# Patient Record
Sex: FEMALE | Race: BLACK OR AFRICAN AMERICAN | NOT HISPANIC OR LATINO | Employment: FULL TIME | URBAN - METROPOLITAN AREA
[De-identification: names, ages, dates, MRNs, and addresses within clinical notes are randomized per-mention and may not be internally consistent; named-entity substitution may affect disease eponyms.]

---

## 2017-03-29 ENCOUNTER — OFFICE VISIT (OUTPATIENT)
Dept: SLEEP MEDICINE | Facility: CLINIC | Age: 57
End: 2017-03-29
Payer: COMMERCIAL

## 2017-03-29 VITALS
SYSTOLIC BLOOD PRESSURE: 159 MMHG | WEIGHT: 293 LBS | DIASTOLIC BLOOD PRESSURE: 89 MMHG | BODY MASS INDEX: 50.02 KG/M2 | HEART RATE: 82 BPM | HEIGHT: 64 IN

## 2017-03-29 DIAGNOSIS — G47.00 INSOMNIA, UNSPECIFIED TYPE: Primary | ICD-10-CM

## 2017-03-29 PROCEDURE — 1160F RVW MEDS BY RX/DR IN RCRD: CPT | Mod: S$GLB,,, | Performed by: PSYCHIATRY & NEUROLOGY

## 2017-03-29 PROCEDURE — 99999 PR PBB SHADOW E&M-EST. PATIENT-LVL III: CPT | Mod: PBBFAC,,, | Performed by: PSYCHIATRY & NEUROLOGY

## 2017-03-29 PROCEDURE — 3079F DIAST BP 80-89 MM HG: CPT | Mod: S$GLB,,, | Performed by: PSYCHIATRY & NEUROLOGY

## 2017-03-29 PROCEDURE — 99214 OFFICE O/P EST MOD 30 MIN: CPT | Mod: S$GLB,,, | Performed by: PSYCHIATRY & NEUROLOGY

## 2017-03-29 PROCEDURE — 3077F SYST BP >= 140 MM HG: CPT | Mod: S$GLB,,, | Performed by: PSYCHIATRY & NEUROLOGY

## 2017-03-29 RX ORDER — TEMAZEPAM 15 MG/1
CAPSULE ORAL
Qty: 30 CAPSULE | Refills: 1 | Status: SHIPPED | OUTPATIENT
Start: 2017-03-29 | End: 2017-07-25

## 2017-03-29 NOTE — PATIENT INSTRUCTIONS
CBTI-      [email or web address] -> look up restful sleep hypnosis.    Blue Light Filters for electronics at night    1 Melatonin and 1 Altoril - OK to combine with Trazodone    Omega/Mg oil    I will add Restoril (Temazepam) 15 mg 1 or 2 pills

## 2017-03-29 NOTE — PROGRESS NOTES
"   Yulissa Hatfield 56 y.o. female returns for management of obstructive sleep apnea and CPAP equipment check. Last seen in clinic by Dr. Fisher 09/07/2016. This is her initial visit with me.     10/23/2015 INITIAL HISTORY OF PRESENT ILLNESS:  Yulissa Hatfield is a 56 y.o. female is here to be evaluated for a sleep disorder.       CHIEF COMPLAINT:        The patient has mentioned difficulty falling and staying asleep.    The patient states that she has already made some changes in regard to sleep hygiene, making sure that the bedroom is dark, features comfortable temperature and humidity level. The patient does watch TV and read in bed before turning light off. she avoids going to bed before she is sleepy and stays in bed if not asleep within 30 minutes. she avoids clock watching and worries about her ability to fall asleep.     The patient has tried the following sleeping aids: Lunesta (did not work), Melatonin and Benadryl helps to fall, but not stay asleep. Belsomra - mouth trembling at 10 mg. Restoril - "crazy dreams".     The patient's complaints include excessive daytime sleepiness, excessive daytime fatigue and snoring.  Denied choking, gasping for air, witnessed paneas.      Denies  dry mouth and sore throat  Denies nasal congestion   Reports  morning headaches  Reports  interrupted sleep  Reports frequent leg movements  Denies symptoms concerning for parasomnia    The ESS (Harbinger Sleepiness Score) taken on initial visit is 3 /24    The patient never had tonsillectomy, adenoidectomy or UPPP     She had a previous negative experience with PSG and an attempt of CPAP titration (combination of factors).      INTERVAL HISTORY:    03/24/2016 Dr. Fisher:  The patient has not presented any new complaints since the previous visit. Did not tolerate Trazodone - 50 mg - reports "crazy dreams", at least it gave her 4 hours of sleep. Not taking Xanax for years.  Still having has a lot of sleep related anxiety. " Using different mediations. Does sleepy time tea, meditation, bedroom dark and cold, no caffeine after lunch. Tried sleep meditation 3-4 times over the last week with progressive muscle relaxation.   Not watching the clock. A lot of her work is on her mind when going to bed.  We just recently got her sleep study. APAP was ordered on 3/18 - has not received it yet.   ESS 3/24.    05/18/2016 Dr. Fisher: She has been getting used to CPAP - still taking mask off on the early morning. Modest improvement in continuity. Still feeling sleepy and tired though.  Does not like Milagro View mask - eye leaks.    CPAP pressure: 9-18 cm H2O  Mask comfort / fit:  Milagro View    Pressure tolerance: OK   Humidification: OK yes  CPAP Interrogation:    Ave daily usage:5./6 hours /7days  Ave daily usage:5 hours /30days  Days >4 hours usage: 5 /7 days  Days >4 hours usage: 25/30 days  (86% compliance)  Machine condition: good     90-%tile pressure: 13-14 cm H2O  Large leak 20-28 L/min  AHI 0.8      09/07/2016 Dr. Fisher: States that her sleep schedule and diet have been off track. We are discussing her PSG - some trends suggest complex apnea, though she is doing much better on her side. Taking 1 hr to fall asleep. Sleep continuity improved. No problem going back to sleep. Trazodone  mg did not work.     Waking up with a headache. Reports left side of the face droopy, but headache is not unilateral. No weakness/numbeness in the body, no trouble walking. Reports dizziness.     Tried meditation.    Trying to turn TV off and keep electronics away.     Consistency is the key.    In the past - PM, valerian root;      Not going to the kitchen anymore.    90% pressure - 12-15        Compliance Summary  Apnea Indices  Ventilator Statistics    Days with Device Usage:  30 days  Average AHI:  0.9  Average Breath Rate:  N/A    Percentage of Days >=4 Hours:  100.0%  Average OA Index:  0.3  Average % Patient Triggered Breaths:  N/A    Average  Usage (Days Used):  7 hrs. 50 mins. 21 secs.  Average CA Index:  0.0  Average Tidal Volume:  N/A    Average Usage (All Days):  7 hrs. 50 mins. 21 secs.    Average Minute Vent:  N/A        Large Leak  Periodic Breathing     Average Time in Large Leak:  6 mins. 28 secs.  Average % of Night in PB:  0.1%     Average % of Night in Large Leak:  1.4%               12/06/2016 LETICIA Grimm NP: Pt returns today in follow-up. Reports that sleep schedule is much better and she's following better sleep hygiene. She rarely uses sleep meds now. She has increased her exercise and is now doing it more consistently. She has also been meditating more frequently. Denies breakthrough symptoms on CPAP. Only complaint since last clinic visit is that ramp time of 20 minutes does not seem to be long enough, as she usually falls asleep 30+ minutes. ESS 1.     CPAP pressure: 12 - 18 cm H2O  Mask comfort / fit:  Milagro View    Pressure tolerance: OK   Humidification: OK yes  CPAP Interrogation:    Blower hours: 1791.7   Therapy hours: 1644.9  Ave daily usage: 7 hours /7days  Ave daily usage: 6.3 hours /30days  Days >4 hours usage: 26/30 days    Machine condition: good   90-% pressure: 13.4 cm H2O  AHI 1.2      03/29/2017:  Still interrupted sleep - Taking 2 Alteril and 2 5 mg Melatonin IR. Still compliant with CPAP.   Started line dancing classed and gym + meditation class.  Showed me her Fitbit download. ESS 6/24.    CPAP 12-18  Dreamwear mask  90% pressure - 12    Therapy Event Summary (Last 14 Days)     Hide      Compliance Summary  Apnea Indices  Ventilator Statistics    Days with Device Usage:  14 days  Average AHI:  0.5  Average Breath Rate:  18.9 bpm    Percentage of Days >=4 Hours:  100.0%  Average OA Index:  0.2  Average % Patient Triggered Breaths:  N/A    Average Usage (Days Used):  7 hrs. 18 mins. 48 secs.  Average CA Index:  0.0  Average Tidal Volume:  394.4 ml    Average Usage (All Days):  7 hrs. 18 mins. 48 secs.    Average Minute  Vent:  N/A        Large Leak  Periodic Breathing     Average Time in Large Leak:  3 mins. 17 secs.  Average % of Night in PB:  0.0%     Average % of Night in Large Leak:  0.7%                    SLEEP ROUTINE AND LIFESTYLE :    Occupation:3 degrees - doing social work now.  She lost all her kids in an accident 13 years ago.    Gained 150 lbs since that time.   Bed partner: 10-11 pm  Time to bed: 2-3 hrs -> now 1 hr  Sleep onset latency: 45 min  Disruptions or awakenings: 3-4 ->2  Time to fall back into sleep: 45 min-60 min->30 min  Wakeup time: 6:30-7AM   Perceived sleep quality: 0/5  Perceived total sleep time:  3-4  hours.  Daytime naps: 0  Weekend sleep routine: till 7:30  Exercise routine: yes  Caffeine: no     PREVIOUS SLEEP STUDIES:     PSG 9/17/15: Significant Obstructive sleep apnea (DANIELLE) with AHI (apnea hypopnea Index) of 52 and SaO2 of 81 (weight  336 lbs).    PAST MEDICAL HISTORY:    Active Ambulatory Problems     Diagnosis Date Noted    LUQ abdominal pain 2013    Primary localized osteoarthrosis, lower leg 2014    DANIELLE (obstructive sleep apnea)     Essential hypertension 2016    Varicose veins of both lower extremities with pain 2016     Resolved Ambulatory Problems     Diagnosis Date Noted    No Resolved Ambulatory Problems     Past Medical History:   Diagnosis Date    HTN (hypertension)     Hyperlipidemia     Hypothyroid                 PAST SURGICAL HISTORY:    Past Surgical History:   Procedure Laterality Date    CARPAL TUNNEL RELEASE       SECTION, CLASSIC      x 3    ENDOMETRIAL ABLATION      FOOT SURGERY      KNEE SURGERY      THYROID SURGERY           FAMILY HISTORY:                Family History   Problem Relation Age of Onset    Heart disease Mother     Heart disease Father        SOCIAL HISTORY:          Tobacco:   History   Smoking Status    Never Smoker   Smokeless Tobacco    Never Used       alcohol use:    History   Alcohol Use No            "        ALLERGIES:    Review of patient's allergies indicates:   Allergen Reactions    Cortisone Rash       CURRENT MEDICATIONS:    Current Outpatient Prescriptions   Medication Sig Dispense Refill    fish oil-omega-3 fatty acids 300-1,000 mg capsule Take 2 g by mouth once daily.      levothyroxine (SYNTHROID) 125 MCG tablet Take 125 mcg by mouth once daily.      multivitamin capsule Take 1 capsule by mouth once daily.      valsartan-hydrochlorothiazide (DIOVAN-HCT) 80-12.5 mg per tablet       diclofenac sodium (VOLTAREN) 1 % Gel Apply 2 g topically 4 (four) times daily. 100 g 5     No current facility-administered medications for this visit.                       REVIEW OF SYSTEMS:   Sleep related symptoms as per HPI    reports weight gain 150 lbs   Denies dyspnea  Denies palpitations  Denies acid reflux   Reports polyuria  Reports  mood diturbance  Denies  anemia  Denies  muscle pain  Denies  Gait imbalance    Otherwise, a balance of 10 systems reviewed is negative.    PHYSICAL EXAM:  BP (!) 159/89 (BP Location: Right arm, Patient Position: Sitting, BP Method: Automatic)  Pulse 82  Ht 5' 4" (1.626 m)  Wt (!) 149 kg (328 lb 7.8 oz)  BMI 56.38 kg/m2  GENERAL: Overweight body habitus, well groomed.    ASSESSMENT:    Obstructive sleep apnea, severe by AHI with prior symptoms of snoring, excessive daytime sleepiness, and fatigue, now resolved with CPAP use. The patient is adherent on CPAP and experiencing symptomatic benefit. Medical co-mobidities: insomnia.     Insomnia NEC. Multi-factorial -  excess time in bed, poor sleep hygiene, and likely paradoxical insomnia play a role. Improved on CPAP and better sleep hygiene.       PLAN:    Continue CPAP 12-18 cm H2O    CBTI-      [email or web address] -> look up restful sleep hypnosis.    Blue Light Filters for electronics at night    1 Melatonin and 1 Altoril - OK to combine with Trazodone    Omega/Mg oil    I will add Restoril (Temazepam) 15 mg 1 or 2 " pills    -Education: During our discussion today, we talked about the etiology of obstructive sleep apnea as well as the potential ramifications of untreated sleep apnea, which could include daytime sleepiness, hypertension, heart disease and/or stroke.  We discussed potential treatment options, which could include weight loss, body positioning, continuous positive airway pressure (CPAP), or referral for surgical consideration. The patient preferred CPAP option.    She should avoid ETOH and sedatives at night, as it tends to aggravate DANIELLE. Regular replacement of CPAP mask, tubing and filter was recommended.    Precautions: The patient was advised to abstain from driving should he feel sleepy or drowsy.

## 2017-03-29 NOTE — MR AVS SNAPSHOT
[facility]   [place] [address]  Phone: [phone]  Fax: [phone]                  Yulissa Hatfield   3/29/2017 1:00 PM   Office Visit    Description:  Female : 1960   Provider:  Constance Fisher MD   Department:  [facility]           Reason for Visit     Sleep Apnea           Diagnoses this Visit        Comments    Insomnia, unspecified type    -  Primary            To Do List           Goals (5 Years of Data)     None       These Medications        Disp Refills Start End    temazepam (RESTORIL) 15 mg Cap 30 capsule 1 3/29/2017     1 pill PO PRN bedtime    Pharmacy: [facility]/pharmacy #[id number] - [place] - [address] [facility] #: [phone]         [facility] On Call     [facility] On Call Nurse Care Line -  Assistance  Registered nurses in the [facility] Center provide clinical advisement, health education, appointment booking, and other advisory services.  Call for this free service at [phone].             Medications           Message regarding Medications     Verify the changes and/or additions to your medication regime listed below are the same as discussed with your clinician today.  If any of these changes or additions are incorrect, please notify your healthcare provider.        START taking these NEW medications        Refills    temazepam (RESTORIL) 15 mg Cap 1    Si pill PO PRN bedtime    Class: Normal           Verify that the below list of medications is an accurate representation of the medications you are currently taking.  If none reported, the list may be blank. If incorrect, please contact your healthcare provider. Carry this list with you in case of emergency.           Current Medications     fish oil-omega-3 fatty acids 300-1,000 mg capsule Take 2 g by mouth once daily.    levothyroxine (SYNTHROID) 125 MCG tablet Take 125 mcg by mouth once daily.    multivitamin capsule Take 1 capsule by mouth once daily.     "valsartan-hydrochlorothiazide (DIOVAN-HCT) 80-12.5 mg per tablet     diclofenac sodium (VOLTAREN) 1 % Gel Apply 2 g topically 4 (four) times daily.    temazepam (RESTORIL) 15 mg Cap 1 pill PO PRN bedtime           Clinical Reference Information           Your Vitals Were     BP Pulse Height Weight BMI    159/89 (BP Location: Right arm, Patient Position: Sitting, BP Method: Automatic) 82 5' 4" (1.626 m) 149 kg (328 lb 7.8 oz) 56.38 kg/m2      Blood Pressure          Most Recent Value    BP  (!)  159/89      Allergies as of 3/29/2017     Cortisone      Immunizations Administered on Date of Encounter - 3/29/2017     None      Instructions    CBTI-      [email or web address] -> look up restful sleep hypnosis.    Blue Light Filters for electronics at night    1 Melatonin and 1 Altoril - OK to combine with Trazodone    Omega/Mg oil    I will add Restoril (Temazepam) 15 mg 1 or 2 pills         Language Assistance Services     ATTENTION: Language assistance services are available, free of charge. Please call [phone].      ATENCIÓN: Si habla español, tiene a mckeon disposición servicios gratuitos de asistencia lingüística. Llame al [phone].     DELPHINE Ý: N?u b?n nói Ti?ng Vi?t, có các d?ch v? h? tr? ngôn ng? mi?n phí dành cho b?n. G?i s? [phone].         [facility] complies with applicable Federal civil rights laws and does not discriminate on the basis of race, color, national origin, age, disability, or sex.        "

## 2017-05-11 ENCOUNTER — TELEPHONE (OUTPATIENT)
Dept: SLEEP MEDICINE | Facility: CLINIC | Age: 57
End: 2017-05-11

## 2017-05-11 DIAGNOSIS — G47.33 OSA (OBSTRUCTIVE SLEEP APNEA): Primary | ICD-10-CM

## 2017-07-25 ENCOUNTER — OFFICE VISIT (OUTPATIENT)
Dept: ALLERGY | Facility: CLINIC | Age: 57
End: 2017-07-25
Payer: COMMERCIAL

## 2017-07-25 ENCOUNTER — LAB VISIT (OUTPATIENT)
Dept: LAB | Facility: HOSPITAL | Age: 57
End: 2017-07-25
Payer: COMMERCIAL

## 2017-07-25 VITALS
BODY MASS INDEX: 48.82 KG/M2 | HEIGHT: 65 IN | WEIGHT: 293 LBS | SYSTOLIC BLOOD PRESSURE: 138 MMHG | DIASTOLIC BLOOD PRESSURE: 102 MMHG | HEART RATE: 72 BPM

## 2017-07-25 DIAGNOSIS — I10 ESSENTIAL HYPERTENSION: ICD-10-CM

## 2017-07-25 DIAGNOSIS — T78.3XXA ANGIOEDEMA, INITIAL ENCOUNTER: Primary | ICD-10-CM

## 2017-07-25 DIAGNOSIS — T78.3XXA ANGIOEDEMA, INITIAL ENCOUNTER: ICD-10-CM

## 2017-07-25 DIAGNOSIS — G47.33 OSA (OBSTRUCTIVE SLEEP APNEA): ICD-10-CM

## 2017-07-25 DIAGNOSIS — J31.0 OTHER CHRONIC RHINITIS: ICD-10-CM

## 2017-07-25 DIAGNOSIS — E03.9 HYPOTHYROIDISM, UNSPECIFIED TYPE: ICD-10-CM

## 2017-07-25 LAB
25(OH)D3+25(OH)D2 SERPL-MCNC: 17 NG/ML
ALBUMIN SERPL BCP-MCNC: 3.9 G/DL
ALP SERPL-CCNC: 80 U/L
ALT SERPL W/O P-5'-P-CCNC: 38 U/L
ANION GAP SERPL CALC-SCNC: 9 MMOL/L
AST SERPL-CCNC: 24 U/L
BILIRUB SERPL-MCNC: 0.4 MG/DL
BUN SERPL-MCNC: 19 MG/DL
CALCIUM SERPL-MCNC: 9.5 MG/DL
CHLORIDE SERPL-SCNC: 104 MMOL/L
CO2 SERPL-SCNC: 27 MMOL/L
CREAT SERPL-MCNC: 0.7 MG/DL
ERYTHROCYTE [DISTWIDTH] IN BLOOD BY AUTOMATED COUNT: 15.1 %
EST. GFR  (AFRICAN AMERICAN): >60 ML/MIN/1.73 M^2
EST. GFR  (NON AFRICAN AMERICAN): >60 ML/MIN/1.73 M^2
GLUCOSE SERPL-MCNC: 104 MG/DL
HCT VFR BLD AUTO: 43.6 %
HGB BLD-MCNC: 14.1 G/DL
IGE SERPL-ACNC: 97 IU/ML
MCH RBC QN AUTO: 27 PG
MCHC RBC AUTO-ENTMCNC: 32.3 G/DL
MCV RBC AUTO: 84 FL
NEUTROPHILS # BLD AUTO: 3.1 K/UL
PLATELET # BLD AUTO: 245 K/UL
PMV BLD AUTO: 11.2 FL
POTASSIUM SERPL-SCNC: 4.2 MMOL/L
PROT SERPL-MCNC: 8 G/DL
RBC # BLD AUTO: 5.22 M/UL
SODIUM SERPL-SCNC: 140 MMOL/L
THYROGLOB AB SERPL IA-ACNC: <4 IU/ML
THYROPEROXIDASE IGG SERPL-ACNC: <6 IU/ML
TSH SERPL DL<=0.005 MIU/L-ACNC: 1.86 UIU/ML
WBC # BLD AUTO: 5.78 K/UL

## 2017-07-25 PROCEDURE — 80053 COMPREHEN METABOLIC PANEL: CPT

## 2017-07-25 PROCEDURE — 83520 IMMUNOASSAY QUANT NOS NONAB: CPT

## 2017-07-25 PROCEDURE — 86800 THYROGLOBULIN ANTIBODY: CPT

## 2017-07-25 PROCEDURE — 84165 PROTEIN E-PHORESIS SERUM: CPT | Mod: 26,,, | Performed by: PATHOLOGY

## 2017-07-25 PROCEDURE — 82785 ASSAY OF IGE: CPT

## 2017-07-25 PROCEDURE — 86003 ALLG SPEC IGE CRUDE XTRC EA: CPT | Mod: 59

## 2017-07-25 PROCEDURE — 85027 COMPLETE CBC AUTOMATED: CPT

## 2017-07-25 PROCEDURE — 88184 FLOWCYTOMETRY/ TC 1 MARKER: CPT

## 2017-07-25 PROCEDURE — 99999 PR PBB SHADOW E&M-EST. PATIENT-LVL III: CPT | Mod: PBBFAC,,, | Performed by: ALLERGY & IMMUNOLOGY

## 2017-07-25 PROCEDURE — 82306 VITAMIN D 25 HYDROXY: CPT

## 2017-07-25 PROCEDURE — 84443 ASSAY THYROID STIM HORMONE: CPT

## 2017-07-25 PROCEDURE — 99204 OFFICE O/P NEW MOD 45 MIN: CPT | Mod: S$GLB,,, | Performed by: ALLERGY & IMMUNOLOGY

## 2017-07-25 PROCEDURE — 86376 MICROSOMAL ANTIBODY EACH: CPT

## 2017-07-25 PROCEDURE — 86003 ALLG SPEC IGE CRUDE XTRC EA: CPT

## 2017-07-25 PROCEDURE — 36415 COLL VENOUS BLD VENIPUNCTURE: CPT

## 2017-07-25 PROCEDURE — 84165 PROTEIN E-PHORESIS SERUM: CPT

## 2017-07-25 RX ORDER — MULTIVIT WITH IRON,MINERALS
TABLET ORAL DAILY
COMMUNITY
End: 2018-08-22

## 2017-07-25 RX ORDER — PHENYLEPHRINE HCL 10 MG
500 TABLET ORAL DAILY
COMMUNITY
End: 2019-02-04

## 2017-07-25 NOTE — PROGRESS NOTES
"Yulissa Hatfield is a 52-year-old female who presents to clinic today for evaluation of tongue swelling. She is here alone.    She has had itching of her mouth after eating pineapple for many years. She avoids.    Around June 21 she had swelling of her tongue and lip that she associated with consumption of sea salt. She took Benadryl and the swelling resolved. Since then however she has continued to have a sensitive tongue without swelling. She has taken Benadryl off and on since the initial episode. She has not taken any in the past week.    She was out in a restaurant several weeks ago and had sea salt. She had a "strange feeling" on her tongue. She did not have any swelling.    She denies any urticaria.    When this episode occurred they have been changing floors in her house. She did see evidence of black mold when they were repaired. Her  developed bronchitis and eventually pneumonia. Her niece that lives with them developed an upper respiratory infection.    She also been under a lot of stress. Her nieces 4 children are living with them.    She has had a "knot on the lip" after eating crab once. She now takes Benadryl before sometimes eating.    She does have chronic "sinus" with pressure across her face, eye tearing, and nasal congestion.    She denies any cough, wheezing, or shortness of breath.    She has had some scalp itching on several occasions after using hair dye particularly if she lets this set for a prolonged period of time.    She does have sleep apnea and uses CPAP.    She has hypertension and takes Diovan.    She does have hypothyroidism and takes Synthroid. She thinks she had a total thyroidectomy for a nodule on her parathyroid.    She has pain in both knees and takes Tylenol as needed.    She works as a  in [place].    [facility] PEQ ALLERGY QUESTIONNAIRE LONG 7/24/2017   Do you have symptoms in your head, eyes, ears, nose, or sinuses? Yes   Head or facial pain: Sinus pressure "   Please describe your head and/or facial pain, if applicable.  sinus pressure   Eyes: Tearing   Do you have difficulty wearing contacts, if applicable?  No   Ears: No symptoms   Nose: Snoring   Throat: No symptoms   Sinuses: No symptoms   Do you have symptoms in your lungs?  Yes   Lungs: Apnea or sleep apnea   Was your last chest x-ray normal or abnormal, if applicable? Normal   Have you ever has a tuberculosis skin test?  Yes   Was your tuberculosis skin test positive or negative, if applicable? Negative   Have you ever had a lung-function test? No   Have you had a flu shot this year? No   Have you had the pneumonia vaccine?  No   Do you have any known problems with your immune system? No   Do you suspect you may have problems with your immune system? No   Do you have frequent infections? No   Do you have skin symptoms? Yes   Skin: Itching   When did your hives and/or swelling first begin? 3 weeks ago   Please note the frequency of hives and/or swelling in days, weeks OR months. 2-3   Body location most commonly affected by hives: mouth, tongue   Body location most commonly affected by swelling: Tongue   What are the substances, contacts, activity, foods, or drugs, which you think are related to hives or swelling? foods, environment   Which medications have been helpful in controlling hives or swelling? benedrayl, atarax   Are you taking this medication regularly? No   Have you associated the hives or swelling with any of the following? Not applicable   Have you had any other associated symptoms with the hives or swelling such as: Not applicable   When did these symptoms first occur? 3-4 weeks ago   Are they getting worse or better? Worse   How often do these symptoms occur? depends   When do these symptoms occur? infrequently, sea salt products eaten   Do they occur year round? No   If there is any seasonal variation in your symptoms, when are they worse? no   Is there a particular time of the day or night when  the symptoms are worse? unsure   Is there anything you have identified, which can cause symptoms or make them worse? (such as dust, grass, plant or animal products, mold, heat, cold, strong odors, exercise) some eye make-up, lipsticks, hair dye   Is there anything you have identified, which can make symptoms better?  no   What medications have you tried in the past to help control these symptoms?  Benadryl   Please list all the vitamins or herbal medications you are taking. flax seed, cod liver oil, garlic, cinnamon, multi vitamin,   Please list all the other medications you are taking, including over-the-counter medications. Tylenol, diovan 80/12.5, levothyroxine 125 mcg   Have you ever seen an allergist for these symptoms? No   Have you ever had skin tests? Yes   When did you have skin tests, if applicable? 13 years ago   Have you ever had any other type of allergy testing? No   Have you ever had allergy shots? No   Do you have food allergies? Yes   Please list the food(s), type of reaction(s) and last date of reaction(s) sea salt 7/19/17 , pineapple, some seafood,   Do you have drug allergies? Yes   Please list the drug(s), type of reaction(s), last date of reaction(s), and if you have used the medication since discovering you are allergic.  topical cortisone, break out redness itching, certain creams prescribed by dermatologist   Do you have insect allergies? No   Please list the insect](s), type of reaction(s), last date of reaction(s), and whether or not you went to the emergency as a result.  nka   Do you have latex allergies? No   Constitution: No symptoms   Cardiovascular: Leg swelling   Gastrointestinal: No symptoms   Genital/ urinary No symptoms   Musculoskeletal: Muscle pain, Joint pain, Joint swelling   Endocrine: No symptoms   Hematologic: No symptoms   Please note which family members have allergies or asthma and specify which they have. none   How long have you lived at your current address? 3 1/2  years   Describe the damage caused by the water damage and the repairs to fix it, if applicable.  Not flood damage but had to change wooden jennifer due to moisture.   Is there any evidence of mold in the house? Yes   Does your house have: Central air conditioning   Does your bedroom have: Ceiling fan, Potted plants   What type of pillow do you have, for example feather, foam and fiberfill?  foam, feather, fiberfil   Do you have pets? No   Does anyone in the house smoke? No   What is your occupation?    Do any of the symptoms increase at school or work? Please specify which symptoms, if applicable.  yes   Do you suspect anything at work or school, which may be causing your symptoms? If so, please elaborate.  yes, old building that has know asbestos, humidity issues, ventilation concerns   Did you find this questionnaire helpful in addressing your symptoms?  Yes     Physical Examination:  General: Well-developed, well-nourished, no acute distress. 339 pounds.  Head: No sinus tenderness.  Eyes: Conjunctivae:  No bulbar or palpebral conjunctival injection.  Ears: EAC's clear.  TM's clear.  No pre-auricular nodes.  Nose: Nasal Mucosa:  Pink.  Septum: No apparent deviation.  Turbinates:  No significant edema.  Polyps/Mass:  None visible.  Teeth/Gums:  No bleeding noted.  Oropharynx: No exudates.  Neck: Supple without thyromegaly. No cervical lymphadenopathy.    Respiratory/Chest: Effort: Good.  Auscultation:  Clear bilaterally.  Cardiovascular:  No murmur, rubs, or gallop heard.   GI:  Non-tender.  No masses.  No organomegaly.  Extremities:  No swelling.  No cyanosis, clubbing, or edema.  Skin: Good turgor.  No urticaria or angioedema.  Neuro/Psych: Oriented x 3.    Assessment:  1. Tongue swelling of uncertain etiology.  2. Consider food allergy.  3. Chronic rhinitis, consider allergic.  4. Sleep apnea.  5. Hypothyroidism on replacement.  6. DJD, prob.  7. Hypertension on Diovan.    Recommendations:  1.  Laboratory as ordered.  2. Take pictures of any further swelling or urticaria.  3. Antihistamines as needed.  4. Return to clinic in 1 week.

## 2017-07-26 LAB
ALBUMIN SERPL ELPH-MCNC: 4.07 G/DL
ALPHA1 GLOB SERPL ELPH-MCNC: 0.3 G/DL
ALPHA2 GLOB SERPL ELPH-MCNC: 0.97 G/DL
B-GLOBULIN SERPL ELPH-MCNC: 0.96 G/DL
GAMMA GLOB SERPL ELPH-MCNC: 1.29 G/DL
PATHOLOGIST INTERPRETATION SPE: NORMAL
PROT SERPL-MCNC: 7.6 G/DL
TRYPTASE LEVEL: 4.3 NG/ML

## 2017-07-27 LAB
A ALTERNATA IGE QN: <0.35 KU/L
A FUMIGATUS IGE QN: <0.35 KU/L
BERMUDA GRASS IGE QN: <0.35 KU/L
CAT DANDER IGE QN: 5.65 KU/L
CEDAR IGE QN: <0.35 KU/L
CRAB IGE QN: <0.35 KU/L
CRAWFISH IGE QN: <0.35 KU/L
D FARINAE IGE QN: 13.7 KU/L
D PTERONYSS IGE QN: 12.8 KU/L
DEPRECATED A ALTERNATA IGE RAST QL: NORMAL
DEPRECATED A FUMIGATUS IGE RAST QL: NORMAL
DEPRECATED BERMUDA GRASS IGE RAST QL: NORMAL
DEPRECATED CAT DANDER IGE RAST QL: ABNORMAL
DEPRECATED CEDAR IGE RAST QL: NORMAL
DEPRECATED CRAB IGE RAST QL: NORMAL
DEPRECATED CRAWFISH IGE RAST QL: NORMAL
DEPRECATED D FARINAE IGE RAST QL: ABNORMAL
DEPRECATED D PTERONYSS IGE RAST QL: ABNORMAL
DEPRECATED DOG DANDER IGE RAST QL: ABNORMAL
DEPRECATED ENGL PLANTAIN IGE RAST QL: NORMAL
DEPRECATED LOBSTER IGE RAST QL: NORMAL
DEPRECATED MARSH ELDER IGE RAST QL: NORMAL
DEPRECATED PINEAPPLE IGE RAST QL: NORMAL
DEPRECATED ROACH IGE RAST QL: NORMAL
DEPRECATED SHRIMP IGE RAST QL: ABNORMAL
DEPRECATED TIMOTHY IGE RAST QL: NORMAL
DEPRECATED WHITE OAK IGE RAST QL: NORMAL
DOG DANDER IGE QN: 0.96 KU/L
ENGL PLANTAIN IGE QN: <0.35 KU/L
LOBSTER IGE QN: <0.35 KU/L
MARSH ELDER IGE QN: <0.35 KU/L
PINEAPPLE IGE QN: <0.35 KU/L
RAGWEED, WESTERN IGE: <0.35 KU/L
RAGWEED, WESTERN, CLASS: NORMAL
ROACH IGE QN: <0.35 KU/L
SHRIMP IGE QN: 0.59 KU/L
TIMOTHY IGE QN: <0.35 KU/L
WHITE OAK IGE QN: <0.35 KU/L

## 2017-08-03 LAB
CIU ASSOCIATED BASOPHIL ACTIVATION: 5
IGE RECEPTOR ANTIBODY: NORMAL

## 2017-08-15 ENCOUNTER — OFFICE VISIT (OUTPATIENT)
Dept: ALLERGY | Facility: CLINIC | Age: 57
End: 2017-08-15
Payer: COMMERCIAL

## 2017-08-15 VITALS
BODY MASS INDEX: 48.82 KG/M2 | HEART RATE: 72 BPM | SYSTOLIC BLOOD PRESSURE: 130 MMHG | HEIGHT: 65 IN | DIASTOLIC BLOOD PRESSURE: 62 MMHG | WEIGHT: 293 LBS

## 2017-08-15 DIAGNOSIS — T78.3XXD ANGIOEDEMA, SUBSEQUENT ENCOUNTER: Primary | ICD-10-CM

## 2017-08-15 DIAGNOSIS — J30.89 CHRONIC NONSEASONAL ALLERGIC RHINITIS DUE TO OTHER ALLERGEN: ICD-10-CM

## 2017-08-15 DIAGNOSIS — I10 ESSENTIAL HYPERTENSION: ICD-10-CM

## 2017-08-15 DIAGNOSIS — G47.33 OSA (OBSTRUCTIVE SLEEP APNEA): ICD-10-CM

## 2017-08-15 PROCEDURE — 3075F SYST BP GE 130 - 139MM HG: CPT | Mod: S$GLB,,, | Performed by: ALLERGY & IMMUNOLOGY

## 2017-08-15 PROCEDURE — 3078F DIAST BP <80 MM HG: CPT | Mod: S$GLB,,, | Performed by: ALLERGY & IMMUNOLOGY

## 2017-08-15 PROCEDURE — 99999 PR PBB SHADOW E&M-EST. PATIENT-LVL III: CPT | Mod: PBBFAC,,, | Performed by: ALLERGY & IMMUNOLOGY

## 2017-08-15 PROCEDURE — 99214 OFFICE O/P EST MOD 30 MIN: CPT | Mod: S$GLB,,, | Performed by: ALLERGY & IMMUNOLOGY

## 2017-08-15 PROCEDURE — 3008F BODY MASS INDEX DOCD: CPT | Mod: S$GLB,,, | Performed by: ALLERGY & IMMUNOLOGY

## 2017-08-15 RX ORDER — ERGOCALCIFEROL 1.25 MG/1
50000 CAPSULE ORAL
Qty: 12 CAPSULE | Refills: 1 | Status: SHIPPED | OUTPATIENT
Start: 2017-08-15 | End: 2019-02-15

## 2017-08-18 ENCOUNTER — OFFICE VISIT (OUTPATIENT)
Dept: SLEEP MEDICINE | Facility: CLINIC | Age: 57
End: 2017-08-18
Payer: COMMERCIAL

## 2017-08-18 VITALS
DIASTOLIC BLOOD PRESSURE: 86 MMHG | HEIGHT: 65 IN | SYSTOLIC BLOOD PRESSURE: 149 MMHG | BODY MASS INDEX: 48.82 KG/M2 | WEIGHT: 293 LBS | HEART RATE: 73 BPM

## 2017-08-18 DIAGNOSIS — F41.9 ANXIETY: ICD-10-CM

## 2017-08-18 DIAGNOSIS — G47.33 OSA (OBSTRUCTIVE SLEEP APNEA): Primary | ICD-10-CM

## 2017-08-18 DIAGNOSIS — G47.00 INSOMNIA, UNSPECIFIED TYPE: ICD-10-CM

## 2017-08-18 PROCEDURE — 3079F DIAST BP 80-89 MM HG: CPT | Mod: S$GLB,,, | Performed by: PSYCHIATRY & NEUROLOGY

## 2017-08-18 PROCEDURE — 3008F BODY MASS INDEX DOCD: CPT | Mod: S$GLB,,, | Performed by: PSYCHIATRY & NEUROLOGY

## 2017-08-18 PROCEDURE — 99999 PR PBB SHADOW E&M-EST. PATIENT-LVL III: CPT | Mod: PBBFAC,,, | Performed by: PSYCHIATRY & NEUROLOGY

## 2017-08-18 PROCEDURE — 99214 OFFICE O/P EST MOD 30 MIN: CPT | Mod: S$GLB,,, | Performed by: PSYCHIATRY & NEUROLOGY

## 2017-08-18 PROCEDURE — 3077F SYST BP >= 140 MM HG: CPT | Mod: S$GLB,,, | Performed by: PSYCHIATRY & NEUROLOGY

## 2017-08-18 NOTE — PATIENT INSTRUCTIONS
"[employer] Black Seed Oil - 24 oz (24 oz)   [employer]   4.3 out of 5 stars 165 customer reviews    12 answered questions                   About the product   100% PURE HOLISTIC SUPPORT - Premium Black Seed Oil Extract Supports Immunity & Boosts Inflammatory Health with its Natural Rejuvenating Properties; Safe, Gentle Everyday Dietary Supplement   MAXIMUM THYMOQUINONE POWER - 3rd Party Tested & Certified to Contain 0.95% Naturally Occurring TQ; Delivers Rich Source of Essential Fatty Acids & Antioxidants to Restore Inflammatory Balance, Immunity & Youthful Energy   SAFE INGREDIENTS & PRACTICES - Liquid Supplement Uses Unrefined Virgin Nigella Sativa Derived from Organic [place] & [place] Sources; Solvent, BPA & Alcohol Free, Vegan & Vegetarian Safe, Non GMO, Halal & Naturally Gluten Free   VERSATILE ALTERNATIVE REMEDY - Enjoy Healthy Detox & Regular Support for Illness & Age-Related Disorders; Build Defense Against Fatigue, Eczema, Acne & Hair Loss; Great for Arthritis, Respiratory Ailments, Kidney, Liver, Heart & Weight Loss   CONVENIENT BULK SUPPLY - [employer] Now Brings You a Convenient 24 Oz Bottle Supply for a Total of 144 Servings; Simply Take 2 Teaspoons a Day for Maximum Benefits   More     Sponsored       ","url":"[email or web address]%6QLLynX2%6Eh17h47Qb5LcqYBVNWgwhayiNh1BDXxN9vb3%4BfRaU5Dga8PB9%2FsAqQCZ%4Thu3NlgzFKcVGgwCbYLaOG5nki7I01HtWbSu7HRxFhjMkQdCLqKyCXxqp%1Oqa64G8TgZD1wneYEmqLoFvAfPyU%6HR3uvaGz8u6qoB6mPZsYC8%2FO0Y0yG6pdMYZrL3M4%5R6OTwhjB1%8R4wB4Hl%5OghH81EtMyEmYR0GcMDfXgy2q2QKYyMj1%2FWnld%9N53Rvr0mJqWqQfZBHiztCC1Fs2vGCRdH%3J6JZkvvzbJJN0htQi3LUiweMlyno0uVnBj0lxyFJTac1nu8DUhlVy%3RwNKvlgEimdtIWy10Y0MFIzC%4TRPGBVBjlpdr2zQkJEZ%8Yz5Jv7L4GGIbmOEZzF0hNoGw0E%2F6vb5kCxmXEJYI7CO%2MmEt6ett0%4YKq8WK%0ZuA0JmUn7imOYuvn82keYBxAh8SrKEyII7xW315Vt8jmnbo%3D%3D"}"   Saw [facility]...    4.3 " out of 5 stars 35   $11.99$11.99($0.12/Count)$32.99   Prime   Get it by Tomorrow, Aug 19   FREE Shipping on eligible orders      [employer] [facility] DIM-plus Estrogen Metabolism Formula, Capsules...    4.5 out of 5 stars 63   $23.16$23.16($0.19/Count)$24.93     Get it by Tomorrow, Aug 19   FREE Shipping on eligible orders     Add to Cart    More options available:

## 2017-08-18 NOTE — PROGRESS NOTES
"   Yulissa Hatfield 56 y.o. female returns for management of obstructive sleep apnea and CPAP equipment check. Last seen in clinic by Dr. Fisher 09/07/2016. This is her initial visit with me.     10/23/2015 INITIAL HISTORY OF PRESENT ILLNESS:  Yulissa Hatfield is a 56 y.o. female is here to be evaluated for a sleep disorder.       CHIEF COMPLAINT:        The patient has mentioned difficulty falling and staying asleep.    The patient states that she has already made some changes in regard to sleep hygiene, making sure that the bedroom is dark, features comfortable temperature and humidity level. The patient does watch TV and read in bed before turning light off. she avoids going to bed before she is sleepy and stays in bed if not asleep within 30 minutes. she avoids clock watching and worries about her ability to fall asleep.     The patient has tried the following sleeping aids: Lunesta (did not work), Melatonin and Benadryl helps to fall, but not stay asleep. Belsomra - mouth trembling at 10 mg. Restoril - "crazy dreams".     The patient's complaints include excessive daytime sleepiness, excessive daytime fatigue and snoring.  Denied choking, gasping for air, witnessed paneas.      Denies  dry mouth and sore throat  Denies nasal congestion   Reports  morning headaches  Reports  interrupted sleep  Reports frequent leg movements  Denies symptoms concerning for parasomnia    The ESS ([place] Sleepiness Score) taken on initial visit is 3 /24    The patient never had tonsillectomy, adenoidectomy or UPPP     She had a previous negative experience with PSG and an attempt of CPAP titration (combination of factors).      INTERVAL HISTORY:    03/24/2016 Dr. Fisher:  The patient has not presented any new complaints since the previous visit. Did not tolerate Trazodone - 50 mg - reports "crazy dreams", at least it gave her 4 hours of sleep. Not taking Xanax for years.  Still having has a lot of sleep related anxiety. " Using different mediations. Does sleepy time tea, meditation, bedroom dark and cold, no caffeine after lunch. Tried sleep meditation 3-4 times over the last week with progressive muscle relaxation.   Not watching the clock. A lot of her work is on her mind when going to bed.  We just recently got her sleep study. APAP was ordered on 3/18 - has not received it yet.   ESS 3/24.    05/18/2016 Dr. Fisher: She has been getting used to CPAP - still taking mask off on the early morning. Modest improvement in continuity. Still feeling sleepy and tired though.  Does not like Milagro View mask - eye leaks.    CPAP pressure: 9-18 cm H2O  Mask comfort / fit:  Milagro View    Pressure tolerance: OK   Humidification: OK yes  CPAP Interrogation:    Ave daily usage:5./6 hours /7days  Ave daily usage:5 hours /30days  Days >4 hours usage: 5 /7 days  Days >4 hours usage: 25/30 days  (86% compliance)  Machine condition: good     90-%tile pressure: 13-14 cm H2O  Large leak 20-28 L/min  AHI 0.8      09/07/2016 Dr. Fisher: States that her sleep schedule and diet have been off track. We are discussing her PSG - some trends suggest complex apnea, though she is doing much better on her side. Taking 1 hr to fall asleep. Sleep continuity improved. No problem going back to sleep. Trazodone  mg did not work.     Waking up with a headache. Reports left side of the face droopy, but headache is not unilateral. No weakness/numbeness in the body, no trouble walking. Reports dizziness.     Tried meditation.    Trying to turn TV off and keep electronics away.     Consistency is the key.    In the past - PM, valerian root;      Not going to the kitchen anymore.    90% pressure - 12-15        Compliance Summary  Apnea Indices  Ventilator Statistics    Days with Device Usage:  30 days  Average AHI:  0.9  Average Breath Rate:  N/A    Percentage of Days >=4 Hours:  100.0%  Average OA Index:  0.3  Average % Patient Triggered Breaths:  N/A    Average  Usage (Days Used):  7 hrs. 50 mins. 21 secs.  Average CA Index:  0.0  Average Tidal Volume:  N/A    Average Usage (All Days):  7 hrs. 50 mins. 21 secs.    Average Minute Vent:  N/A        Large Leak  Periodic Breathing     Average Time in Large Leak:  6 mins. 28 secs.  Average % of Night in PB:  0.1%     Average % of Night in Large Leak:  1.4%               12/06/2016 LETICIA Grimm NP: Pt returns today in follow-up. Reports that sleep schedule is much better and she's following better sleep hygiene. She rarely uses sleep meds now. She has increased her exercise and is now doing it more consistently. She has also been meditating more frequently. Denies breakthrough symptoms on CPAP. Only complaint since last clinic visit is that ramp time of 20 minutes does not seem to be long enough, as she usually falls asleep 30+ minutes. ESS 1.     CPAP pressure: 12 - 18 cm H2O  Mask comfort / fit:  Milagro View    Pressure tolerance: OK   Humidification: OK yes  CPAP Interrogation:    Blower hours: 1791.7   Therapy hours: 1644.9  Ave daily usage: 7 hours /7days  Ave daily usage: 6.3 hours /30days  Days >4 hours usage: 26/30 days    Machine condition: good   90-% pressure: 13.4 cm H2O  AHI 1.2      03/29/2017:  Still interrupted sleep - Taking 2 Alteril and 2 5 mg Melatonin IR. Still compliant with CPAP.   Started line dancing classed and gym + meditation class.  Showed me her Fitbit download. ESS 6/24.    CPAP 12-18  Dreamwear mask  90% pressure - 12    Therapy Event Summary (Last 14 Days)     Hide      Compliance Summary  Apnea Indices  Ventilator Statistics    Days with Device Usage:  14 days  Average AHI:  0.5  Average Breath Rate:  18.9 bpm    Percentage of Days >=4 Hours:  100.0%  Average OA Index:  0.2  Average % Patient Triggered Breaths:  N/A    Average Usage (Days Used):  7 hrs. 18 mins. 48 secs.  Average CA Index:  0.0  Average Tidal Volume:  394.4 ml    Average Usage (All Days):  7 hrs. 18 mins. 48 secs.    Average Minute  Vent:  N/A        Large Leak  Periodic Breathing     Average Time in Large Leak:  3 mins. 17 secs.  Average % of Night in PB:  0.0%     Average % of Night in Large Leak:  0.7%              08/18/2017:   She has been CPAP compliant 12-18 cm H2). 90% was 13.7 cm. Not using Trazodone and even Melatonin anymore - just Magnesium.   She is trying to switch natural treatments around the year. She has not tried hemp seed oil - could not get the food grade one.     Therapy Event Summary (Last 14 Days)     Using Dreamwear.    Hide      Compliance Summary  Apnea Indices  Ventilator Statistics    Days with Device Usage:  14 days  Average AHI:  0.5  Average Breath Rate:  20.2 bpm    Percentage of Days >=4 Hours:  100.0%  Average OA Index:  0.2  Average % Patient Triggered Breaths:  N/A    Average Usage (Days Used):  7 hrs. 52 mins. 35 secs.  Average CA Index:  0.0  Average Tidal Volume:  384.4 ml    Average Usage (All Days):  7 hrs. 52 mins. 35 secs.    Average Minute Vent:  N/A        Large Leak  Periodic Breathing     Average Time in Large Leak:  1 mins. 43 secs.  Average % of Night in PB:  0.0%     Average % of Night in Large Leak:  0.4%                  SLEEP ROUTINE AND LIFESTYLE :    Occupation:3 degrees - doing social work now.  She lost all her kids in an accident 13 years ago.    Gained 150 lbs since that time.   Bed partner: 10-11 pm  Time to bed: 2-3 hrs -> now 1 hr  Sleep onset latency: 45 min  Disruptions or awakenings: 3-4 ->2  Time to fall back into sleep: 45 min-60 min->30 min  Wakeup time: 6:30-7AM   Perceived sleep quality: 0/5  Perceived total sleep time:  3-4  hours.  Daytime naps: 0  Weekend sleep routine: till 7:30  Exercise routine: yes  Caffeine: no     PREVIOUS SLEEP STUDIES:     PSG 9/17/15: Significant Obstructive sleep apnea (DANIELLE) with AHI (apnea hypopnea Index) of 52 and SaO2 of 81 (weight  336 lbs).    PAST MEDICAL HISTORY:    Active Ambulatory Problems     Diagnosis Date Noted    LUQ abdominal  pain 2013    Primary localized osteoarthrosis, lower leg 2014    DANIELLE (obstructive sleep apnea)     Essential hypertension 2016    Varicose veins of both lower extremities with pain 2016     Resolved Ambulatory Problems     Diagnosis Date Noted    No Resolved Ambulatory Problems     Past Medical History:   Diagnosis Date    HTN (hypertension)     Hyperlipidemia     Hypothyroid                 PAST SURGICAL HISTORY:    Past Surgical History:   Procedure Laterality Date    CARPAL TUNNEL RELEASE       SECTION, CLASSIC      x 3    ENDOMETRIAL ABLATION      FOOT SURGERY      KNEE SURGERY      THYROID SURGERY           FAMILY HISTORY:                Family History   Problem Relation Age of Onset    Heart disease Mother     Heart disease Father        SOCIAL HISTORY:          Tobacco:   History   Smoking Status    Never Smoker   Smokeless Tobacco    Never Used       alcohol use:    History   Alcohol Use No                   ALLERGIES:    Review of patient's allergies indicates:   Allergen Reactions    Cortisone Rash       CURRENT MEDICATIONS:    Current Outpatient Prescriptions   Medication Sig Dispense Refill    cinnamon bark 500 mg capsule Take 500 mg by mouth once daily.      cod liver oil Cap Take by mouth once daily.      ergocalciferol (ERGOCALCIFEROL) 50,000 unit Cap Take 1 capsule (50,000 Units total) by mouth every 7 days. 12 capsule 1    fish oil-omega-3 fatty acids 300-1,000 mg capsule Take 2 g by mouth once daily.      levothyroxine (SYNTHROID) 125 MCG tablet Take 125 mcg by mouth once daily.      multivitamin capsule Take 1 capsule by mouth once daily.      valsartan-hydrochlorothiazide (DIOVAN-HCT) 80-12.5 mg per tablet        No current facility-administered medications for this visit.                       REVIEW OF SYSTEMS:   Sleep related symptoms as per HPI    reports weight gain 150 lbs   Denies dyspnea  Denies palpitations  Denies acid reflux  "  Reports polyuria  Reports  mood diturbance  Denies  anemia  Denies  muscle pain  Denies  Gait imbalance    Otherwise, a balance of 10 systems reviewed is negative.    PHYSICAL EXAM:  BP (!) 149/86 (BP Location: Left arm, Patient Position: Sitting, BP Method: Large (Automatic))   Pulse 73   Ht 5' 5" (1.651 m)   Wt (!) 153.1 kg (337 lb 8.4 oz)   BMI 56.17 kg/m²   GENERAL: Overweight body habitus, well groomed.    ASSESSMENT:    1. Obstructive sleep apnea, severe by AHI with prior symptoms of snoring, excessive daytime sleepiness, and fatigue, now resolved with CPAP use. The patient is adherent on CPAP and experiencing symptomatic benefit. Medical co-mobidities: insomnia.     2. Insomnia NEC. Multi-factorial -  excess time in bed, poor sleep hygiene, and likely paradoxical insomnia play a role. Improved on CPAP and better sleep hygiene + magnesium.      PLAN:    Continue CPAP 12-18 cm H2O    Continue MAgnesium    CBTI-      1 Melatonin and 1 Altoril or Trazodone if needed    Omega/Mg oil      -Education: During our discussion today, we talked about the etiology of obstructive sleep apnea as well as the potential ramifications of untreated sleep apnea, which could include daytime sleepiness, hypertension, heart disease and/or stroke.  We discussed potential treatment options, which could include weight loss, body positioning, continuous positive airway pressure (CPAP), or referral for surgical consideration. The patient preferred CPAP option.    She should avoid ETOH and sedatives at night, as it tends to aggravate DANIELLE. Regular replacement of CPAP mask, tubing and filter was recommended.    Precautions: The patient was advised to abstain from driving should he feel sleepy or drowsy.        "

## 2017-09-05 ENCOUNTER — OFFICE VISIT (OUTPATIENT)
Dept: ALLERGY | Facility: CLINIC | Age: 57
End: 2017-09-05
Payer: COMMERCIAL

## 2017-09-05 VITALS
WEIGHT: 293 LBS | TEMPERATURE: 98 F | BODY MASS INDEX: 48.82 KG/M2 | HEIGHT: 65 IN | SYSTOLIC BLOOD PRESSURE: 122 MMHG | DIASTOLIC BLOOD PRESSURE: 70 MMHG

## 2017-09-05 DIAGNOSIS — T78.3XXD ANGIOEDEMA, SUBSEQUENT ENCOUNTER: Primary | ICD-10-CM

## 2017-09-05 PROCEDURE — 99999 PR PBB SHADOW E&M-EST. PATIENT-LVL III: CPT | Mod: PBBFAC,,, | Performed by: ALLERGY & IMMUNOLOGY

## 2017-09-05 PROCEDURE — 99499 UNLISTED E&M SERVICE: CPT | Mod: S$GLB,,, | Performed by: ALLERGY & IMMUNOLOGY

## 2017-09-05 NOTE — PROGRESS NOTES
She was returning today for food skin tests. She forgot and took an [place] with diphenhydramine last Thursday. This appointment was canceled.

## 2017-09-07 ENCOUNTER — OFFICE VISIT (OUTPATIENT)
Dept: ALLERGY | Facility: CLINIC | Age: 57
End: 2017-09-07
Payer: COMMERCIAL

## 2017-09-07 VITALS
SYSTOLIC BLOOD PRESSURE: 126 MMHG | HEIGHT: 65 IN | WEIGHT: 293 LBS | HEART RATE: 68 BPM | DIASTOLIC BLOOD PRESSURE: 100 MMHG | BODY MASS INDEX: 48.82 KG/M2

## 2017-09-07 DIAGNOSIS — E03.9 HYPOTHYROIDISM, UNSPECIFIED TYPE: ICD-10-CM

## 2017-09-07 DIAGNOSIS — T78.3XXD ANGIOEDEMA, SUBSEQUENT ENCOUNTER: Primary | ICD-10-CM

## 2017-09-07 DIAGNOSIS — J30.89 CHRONIC NONSEASONAL ALLERGIC RHINITIS DUE TO OTHER ALLERGEN: ICD-10-CM

## 2017-09-07 DIAGNOSIS — I10 ESSENTIAL HYPERTENSION: ICD-10-CM

## 2017-09-07 DIAGNOSIS — G47.33 OSA (OBSTRUCTIVE SLEEP APNEA): ICD-10-CM

## 2017-09-07 PROCEDURE — 3008F BODY MASS INDEX DOCD: CPT | Mod: S$GLB,,, | Performed by: ALLERGY & IMMUNOLOGY

## 2017-09-07 PROCEDURE — 3074F SYST BP LT 130 MM HG: CPT | Mod: S$GLB,,, | Performed by: ALLERGY & IMMUNOLOGY

## 2017-09-07 PROCEDURE — 99999 PR PBB SHADOW E&M-EST. PATIENT-LVL III: CPT | Mod: PBBFAC,,, | Performed by: ALLERGY & IMMUNOLOGY

## 2017-09-07 PROCEDURE — 99213 OFFICE O/P EST LOW 20 MIN: CPT | Mod: 25,S$GLB,, | Performed by: ALLERGY & IMMUNOLOGY

## 2017-09-07 PROCEDURE — 3080F DIAST BP >= 90 MM HG: CPT | Mod: S$GLB,,, | Performed by: ALLERGY & IMMUNOLOGY

## 2017-09-07 PROCEDURE — 95004 PERQ TESTS W/ALRGNC XTRCS: CPT | Mod: S$GLB,,, | Performed by: ALLERGY & IMMUNOLOGY

## 2017-09-07 NOTE — PROGRESS NOTES
Physical Therapy Daily Treatment     Visit Count: 3 of 12  Plan of Care Dates: Initial: 7/31/2017 Through: 9/11/2017     Insurance Information: THERAPY BENEFITS:  PAYOR: JUAN A  VISIT LIMIT: 20 VISITS PER YEAR  AUTHORIZATION NEEDED: NO  NOTES:   Next Referring Provider Visit: 9/18/17     Referred by: Lizandro Alejandro MD  Medical Diagnosis (from order):  M47.817 (ICD-10-CM) - Lumbosacral spondylosis without myelopathy, M54.5 (ICD-10-CM) - Discogenic low back pain, M54.17 (ICD-10-CM) - Lumbosacral radiculopathy  Insurance: 1. ANTHMARGARITA AACN  2. N/A    Date of Onset: April   Diagnosis Precautions: none  Chart reviewed: Relevant co-morbidities, allergies, tests and medications: none     SUBJECTIVE   Patient reports that she doesn't have the shooting pain in either leg. \"Pain and tingling still remain in the L foot.\"     Current Pain: 3/10.      Functional Change: Patient commented that shaving her legs and trimming her toe nails is a little easier now.    OBJECTIVE       Treatment   Manual Therapy:   Axial distraction and with slight rotation  Ed pt in MT purpose, to decrease pain, benefits and risks.  Ed pt that after techniques they may have some muscle soreness that lasts for the next 24-48 hours and to call with questions.  Ed pt to use ice/heat for any residual discomfort they may experience.  Pt has agreed to techniques.       Neuro re-education:   Exercise Reps: Sets: Position/Cues Pain  Fatigue   L sciatic flossing 3.5 min   R sidelying over pillow with ankle pump  N     Bracing (TA, pelvic floor, multifidus) 20x5ec   R sidelying over pillow  N     Glut sets 37i6aqu   prone       Hip ext 10x  prone     Comments:  (* denotes home program issuance as well, [employer] access code: NA)Verbal Cues/Manual Cues for form with exercises to correct posture, avoid compensation and improve muscle control to facilitate improved strength and stability.  Instructed pt in NR purpose, to increase strength, benefits and risks.  Patient  Yulissa Hatfield returns to clinic today for continued evaluation of angioedema and chronic rhinitis. She is here alone. She was last seen for an evaluation on August 15, 2017.    She did return to clinic on August 5, 2017 but had taken [place]areen[place] and Benadryl.     She has not had any angioedema. She has not had any urticaria.    Her rhinitis and conjunctivitis have been well controlled.    She does continue to take her vitamin D.    [facility] PEQ ALLERGY QUESTIONNAIRE SHORT 9/6/2017   Are you taking any new medications since your last visit? No   Constitution: No symptoms   Head or facial pain: Sinus pressure   Eyes: Tearing   Ears: No symptoms   Nose: Sniffling   Throat: No symptoms   Sinuses: No symptoms   Lungs: No symptoms   Skin: No symptoms   Cardiovascular: No symptoms   Gastrointestinal: No symptoms   Genital/ urinary No symptoms   Musculoskeletal: Muscle pain, Joint pain, Joint swelling   Neurologic: No symptoms   Endocrine: No symptoms   Hematologic: No symptoms       Physical Examination:  General: Well-developed, well-nourished, no acute distress. 341 pounds.  Head: No sinus tenderness.  Eyes: Conjunctivae:  No bulbar or palpebral conjunctival injection.  Ears: EAC's clear.  TM's clear.  No pre-auricular nodes.  Nose: Nasal Mucosa:  Pink.  Septum: No apparent deviation.  Turbinates:  No significant edema.  Polyps/Mass:  None visible.  Teeth/Gums:  No bleeding noted.  Oropharynx: No exudates.  Neck: Supple without thyromegaly. No cervical lymphadenopathy.    Respiratory/Chest: Effort: Good.  Auscultation:  Clear bilaterally.  Skin: Good turgor.  No urticaria or angioedema.  Neuro/Psych: Oriented x 3.    Laboratory 7/25/2017:  IgE level: 97.  ImmunoCAP:  Class III: Dust mites, cat.  Class II: Dog.  Class I: Shrimp.  Pineapple, crab, lobster, crawfish: Negative.  CBC: Normal.  CMP: Normal.  TSH: 1.862.  Antithyroglobulin antibody level: Less than 4.0.  Antimicrosomal antibody level: Less than 6.0.  Anti-IgE  has agreed to exercises this visit.    Modalities:  Patient has been made aware of potential contraindications and possible risks associated with the use of the following modalities and has agreed.  Un-attended Electrical Stimulation (76861/): IFC  ( 17-19 ma) L LB/S-I,Mid thoracic   Location: LB; Position: prone; Duration: 15 minutes; 2.00 in (5.0 cm) Round electrodes placed 4.   Parameters: Pulse rate:  Hz; Intensity: patient comfort, in conjunction with cold pack     Modality treatment resulted in decreased pain.  Patient reports no adverse reaction to treatment.  Current Home Program (not performed this date except as noted above):       ASSESSMENT   Patient now exhibiting decreasing complaints of L LB pain and is also tolerating the current exercises well. Discussed chair seating for work and advised patient about proper chair positioning with improved lumbar support. Mild progression of the current exercises was tolerated well.     Pain after treatment:Decreased but not rated /10  Result of above outlined education: Verbalizes understanding and Demonstrates understanding    Goals:       1. Patient independent with modified and progressed home exercise program.  2. Patient will demonstrate proper body mechanics for sitting and standing.  3. Patient will report decreased lumbar pain to 2/10 to aid in age appropriate activities.   4. Patient will increase lumbar active range of motion to within normal limits to aid in age appropriate activities, lifting as required, completing household tasks and activity tolerance.  5. Patient will decrease radicular symptoms by 80 % to aid in completing household tasks.  6. Oswestry: Patient will complete form to reflect an improved score from initial score of 27 to less than or equal to <15% (0-20% = minimal disability; 20-40% = moderate disability; 40-60% = severe disability; 60-80% = crippled; % = bed bound) to indicate pt reported improvement in  receptor antibody level: 5.  Serum tryptase: 4.3.  Vitamin D level: 17.  SPEP: Normal.    Food skin test prick #60 9/7/2017:    Assessment:  1. Angioedema of uncertain etiology, consider associated with thyroid disease.  2. No evidence of food allergy.  3. Allergic rhinitis.  4. Sleep apnea.  5. Hypothyroidism on replacement.  6. DJD.  7. Hypertension on Diovan.  8. Vitamin D insufficiency.    Recommendations:  1. House dust mite avoidance.  2. Continue to observe for any further urticaria or angioedema.  3. Take pictures and journal all episodes.  4. OTC antihistamines if needed  5. Consider skin testing off antihistamines.  6. Continue vitamin D.  7. Return to clinic in 2-3 months or sooner if needed.   function/disability/impairment (minimal detectable change: 12%).      PLAN   Continue MT to improve facet mobility and TE for improving trunk control during positional transitions     THERAPY DAILY BILLING   Primary Insurance: JUAN A [place]  Secondary Insurance: N/A    Evaluation Procedures:  No evaluation codes were used on this date of service    Timed Procedures:  Manual Therapy, 10 minutes  Neuromuscular Re-Education, 15 minutes    Untimed Procedures:  Electrostimulation Unattended    Total Treatment Time: 40 minutes    The referring provider's electronic or written signature on the evaluation authorizes the therapy plan of care.

## 2018-01-05 ENCOUNTER — OFFICE VISIT (OUTPATIENT)
Dept: PODIATRY | Facility: CLINIC | Age: 58
End: 2018-01-05
Payer: COMMERCIAL

## 2018-01-05 VITALS
HEART RATE: 93 BPM | DIASTOLIC BLOOD PRESSURE: 84 MMHG | HEIGHT: 65 IN | BODY MASS INDEX: 48.82 KG/M2 | SYSTOLIC BLOOD PRESSURE: 148 MMHG | WEIGHT: 293 LBS

## 2018-01-05 DIAGNOSIS — L60.0 ONYCHOCRYPTOSIS: Primary | ICD-10-CM

## 2018-01-05 DIAGNOSIS — M79.674 GREAT TOE PAIN, RIGHT: ICD-10-CM

## 2018-01-05 PROCEDURE — 99999 PR PBB SHADOW E&M-EST. PATIENT-LVL III: CPT | Mod: PBBFAC,,, | Performed by: PODIATRIST

## 2018-01-05 PROCEDURE — 99213 OFFICE O/P EST LOW 20 MIN: CPT | Mod: 25,S$GLB,, | Performed by: PODIATRIST

## 2018-01-05 PROCEDURE — 11730 AVULSION NAIL PLATE SIMPLE 1: CPT | Mod: T5,S$GLB,, | Performed by: PODIATRIST

## 2018-01-05 RX ORDER — DOXEPIN HYDROCHLORIDE 50 MG/G
CREAM TOPICAL
COMMUNITY
Start: 2017-11-14 | End: 2018-08-22

## 2018-01-05 RX ORDER — HYDROCHLOROTHIAZIDE 12.5 MG/1
CAPSULE ORAL
COMMUNITY
Start: 2017-12-27 | End: 2019-03-11 | Stop reason: SDUPTHER

## 2018-01-05 RX ORDER — VALSARTAN 80 MG/1
TABLET ORAL
COMMUNITY
Start: 2017-12-28 | End: 2018-08-22

## 2018-01-05 RX ORDER — CALCIPOTRIENE, BETAMETHASONE DIPROPIONATE 50; .643 UG/G; MG/G
OINTMENT TOPICAL
COMMUNITY
Start: 2017-11-14 | End: 2018-05-02

## 2018-01-05 RX ORDER — CLONAZEPAM 1 MG/1
TABLET ORAL
COMMUNITY
Start: 2017-12-27 | End: 2018-05-02 | Stop reason: ALTCHOICE

## 2018-01-05 RX ORDER — LIDOCAINE AND PRILOCAINE 25; 25 MG/G; MG/G
CREAM TOPICAL
COMMUNITY
Start: 2017-11-14 | End: 2019-07-22

## 2018-01-05 NOTE — PROGRESS NOTES
"Subjective:      Patient ID: Yulissa Hatfield is a 57 y.o. female.    Chief Complaint: Ingrown Toenail (Right hallux)    Yulissa is a 57 y.o. female who presents to the clinic complaining of painful ingrown toenail on the right big toe for past week. Denies trauma. Says she had pedicure and attempted to have them cut it out.     Vitals:    18 0901   BP: (!) 148/84   Pulse: 93   Weight: (!) 154.2 kg (340 lb)   Height: 5' 5" (1.651 m)   PainSc:   5   PainLoc: Toe      Past Medical History:   Diagnosis Date    HTN (hypertension)     Hyperlipidemia     Hypothyroid        Past Surgical History:   Procedure Laterality Date    CARPAL TUNNEL RELEASE       SECTION, CLASSIC      x 3    ENDOMETRIAL ABLATION      FOOT SURGERY      KNEE SURGERY      THYROID SURGERY         Family History   Problem Relation Age of Onset    Heart disease Mother     Heart disease Father        Social History     Social History    Marital status:      Spouse name: N/A    Number of children: N/A    Years of education: N/A     Social History Main Topics    Smoking status: Never Smoker    Smokeless tobacco: Never Used    Alcohol use No    Drug use: No    Sexual activity: Not Asked     Other Topics Concern    None     Social History Narrative    None       Current Outpatient Prescriptions   Medication Sig Dispense Refill    calcipotriene-betamethasone (TACLONEX) ointment       cinnamon bark 500 mg capsule Take 500 mg by mouth once daily.      clonazePAM (KLONOPIN) 1 MG tablet       cod liver oil Cap Take by mouth once daily.      doxepin (ZONALON) 5 % cream       ergocalciferol (ERGOCALCIFEROL) 50,000 unit Cap Take 1 capsule (50,000 Units total) by mouth every 7 days. 12 capsule 1    fish oil-omega-3 fatty acids 300-1,000 mg capsule Take 2 g by mouth once daily.      hydroCHLOROthiazide (MICROZIDE) 12.5 mg capsule       levothyroxine (SYNTHROID) 125 MCG tablet Take 125 mcg by mouth once daily.      " lidocaine-prilocaine (EMLA) cream       multivitamin capsule Take 1 capsule by mouth once daily.      valsartan (DIOVAN) 80 MG tablet       valsartan-hydrochlorothiazide (DIOVAN-HCT) 80-12.5 mg per tablet        No current facility-administered medications for this visit.        Review of patient's allergies indicates:   Allergen Reactions    Cortisone Rash         Review of Systems   Constitution: Negative for chills, fever, weakness and malaise/fatigue.   Cardiovascular: Negative for chest pain, claudication and leg swelling.   Respiratory: Negative for cough and shortness of breath.    Skin: Positive for nail changes. Negative for color change.   Musculoskeletal: Negative for back pain, joint pain, muscle cramps and muscle weakness.   Gastrointestinal: Negative for nausea and vomiting.   Neurological: Negative for numbness and paresthesias.   Psychiatric/Behavioral: Negative for altered mental status.           Objective:      Physical Exam   Constitutional: She is oriented to person, place, and time. No distress.   Cardiovascular:   Pulses:       Dorsalis pedis pulses are 2+ on the right side, and 2+ on the left side.        Posterior tibial pulses are 2+ on the right side, and 2+ on the left side.   CFT< 3 secs all toes bilateral foot, skin temp warm bilateral foot, diminished digital hair growth bilateral foot, mild to moderate lower extremity edema with varicosities bilateral.     Musculoskeletal:   Adequate joint range of motion without pain, limitation, nor crepitation Bilateral feet and ankle joints. Muscle strength is 5/5 in all groups bilaterally.       Neurological: She is alert and oriented to person, place, and time. She has normal strength. No sensory deficit.   Skin: Skin is warm, dry and intact. Capillary refill takes less than 2 seconds. No ecchymosis and no rash noted. She is not diaphoretic. No cyanosis or erythema. No pallor. Nails show no clubbing.   Right hallux nail is thickened 2-3 mm,  dystrophic and moderately incurvated along lateral nail border with underlying debris and localized pain along lateral nail border.     No open lesions or macerations bilateral lower extremity.               Assessment:       Encounter Diagnoses   Name Primary?    Onychocryptosis Yes    Great toe pain, right          Plan:       Yulissa was seen today for ingrown toenail.    Diagnoses and all orders for this visit:    Onychocryptosis    Great toe pain, right      I counseled the patient on her conditions, their implications and medical management.    Procedure: Right hallux lateral nail border avulsion  Pathology: none  EBL: < 1 mL  Materials: none  Injectibles: 4 mL 1% plain lidocaine  Complications: none    Procedure in detail: Time out called verifying patient, procedure and toe. Skin prepped with alcohol followed by ethyl chloride application and infiltration of 4 mL 1% plain lidocaine into proximal toe. Skin was prepped with betadine. A sterile tourniquet was applied to the toe. Sterile instrumentation was used to excise the lateral nail border. The tourniquet was released noting instant return of the toe color and capillary fill time was instant. Bacitracin ointment was applied to the wound followed by gauze secured with coban.     The patient tolerated the procedure well without complication.    RTC 2 weeks or prn.  .

## 2018-01-25 ENCOUNTER — OFFICE VISIT (OUTPATIENT)
Dept: PODIATRY | Facility: CLINIC | Age: 58
End: 2018-01-25
Payer: COMMERCIAL

## 2018-01-25 VITALS
HEIGHT: 65 IN | SYSTOLIC BLOOD PRESSURE: 157 MMHG | WEIGHT: 293 LBS | BODY MASS INDEX: 48.82 KG/M2 | HEART RATE: 74 BPM | DIASTOLIC BLOOD PRESSURE: 91 MMHG

## 2018-01-25 PROCEDURE — 99212 OFFICE O/P EST SF 10 MIN: CPT | Mod: S$GLB,,, | Performed by: PODIATRIST

## 2018-01-25 PROCEDURE — 99999 PR PBB SHADOW E&M-EST. PATIENT-LVL II: CPT | Mod: PBBFAC,,, | Performed by: PODIATRIST

## 2018-01-26 NOTE — PROGRESS NOTES
"Subjective:      Patient ID: Yulissa Hatfield is a 57 y.o. female.    Chief Complaint: No chief complaint on file.    Yulissa is a 57 y.o. female who presents to the clinic complaining of painful ingrown toenail on the right big toe for past week. Denies trauma. Says she had pedicure and attempted to have them cut it out.     18: Follow up from nail avulsion right hallux lateral nail border x 2 weeks. No pain. No new complaints.    Vitals:    18 0904   BP: (!) 157/91   Pulse: 74   Weight: (!) 154.2 kg (340 lb)   Height: 5' 5" (1.651 m)   PainSc: 0-No pain      Past Medical History:   Diagnosis Date    HTN (hypertension)     Hyperlipidemia     Hypothyroid        Past Surgical History:   Procedure Laterality Date    CARPAL TUNNEL RELEASE       SECTION, CLASSIC      x 3    ENDOMETRIAL ABLATION      FOOT SURGERY      KNEE SURGERY      THYROID SURGERY         Family History   Problem Relation Age of Onset    Heart disease Mother     Heart disease Father        Social History     Social History    Marital status:      Spouse name: N/A    Number of children: N/A    Years of education: N/A     Social History Main Topics    Smoking status: Never Smoker    Smokeless tobacco: Never Used    Alcohol use No    Drug use: No    Sexual activity: Not on file     Other Topics Concern    Not on file     Social History Narrative    No narrative on file       Current Outpatient Prescriptions   Medication Sig Dispense Refill    calcipotriene-betamethasone (TACLONEX) ointment       cinnamon bark 500 mg capsule Take 500 mg by mouth once daily.      clonazePAM (KLONOPIN) 1 MG tablet       cod liver oil Cap Take by mouth once daily.      doxepin (ZONALON) 5 % cream       ergocalciferol (ERGOCALCIFEROL) 50,000 unit Cap Take 1 capsule (50,000 Units total) by mouth every 7 days. 12 capsule 1    fish oil-omega-3 fatty acids 300-1,000 mg capsule Take 2 g by mouth once daily.      " hydroCHLOROthiazide (MICROZIDE) 12.5 mg capsule       levothyroxine (SYNTHROID) 125 MCG tablet Take 125 mcg by mouth once daily.      lidocaine-prilocaine (EMLA) cream       multivitamin capsule Take 1 capsule by mouth once daily.      valsartan (DIOVAN) 80 MG tablet       valsartan-hydrochlorothiazide (DIOVAN-HCT) 80-12.5 mg per tablet        No current facility-administered medications for this visit.        Review of patient's allergies indicates:   Allergen Reactions    Cortisone Rash         Review of Systems   Constitution: Negative for chills, fever, weakness and malaise/fatigue.   Cardiovascular: Negative for chest pain, claudication and leg swelling.   Respiratory: Negative for cough and shortness of breath.    Skin: Positive for nail changes. Negative for color change.   Musculoskeletal: Negative for back pain, joint pain, muscle cramps and muscle weakness.   Gastrointestinal: Negative for nausea and vomiting.   Neurological: Negative for numbness and paresthesias.   Psychiatric/Behavioral: Negative for altered mental status.           Objective:      Physical Exam   Constitutional: She is oriented to person, place, and time. No distress.   Cardiovascular:   Pulses:       Dorsalis pedis pulses are 2+ on the right side, and 2+ on the left side.        Posterior tibial pulses are 2+ on the right side, and 2+ on the left side.   CFT< 3 secs all toes bilateral foot, skin temp warm bilateral foot, diminished digital hair growth bilateral foot, mild to moderate lower extremity edema with varicosities bilateral.     Musculoskeletal:   Adequate joint range of motion without pain, limitation, nor crepitation Bilateral feet and ankle joints. Muscle strength is 5/5 in all groups bilaterally.       Neurological: She is alert and oriented to person, place, and time. She has normal strength. No sensory deficit.   Skin: Skin is warm, dry and intact. Capillary refill takes less than 2 seconds. No ecchymosis and no  rash noted. She is not diaphoretic. No cyanosis or erythema. No pallor. Nails show no clubbing.   Right hallux lateral nail bed wound healed, no pain on palpation, no discoloration.     No open lesions or macerations bilateral lower extremity.               Assessment:       Encounter Diagnosis   Name Primary?    Nail avulsion, subsequent encounter Yes         Plan:       Diagnoses and all orders for this visit:    Nail avulsion, subsequent encounter      I counseled the patient on her conditions, their implications and medical management.    Discontinue local wound care.    RTC prn.

## 2018-05-02 ENCOUNTER — OFFICE VISIT (OUTPATIENT)
Dept: SLEEP MEDICINE | Facility: CLINIC | Age: 58
End: 2018-05-02
Payer: COMMERCIAL

## 2018-05-02 VITALS
WEIGHT: 293 LBS | BODY MASS INDEX: 52.42 KG/M2 | DIASTOLIC BLOOD PRESSURE: 81 MMHG | HEART RATE: 84 BPM | SYSTOLIC BLOOD PRESSURE: 153 MMHG

## 2018-05-02 DIAGNOSIS — F41.9 ANXIETY: Primary | ICD-10-CM

## 2018-05-02 PROCEDURE — 99214 OFFICE O/P EST MOD 30 MIN: CPT | Mod: S$GLB,,, | Performed by: PSYCHIATRY & NEUROLOGY

## 2018-05-02 PROCEDURE — 99999 PR PBB SHADOW E&M-EST. PATIENT-LVL III: CPT | Mod: PBBFAC,,, | Performed by: PSYCHIATRY & NEUROLOGY

## 2018-05-02 RX ORDER — ALPRAZOLAM 0.25 MG/1
TABLET ORAL
Qty: 90 TABLET | Refills: 0 | Status: SHIPPED | OUTPATIENT
Start: 2018-05-02 | End: 2018-08-22

## 2018-05-02 NOTE — PATIENT INSTRUCTIONS
Continue CPAP 12-18 cm H2O     exercise 1 step at a time    CBTI-     Continue Magnesium 200 Bisglycinate - 1-2 pills       1 Melatonin and 1 Alteril or Xanax if needed    Please stop Clonazepam    Omega/Mg oil    Hemp oil

## 2018-05-02 NOTE — PROGRESS NOTES
"   Yulissa Hatfield 57 y.o. female returns for management of obstructive sleep apnea and CPAP equipment check.     10/23/2015 INITIAL HISTORY OF PRESENT ILLNESS:  Yulissa Hatfield is a 57 y.o. female is here to be evaluated for a sleep disorder.       CHIEF COMPLAINT:        The patient has mentioned difficulty falling and staying asleep.    The patient states that she has already made some changes in regard to sleep hygiene, making sure that the bedroom is dark, features comfortable temperature and humidity level. The patient does watch TV and read in bed before turning light off. she avoids going to bed before she is sleepy and stays in bed if not asleep within 30 minutes. she avoids clock watching and worries about her ability to fall asleep.     The patient has tried the following sleeping aids: Lunesta (did not work), Melatonin and Benadryl helps to fall, but not stay asleep. Belsomra - mouth trembling at 10 mg. Restoril - "crazy dreams".     The patient's complaints include excessive daytime sleepiness, excessive daytime fatigue and snoring.  Denied choking, gasping for air, witnessed paneas.      Denies  dry mouth and sore throat  Denies nasal congestion   Reports  morning headaches  Reports  interrupted sleep  Reports frequent leg movements  Denies symptoms concerning for parasomnia    The ESS ([place] Sleepiness Score) taken on initial visit is 3 /24    The patient never had tonsillectomy, adenoidectomy or UPPP     She had a previous negative experience with PSG and an attempt of CPAP titration (combination of factors).      INTERVAL HISTORY:    03/24/2016 Dr. Fisher:  The patient has not presented any new complaints since the previous visit. Did not tolerate Trazodone - 50 mg - reports "crazy dreams", at least it gave her 4 hours of sleep. Not taking Xanax for years.  Still having has a lot of sleep related anxiety. Using different mediations. Does sleepy time tea, meditation, bedroom dark and cold, " no caffeine after lunch. Tried sleep meditation 3-4 times over the last week with progressive muscle relaxation.   Not watching the clock. A lot of her work is on her mind when going to bed.  We just recently got her sleep study. APAP was ordered on 3/18 - has not received it yet.   ESS 3/24.    05/18/2016 Dr. Fisher: She has been getting used to CPAP - still taking mask off on the early morning. Modest improvement in continuity. Still feeling sleepy and tired though.  Does not like Milagro View mask - eye leaks.    CPAP pressure: 9-18 cm H2O  Mask comfort / fit:  Milagro View    Pressure tolerance: OK   Humidification: OK yes  CPAP Interrogation:    Ave daily usage:5./6 hours /7days  Ave daily usage:5 hours /30days  Days >4 hours usage: 5 /7 days  Days >4 hours usage: 25/30 days  (86% compliance)  Machine condition: good     90-%tile pressure: 13-14 cm H2O  Large leak 20-28 L/min  AHI 0.8      09/07/2016 Dr. Fisher: States that her sleep schedule and diet have been off track. We are discussing her PSG - some trends suggest complex apnea, though she is doing much better on her side. Taking 1 hr to fall asleep. Sleep continuity improved. No problem going back to sleep. Trazodone  mg did not work.     Waking up with a headache. Reports left side of the face droopy, but headache is not unilateral. No weakness/numbeness in the body, no trouble walking. Reports dizziness.     Tried meditation.    Trying to turn TV off and keep electronics away.     Consistency is the key.    In the past - PM, valerian root;      Not going to the kitchen anymore.    90% pressure - 12-15        Compliance Summary  Apnea Indices  Ventilator Statistics    Days with Device Usage:  30 days  Average AHI:  0.9  Average Breath Rate:  N/A    Percentage of Days >=4 Hours:  100.0%  Average OA Index:  0.3  Average % Patient Triggered Breaths:  N/A    Average Usage (Days Used):  7 hrs. 50 mins. 21 secs.  Average CA Index:  0.0  Average Tidal  Volume:  N/A    Average Usage (All Days):  7 hrs. 50 mins. 21 secs.    Average Minute Vent:  N/A        Large Leak  Periodic Breathing     Average Time in Large Leak:  6 mins. 28 secs.  Average % of Night in PB:  0.1%     Average % of Night in Large Leak:  1.4%               12/06/2016 LETICIA Grimm NP: Pt returns today in follow-up. Reports that sleep schedule is much better and she's following better sleep hygiene. She rarely uses sleep meds now. She has increased her exercise and is now doing it more consistently. She has also been meditating more frequently. Denies breakthrough symptoms on CPAP. Only complaint since last clinic visit is that ramp time of 20 minutes does not seem to be long enough, as she usually falls asleep 30+ minutes. ESS 1.     CPAP pressure: 12 - 18 cm H2O  Mask comfort / fit:  Milagro View    Pressure tolerance: OK   Humidification: OK yes  CPAP Interrogation:    Blower hours: 1791.7   Therapy hours: 1644.9  Ave daily usage: 7 hours /7days  Ave daily usage: 6.3 hours /30days  Days >4 hours usage: 26/30 days    Machine condition: good   90-% pressure: 13.4 cm H2O  AHI 1.2      03/29/2017:  Still interrupted sleep - Taking 2 Alteril and 2 5 mg Melatonin IR. Still compliant with CPAP.   Started line dancing classed and gym + meditation class.  Showed me her Fitbit download. ESS 6/24.    CPAP 12-18  Dreamwear mask  90% pressure - 12    Therapy Event Summary (Last 14 Days)     Hide      Compliance Summary  Apnea Indices  Ventilator Statistics    Days with Device Usage:  14 days  Average AHI:  0.5  Average Breath Rate:  18.9 bpm    Percentage of Days >=4 Hours:  100.0%  Average OA Index:  0.2  Average % Patient Triggered Breaths:  N/A    Average Usage (Days Used):  7 hrs. 18 mins. 48 secs.  Average CA Index:  0.0  Average Tidal Volume:  394.4 ml    Average Usage (All Days):  7 hrs. 18 mins. 48 secs.    Average Minute Vent:  N/A        Large Leak  Periodic Breathing     Average Time in Large Leak:  3  mins. 17 secs.  Average % of Night in PB:  0.0%     Average % of Night in Large Leak:  0.7%              08/18/2017:   She has been CPAP compliant 12-18 cm H2). 90% was 13.7 cm. Not using Trazodone and even Melatonin anymore - just Magnesium.   She is trying to switch natural treatments around the year. She has not tried hemp seed oil - could not get the food grade one.     Therapy Event Summary (Last 14 Days)     Using [employer]    Hide      Compliance Summary  Apnea Indices  Ventilator Statistics    Days with Device Usage:  14 days  Average AHI:  0.5  Average Breath Rate:  20.2 bpm    Percentage of Days >=4 Hours:  100.0%  Average OA Index:  0.2  Average % Patient Triggered Breaths:  N/A    Average Usage (Days Used):  7 hrs. 52 mins. 35 secs.  Average CA Index:  0.0  Average Tidal Volume:  384.4 ml    Average Usage (All Days):  7 hrs. 52 mins. 35 secs.    Average Minute Vent:  N/A        Large Leak  Periodic Breathing     Average Time in Large Leak:  1 mins. 43 secs.  Average % of Night in PB:  0.0%     Average % of Night in Large Leak:  0.4%              05/02/2018:    Has been having harder time falling and staying asleep. Using wrist splint. Her  is 1 mo post prostate surgery.  Usign CPAP compliantly  Sleep schedule updated below.  She finally found sleep guided meditation that she liked  Her 's need also interrupt her sleep.  Planning to visit her GYN - she feels her foam mattress is a little warm.  She has not been to the gym lately. She believes she is at her heaviest now.    APAP 12-18 cm   90% 13-14 cm H2O    Therapy Event Summary Date Range: 4/2/2018 - 5/1/2018     Hide      Compliance Summary  Apnea Indices  Ventilator Statistics    Days with Device Usage:  30 days  Average AHI:  0.8  Average Breath Rate:  20.0 bpm    Percentage of Days >=4 Hours:  100.0%  Average OA Index:  0.4  Average % Patient Triggered Breaths:  N/A    Average Usage (Days Used):  9 hrs. 23 mins. 46 secs.  Average CA  Index:  0.0  Average Tidal Volume:  409.4 ml    Average Usage (All Days):  9 hrs. 23 mins. 46 secs.    Average Minute Vent:  N/A        Large Leak  Periodic Breathing     Average Time in Large Leak:  1 mins. 6 secs.  Average % of Night in PB:  0.1%     Average % of Night in Large Leak:  0.2%                    SLEEP ROUTINE AND LIFESTYLE :    Occupation:3 degrees - doing social work now.  She lost all her kids in an accident 13 years ago.    Gained 150 lbs since that time.   Bed partner: 10-11 pm  Time to bed: 2-3 hrs -> now 1 hr-> now less and TV on later and not on timer  Sleep onset latency: 45 min  Disruptions or awakenings: 3-4 ->2->4  Time to fall back into sleep: 45 min-60 min->30 min-> 5 min  Wakeup time: 6:30-7AM   Perceived sleep quality: 0/5  Perceived total sleep time:  3-4  hours.  Daytime naps: 0  Weekend sleep routine: till 7:30  Exercise routine: yes  Caffeine: no     PREVIOUS SLEEP STUDIES:     PSG 9/17/15: Significant Obstructive sleep apnea (DANIELLE) with AHI (apnea hypopnea Index) of 52 and SaO2 of 81 (weight  336 lbs).    PAST MEDICAL HISTORY:    Active Ambulatory Problems     Diagnosis Date Noted    LUQ abdominal pain 2013    Primary localized osteoarthrosis, lower leg 2014    DANIELLE (obstructive sleep apnea)     Essential hypertension 2016    Varicose veins of both lower extremities with pain 2016    Hypothyroidism 2017    Chronic non-seasonal allergic rhinitis 2017     Resolved Ambulatory Problems     Diagnosis Date Noted    No Resolved Ambulatory Problems     Past Medical History:   Diagnosis Date    HTN (hypertension)     Hyperlipidemia     Hypothyroid                 PAST SURGICAL HISTORY:    Past Surgical History:   Procedure Laterality Date    CARPAL TUNNEL RELEASE       SECTION, CLASSIC      x 3    ENDOMETRIAL ABLATION      FOOT SURGERY      KNEE SURGERY      THYROID SURGERY           FAMILY HISTORY:                Family History    Problem Relation Age of Onset    Heart disease Mother     Heart disease Father        SOCIAL HISTORY:          Tobacco:   History   Smoking Status    Never Smoker   Smokeless Tobacco    Never Used       alcohol use:    History   Alcohol Use No                   ALLERGIES:    Review of patient's allergies indicates:   Allergen Reactions    Cortisone Rash       CURRENT MEDICATIONS:    Current Outpatient Prescriptions   Medication Sig Dispense Refill    cinnamon bark 500 mg capsule Take 500 mg by mouth once daily.      clonazePAM (KLONOPIN) 1 MG tablet       cod liver oil Cap Take by mouth once daily.      doxepin (ZONALON) 5 % cream       hydroCHLOROthiazide (MICROZIDE) 12.5 mg capsule       levothyroxine (SYNTHROID) 125 MCG tablet Take 125 mcg by mouth once daily.      lidocaine-prilocaine (EMLA) cream       multivitamin capsule Take 1 capsule by mouth once daily.      valsartan (DIOVAN) 80 MG tablet       ergocalciferol (ERGOCALCIFEROL) 50,000 unit Cap Take 1 capsule (50,000 Units total) by mouth every 7 days. 12 capsule 1    fish oil-omega-3 fatty acids 300-1,000 mg capsule Take 2 g by mouth once daily.      valsartan-hydrochlorothiazide (DIOVAN-HCT) 80-12.5 mg per tablet        No current facility-administered medications for this visit.                       REVIEW OF SYSTEMS:   Sleep related symptoms as per HPI    reports weight gain 150 lbs   Denies dyspnea  Denies palpitations  Denies acid reflux   Reports polyuria  Reports  mood diturbance  Denies  anemia  Denies  muscle pain  Denies  Gait imbalance    Otherwise, a balance of 10 systems reviewed is negative.    PHYSICAL EXAM:  BP (!) 153/81 (BP Location: Left arm, Patient Position: Sitting, BP Method: Large (Automatic))   Pulse 84   Wt (!) 142.9 kg (315 lb)   BMI 52.42 kg/m²   GENERAL: Overweight body habitus, well groomed.    ASSESSMENT:    1. Obstructive sleep apnea, severe by AHI with prior symptoms of snoring, excessive daytime  sleepiness, and fatigue, now resolved with CPAP use. The patient is adherent on CPAP and experiencing symptomatic benefit. Medical co-mobidities: insomnia.     2. Insomnia NEC. Multi-factorial -  excess time in bed, poor sleep hygiene, and likely paradoxical insomnia play a role. Improved on CPAP and better sleep hygiene + magnesium.      PLAN:      Continue CPAP 12-18 cm H2O     exercise 1 step at a time    CBTI-     Continue Magnesium 200 Bisglycinate - 1-2 pills       1 Melatonin and 1 Alteril or Xanax if needed    Please stop Clonazepam      -Education: During our discussion today, we talked about the etiology of obstructive sleep apnea as well as the potential ramifications of untreated sleep apnea, which could include daytime sleepiness, hypertension, heart disease and/or stroke.  We discussed potential treatment options, which could include weight loss, body positioning, continuous positive airway pressure (CPAP), or referral for surgical consideration. The patient preferred CPAP option.    She should avoid ETOH and sedatives at night, as it tends to aggravate DANIELLE. Regular replacement of CPAP mask, tubing and filter was recommended.    Precautions: The patient was advised to abstain from driving should he feel sleepy or drowsy.

## 2018-06-08 LAB
HPV 16: NEGATIVE
HPV 18: NEGATIVE
HPV OTHER HR TYPES: NEGATIVE

## 2018-08-12 ENCOUNTER — HOSPITAL ENCOUNTER (EMERGENCY)
Facility: HOSPITAL | Age: 58
Discharge: HOME | End: 2018-08-12
Attending: EMERGENCY MEDICINE
Payer: COMMERCIAL

## 2018-08-12 VITALS
OXYGEN SATURATION: 97 % | HEART RATE: 74 BPM | BODY MASS INDEX: 50.02 KG/M2 | RESPIRATION RATE: 16 BRPM | DIASTOLIC BLOOD PRESSURE: 74 MMHG | TEMPERATURE: 98 F | WEIGHT: 293 LBS | HEIGHT: 64 IN | SYSTOLIC BLOOD PRESSURE: 140 MMHG

## 2018-08-12 DIAGNOSIS — M25.519 SHOULDER PAIN: ICD-10-CM

## 2018-08-12 DIAGNOSIS — R07.9 CHEST PAIN: ICD-10-CM

## 2018-08-12 LAB
ALBUMIN SERPL BCP-MCNC: 4.2 G/DL
ALP SERPL-CCNC: 72 U/L
ALT SERPL W/O P-5'-P-CCNC: 36 U/L
ANION GAP SERPL CALC-SCNC: 11 MMOL/L
AST SERPL-CCNC: 27 U/L
BASOPHILS # BLD AUTO: 0.03 K/UL
BASOPHILS NFR BLD: 0.5 %
BILIRUB SERPL-MCNC: 0.4 MG/DL
BNP SERPL-MCNC: 38 PG/ML
BUN SERPL-MCNC: 16 MG/DL
CALCIUM SERPL-MCNC: 9.7 MG/DL
CHLORIDE SERPL-SCNC: 105 MMOL/L
CO2 SERPL-SCNC: 25 MMOL/L
CREAT SERPL-MCNC: 0.7 MG/DL
DIFFERENTIAL METHOD: ABNORMAL
EOSINOPHIL # BLD AUTO: 0.1 K/UL
EOSINOPHIL NFR BLD: 1.5 %
ERYTHROCYTE [DISTWIDTH] IN BLOOD BY AUTOMATED COUNT: 14.7 %
EST. GFR  (AFRICAN AMERICAN): >60 ML/MIN/1.73 M^2
EST. GFR  (NON AFRICAN AMERICAN): >60 ML/MIN/1.73 M^2
GLUCOSE SERPL-MCNC: 85 MG/DL
HCT VFR BLD AUTO: 41.5 %
HGB BLD-MCNC: 12.6 G/DL
LYMPHOCYTES # BLD AUTO: 2.6 K/UL
LYMPHOCYTES NFR BLD: 43.9 %
MCH RBC QN AUTO: 26 PG
MCHC RBC AUTO-ENTMCNC: 30.4 G/DL
MCV RBC AUTO: 86 FL
MONOCYTES # BLD AUTO: 0.5 K/UL
MONOCYTES NFR BLD: 8 %
NEUTROPHILS # BLD AUTO: 2.7 K/UL
NEUTROPHILS NFR BLD: 45.9 %
PLATELET # BLD AUTO: 250 K/UL
PMV BLD AUTO: 11.2 FL
POTASSIUM SERPL-SCNC: 4.1 MMOL/L
PROT SERPL-MCNC: 7.7 G/DL
RBC # BLD AUTO: 4.84 M/UL
SODIUM SERPL-SCNC: 141 MMOL/L
TROPONIN I SERPL DL<=0.01 NG/ML-MCNC: 0.01 NG/ML
TSH SERPL DL<=0.005 MIU/L-ACNC: 0.47 UIU/ML
WBC # BLD AUTO: 5.9 K/UL

## 2018-08-12 PROCEDURE — 99284 EMERGENCY DEPT VISIT MOD MDM: CPT | Mod: 25

## 2018-08-12 PROCEDURE — 84443 ASSAY THYROID STIM HORMONE: CPT

## 2018-08-12 PROCEDURE — 84484 ASSAY OF TROPONIN QUANT: CPT | Mod: 91

## 2018-08-12 PROCEDURE — 83880 ASSAY OF NATRIURETIC PEPTIDE: CPT

## 2018-08-12 PROCEDURE — 85025 COMPLETE CBC W/AUTO DIFF WBC: CPT

## 2018-08-12 PROCEDURE — 80053 COMPREHEN METABOLIC PANEL: CPT

## 2018-08-12 PROCEDURE — 93005 ELECTROCARDIOGRAM TRACING: CPT

## 2018-08-12 PROCEDURE — 96374 THER/PROPH/DIAG INJ IV PUSH: CPT

## 2018-08-12 PROCEDURE — 93010 ELECTROCARDIOGRAM REPORT: CPT | Mod: ,,, | Performed by: INTERNAL MEDICINE

## 2018-08-12 PROCEDURE — 63600175 PHARM REV CODE 636 W HCPCS: Performed by: NURSE PRACTITIONER

## 2018-08-12 PROCEDURE — 84484 ASSAY OF TROPONIN QUANT: CPT

## 2018-08-12 RX ORDER — ASPIRIN 325 MG
325 TABLET ORAL
Status: DISCONTINUED | OUTPATIENT
Start: 2018-08-12 | End: 2018-08-12

## 2018-08-12 RX ORDER — FUROSEMIDE 10 MG/ML
20 INJECTION INTRAMUSCULAR; INTRAVENOUS
Status: COMPLETED | OUTPATIENT
Start: 2018-08-12 | End: 2018-08-12

## 2018-08-12 RX ADMIN — FUROSEMIDE 20 MG: 10 INJECTION, SOLUTION INTRAMUSCULAR; INTRAVENOUS at 07:08

## 2018-08-12 NOTE — ED PROVIDER NOTES
"Encounter Date: 8/12/2018       History     Chief Complaint   Patient presents with    Chest Pain     Pt reports chest pressure, onset yesteday and again today.  Denies shortness of breath     58yo female presents to the ED for CP.  Pt states that over the past few days she has been having intermittent left anterior chest pain.  She's unsure what causes the pain to start or stop.  She initially attributed the pain to increasing her weights at the gym.  However, the pain was "pressure" in her chest with radiation to her shoulder, neck, and left forearm so she decided to come to the ED.  She also recently stopped taking her BP medication due to a recall.  No SOB, diaphoresis, exertional dyspnea/CP, abd pain, n/v/d, palpitations, or cough.  Pt does have family history of CAD.  Nonsmoker.  No other complaints at this time.  Pt was given ASA by urgent care and they advised her to come to the ED today because they could not perform any testing.       The history is provided by the patient.   Chest Pain   The current episode started several days ago. Chest pain occurs intermittently. The chest pain is unchanged. Associated with: unknown. At its most intense, the chest pain is at 7/10. The chest pain is currently at 7/10. The quality of the pain is described as pressure-like. The pain radiates to the left shoulder and left arm. Chest pain is worsened by certain positions. Pertinent negatives for primary symptoms include no fever, no fatigue, no shortness of breath, no cough, no palpitations, no abdominal pain, no nausea, no vomiting and no dizziness.   Pertinent negatives for associated symptoms include no diaphoresis, no lower extremity edema, no near-syncope, no numbness, no orthopnea, no paroxysmal nocturnal dyspnea and no weakness. She tried nothing for the symptoms. Risk factors include obesity, lack of exercise and sedentary lifestyle.   Her past medical history is significant for hyperlipidemia and hypertension. "   Her family medical history is significant for CAD.     Review of patient's allergies indicates:   Allergen Reactions    Cortisone Rash     Past Medical History:   Diagnosis Date    HTN (hypertension)     Hyperlipidemia     Hypothyroid      Past Surgical History:   Procedure Laterality Date    CARPAL TUNNEL RELEASE       SECTION, CLASSIC      x 3    ENDOMETRIAL ABLATION      FOOT SURGERY      KNEE SURGERY      THYROID SURGERY       Family History   Problem Relation Age of Onset    Heart disease Mother     Heart disease Father      Social History     Tobacco Use    Smoking status: Never Smoker    Smokeless tobacco: Never Used   Substance Use Topics    Alcohol use: No    Drug use: No     Review of Systems   Constitutional: Negative for activity change, appetite change, chills, diaphoresis, fatigue and fever.   HENT: Negative for congestion.    Respiratory: Negative for cough and shortness of breath.    Cardiovascular: Positive for chest pain. Negative for palpitations, orthopnea, leg swelling and near-syncope.   Gastrointestinal: Negative for abdominal pain, nausea and vomiting.   Genitourinary: Negative for decreased urine volume.   Musculoskeletal: Negative for back pain.   Skin: Negative for rash.   Neurological: Negative for dizziness, weakness and numbness.   Psychiatric/Behavioral: Negative for confusion.       Physical Exam     Initial Vitals [18 1640]   BP Pulse Resp Temp SpO2   (!) 171/82 81 20 98.5 °F (36.9 °C) 98 %      MAP       --         Physical Exam    Nursing note and vitals reviewed.  Constitutional: Vital signs are normal. She appears well-developed and well-nourished. She is active and cooperative.  Non-toxic appearance. She does not have a sickly appearance. She does not appear ill.   HENT:   Head: Normocephalic and atraumatic.   Eyes: Conjunctivae and EOM are normal.   Neck: Normal range of motion. Neck supple.   Cardiovascular: Normal rate, regular rhythm and  normal heart sounds.   Pulses:       Radial pulses are 2+ on the right side, and 2+ on the left side.   Pulmonary/Chest: Effort normal and breath sounds normal. She exhibits no tenderness and no bony tenderness.   Abdominal: Soft. Normal appearance and bowel sounds are normal.   Neurological: She is alert and oriented to person, place, and time. She has normal strength. GCS eye subscore is 4. GCS verbal subscore is 5. GCS motor subscore is 6.   Skin: Skin is warm, dry and intact. Capillary refill takes less than 2 seconds. No abrasion, no bruising and no rash noted.   Psychiatric: She has a normal mood and affect. Her speech is normal and behavior is normal. Judgment and thought content normal. Cognition and memory are normal.         ED Course   Procedures  Labs Reviewed   CBC W/ AUTO DIFFERENTIAL - Abnormal; Notable for the following components:       Result Value    MCH 26.0 (*)     MCHC 30.4 (*)     RDW 14.7 (*)     All other components within normal limits   COMPREHENSIVE METABOLIC PANEL   TROPONIN I   B-TYPE NATRIURETIC PEPTIDE   TROPONIN I   TSH   TROPONIN I          Imaging Results          X-Ray Chest PA And Lateral (Final result)  Result time 08/12/18 18:21:08    Final result by Robert Castillo MD (08/12/18 18:21:08)                 Impression:      Borderline cardiomegaly with increased attenuation of the pulmonary parenchyma.  The findings are suggestive of early fluid overload state.    No definitive pleural effusion.  No airspace opacity.      Electronically signed by: Robert Castillo MD  Date:    08/12/2018  Time:    18:21             Narrative:    EXAMINATION:  XR CHEST PA AND LATERAL    CLINICAL HISTORY:  Chest pain, unspecified    TECHNIQUE:  PA and lateral views of the chest were performed.    COMPARISON:  Chest x-ray dated 08/12/2018 and 01/05/2014.    FINDINGS:  Monitoring EKG leads are present.  The may be postoperative changes in the base of the neck.  The subcutaneous tissues are  unremarkable.    The trachea is unremarkable.  The cardiomediastinal silhouette is enlarged.  The hemidiaphragms are within normal limits.  There is no evidence of free air beneath the hemidiaphragms.  No pleural effusions are present.  There is no evidence of a pneumothorax.  There is no evidence of pneumomediastinum.  No airspace opacities are present.  There is pulmonary vascular congestion.  There is minimal increased attenuation of the pulmonary parenchyma.  There are degenerative changes in the osseous structures.                               X-Ray Chest 1 View (Final result)  Result time 08/12/18 17:53:55   Procedure changed from X-Ray Chest PA And Lateral     Final result by Robert Castillo MD (08/12/18 17:53:55)                 Impression:      Cardiomegaly with increased attenuation of the pulmonary parenchyma and small bilateral pleural effusions.  The findings are most suggestive of early CHF.    Possible airspace opacity in the left lung base.  A lateral projection may be attempted for further evaluation.      Electronically signed by: Robert Castillo MD  Date:    08/12/2018  Time:    17:53             Narrative:    EXAMINATION:  XR CHEST 1 VIEW    CLINICAL HISTORY:  Chest Pain;    TECHNIQUE:  Single frontal view of the chest was performed.    COMPARISON:  01/05/2014.    FINDINGS:  Monitoring EKG leads are present.  The trachea is unremarkable.  There is enlargement of the cardiomediastinal silhouette.  The hemidiaphragms are poorly delineated.  The may be small bilateral pleural effusions.  There is no evidence of a pneumothorax.  There is no evidence of pneumomediastinum.  There is increased attenuation of the pulmonary parenchyma.  There may be an airspace opacity in left lung base.  There are degenerative changes in the osseous structures.                                 Medical Decision Making:   Initial Assessment:   58yo female here for CP intermittent over the past few days.  Pt appears well,  nontoxic.  Pain radiates to left arm and left neck.  No exertional component, SOB, n/v, or diaphoresis.    Differential Diagnosis:   NSTEMI, dysrhythmia, electrolyte derangement, pneumonia, CHF, chest wall pain, effusion, pneumothorax  Clinical Tests:   Lab Tests: Ordered and Reviewed  Radiological Study: Ordered and Reviewed  Medical Tests: Ordered and Reviewed  ED Management:  Labs, EKG, CXR  Pt given ASA PTA.   Troponin and BNP negative.  CXR does reveal possible early fluid overload with small bilateral pleural effusions.  However, pt has no SOB, cough, or respiratory distress.  Pt on continuous CR monitoring in the ED and there was no ectopy.  Oxygen saturation stable on RA.      Will repeat troponin.  If negative, anticipate DC home.   Pt was turned over to Dr.LeFort at 1915 for disposition.               Attending Attestation:     Physician Attestation Statement for NP/PA:   I have conducted a face to face encounter with this patient in addition to the NP/PA, due to    Other NP/PA Attestation Additions:    History of Present Illness: Agree;  58 y/o F presents to the ED c/o CP, left shoulder pain, intermittent for a few days, described as pressure. No SOB. Not exertional.    Physical Exam: Agree   Medical Decision Making: Agree; Lab and radiologic eval benign. Px handed off to Dr. Lefort, who discharged after a second negative troponin.                     Clinical Impression:   Diagnoses of Shoulder pain and Chest pain were pertinent to this visit.                             Trini Elliott, KENROY  08/12/18 1941       Franck Moore MD  08/13/18 [id number]

## 2018-08-12 NOTE — ED NOTES
APPEARANCE: Alert, oriented and in no acute distress. Obese  CARDIAC: Normal rate and rhythm, no murmur heard. Elevated BP. Right sided chest pressure   PERIPHERAL VASCULAR: peripheral pulses present. Normal cap refill. No edema. Warm to touch.    RESPIRATORY:Normal rate and effort, breath sounds clear bilaterally throughout chest. Respirations are equal and unlabored no obvious signs of distress.  GASTRO: soft, bowel sounds normal, no tenderness, no abdominal distention. Obese  MUSC: Full ROM. No bony tenderness or soft tissue tenderness. No obvious deformity. Pt complains of right shoulder pain  SKIN: Skin is warm and dry, normal skin turgor, mucous membranes moist.  NEURO: 5/5 strength major flexors/extensors bilaterally. Sensory intact to light touch bilaterally. [place] coma scale: eyes open spontaneously-4, oriented & converses-5, obeys commands-6. No neurological abnormalities.   MENTAL STATUS: awake, alert and aware of environment.  EYE: PERRL, both eyes: pupils brisk and reactive to light. Normal size.  ENT: EARS: no obvious drainage. NOSE: no active bleeding.   Pt complains of right sided chest pressure and right shoulder pain

## 2018-08-13 NOTE — ED NOTES
Pt is resting on stretcher with eyes open.  at bedside. SR up and CB in reach. On cardiac monitor. VS stable. No chest pain at this time.

## 2018-08-22 ENCOUNTER — OFFICE VISIT (OUTPATIENT)
Dept: SLEEP MEDICINE | Facility: CLINIC | Age: 58
End: 2018-08-22
Payer: COMMERCIAL

## 2018-08-22 VITALS
SYSTOLIC BLOOD PRESSURE: 139 MMHG | HEIGHT: 64 IN | HEART RATE: 73 BPM | WEIGHT: 293 LBS | BODY MASS INDEX: 50.02 KG/M2 | DIASTOLIC BLOOD PRESSURE: 88 MMHG

## 2018-08-22 DIAGNOSIS — G47.33 OSA (OBSTRUCTIVE SLEEP APNEA): Primary | ICD-10-CM

## 2018-08-22 PROCEDURE — 3075F SYST BP GE 130 - 139MM HG: CPT | Mod: CPTII,S$GLB,, | Performed by: PSYCHIATRY & NEUROLOGY

## 2018-08-22 PROCEDURE — 99214 OFFICE O/P EST MOD 30 MIN: CPT | Mod: S$GLB,,, | Performed by: PSYCHIATRY & NEUROLOGY

## 2018-08-22 PROCEDURE — 3079F DIAST BP 80-89 MM HG: CPT | Mod: CPTII,S$GLB,, | Performed by: PSYCHIATRY & NEUROLOGY

## 2018-08-22 PROCEDURE — 99999 PR PBB SHADOW E&M-EST. PATIENT-LVL IV: CPT | Mod: PBBFAC,,, | Performed by: PSYCHIATRY & NEUROLOGY

## 2018-08-22 PROCEDURE — 3008F BODY MASS INDEX DOCD: CPT | Mod: CPTII,S$GLB,, | Performed by: PSYCHIATRY & NEUROLOGY

## 2018-08-22 RX ORDER — AMITRIPTYLINE HYDROCHLORIDE 50 MG/1
TABLET, FILM COATED ORAL
Qty: 30 TABLET | Refills: 11 | Status: SHIPPED | OUTPATIENT
Start: 2018-08-22 | End: 2019-02-07

## 2018-08-22 NOTE — PROGRESS NOTES
"   Yulissa Hatfield 57 y.o. female returns for management of obstructive sleep apnea and CPAP equipment check.     10/23/2015 INITIAL HISTORY OF PRESENT ILLNESS:  Yulissa Hatfield is a 57 y.o. female is here to be evaluated for a sleep disorder.       CHIEF COMPLAINT:        The patient has mentioned difficulty falling and staying asleep.    The patient states that she has already made some changes in regard to sleep hygiene, making sure that the bedroom is dark, features comfortable temperature and humidity level. The patient does watch TV and read in bed before turning light off. she avoids going to bed before she is sleepy and stays in bed if not asleep within 30 minutes. she avoids clock watching and worries about her ability to fall asleep.     The patient has tried the following sleeping aids: Lunesta (did not work), Melatonin and Benadryl helps to fall, but not stay asleep. Belsomra - mouth trembling at 10 mg. Restoril - "crazy dreams".     The patient's complaints include excessive daytime sleepiness, excessive daytime fatigue and snoring.  Denied choking, gasping for air, witnessed paneas.      Denies  dry mouth and sore throat  Denies nasal congestion   Reports  morning headaches  Reports  interrupted sleep  Reports frequent leg movements  Denies symptoms concerning for parasomnia    The ESS ([place] Sleepiness Score) taken on initial visit is 3 /24    The patient never had tonsillectomy, adenoidectomy or UPPP     She had a previous negative experience with PSG and an attempt of CPAP titration (combination of factors).      INTERVAL HISTORY:    03/24/2016 Dr. Fisher:  The patient has not presented any new complaints since the previous visit. Did not tolerate Trazodone - 50 mg - reports "crazy dreams", at least it gave her 4 hours of sleep. Not taking Xanax for years.  Still having has a lot of sleep related anxiety. Using different mediations. Does sleepy time tea, meditation, bedroom dark and cold, " no caffeine after lunch. Tried sleep meditation 3-4 times over the last week with progressive muscle relaxation.   Not watching the clock. A lot of her work is on her mind when going to bed.  We just recently got her sleep study. APAP was ordered on 3/18 - has not received it yet.   ESS 3/24.    05/18/2016 Dr. Fisher: She has been getting used to CPAP - still taking mask off on the early morning. Modest improvement in continuity. Still feeling sleepy and tired though.  Does not like Milagro View mask - eye leaks.    CPAP pressure: 9-18 cm H2O  Mask comfort / fit:  Milagro View    Pressure tolerance: OK   Humidification: OK yes  CPAP Interrogation:    Ave daily usage:5./6 hours /7days  Ave daily usage:5 hours /30days  Days >4 hours usage: 5 /7 days  Days >4 hours usage: 25/30 days  (86% compliance)  Machine condition: good     90-%tile pressure: 13-14 cm H2O  Large leak 20-28 L/min  AHI 0.8      09/07/2016 Dr. Fisher: States that her sleep schedule and diet have been off track. We are discussing her PSG - some trends suggest complex apnea, though she is doing much better on her side. Taking 1 hr to fall asleep. Sleep continuity improved. No problem going back to sleep. Trazodone  mg did not work.     Waking up with a headache. Reports left side of the face droopy, but headache is not unilateral. No weakness/numbeness in the body, no trouble walking. Reports dizziness.     Tried meditation.    Trying to turn TV off and keep electronics away.     Consistency is the key.    In the past - PM, valerian root;      Not going to the kitchen anymore.    90% pressure - 12-15        Compliance Summary  Apnea Indices  Ventilator Statistics    Days with Device Usage:  30 days  Average AHI:  0.9  Average Breath Rate:  N/A    Percentage of Days >=4 Hours:  100.0%  Average OA Index:  0.3  Average % Patient Triggered Breaths:  N/A    Average Usage (Days Used):  7 hrs. 50 mins. 21 secs.  Average CA Index:  0.0  Average Tidal  Volume:  N/A    Average Usage (All Days):  7 hrs. 50 mins. 21 secs.    Average Minute Vent:  N/A        Large Leak  Periodic Breathing     Average Time in Large Leak:  6 mins. 28 secs.  Average % of Night in PB:  0.1%     Average % of Night in Large Leak:  1.4%               12/06/2016 LETICIA Grimm NP: Pt returns today in follow-up. Reports that sleep schedule is much better and she's following better sleep hygiene. She rarely uses sleep meds now. She has increased her exercise and is now doing it more consistently. She has also been meditating more frequently. Denies breakthrough symptoms on CPAP. Only complaint since last clinic visit is that ramp time of 20 minutes does not seem to be long enough, as she usually falls asleep 30+ minutes. ESS 1.     CPAP pressure: 12 - 18 cm H2O  Mask comfort / fit:  Milagro View    Pressure tolerance: OK   Humidification: OK yes  CPAP Interrogation:    Blower hours: 1791.7   Therapy hours: 1644.9  Ave daily usage: 7 hours /7days  Ave daily usage: 6.3 hours /30days  Days >4 hours usage: 26/30 days    Machine condition: good   90-% pressure: 13.4 cm H2O  AHI 1.2      03/29/2017:  Still interrupted sleep - Taking 2 Alteril and 2 5 mg Melatonin IR. Still compliant with CPAP.   Started line dancing classed and gym + meditation class.  Showed me her Fitbit download. ESS 6/24.    CPAP 12-18  Dreamwear mask  90% pressure - 12    Therapy Event Summary (Last 14 Days)     Hide      Compliance Summary  Apnea Indices  Ventilator Statistics    Days with Device Usage:  14 days  Average AHI:  0.5  Average Breath Rate:  18.9 bpm    Percentage of Days >=4 Hours:  100.0%  Average OA Index:  0.2  Average % Patient Triggered Breaths:  N/A    Average Usage (Days Used):  7 hrs. 18 mins. 48 secs.  Average CA Index:  0.0  Average Tidal Volume:  394.4 ml    Average Usage (All Days):  7 hrs. 18 mins. 48 secs.    Average Minute Vent:  N/A        Large Leak  Periodic Breathing     Average Time in Large Leak:  3  mins. 17 secs.  Average % of Night in PB:  0.0%     Average % of Night in Large Leak:  0.7%              08/18/2017:   She has been CPAP compliant 12-18 cm H2). 90% was 13.7 cm. Not using Trazodone and even Melatonin anymore - just Magnesium.   She is trying to switch natural treatments around the year. She has not tried hemp seed oil - could not get the food grade one.     Therapy Event Summary (Last 14 Days)     Using [employer].    Hide      Compliance Summary  Apnea Indices  Ventilator Statistics    Days with Device Usage:  14 days  Average AHI:  0.5  Average Breath Rate:  20.2 bpm    Percentage of Days >=4 Hours:  100.0%  Average OA Index:  0.2  Average % Patient Triggered Breaths:  N/A    Average Usage (Days Used):  7 hrs. 52 mins. 35 secs.  Average CA Index:  0.0  Average Tidal Volume:  384.4 ml    Average Usage (All Days):  7 hrs. 52 mins. 35 secs.    Average Minute Vent:  N/A        Large Leak  Periodic Breathing     Average Time in Large Leak:  1 mins. 43 secs.  Average % of Night in PB:  0.0%     Average % of Night in Large Leak:  0.4%              05/02/2018:    Has been having harder time falling and staying asleep. Using wrist splint. Her  is 1 mo post prostate surgery.  Usign CPAP compliantly  Sleep schedule updated below.  She finally found sleep guided meditation that she liked  Her 's need also interrupt her sleep.  Planning to visit her GYN - she feels her foam mattress is a little warm.  She has not been to the gym lately. She believes she is at her heaviest now.    APAP 12-18 cm   90% 13-14 cm H2O    Therapy Event Summary Date Range: 4/2/2018 - 5/1/2018     Hide      Compliance Summary  Apnea Indices  Ventilator Statistics    Days with Device Usage:  30 days  Average AHI:  0.8  Average Breath Rate:  20.0 bpm    Percentage of Days >=4 Hours:  100.0%  Average OA Index:  0.4  Average % Patient Triggered Breaths:  N/A    Average Usage (Days Used):  9 hrs. 23 mins. 46 secs.  Average CA  "Index:  0.0  Average Tidal Volume:  409.4 ml    Average Usage (All Days):  9 hrs. 23 mins. 46 secs.    Average Minute Vent:  N/A        Large Leak  Periodic Breathing     Average Time in Large Leak:  1 mins. 6 secs.  Average % of Night in PB:  0.1%     Average % of Night in Large Leak:  0.2%                08/22/2018  Since last time Valsartan was recalled -> since then medication was changed twice  Sleep scheduled reviewed.  TV is loud. Due to her . Her 's rest was negative for CA fortunately.  Started seeing a psychologist (TINO) due to significant anxiety.  Has been using aromatherapy for insomnia "good night sleep" - lost effect lately.  No caffeine and no screens after 4 PM  Has been feeling a little depressed lately. A lot of stressed this year. Trying to get back on track.  Has not been journaling.   APPA 12-18  90% 12.8  Using under the nose mask.  Therapy Event Summary Date Range: 7/23/2018 - 8/21/2018     Hide      Compliance Summary  Apnea Indices  Ventilator Statistics    Days with Device Usage:  30 days  Average AHI:  0.7  Average Breath Rate:  20.2 bpm    Percentage of Days >=4 Hours:  100.0%  Average OA Index:  0.4  Average % Patient Triggered Breaths:  N/A    Average Usage (Days Used):  8 hrs. 51 mins. 48 secs.  Average CA Index:  0.0  Average Tidal Volume:  395.3 ml    Average Usage (All Days):  8 hrs. 51 mins. 48 secs.    Average Minute Vent:  N/A        Large Leak  Periodic Breathing     Average Time in Large Leak:  56 secs.  Average % of Night in PB:  0.2%     Average % of Night in Large Leak:  0.2%                    SLEEP ROUTINE AND LIFESTYLE :    Occupation:3 degrees - doing social work now.  She lost all her kids in an accident 13 years ago.    Gained 150 lbs since that time.   Bed partner: 10-11 pm --> 9 PM to watch TV (not sleepy yet)  Time to bed: 2-3 hrs -> now 1 hr-> now less and TV on later and not on timer -> ready to sleep at 10  Sleep onset latency: 45 min  Disruptions " or awakenings: 3-4 ->2 at least  Time to fall back into sleep: 45 min-60 min->30 min-> over 5 min to return to sleep  Wakeup time: 6:30-7AM   Perceived sleep quality: 0/5  Perceived total sleep time:  3-4  hours.  Daytime naps: 0  Weekend sleep routine: till 7:30  Exercise routine: yes  Caffeine: no     PREVIOUS SLEEP STUDIES:     PSG 9/17/15: Significant Obstructive sleep apnea (DANIELLE) with AHI (apnea hypopnea Index) of 52 and SaO2 of 81 (weight  336 lbs).    PAST MEDICAL HISTORY:    Active Ambulatory Problems     Diagnosis Date Noted    LUQ abdominal pain 2013    Primary localized osteoarthrosis, lower leg 2014    DANIELLE (obstructive sleep apnea)     Essential hypertension 2016    Varicose veins of both lower extremities with pain 2016    Hypothyroidism 2017    Chronic non-seasonal allergic rhinitis 2017     Resolved Ambulatory Problems     Diagnosis Date Noted    No Resolved Ambulatory Problems     Past Medical History:   Diagnosis Date    HTN (hypertension)     Hyperlipidemia     Hypothyroid                 PAST SURGICAL HISTORY:    Past Surgical History:   Procedure Laterality Date    CARPAL TUNNEL RELEASE       SECTION, CLASSIC      x 3    ENDOMETRIAL ABLATION      FOOT SURGERY      KNEE SURGERY      THYROID SURGERY           FAMILY HISTORY:                Family History   Problem Relation Age of Onset    Heart disease Mother     Heart disease Father        SOCIAL HISTORY:          Tobacco:   Social History     Tobacco Use   Smoking Status Never Smoker   Smokeless Tobacco Never Used       alcohol use:    Social History     Substance and Sexual Activity   Alcohol Use No                   ALLERGIES:    Review of patient's allergies indicates:   Allergen Reactions    Cortisone Rash       CURRENT MEDICATIONS:    Current Outpatient Medications   Medication Sig Dispense Refill    ALPRAZolam (XANAX) 0.25 MG tablet 1 pill 3 times for anxiety 90 tablet 0     cinnamon bark 500 mg capsule Take 500 mg by mouth once daily.      cod liver oil Cap Take by mouth once daily.      doxepin (ZONALON) 5 % cream       ergocalciferol (ERGOCALCIFEROL) 50,000 unit Cap Take 1 capsule (50,000 Units total) by mouth every 7 days. 12 capsule 1    fish oil-omega-3 fatty acids 300-1,000 mg capsule Take 2 g by mouth once daily.      hydroCHLOROthiazide (MICROZIDE) 12.5 mg capsule       levothyroxine (SYNTHROID) 125 MCG tablet Take 125 mcg by mouth once daily.      lidocaine-prilocaine (EMLA) cream       multivitamin capsule Take 1 capsule by mouth once daily.      valsartan (DIOVAN) 80 MG tablet       valsartan-hydrochlorothiazide (DIOVAN-HCT) 80-12.5 mg per tablet        No current facility-administered medications for this visit.                       REVIEW OF SYSTEMS:   Sleep related symptoms as per HPI    reports weight gain 150 lbs   Denies dyspnea  Denies palpitations  Denies acid reflux   Reports polyuria  Reports  mood diturbance  Denies  anemia  Denies  muscle pain  Denies  Gait imbalance    Otherwise, a balance of 10 systems reviewed is negative.    PHYSICAL EXAM:  There were no vitals taken for this visit.  GENERAL: Overweight body habitus, well groomed.    ASSESSMENT:    1. Obstructive sleep apnea, severe by AHI with prior symptoms of snoring, excessive daytime sleepiness, and fatigue, now resolved with CPAP use. The patient is adherent on CPAP and experiencing symptomatic benefit. Medical co-mobidities: insomnia.     2. Insomnia NEC. Multi-factorial -  excess time in bed, poor sleep hygiene (TV loud); recent traumatic events      PLAN:    Short several months course of Elavil 25-50 mg at bedtime  Continue  Good sleep hygiene (dont go to bed unless sleepy)  Please get Blue Tooth earphones for your  to watch TV  Keep up great job with CPAP!      Continue CPAP 12-18 cm H2O     exercise 1 step at a time    CBTI-     Continue Magnesium 200 Bisglycinate -  1-2 pills       1 Melatonin and 1 Alteril or Xanax if needed    Please stop Clonazepam      -Education: During our discussion today, we talked about the etiology of obstructive sleep apnea as well as the potential ramifications of untreated sleep apnea, which could include daytime sleepiness, hypertension, heart disease and/or stroke.  We discussed potential treatment options, which could include weight loss, body positioning, continuous positive airway pressure (CPAP), or referral for surgical consideration. The patient preferred CPAP option.    She should avoid ETOH and sedatives at night, as it tends to aggravate DANIELLE. Regular replacement of CPAP mask, tubing and filter was recommended.    Precautions: The patient was advised to abstain from driving should he feel sleepy or drowsy.

## 2018-08-22 NOTE — PATIENT INSTRUCTIONS
[email or web address] - CBT (Cognitive Behavior Therapy) course 10$  Short several months course of Elavil 25-50 mg at bedtime  Continue  Good sleep hygiene (dont go to bed unless sleepy)  Please get Blue Tooth earphones for your  to watch TV  Keep up great job with CPAP!

## 2018-10-03 ENCOUNTER — TELEPHONE (OUTPATIENT)
Dept: SLEEP MEDICINE | Facility: CLINIC | Age: 58
End: 2018-10-03

## 2018-10-03 ENCOUNTER — PATIENT MESSAGE (OUTPATIENT)
Dept: SLEEP MEDICINE | Facility: CLINIC | Age: 58
End: 2018-10-03

## 2018-10-03 NOTE — TELEPHONE ENCOUNTER
Contacted patient who needed to cancel appointment for today, 10/3, since everything was going ok. Wanted to schedule for regular 6 month checkup and did for 2/4. Also informed patient about use of the patient portal as well as the fact that her supplies had been updated on 8/22.

## 2018-10-03 NOTE — TELEPHONE ENCOUNTER
----- Message from Vale Lim sent at 10/3/2018  7:36 AM CDT -----  Contact: [employer]  Message from [facility], System Message sent at 10/3/2018  7:25 AM CDT -----    Appointment Request From: Yulissa Hatfield    With Provider: Constance Fisher MD [[facility]]    Preferred Date Range: 10/22/2018 - 11/9/2018    Preferred Times: Any time    Reason for visit: Existing Patient    Comments:  B

## 2018-12-31 LAB
CHOLEST SERPL-MSCNC: 244 MG/DL (ref 0–200)
HDLC SERPL-MCNC: 60 MG/DL
LDLC SERPL CALC-MCNC: 164 MG/DL
NON HDL CHOL. (LDL+VLDL): 184
TRIGLYCERIDE (LIPID PAN): 90

## 2019-01-31 ENCOUNTER — PATIENT OUTREACH (OUTPATIENT)
Dept: ADMINISTRATIVE | Facility: HOSPITAL | Age: 59
End: 2019-01-31

## 2019-02-04 ENCOUNTER — OFFICE VISIT (OUTPATIENT)
Dept: SLEEP MEDICINE | Facility: CLINIC | Age: 59
End: 2019-02-04
Payer: COMMERCIAL

## 2019-02-04 VITALS
WEIGHT: 293 LBS | HEIGHT: 64 IN | HEART RATE: 73 BPM | DIASTOLIC BLOOD PRESSURE: 96 MMHG | BODY MASS INDEX: 50.02 KG/M2 | SYSTOLIC BLOOD PRESSURE: 168 MMHG

## 2019-02-04 DIAGNOSIS — G47.00 INSOMNIA, UNSPECIFIED TYPE: Primary | ICD-10-CM

## 2019-02-04 PROCEDURE — 3077F PR MOST RECENT SYSTOLIC BLOOD PRESSURE >= 140 MM HG: ICD-10-PCS | Mod: CPTII,S$GLB,, | Performed by: PSYCHIATRY & NEUROLOGY

## 2019-02-04 PROCEDURE — 3008F PR BODY MASS INDEX (BMI) DOCUMENTED: ICD-10-PCS | Mod: CPTII,S$GLB,, | Performed by: PSYCHIATRY & NEUROLOGY

## 2019-02-04 PROCEDURE — 99214 PR OFFICE/OUTPT VISIT, EST, LEVL IV, 30-39 MIN: ICD-10-PCS | Mod: S$GLB,,, | Performed by: PSYCHIATRY & NEUROLOGY

## 2019-02-04 PROCEDURE — 3077F SYST BP >= 140 MM HG: CPT | Mod: CPTII,S$GLB,, | Performed by: PSYCHIATRY & NEUROLOGY

## 2019-02-04 PROCEDURE — 99999 PR PBB SHADOW E&M-EST. PATIENT-LVL III: ICD-10-PCS | Mod: PBBFAC,,, | Performed by: PSYCHIATRY & NEUROLOGY

## 2019-02-04 PROCEDURE — 3080F PR MOST RECENT DIASTOLIC BLOOD PRESSURE >= 90 MM HG: ICD-10-PCS | Mod: CPTII,S$GLB,, | Performed by: PSYCHIATRY & NEUROLOGY

## 2019-02-04 PROCEDURE — 3080F DIAST BP >= 90 MM HG: CPT | Mod: CPTII,S$GLB,, | Performed by: PSYCHIATRY & NEUROLOGY

## 2019-02-04 PROCEDURE — 3008F BODY MASS INDEX DOCD: CPT | Mod: CPTII,S$GLB,, | Performed by: PSYCHIATRY & NEUROLOGY

## 2019-02-04 PROCEDURE — 99999 PR PBB SHADOW E&M-EST. PATIENT-LVL III: CPT | Mod: PBBFAC,,, | Performed by: PSYCHIATRY & NEUROLOGY

## 2019-02-04 PROCEDURE — 99214 OFFICE O/P EST MOD 30 MIN: CPT | Mod: S$GLB,,, | Performed by: PSYCHIATRY & NEUROLOGY

## 2019-02-04 RX ORDER — EZETIMIBE 10 MG/1
10 TABLET ORAL DAILY
COMMUNITY
End: 2019-03-11 | Stop reason: SDUPTHER

## 2019-02-04 RX ORDER — TELMISARTAN 80 MG/1
80 TABLET ORAL DAILY
COMMUNITY
End: 2019-03-11 | Stop reason: SDUPTHER

## 2019-02-04 NOTE — Clinical Note
Sheela,Please consider her for CBTI - patient very motivated in non-medication treatment options.Thank you!

## 2019-02-04 NOTE — PROGRESS NOTES
"   Yulissa Hatfield 58 y.o. female returns for management of obstructive sleep apnea and CPAP equipment check.     10/23/2015 INITIAL HISTORY OF PRESENT ILLNESS:  Yulissa Hatfield is a 58 y.o. female is here to be evaluated for a sleep disorder.       CHIEF COMPLAINT:        The patient has mentioned difficulty falling and staying asleep.    The patient states that she has already made some changes in regard to sleep hygiene, making sure that the bedroom is dark, features comfortable temperature and humidity level. The patient does watch TV and read in bed before turning light off. she avoids going to bed before she is sleepy and stays in bed if not asleep within 30 minutes. she avoids clock watching and worries about her ability to fall asleep.     The patient has tried the following sleeping aids: Lunesta (did not work), Melatonin and Benadryl helps to fall, but not stay asleep. Belsomra - mouth trembling at 10 mg. Restoril - "crazy dreams".     The patient's complaints include excessive daytime sleepiness, excessive daytime fatigue and snoring.  Denied choking, gasping for air, witnessed paneas.      Denies  dry mouth and sore throat  Denies nasal congestion   Reports  morning headaches  Reports  interrupted sleep  Reports frequent leg movements  Denies symptoms concerning for parasomnia    The ESS ([place] Sleepiness Score) taken on initial visit is 3 /24    The patient never had tonsillectomy, adenoidectomy or UPPP     She had a previous negative experience with PSG and an attempt of CPAP titration (combination of factors).      INTERVAL HISTORY:    03/24/2016 Dr. Fisher:  The patient has not presented any new complaints since the previous visit. Did not tolerate Trazodone - 50 mg - reports "crazy dreams", at least it gave her 4 hours of sleep. Not taking Xanax for years.  Still having has a lot of sleep related anxiety. Using different mediations. Does sleepy time tea, meditation, bedroom dark and cold, " no caffeine after lunch. Tried sleep meditation 3-4 times over the last week with progressive muscle relaxation.   Not watching the clock. A lot of her work is on her mind when going to bed.  We just recently got her sleep study. APAP was ordered on 3/18 - has not received it yet.   ESS 3/24.    05/18/2016 Dr. Fisher: She has been getting used to CPAP - still taking mask off on the early morning. Modest improvement in continuity. Still feeling sleepy and tired though.  Does not like Milagro View mask - eye leaks.    CPAP pressure: 9-18 cm H2O  Mask comfort / fit:  Milagro View    Pressure tolerance: OK   Humidification: OK yes  CPAP Interrogation:    Ave daily usage:5./6 hours /7days  Ave daily usage:5 hours /30days  Days >4 hours usage: 5 /7 days  Days >4 hours usage: 25/30 days  (86% compliance)  Machine condition: good     90-%tile pressure: 13-14 cm H2O  Large leak 20-28 L/min  AHI 0.8      09/07/2016 Dr. Fisher: States that her sleep schedule and diet have been off track. We are discussing her PSG - some trends suggest complex apnea, though she is doing much better on her side. Taking 1 hr to fall asleep. Sleep continuity improved. No problem going back to sleep. Trazodone  mg did not work.     Waking up with a headache. Reports left side of the face droopy, but headache is not unilateral. No weakness/numbeness in the body, no trouble walking. Reports dizziness.     Tried meditation.    Trying to turn TV off and keep electronics away.     Consistency is the key.    In the past - PM, valerian root;      Not going to the kitchen anymore.    90% pressure - 12-15        Compliance Summary  Apnea Indices  Ventilator Statistics    Days with Device Usage:  30 days  Average AHI:  0.9  Average Breath Rate:  N/A    Percentage of Days >=4 Hours:  100.0%  Average OA Index:  0.3  Average % Patient Triggered Breaths:  N/A    Average Usage (Days Used):  7 hrs. 50 mins. 21 secs.  Average CA Index:  0.0  Average Tidal  Volume:  N/A    Average Usage (All Days):  7 hrs. 50 mins. 21 secs.    Average Minute Vent:  N/A        Large Leak  Periodic Breathing     Average Time in Large Leak:  6 mins. 28 secs.  Average % of Night in PB:  0.1%     Average % of Night in Large Leak:  1.4%               12/06/2016 LETICIA Grimm NP: Pt returns today in follow-up. Reports that sleep schedule is much better and she's following better sleep hygiene. She rarely uses sleep meds now. She has increased her exercise and is now doing it more consistently. She has also been meditating more frequently. Denies breakthrough symptoms on CPAP. Only complaint since last clinic visit is that ramp time of 20 minutes does not seem to be long enough, as she usually falls asleep 30+ minutes. ESS 1.     CPAP pressure: 12 - 18 cm H2O  Mask comfort / fit:  Milagro View    Pressure tolerance: OK   Humidification: OK yes  CPAP Interrogation:    Blower hours: 1791.7   Therapy hours: 1644.9  Ave daily usage: 7 hours /7days  Ave daily usage: 6.3 hours /30days  Days >4 hours usage: 26/30 days    Machine condition: good   90-% pressure: 13.4 cm H2O  AHI 1.2      03/29/2017:  Still interrupted sleep - Taking 2 Alteril and 2 5 mg Melatonin IR. Still compliant with CPAP.   Started line dancing classed and gym + meditation class.  Showed me her Fitbit download. ESS 6/24.    CPAP 12-18  Dreamwear mask  90% pressure - 12    Therapy Event Summary (Last 14 Days)     Hide      Compliance Summary  Apnea Indices  Ventilator Statistics    Days with Device Usage:  14 days  Average AHI:  0.5  Average Breath Rate:  18.9 bpm    Percentage of Days >=4 Hours:  100.0%  Average OA Index:  0.2  Average % Patient Triggered Breaths:  N/A    Average Usage (Days Used):  7 hrs. 18 mins. 48 secs.  Average CA Index:  0.0  Average Tidal Volume:  394.4 ml    Average Usage (All Days):  7 hrs. 18 mins. 48 secs.    Average Minute Vent:  N/A        Large Leak  Periodic Breathing     Average Time in Large Leak:  3  mins. 17 secs.  Average % of Night in PB:  0.0%     Average % of Night in Large Leak:  0.7%              08/18/2017:   She has been CPAP compliant 12-18 cm H2). 90% was 13.7 cm. Not using Trazodone and even Melatonin anymore - just Magnesium.   She is trying to switch natural treatments around the year. She has not tried hemp seed oil - could not get the food grade one.     Therapy Event Summary (Last 14 Days)     Using [employer].    Hide      Compliance Summary  Apnea Indices  Ventilator Statistics    Days with Device Usage:  14 days  Average AHI:  0.5  Average Breath Rate:  20.2 bpm    Percentage of Days >=4 Hours:  100.0%  Average OA Index:  0.2  Average % Patient Triggered Breaths:  N/A    Average Usage (Days Used):  7 hrs. 52 mins. 35 secs.  Average CA Index:  0.0  Average Tidal Volume:  384.4 ml    Average Usage (All Days):  7 hrs. 52 mins. 35 secs.    Average Minute Vent:  N/A        Large Leak  Periodic Breathing     Average Time in Large Leak:  1 mins. 43 secs.  Average % of Night in PB:  0.0%     Average % of Night in Large Leak:  0.4%              05/02/2018:    Has been having harder time falling and staying asleep. Using wrist splint. Her  is 1 mo post prostate surgery.  Usign CPAP compliantly  Sleep schedule updated below.  She finally found sleep guided meditation that she liked  Her 's need also interrupt her sleep.  Planning to visit her GYN - she feels her foam mattress is a little warm.  She has not been to the gym lately. She believes she is at her heaviest now.    APAP 12-18 cm   90% 13-14 cm H2O    Therapy Event Summary Date Range: 4/2/2018 - 5/1/2018     Hide      Compliance Summary  Apnea Indices  Ventilator Statistics    Days with Device Usage:  30 days  Average AHI:  0.8  Average Breath Rate:  20.0 bpm    Percentage of Days >=4 Hours:  100.0%  Average OA Index:  0.4  Average % Patient Triggered Breaths:  N/A    Average Usage (Days Used):  9 hrs. 23 mins. 46 secs.  Average CA  "Index:  0.0  Average Tidal Volume:  409.4 ml    Average Usage (All Days):  9 hrs. 23 mins. 46 secs.    Average Minute Vent:  N/A        Large Leak  Periodic Breathing     Average Time in Large Leak:  1 mins. 6 secs.  Average % of Night in PB:  0.1%     Average % of Night in Large Leak:  0.2%                08/22/2018  Since last time Valsartan was recalled -> since then medication was changed twice  Sleep scheduled reviewed.  TV is loud. Due to her . Her 's rest was negative for CA fortunately.  Started seeing a psychologist (TINO) due to significant anxiety.  Has been using aromatherapy for insomnia "good night sleep" - lost effect lately.  No caffeine and no screens after 4 PM  Has been feeling a little depressed lately. A lot of stressed this year. Trying to get back on track.  Has not been journaling.   APPA 12-18  90% 12.8  Using under the nose mask.  Therapy Event Summary Date Range: 7/23/2018 - 8/21/2018 02/04/2019    APAP 12-18  90: 12-13; on bad nights - 15 cm  Stopped Amitriptyline.  Now has bursitis. Alternates Meloxicam  Occasionally taking xanax for anxiety  Trying Tylenol.    Again would like to revisit natural treatment options.  Tried [facility] on her phone, but lost a habit. Tends to be aggravated with routines.  Sometimes watching TV on  Bed. Eating a lot of sweets at night.  Sleeping more on her back - at times congested.      Compliance Summary  Apnea Indices  Ventilator Statistics    Days with Device Usage:  30 days  Average AHI:  0.7  Average Breath Rate:  20.2 bpm    Percentage of Days >=4 Hours:  100.0%  Average OA Index:  0.4  Average % Patient Triggered Breaths:  N/A    Average Usage (Days Used):  8 hrs. 51 mins. 48 secs.  Average CA Index:  0.0  Average Tidal Volume:  395.3 ml    Average Usage (All Days):  8 hrs. 51 mins. 48 secs.    Average Minute Vent:  N/A        Large Leak  Periodic Breathing     Average Time in Large Leak:  56 secs.  Average % of Night in PB:  0.2%   "   Average % of Night in Large Leak:  0.2%                    SLEEP ROUTINE AND LIFESTYLE :    Occupation:3 degrees - doing social work now.  She lost all her kids in an accident 13 years ago.    Gained 150 lbs since that time.   Bed partner: 10-11 pm --> 9 PM to watch TV (not sleepy yet)  Time to bed: 2-3 hrs -> now 1 hr-> now less and TV on later and not on timer -> ready to sleep at 10  Sleep onset latency: 45 min  Disruptions or awakenings: 3-4 ->2 at least  Time to fall back into sleep: 45 min-60 min->30 min-> over 5 min to return to sleep  Wakeup time: 6:30-7AM   Perceived sleep quality: 0/5  Perceived total sleep time:  3-4  hours.  Daytime naps: 0  Weekend sleep routine: till 7:30  Exercise routine: yes  Caffeine: no     PREVIOUS SLEEP STUDIES:     PSG 9/17/15: Significant Obstructive sleep apnea (DANIELLE) with AHI (apnea hypopnea Index) of 52 and SaO2 of 81 (weight  336 lbs).    PAST MEDICAL HISTORY:    Active Ambulatory Problems     Diagnosis Date Noted    LUQ abdominal pain 2013    Primary localized osteoarthrosis, lower leg 2014    DANIELLE (obstructive sleep apnea)     Essential hypertension 2016    Varicose veins of both lower extremities with pain 2016    Hypothyroidism 2017    Chronic non-seasonal allergic rhinitis 2017     Resolved Ambulatory Problems     Diagnosis Date Noted    No Resolved Ambulatory Problems     Past Medical History:   Diagnosis Date    HTN (hypertension)     Hyperlipidemia     Hypothyroid                 PAST SURGICAL HISTORY:    Past Surgical History:   Procedure Laterality Date    CARPAL TUNNEL RELEASE       SECTION, CLASSIC      x 3    EGD (ESOPHAGOGASTRODUODENOSCOPY) N/A 3/25/2013    [facility]    ENDOMETRIAL ABLATION      FOOT SURGERY      KNEE SURGERY      THYROID SURGERY           FAMILY HISTORY:                Family History   Problem Relation Age of Onset    Heart disease Mother  "    Heart disease Father        SOCIAL HISTORY:          Tobacco:   Social History     Tobacco Use   Smoking Status Never Smoker   Smokeless Tobacco Never Used       alcohol use:    Social History     Substance and Sexual Activity   Alcohol Use No                   ALLERGIES:    Review of patient's allergies indicates:   Allergen Reactions    Cortisone Rash       CURRENT MEDICATIONS:    Current Outpatient Medications   Medication Sig Dispense Refill    amitriptyline (ELAVIL) 50 MG tablet 1 pill PO bedtime 30 tablet 11    ezetimibe (ZETIA) 10 mg tablet Take 10 mg by mouth once daily.      fish oil-omega-3 fatty acids 300-1,000 mg capsule Take 2 g by mouth once daily.      hydroCHLOROthiazide (MICROZIDE) 12.5 mg capsule       levothyroxine (SYNTHROID) 125 MCG tablet Take 125 mcg by mouth once daily.      lidocaine-prilocaine (EMLA) cream       telmisartan (MICARDIS) 80 MG Tab Take 40 mg by mouth once daily.      ergocalciferol (ERGOCALCIFEROL) 50,000 unit Cap Take 1 capsule (50,000 Units total) by mouth every 7 days. 12 capsule 1     No current facility-administered medications for this visit.                       REVIEW OF SYSTEMS:   Sleep related symptoms as per HPI    reports weight gain 150 lbs   Denies dyspnea  Denies palpitations  Denies acid reflux   Reports polyuria  Reports  mood diturbance  Denies  anemia  Denies  muscle pain  Denies  Gait imbalance    Otherwise, a balance of 10 systems reviewed is negative.    PHYSICAL EXAM:  BP (!) 168/96   Pulse 73   Ht 5' 4" (1.626 m)   Wt (!) 155 kg (341 lb 11.2 oz)   BMI 58.65 kg/m²   GENERAL: Overweight body habitus, well groomed.    ASSESSMENT:    1. Obstructive sleep apnea, severe by AHI with prior symptoms of snoring, excessive daytime sleepiness, and fatigue, now resolved with CPAP use. The patient is adherent on CPAP and experiencing symptomatic benefit. Medical co-mobidities: insomnia.     2. Insomnia NEC. Multi-factorial -  excess time in bed, " poor sleep hygiene (TV loud); recent traumatic events. Not currently on a medication. Melatonin helps her to fall but not to stay asleep.      PLAN:    Will refer to [facility] - brochure provided   For now - will start trial of Belsomra. Will give coupon.  Potential side effects were explained.     Continue CPAP 12-18 cm H2O     exercise 1 step at a time    CBTI-     Continue Magnesium 200 Bisglycinate - 1-2 pills      Continue Melatonin and           -Education: During our discussion today, we talked about the etiology of obstructive sleep apnea as well as the potential ramifications of untreated sleep apnea, which could include daytime sleepiness, hypertension, heart disease and/or stroke.  We discussed potential treatment options, which could include weight loss, body positioning, continuous positive airway pressure (CPAP), or referral for surgical consideration. The patient preferred CPAP option.    She should avoid ETOH and sedatives at night, as it tends to aggravate DANIELLE. Regular replacement of CPAP mask, tubing and filter was recommended.    Precautions: The patient was advised to abstain from driving should he feel sleepy or drowsy.

## 2019-02-07 ENCOUNTER — HOSPITAL ENCOUNTER (OUTPATIENT)
Facility: HOSPITAL | Age: 59
Discharge: HOME | End: 2019-02-07
Attending: EMERGENCY MEDICINE | Admitting: HOSPITALIST
Payer: COMMERCIAL

## 2019-02-07 VITALS
BODY MASS INDEX: 48.82 KG/M2 | HEART RATE: 90 BPM | HEIGHT: 65 IN | DIASTOLIC BLOOD PRESSURE: 86 MMHG | RESPIRATION RATE: 18 BRPM | TEMPERATURE: 98 F | SYSTOLIC BLOOD PRESSURE: 160 MMHG | WEIGHT: 293 LBS | OXYGEN SATURATION: 98 %

## 2019-02-07 DIAGNOSIS — R07.89 CHEST PRESSURE: Primary | ICD-10-CM

## 2019-02-07 DIAGNOSIS — R07.9 CHEST PAIN: ICD-10-CM

## 2019-02-07 DIAGNOSIS — R42 DIZZINESS: ICD-10-CM

## 2019-02-07 PROBLEM — E03.9 HYPOTHYROIDISM: Chronic | Status: ACTIVE | Noted: 2017-09-07

## 2019-02-07 PROBLEM — J30.89 CHRONIC NON-SEASONAL ALLERGIC RHINITIS: Chronic | Status: ACTIVE | Noted: 2017-09-07

## 2019-02-07 LAB
ALBUMIN SERPL BCP-MCNC: 3.4 G/DL
ALP SERPL-CCNC: 70 U/L
ALT SERPL W/O P-5'-P-CCNC: 23 U/L
ANION GAP SERPL CALC-SCNC: 10 MMOL/L
AST SERPL-CCNC: 16 U/L
BASOPHILS # BLD AUTO: 0.02 K/UL
BASOPHILS NFR BLD: 0.3 %
BILIRUB SERPL-MCNC: 0.3 MG/DL
BSA FOR ECHO PROCEDURE: 2.66 M2
BUN SERPL-MCNC: 12 MG/DL
CALCIUM SERPL-MCNC: 9 MG/DL
CHLORIDE SERPL-SCNC: 104 MMOL/L
CO2 SERPL-SCNC: 25 MMOL/L
CREAT SERPL-MCNC: 0.7 MG/DL
CV STRESS BASE HR: 75 BPM
DIASTOLIC BLOOD PRESSURE: 79 MMHG
DIFFERENTIAL METHOD: ABNORMAL
EOSINOPHIL # BLD AUTO: 0.2 K/UL
EOSINOPHIL NFR BLD: 2.4 %
ERYTHROCYTE [DISTWIDTH] IN BLOOD BY AUTOMATED COUNT: 14.1 %
EST. GFR  (AFRICAN AMERICAN): >60 ML/MIN/1.73 M^2
EST. GFR  (NON AFRICAN AMERICAN): >60 ML/MIN/1.73 M^2
GLUCOSE SERPL-MCNC: 170 MG/DL
HCT VFR BLD AUTO: 40.5 %
HGB BLD-MCNC: 12.4 G/DL
LYMPHOCYTES # BLD AUTO: 3.7 K/UL
LYMPHOCYTES NFR BLD: 58.5 %
MCH RBC QN AUTO: 26.6 PG
MCHC RBC AUTO-ENTMCNC: 30.6 G/DL
MCV RBC AUTO: 87 FL
MONOCYTES # BLD AUTO: 0.5 K/UL
MONOCYTES NFR BLD: 8.3 %
NEUTROPHILS # BLD AUTO: 1.9 K/UL
NEUTROPHILS NFR BLD: 30.3 %
OHS CV CPX 1 MINUTE RECOVERY HEART RATE: 122 BPM
OHS CV CPX 85 PERCENT MAX PREDICTED HEART RATE MALE: 133
OHS CV CPX ESTIMATED METS: 6
OHS CV CPX MAX PREDICTED HEART RATE: 157
OHS CV CPX PATIENT IS FEMALE: 1
OHS CV CPX PATIENT IS MALE: 0
OHS CV CPX PEAK DIASTOLIC BLOOD PRESSURE: 84 MMHG
OHS CV CPX PEAK HEAR RATE: 153 BPM
OHS CV CPX PEAK RATE PRESSURE PRODUCT: NORMAL
OHS CV CPX PEAK SYSTOLIC BLOOD PRESSURE: 171 MMHG
OHS CV CPX PERCENT MAX PREDICTED HEART RATE ACHIEVED: 98
OHS CV CPX PERCENT TARGET HEART RATE ACHIEVED: 110.07
OHS CV CPX RATE PRESSURE PRODUCT PRESENTING: NORMAL
OHS CV CPX TARGET HEART RATE: 139
PLATELET # BLD AUTO: 230 K/UL
PMV BLD AUTO: 11 FL
POTASSIUM SERPL-SCNC: 3.9 MMOL/L
PROT SERPL-MCNC: 6.5 G/DL
RBC # BLD AUTO: 4.66 M/UL
SODIUM SERPL-SCNC: 139 MMOL/L
STRESS ANGINA INDEX: 0
STRESS ECHO POST EXERCISE DUR MIN: 4 MIN
STRESS ECHO POST EXERCISE DUR SEC: 11
SYSTOLIC BLOOD PRESSURE: 140 MMHG
TROPONIN I SERPL DL<=0.01 NG/ML-MCNC: <0.006 NG/ML
WBC # BLD AUTO: 6.27 K/UL

## 2019-02-07 PROCEDURE — G0378 HOSPITAL OBSERVATION PER HR: HCPCS

## 2019-02-07 PROCEDURE — 93005 ELECTROCARDIOGRAM TRACING: CPT

## 2019-02-07 PROCEDURE — 96374 THER/PROPH/DIAG INJ IV PUSH: CPT | Mod: 59

## 2019-02-07 PROCEDURE — 84484 ASSAY OF TROPONIN QUANT: CPT | Mod: 91

## 2019-02-07 PROCEDURE — 63600175 PHARM REV CODE 636 W HCPCS: Performed by: NURSE PRACTITIONER

## 2019-02-07 PROCEDURE — 99285 EMERGENCY DEPT VISIT HI MDM: CPT | Mod: 25

## 2019-02-07 PROCEDURE — 63600175 PHARM REV CODE 636 W HCPCS: Performed by: EMERGENCY MEDICINE

## 2019-02-07 PROCEDURE — 25000003 PHARM REV CODE 250: Performed by: NURSE PRACTITIONER

## 2019-02-07 PROCEDURE — 25000003 PHARM REV CODE 250: Performed by: EMERGENCY MEDICINE

## 2019-02-07 PROCEDURE — 85025 COMPLETE CBC W/AUTO DIFF WBC: CPT

## 2019-02-07 PROCEDURE — 94761 N-INVAS EAR/PLS OXIMETRY MLT: CPT

## 2019-02-07 PROCEDURE — 96375 TX/PRO/DX INJ NEW DRUG ADDON: CPT

## 2019-02-07 PROCEDURE — 80053 COMPREHEN METABOLIC PANEL: CPT

## 2019-02-07 PROCEDURE — 36415 COLL VENOUS BLD VENIPUNCTURE: CPT

## 2019-02-07 RX ORDER — ENOXAPARIN SODIUM 100 MG/ML
40 INJECTION SUBCUTANEOUS EVERY 24 HOURS
Status: DISCONTINUED | OUTPATIENT
Start: 2019-02-07 | End: 2019-02-07 | Stop reason: HOSPADM

## 2019-02-07 RX ORDER — ACETAMINOPHEN 325 MG/1
650 TABLET ORAL EVERY 4 HOURS PRN
Status: DISCONTINUED | OUTPATIENT
Start: 2019-02-07 | End: 2019-02-07 | Stop reason: HOSPADM

## 2019-02-07 RX ORDER — HYDRALAZINE HYDROCHLORIDE 25 MG/1
25 TABLET, FILM COATED ORAL ONCE
Status: COMPLETED | OUTPATIENT
Start: 2019-02-07 | End: 2019-02-07

## 2019-02-07 RX ORDER — HYDRALAZINE HYDROCHLORIDE 20 MG/ML
10 INJECTION INTRAMUSCULAR; INTRAVENOUS EVERY 8 HOURS PRN
Status: DISCONTINUED | OUTPATIENT
Start: 2019-02-07 | End: 2019-02-07 | Stop reason: HOSPADM

## 2019-02-07 RX ORDER — ASPIRIN 325 MG
325 TABLET ORAL
Status: DISCONTINUED | OUTPATIENT
Start: 2019-02-07 | End: 2019-02-07

## 2019-02-07 RX ORDER — LEVOTHYROXINE SODIUM 125 UG/1
125 TABLET ORAL DAILY
Status: DISCONTINUED | OUTPATIENT
Start: 2019-02-08 | End: 2019-02-07 | Stop reason: HOSPADM

## 2019-02-07 RX ORDER — NAPROXEN SODIUM 220 MG/1
162 TABLET, FILM COATED ORAL
Status: COMPLETED | OUTPATIENT
Start: 2019-02-07 | End: 2019-02-07

## 2019-02-07 RX ORDER — HYDROCHLOROTHIAZIDE 25 MG/1
25 TABLET ORAL
Status: COMPLETED | OUTPATIENT
Start: 2019-02-07 | End: 2019-02-07

## 2019-02-07 RX ORDER — PROCHLORPERAZINE MALEATE 5 MG
10 TABLET ORAL
Status: COMPLETED | OUTPATIENT
Start: 2019-02-07 | End: 2019-02-07

## 2019-02-07 RX ORDER — NITROGLYCERIN 0.4 MG/1
0.4 TABLET SUBLINGUAL EVERY 5 MIN PRN
Status: DISCONTINUED | OUTPATIENT
Start: 2019-02-07 | End: 2019-02-07 | Stop reason: HOSPADM

## 2019-02-07 RX ORDER — TELMISARTAN 80 MG/1
40 TABLET ORAL DAILY
Status: DISCONTINUED | OUTPATIENT
Start: 2019-02-07 | End: 2019-02-07

## 2019-02-07 RX ORDER — SODIUM CHLORIDE 0.9 % (FLUSH) 0.9 %
5 SYRINGE (ML) INJECTION
Status: DISCONTINUED | OUTPATIENT
Start: 2019-02-07 | End: 2019-02-07 | Stop reason: HOSPADM

## 2019-02-07 RX ORDER — KETOROLAC TROMETHAMINE 30 MG/ML
15 INJECTION, SOLUTION INTRAMUSCULAR; INTRAVENOUS
Status: COMPLETED | OUTPATIENT
Start: 2019-02-07 | End: 2019-02-07

## 2019-02-07 RX ADMIN — HYDRALAZINE HYDROCHLORIDE 10 MG: 20 INJECTION INTRAMUSCULAR; INTRAVENOUS at 11:02

## 2019-02-07 RX ADMIN — HYDRALAZINE HYDROCHLORIDE 25 MG: 25 TABLET, FILM COATED ORAL at 03:02

## 2019-02-07 RX ADMIN — KETOROLAC TROMETHAMINE 15 MG: 30 INJECTION, SOLUTION INTRAMUSCULAR at 05:02

## 2019-02-07 RX ADMIN — PROCHLORPERAZINE MALEATE 10 MG: 5 TABLET, FILM COATED ORAL at 06:02

## 2019-02-07 RX ADMIN — ASPIRIN 81 MG 162 MG: 81 TABLET ORAL at 04:02

## 2019-02-07 RX ADMIN — HYDROCHLOROTHIAZIDE 25 MG: 25 TABLET ORAL at 05:02

## 2019-02-07 NOTE — PLAN OF CARE
Problem: Adult Inpatient Plan of Care  Goal: Plan of Care Review  Outcome: Ongoing (interventions implemented as appropriate)  Pt on RA with documented sats. No distress noted. Will continue to monitor.

## 2019-02-07 NOTE — SUBJECTIVE & OBJECTIVE
Past Medical History:   Diagnosis Date    HTN (hypertension)     Hyperlipidemia     Hypothyroid        Past Surgical History:   Procedure Laterality Date    CARPAL TUNNEL RELEASE       SECTION, CLASSIC      x 3    EGD (ESOPHAGOGASTRODUODENOSCOPY) N/A 3/25/2013    Per[facility]    ENDOMETRIAL ABLATION      FOOT SURGERY      KNEE SURGERY      THYROID SURGERY         Review of patient's allergies indicates:   Allergen Reactions    Cortisone Rash       No current facility-administered medications on file prior to encounter.      Current Outpatient Medications on File Prior to Encounter   Medication Sig    ergocalciferol (ERGOCALCIFEROL) 50,000 unit Cap Take 1 capsule (50,000 Units total) by mouth every 7 days.    ezetimibe (ZETIA) 10 mg tablet Take 10 mg by mouth once daily.    hydroCHLOROthiazide (MICROZIDE) 12.5 mg capsule     levothyroxine (SYNTHROID) 125 MCG tablet Take 125 mcg by mouth once daily.    suvorexant (BELSOMRA) 20 mg Tab Take 20 mg by mouth nightly as needed.    telmisartan (MICARDIS) 80 MG Tab Take 40 mg by mouth once daily.    fish oil-omega-3 fatty acids 300-1,000 mg capsule Take 2 g by mouth once daily.    lidocaine-prilocaine (EMLA) cream     [DISCONTINUED] amitriptyline (ELAVIL) 50 MG tablet 1 pill PO bedtime     Family History     Problem Relation (Age of Onset)    Heart disease Mother, Father        Tobacco Use    Smoking status: Never Smoker    Smokeless tobacco: Never Used   Substance and Sexual Activity    Alcohol use: No    Drug use: No    Sexual activity: Not on file     Review of Systems   Constitutional: Negative for activity change, appetite change, chills, diaphoresis, fatigue and fever.   HENT: Negative for trouble swallowing.    Eyes: Negative for visual disturbance.   Respiratory: Negative for cough, chest tightness, shortness of breath and wheezing.    Cardiovascular: Negative for chest pain, palpitations and leg  swelling.   Gastrointestinal: Negative for abdominal distention, abdominal pain, blood in stool, diarrhea, nausea and vomiting.   Genitourinary: Negative for difficulty urinating and hematuria.   Musculoskeletal: Negative for arthralgias, back pain and gait problem.   Skin: Negative for color change.   Neurological: Negative for dizziness, tremors, syncope, facial asymmetry, speech difficulty, weakness, light-headedness and headaches.   Hematological: Does not bruise/bleed easily.   Psychiatric/Behavioral: Negative for agitation, confusion and hallucinations. The patient is not nervous/anxious.      Objective:     Vital Signs (Most Recent):  Temp: 98 °F (36.7 °C) (02/07/19 1050)  Pulse: 90 (02/07/19 1343)  Resp: 18 (02/07/19 1343)  BP: (!) 177/89 (02/07/19 1343)  SpO2: 98 % (02/07/19 1343) Vital Signs (24h Range):  Temp:  [97.5 °F (36.4 °C)-98 °F (36.7 °C)] 98 °F (36.7 °C)  Pulse:  [71-90] 90  Resp:  [18-24] 18  SpO2:  [98 %-100 %] 98 %  BP: (134-212)/() 177/89     Weight: (!) 154.2 kg (340 lb)  Body mass index is 56.58 kg/m².    Physical Exam   Constitutional: She is oriented to person, place, and time. She appears well-developed and well-nourished.   HENT:   Head: Normocephalic and atraumatic.   Eyes: Conjunctivae and EOM are normal. Pupils are equal, round, and reactive to light.   Neck: Normal range of motion. Neck supple.   Cardiovascular: Normal rate, regular rhythm, normal heart sounds and intact distal pulses.   No murmur heard.  Pulmonary/Chest: Effort normal and breath sounds normal.   Abdominal: Soft. Bowel sounds are normal.   Musculoskeletal: Normal range of motion.   Neurological: She is alert and oriented to person, place, and time.   Skin: Skin is warm and dry. Capillary refill takes less than 2 seconds.   Psychiatric: She has a normal mood and affect. Her behavior is normal. Judgment and thought content normal.   Nursing note and vitals reviewed.        CRANIAL NERVES     CN III, IV, VI    Pupils are equal, round, and reactive to light.  Extraocular motions are normal.        Significant Labs:   CBC:   Recent Labs   Lab 02/07/19  0456   WBC 6.27   HGB 12.4   HCT 40.5        CMP:   Recent Labs   Lab 02/07/19  0456      K 3.9      CO2 25   *   BUN 12   CREATININE 0.7   CALCIUM 9.0   PROT 6.5   ALBUMIN 3.4*   BILITOT 0.3   ALKPHOS 70   AST 16   ALT 23   ANIONGAP 10   EGFRNONAA >60     Troponin:   Recent Labs   Lab 02/07/19  0456 02/07/19  0708 02/07/19  1248   TROPONINI <0.006 <0.006 <0.006       Significant Imaging: I have reviewed all pertinent imaging results/findings within the past 24 hours.

## 2019-02-07 NOTE — NURSING
Pt is AAOx2 in NAD, tele box removed and returned, IV removed with catheter intact.  at the BS.  Pt given dc papers with explanation .  Pt verbalized understanding

## 2019-02-07 NOTE — HPI
Ms. Yulissa Hatfield is a 58 year old female who lives at her residence with her . The patients PCP is Dr. Samina Hollis. The patient has a past medical history of hypertension, insomnia, hyperlipidema, and hypothyroidism. The patient has a past surgical history of carpel tunnel release,  x 3, endometrial ablation, foot surgery, knee surgery, and thyroid surgery.  The patient denies smoking cigarettes, drinking alcohol, and she denies illicit drug use. The patient presents to [facility] with complaints of chest pressure and dizziness that has since resolved since presenting to the ED. The patient states she started a new medication for sleep, Belsomra, on last night. The patient states she woke up around 3:00 AM and she states she  felt dizzy, describing the room spinning. The patient also felt nauseated and went to the bathroom where she had 1 or 2 episodes of nonbloody, nonbilious emesis followed by 1 or 2 nonbloody loose bowel movements.  She felt a little bit better and went back to her room where she felt nauseated again, with some mild but persistent dizziness.  At that time she began having some chest pressure.  She denies chest pain, states it is a pressure-like symptom.  No exacerbating or alleviating factors.  No shortness of breath associated with chest pain.  It has been constant since that time, and when it did not go way she decided to come to the emergency department.  She reports some residual nausea but has not had any more emesis. The patient reports taking 2 baby aspirin at home this morning before coming to the ED. In the ED the patient received ASA, toradol, and HTCZ. The patients labs showed normal troponin. CXR---Enlarged cardiac silhouette with prominence of pulmonary vasculature suggestive of pulmonary edema.  No consolidation. The patient was admitted to the CDU for observation.

## 2019-02-07 NOTE — PROVIDER PROGRESS NOTES - EMERGENCY DEPT.
Encounter Date: 2/7/2019    ED Physician Progress Notes       SCRIBE NOTE: I, Gisell Rayo, am scribing for, and in the presence of,  Dr. Henry.  Physician Statement: I, Dr. Henry, personally performed the services described in this documentation as scribed by Gisell Rayo in my presence, and it is both accurate and complete.     Physician Note:   8:20 AM  I assumed care of patient who presented for evaluation of dizziness that is described at spinning. The dizziness is of brief duration and was noted upon arising at 3:00 AM. Patient began Belsomra medication yesterday of which an adverse reaction includes dizziness. Patient also noted heaviness and tightness of chest with radiation to both shoulders. Associated symptoms include diaphoresis and nausea. Chest heaviness, tightness, and associated symptoms are what prompted patient to come to the ED.    9:06  Case discussed with Dr. Mendieta who will admit the patient. HEART score 4. Will place in observation and trend troponins. No further dizziness.

## 2019-02-07 NOTE — H&P
"[facility] Medicine  History & Physical    Patient Name: Yulissa Hatfield  MRN: [id number]  Admission Date: 2019  Attending Physician: Bryan Mendieta MD   Primary Care Provider: Samina Hollis MD         Patient information was obtained from patient, spouse/SO, past medical records and ER records.     Subjective:     Principal Problem:Chest pressure    Chief Complaint:   Chief Complaint   Patient presents with    Dizziness     woke up from sleeping with "room spinning" and midsternal chest pressure, nausea, vomiting and sweating; intermittent; started new medication last night, belsomra, for sleep        HPI: Ms. Yulissa Hatfield is a 58 year old female who lives at her residence with her . The patients PCP is Dr. Samina Hollis. The patient has a past medical history of hypertension, insomnia, hyperlipidema, and hypothyroidism. The patient has a past surgical history of carpel tunnel release,  x 3, endometrial ablation, foot surgery, knee surgery, and thyroid surgery.  The patient denies smoking cigarettes, drinking alcohol, and she denies illicit drug use. The patient presents to [facility] with complaints of chest pressure and dizziness that has since resolved since presenting to the ED. The patient states she started a new medication for sleep, Belsomra, on last night. The patient states she woke up around 3:00 AM and she states she  felt dizzy, describing the room spinning. The patient also felt nauseated and went to the bathroom where she had 1 or 2 episodes of nonbloody, nonbilious emesis followed by 1 or 2 nonbloody loose bowel movements.  She felt a little bit better and went back to her room where she felt nauseated again, with some mild but persistent dizziness.  At that time she began having some chest pressure.  She denies chest pain, states it is a pressure-like symptom.  No exacerbating or alleviating factors.  No shortness " of breath associated with chest pain.  It has been constant since that time, and when it did not go way she decided to come to the emergency department.  She reports some residual nausea but has not had any more emesis. The patient reports taking 2 baby aspirin at home this morning before coming to the ED. In the ED the patient received ASA, toradol, and HTCZ. The patients labs showed normal troponin. CXR---Enlarged cardiac silhouette with prominence of pulmonary vasculature suggestive of pulmonary edema.  No consolidation. The patient was admitted to the CDU for observation.            Past Medical History:   Diagnosis Date    HTN (hypertension)     Hyperlipidemia     Hypothyroid        Past Surgical History:   Procedure Laterality Date    CARPAL TUNNEL RELEASE       SECTION, CLASSIC      x 3    EGD (ESOPHAGOGASTRODUODENOSCOPY) N/A 3/25/2013    Performed by [facility] at [facility] ENDO    ENDOMETRIAL ABLATION      FOOT SURGERY      KNEE SURGERY      THYROID SURGERY         Review of patient's allergies indicates:   Allergen Reactions    Cortisone Rash       No current facility-administered medications on file prior to encounter.      Current Outpatient Medications on File Prior to Encounter   Medication Sig    ergocalciferol (ERGOCALCIFEROL) 50,000 unit Cap Take 1 capsule (50,000 Units total) by mouth every 7 days.    ezetimibe (ZETIA) 10 mg tablet Take 10 mg by mouth once daily.    hydroCHLOROthiazide (MICROZIDE) 12.5 mg capsule     levothyroxine (SYNTHROID) 125 MCG tablet Take 125 mcg by mouth once daily.    suvorexant (BELSOMRA) 20 mg Tab Take 20 mg by mouth nightly as needed.    telmisartan (MICARDIS) 80 MG Tab Take 40 mg by mouth once daily.    fish oil-omega-3 fatty acids 300-1,000 mg capsule Take 2 g by mouth once daily.    lidocaine-prilocaine (EMLA) cream     [DISCONTINUED] amitriptyline (ELAVIL) 50 MG tablet 1 pill PO bedtime     Family History     Problem Relation  (Age of Onset)    Heart disease Mother, Father        Tobacco Use    Smoking status: Never Smoker    Smokeless tobacco: Never Used   Substance and Sexual Activity    Alcohol use: No    Drug use: No    Sexual activity: Not on file     Review of Systems   Constitutional: Negative for activity change, appetite change, chills, diaphoresis, fatigue and fever.   HENT: Negative for trouble swallowing.    Eyes: Negative for visual disturbance.   Respiratory: Negative for cough, chest tightness, shortness of breath and wheezing.    Cardiovascular: Negative for chest pain, palpitations and leg swelling.   Gastrointestinal: Negative for abdominal distention, abdominal pain, blood in stool, diarrhea, nausea and vomiting.   Genitourinary: Negative for difficulty urinating and hematuria.   Musculoskeletal: Negative for arthralgias, back pain and gait problem.   Skin: Negative for color change.   Neurological: Negative for dizziness, tremors, syncope, facial asymmetry, speech difficulty, weakness, light-headedness and headaches.   Hematological: Does not bruise/bleed easily.   Psychiatric/Behavioral: Negative for agitation, confusion and hallucinations. The patient is not nervous/anxious.      Objective:     Vital Signs (Most Recent):  Temp: 98 °F (36.7 °C) (02/07/19 1050)  Pulse: 90 (02/07/19 1343)  Resp: 18 (02/07/19 1343)  BP: (!) 177/89 (02/07/19 1343)  SpO2: 98 % (02/07/19 1343) Vital Signs (24h Range):  Temp:  [97.5 °F (36.4 °C)-98 °F (36.7 °C)] 98 °F (36.7 °C)  Pulse:  [71-90] 90  Resp:  [18-24] 18  SpO2:  [98 %-100 %] 98 %  BP: (134-212)/() 177/89     Weight: (!) 154.2 kg (340 lb)  Body mass index is 56.58 kg/m².    Physical Exam   Constitutional: She is oriented to person, place, and time. She appears well-developed and well-nourished.   HENT:   Head: Normocephalic and atraumatic.   Eyes: Conjunctivae and EOM are normal. Pupils are equal, round, and reactive to light.   Neck: Normal range of motion. Neck  supple.   Cardiovascular: Normal rate, regular rhythm, normal heart sounds and intact distal pulses.   No murmur heard.  Pulmonary/Chest: Effort normal and breath sounds normal.   Abdominal: Soft. Bowel sounds are normal.   Musculoskeletal: Normal range of motion.   Neurological: She is alert and oriented to person, place, and time.   Skin: Skin is warm and dry. Capillary refill takes less than 2 seconds.   Psychiatric: She has a normal mood and affect. Her behavior is normal. Judgment and thought content normal.   Nursing note and vitals reviewed.        CRANIAL NERVES     CN III, IV, VI   Pupils are equal, round, and reactive to light.  Extraocular motions are normal.        Significant Labs:   CBC:   Recent Labs   Lab 02/07/19 0456   WBC 6.27   HGB 12.4   HCT 40.5        CMP:   Recent Labs   Lab 02/07/19 0456      K 3.9      CO2 25   *   BUN 12   CREATININE 0.7   CALCIUM 9.0   PROT 6.5   ALBUMIN 3.4*   BILITOT 0.3   ALKPHOS 70   AST 16   ALT 23   ANIONGAP 10   EGFRNONAA >60     Troponin:   Recent Labs   Lab 02/07/19  0456 02/07/19  0708 02/07/19  1248   TROPONINI <0.006 <0.006 <0.006       Significant Imaging: I have reviewed all pertinent imaging results/findings within the past 24 hours.    Assessment/Plan:     * Chest pressure    -Trend troponin  -echo stress test ordered  -consult cards if troponin or stress test positive  -nitro 0.4 mg SL as needed chest pain  -stat EKG for chest pain  -O2 2 liters NC as needed  -cardiac tele monitoring  -cardiac diet after stress test         Dizziness    No complaints of dizziness at this time.   -fall precautions       Chronic non-seasonal allergic rhinitis    Chronic.       Hypothyroidism    Will resume home meds.       Essential hypertension    SBP range 178-212    -will order prn hydralazine  -HCTZ given in the ED  -monitor       Obstructive sleep apnea on CPAP    Chronic.         VTE Risk Mitigation (From admission, onward)        Ordered      enoxaparin injection 40 mg  Daily      02/07/19 1029     Place TRACE hose  Until discontinued      02/07/19 1029     Place sequential compression device  Until discontinued      02/07/19 1029             Ld Odonnell NP  Department of Hospital Medicine   [facility]

## 2019-02-07 NOTE — HOSPITAL COURSE
The patient was admitted to the CDU for observation. Troponion negative x3, stress test ordered and negative. No complaints of dizziness or chest pressure noted. Will have the patient follow up with her PCP in 1 week. Instructed the patient to stop taking Belsomra. Will resume all other home meds.

## 2019-02-07 NOTE — DISCHARGE SUMMARY
[facility] Medicine  Discharge Summary      Patient Name: Yulissa Hatfield  MRN: [id number]  Admission Date: 2019  Hospital Length of Stay: 0 days  Discharge Date and Time:  2019 3:20 PM  Attending Physician: Bryan Mendieta MD   Discharging Provider: Ld Odonnell NP  Primary Care Provider: Samina Hollis MD      HPI:   Ms. Yulissa Hatfield is a 58 year old female who lives at her residence with her . The patients PCP is Dr. Samina Hollis. The patient has a past medical history of hypertension, insomnia, hyperlipidema, and hypothyroidism. The patient has a past surgical history of carpel tunnel release,  x 3, endometrial ablation, foot surgery, knee surgery, and thyroid surgery.  The patient denies smoking cigarettes, drinking alcohol, and she denies illicit drug use. The patient presents to [facility] with complaints of chest pressure and dizziness that has since resolved since presenting to the ED. The patient states she started a new medication for sleep, Belsomra, on last night. The patient states she woke up around 3:00 AM and she states she  felt dizzy, describing the room spinning. The patient also felt nauseated and went to the bathroom where she had 1 or 2 episodes of nonbloody, nonbilious emesis followed by 1 or 2 nonbloody loose bowel movements.  She felt a little bit better and went back to her room where she felt nauseated again, with some mild but persistent dizziness.  At that time she began having some chest pressure.  She denies chest pain, states it is a pressure-like symptom.  No exacerbating or alleviating factors.  No shortness of breath associated with chest pain.  It has been constant since that time, and when it did not go way she decided to come to the emergency department.  She reports some residual nausea but has not had any more emesis. The patient reports taking 2 baby aspirin at home this  morning before coming to the ED. In the ED the patient received ASA, toradol, and HTCZ. The patients labs showed normal troponin. CXR---Enlarged cardiac silhouette with prominence of pulmonary vasculature suggestive of pulmonary edema.  No consolidation. The patient was admitted to the CDU for observation.            * No surgery found *      Hospital Course:   The patient was admitted to the CDU for observation. Troponion negative x3, stress test ordered and negative. No complaints of dizziness or chest pressure noted. Will have the patient follow up with her PCP in 1 week. Instructed the patient to stop taking Belsomra. Will resume all other home meds.      Consults:     * Chest pressure-resolved as of 2/7/2019    -Trended troponin--negative x3  -echo stress test ordered  · The EKG portion of this study is negative for myocardial ischemia.  · The stress echo portion of this study is negative for myocardial ischemia.  · The patient reported SOB (non-anginal) during the stress test.  · Overall, the patient's exercise capacity was below average.  · There were no arrhythmias during stress.  · Normal left ventricular systolic function. The estimated ejection fraction is 60%  · Indeterminate left ventricular diastolic function.  · Normal right ventricular systolic function.  -will have the patient follow up with her PCP in 1 week.         Chronic non-seasonal allergic rhinitis    Chronic.       Hypothyroidism    Will cont home meds.       Essential hypertension    Will cont home meds on d/c home       Obstructive sleep apnea on CPAP    Chronic.    Cont home settings.        Dizziness-resolved as of 2/7/2019    No complaints of dizziness at this time.   -will d/c home       Final Active Diagnoses:    Diagnosis Date Noted POA    Chronic non-seasonal allergic rhinitis [J30.89] 09/07/2017 Yes     Chronic    Hypothyroidism [E03.9] 09/07/2017 Yes     Chronic    Essential hypertension [I10] 09/06/2016 Yes     Chronic     Obstructive sleep apnea on CPAP [G47.33, Z99.89]  Not Applicable     Chronic      Problems Resolved During this Admission:    Diagnosis Date Noted Date Resolved POA    PRINCIPAL PROBLEM:  Chest pressure [R07.89] 02/07/2019 02/07/2019 Yes    Dizziness [R42] 02/07/2019 02/07/2019 Yes       Discharged Condition: good    Disposition: Home or Self Care    Follow Up:  Follow-up Information     Samina Hollis MD In 1 week.    Specialty:  Family Medicine  Why:  please call for hospital follow up appointment.  Contact information:  [address] [facility]  [address] [place] [id number] [phone]                 Patient Instructions:      Diet Cardiac     Notify your health care provider if you experience any of the following:  temperature >100.4     Notify your health care provider if you experience any of the following:  persistent nausea and vomiting or diarrhea     Notify your health care provider if you experience any of the following:  severe uncontrolled pain     Notify your health care provider if you experience any of the following:  difficulty breathing or increased cough     Notify your health care provider if you experience any of the following:  severe persistent headache     Notify your health care provider if you experience any of the following:  worsening rash     Notify your health care provider if you experience any of the following:  persistent dizziness, light-headedness, or visual disturbances     Notify your health care provider if you experience any of the following:  increased confusion or weakness     Activity as tolerated       Significant Diagnostic Studies: Labs:   CMP   Recent Labs   Lab 02/07/19  0456      K 3.9      CO2 25   *   BUN 12   CREATININE 0.7   CALCIUM 9.0   PROT 6.5   ALBUMIN 3.4*   BILITOT 0.3   ALKPHOS 70   AST 16   ALT 23   ANIONGAP 10   ESTGFRAFRICA >60   EGFRNONAA >60   , CBC   Recent Labs   Lab 02/07/19  0456   WBC 6.27   HGB 12.4   HCT 40.5       and  Troponin   Recent Labs   Lab 02/07/19  1248   TROPONINI <0.006       Pending Diagnostic Studies:     None         Medications:  Reconciled Home Medications:      Medication List      CONTINUE taking these medications    ergocalciferol 50,000 unit Cap  Commonly known as:  ERGOCALCIFEROL  Take 1 capsule (50,000 Units total) by mouth every 7 days.     ezetimibe 10 mg tablet  Commonly known as:  ZETIA  Take 10 mg by mouth once daily.     fish oil-omega-3 fatty acids 300-1,000 mg capsule  Take 2 g by mouth once daily.     hydroCHLOROthiazide 12.5 mg capsule  Commonly known as:  MICROZIDE     levothyroxine 125 MCG tablet  Commonly known as:  SYNTHROID  Take 125 mcg by mouth once daily.     lidocaine-prilocaine cream  Commonly known as:  EMLA     telmisartan 80 MG Tab  Commonly known as:  MICARDIS  Take 40 mg by mouth once daily.        STOP taking these medications    amitriptyline 50 MG tablet  Commonly known as:  ELAVIL     suvorexant 20 mg Tab  Commonly known as:  BELSOMRA            Indwelling Lines/Drains at time of discharge:   Lines/Drains/Airways          None          Time spent on the discharge of patient: 35 minutes  Patient was seen and examined on the date of discharge and determined to be suitable for discharge.         Ld Odonnell NP  Department of Hospital Medicine  [facility]

## 2019-02-07 NOTE — ED PROVIDER NOTES
"Encounter Date: 2019       History     Chief Complaint   Patient presents with    Dizziness     woke up from sleeping with "room spinning" and midsternal chest pressure, nausea, vomiting and sweating; intermittent; started new medication last night, belsomra, for sleep     58-year-old female brought to the emergency department by  for chest pressure.  States last night, before bed, she started a new sleeping medication.  States she woke up around 3:00 a.m. and felt dizzy, describing a room spinning sensation.  She also felt nauseated and went to the bathroom where she had 1 or 2 episodes of nonbloody, nonbilious emesis followed by 1 or 2 nonbloody loose bowel movements.  She felt a little bit better and went back to her room where she felt nauseated again, with some mild but persistent dizziness.  At that time she began having some chest pressure.  She denies chest pain, states it is a pressure-like symptom.  No exacerbating or alleviating factors.  No shortness of breath associated with chest pain.  It has been constant since that time, and when it did not go way she decided to come to the emergency department.  She reports some residual nausea but has not had any more emesis.  Also reports a mild, generalized, throbbing headache that began just prior to arrival and has been constant. Patient reports taking 2 baby aspirin at home this morning before coming to the ED.           Review of patient's allergies indicates:   Allergen Reactions    Cortisone Rash     Past Medical History:   Diagnosis Date    HTN (hypertension)     Hyperlipidemia     Hypothyroid      Past Surgical History:   Procedure Laterality Date    CARPAL TUNNEL RELEASE       SECTION, CLASSIC      x 3    EGD (ESOPHAGOGASTRODUODENOSCOPY) N/A 3/25/2013    [facility] ENDO    ENDOMETRIAL ABLATION      FOOT SURGERY      KNEE SURGERY      THYROID SURGERY       Family History   Problem " Relation Age of Onset    Heart disease Mother     Heart disease Father      Social History     Tobacco Use    Smoking status: Never Smoker    Smokeless tobacco: Never Used   Substance Use Topics    Alcohol use: No    Drug use: No     Review of Systems   Constitutional: Negative for chills, fatigue and fever.   HENT: Negative for congestion, sore throat and voice change.    Eyes: Negative for photophobia, pain and redness.   Respiratory: Negative for cough, choking and shortness of breath.    Cardiovascular: Positive for chest pain. Negative for palpitations and leg swelling.   Gastrointestinal: Positive for diarrhea, nausea and vomiting. Negative for abdominal pain.   Genitourinary: Negative for dysuria, frequency and urgency.   Musculoskeletal: Negative for back pain, neck pain and neck stiffness.   Neurological: Positive for dizziness and headaches. Negative for seizures, speech difficulty, light-headedness and numbness.   All other systems reviewed and are negative.      Physical Exam     Initial Vitals [02/07/19 0442]   BP Pulse Resp Temp SpO2   (!) 186/107 78 (!) 24 97.5 °F (36.4 °C) 100 %      MAP       --         Physical Exam    Nursing note and vitals reviewed.  Constitutional: She appears well-developed and well-nourished. She appears distressed (Mild discomfort).   Morbidly obese   HENT:   Head: Normocephalic and atraumatic.   Mouth/Throat: Oropharynx is clear and moist.   Eyes: Conjunctivae and EOM are normal. Pupils are equal, round, and reactive to light.   Neck: Normal range of motion. Neck supple. No tracheal deviation present.   Cardiovascular: Normal rate, regular rhythm, normal heart sounds and intact distal pulses.   Pulmonary/Chest: Breath sounds normal. No respiratory distress. She has no wheezes. She has no rhonchi. She has no rales.   Abdominal: Soft. Bowel sounds are normal. She exhibits no distension. There is no tenderness.   Musculoskeletal: Normal range of motion. She exhibits no  tenderness.   Neurological: She is alert and oriented to person, place, and time. She has normal strength. No cranial nerve deficit or sensory deficit. GCS score is 15. GCS eye subscore is 4. GCS verbal subscore is 5. GCS motor subscore is 6.   Skin: Skin is warm and dry. Capillary refill takes less than 2 seconds.         ED Course   Procedures  Labs Reviewed   CBC W/ AUTO DIFFERENTIAL   COMPREHENSIVE METABOLIC PANEL   TROPONIN I     EKG Readings: (Independently Interpreted)   Initial Reading: No STEMI. Previous EKG: Compared with most recent EKG Previous EKG Date: 8/12/18 (Minimal change). Rhythm: Normal Sinus Rhythm. Heart Rate: 83. Ectopy: No Ectopy. Conduction: Normal. ST Segments: Normal ST Segments. T Waves: Normal. Axis: Normal.         X-Rays:   Independently Interpreted Readings:   Other Readings:  Imaging interpreted by radiologist and visualized by me    Imaging Results          X-Ray Chest AP Portable (Final result)  Result time 02/07/19 05:35:36    Final result by Kel Turner MD (02/07/19 05:35:36)                 Impression:      Enlarged cardiac silhouette with prominence of pulmonary vasculature suggestive of pulmonary edema.  No consolidation.      Electronically signed by: Kel Turner MD  Date:    02/07/2019  Time:    05:35             Narrative:    EXAMINATION:  XR CHEST AP PORTABLE    CLINICAL HISTORY:  Chest Pain;    TECHNIQUE:  Single frontal view of the chest was performed.    COMPARISON:  08/12/2018    FINDINGS:  Trachea is patent.  Cardiac silhouette is markedly enlarged.  There is increased prominence of pulmonary vasculature, worse than on the prior study.  No consolidation.  No effusion, mass pneumothorax, or free air below the diaphragm.  No acute osseous abnormality.  Metallic density projected over the upper thoracic spine appears unchanged from prior.                                Medical Decision Making:   Initial Assessment:   58-year-old female presents to the emergency  department complaining of chest pressure  Independently Interpreted Test(s):   I have ordered and independently interpreted X-rays - see prior notes.  I have ordered and independently interpreted EKG Reading(s) - see prior notes  Clinical Tests:   Lab Tests: Reviewed       <> Summary of Lab: Benign  ED Management:  Patient given ASA, HCTZ, toradol, and oral compazine. Her initial lab work is benign. I will hand the patient off the Dr. Henry for reevaluation and further management/disposition.                       Clinical Impression:   The primary encounter diagnosis was Dizziness. A diagnosis of Chest pain was also pertinent to this visit.                             Montrell Henry MD  02/07/19 0972

## 2019-02-07 NOTE — PLAN OF CARE
Accompanied by spouse:  Cb Hatfield Spouse [phone]       Independent and drives, uses CPAP at home nightly.    Most likely DC destination will be home without needs.    No preference for appointment times.       02/07/19 0955   Discharge Assessment   Assessment Type Discharge Planning Assessment   Confirmed/corrected address and phone number on facesheet? Yes   Assessment information obtained from? Patient   Prior to hospitilization cognitive status: Alert/Oriented   Prior to hospitalization functional status: Independent   Current cognitive status: Alert/Oriented   Current Functional Status: Independent   Facility Arrived From: Home   Lives With spouse   Able to Return to Prior Arrangements yes   Is patient able to care for self after discharge? Yes   Who are your caregiver(s) and their phone number(s)? Cb Hatfield Spouse [phone]      Patient's perception of discharge disposition home or selfcare   Patient currently being followed by outpatient case management? No   Patient currently receives any other outside agency services? No   Equipment Currently Used at Home CPAP   Do you have any problems affording any of your prescribed medications? No   Is the patient taking medications as prescribed? yes   Does the patient have transportation home? Yes   Transportation Anticipated car, drives self;family or friend will provide   Dialysis Name and Scheduled days n/a   Does the patient receive services at the [facility]madin Clinic? No   Discharge Plan A Home with family   DME Needed Upon Discharge  none   Patient/Family in Agreement with Plan yes

## 2019-02-07 NOTE — ASSESSMENT & PLAN NOTE
-Trended troponin--negative x3  -echo stress test ordered  · The EKG portion of this study is negative for myocardial ischemia.  · The stress echo portion of this study is negative for myocardial ischemia.  · The patient reported SOB (non-anginal) during the stress test.  · Overall, the patient's exercise capacity was below average.  · There were no arrhythmias during stress.  · Normal left ventricular systolic function. The estimated ejection fraction is 60%  · Indeterminate left ventricular diastolic function.  · Normal right ventricular systolic function.  -will have the patient follow up with her PCP in 1 week.

## 2019-02-07 NOTE — ASSESSMENT & PLAN NOTE
-Trend troponin  -echo stress test ordered  -consult cards if troponin or stress test positive  -nitro 0.4 mg SL as needed chest pain  -stat EKG for chest pain  -O2 2 liters NC as needed  -cardiac tele monitoring

## 2019-02-07 NOTE — ED NOTES
"Pt presents to the ED secondary to midsternal chest pressure, nausea, vomiting and dizziness. Pt reports taking new sleep medication around 2000 and woke at 0300 with dizziness and nausea and vomiting x2 which was "just bile". Pt also reports becoming diaphoretic while home.  Pt went to the bathroom to have a bowel movement which pt reports was diarrhea and pt began having chest pressure. Pt reports taking 2 81mg aspirin pta. Pt denies SOB at this time.   "

## 2019-02-15 ENCOUNTER — OFFICE VISIT (OUTPATIENT)
Dept: FAMILY MEDICINE | Facility: CLINIC | Age: 59
End: 2019-02-15
Payer: COMMERCIAL

## 2019-02-15 VITALS
BODY MASS INDEX: 48.82 KG/M2 | DIASTOLIC BLOOD PRESSURE: 68 MMHG | WEIGHT: 293 LBS | RESPIRATION RATE: 18 BRPM | OXYGEN SATURATION: 99 % | HEART RATE: 78 BPM | TEMPERATURE: 99 F | HEIGHT: 65 IN | SYSTOLIC BLOOD PRESSURE: 132 MMHG

## 2019-02-15 DIAGNOSIS — F32.A DEPRESSION, UNSPECIFIED DEPRESSION TYPE: Primary | ICD-10-CM

## 2019-02-15 PROCEDURE — 3075F SYST BP GE 130 - 139MM HG: CPT | Mod: CPTII,S$GLB,, | Performed by: FAMILY MEDICINE

## 2019-02-15 PROCEDURE — 99214 OFFICE O/P EST MOD 30 MIN: CPT | Mod: S$GLB,,, | Performed by: FAMILY MEDICINE

## 2019-02-15 PROCEDURE — 99999 PR PBB SHADOW E&M-EST. PATIENT-LVL III: CPT | Mod: PBBFAC,,, | Performed by: FAMILY MEDICINE

## 2019-02-15 PROCEDURE — 3078F DIAST BP <80 MM HG: CPT | Mod: CPTII,S$GLB,, | Performed by: FAMILY MEDICINE

## 2019-02-15 PROCEDURE — 3078F PR MOST RECENT DIASTOLIC BLOOD PRESSURE < 80 MM HG: ICD-10-PCS | Mod: CPTII,S$GLB,, | Performed by: FAMILY MEDICINE

## 2019-02-15 PROCEDURE — 99999 PR PBB SHADOW E&M-EST. PATIENT-LVL III: ICD-10-PCS | Mod: PBBFAC,,, | Performed by: FAMILY MEDICINE

## 2019-02-15 PROCEDURE — 3008F PR BODY MASS INDEX (BMI) DOCUMENTED: ICD-10-PCS | Mod: CPTII,S$GLB,, | Performed by: FAMILY MEDICINE

## 2019-02-15 PROCEDURE — 3075F PR MOST RECENT SYSTOLIC BLOOD PRESS GE 130-139MM HG: ICD-10-PCS | Mod: CPTII,S$GLB,, | Performed by: FAMILY MEDICINE

## 2019-02-15 PROCEDURE — 99214 PR OFFICE/OUTPT VISIT, EST, LEVL IV, 30-39 MIN: ICD-10-PCS | Mod: S$GLB,,, | Performed by: FAMILY MEDICINE

## 2019-02-15 PROCEDURE — 3008F BODY MASS INDEX DOCD: CPT | Mod: CPTII,S$GLB,, | Performed by: FAMILY MEDICINE

## 2019-02-15 RX ORDER — IBUPROFEN 800 MG/1
TABLET ORAL
COMMUNITY
Start: 2019-02-09 | End: 2019-03-11

## 2019-02-15 RX ORDER — MELOXICAM 15 MG/1
TABLET ORAL
Refills: 11 | COMMUNITY
Start: 2019-01-04 | End: 2019-03-11 | Stop reason: SDUPTHER

## 2019-02-15 RX ORDER — SERTRALINE HYDROCHLORIDE 50 MG/1
50 TABLET, FILM COATED ORAL DAILY
Qty: 30 TABLET | Refills: 2 | Status: SHIPPED | OUTPATIENT
Start: 2019-02-15 | End: 2019-05-24

## 2019-02-15 NOTE — PROGRESS NOTES
HPI:  Yulissa Hatfield is a 58 y.o. year old female that  presents to [facility] care. She Shehwas recently was evaluated for elevated BP and chest pressure. After be worked up for this she was found to be having a reaction to a new sleep medication , Belsomra. She has a  that has history of Prostates cancer. All four of her children were killed in a car accident in 2016.She gained over 125 LBS since the death of her children.    Chief Complaint   Patient presents with    Annual Exam   .       Past Medical History:   Diagnosis Date    HTN (hypertension)     Hyperlipidemia     Hypothyroid      Social History     Socioeconomic History    Marital status:      Spouse name: Not on file    Number of children: Not on file    Years of education: Not on file    Highest education level: Not on file   Social Needs    Financial resource strain: Not on file    Food insecurity - worry: Not on file    Food insecurity - inability: Not on file    Transportation needs - medical: Not on file    Transportation needs - non-medical: Not on file   Occupational History    Not on file   Tobacco Use    Smoking status: Never Smoker    Smokeless tobacco: Never Used   Substance and Sexual Activity    Alcohol use: No    Drug use: No    Sexual activity: Not on file   Other Topics Concern    Not on file   Social History Narrative    Not on file     Past Surgical History:   Procedure Laterality Date    CARPAL TUNNEL RELEASE       SECTION, CLASSIC      x 3    EGD (ESOPHAGOGASTRODUODENOSCOPY) N/A 3/25/2013    Performed by [facility] at [facility]    ENDOMETRIAL ABLATION      FOOT SURGERY      KNEE SURGERY      THYROID SURGERY       Family History   Problem Relation Age of Onset    Heart disease Mother     Heart disease Father     No Known Problems Sister     Heart disease Brother     No Known Problems Daughter     No Known Problems Son     No Known Problems Daughter     No  "Known Problems Daughter            Review of Systems  General ROS: negative for chills, fever or weight loss  Psychological ROS: negative for hallucination, depression or suicidal ideation  Ophthalmic ROS: negative for blurry vision, photophobia or eye pain  ENT ROS: negative for epistaxis, sore throat or rhinorrhea  Respiratory ROS: no cough, shortness of breath, or wheezing  Cardiovascular ROS: no chest pain or dyspnea on exertion  Gastrointestinal ROS: no abdominal pain, change in bowel habits, or black/ bloody stools  Genito-Urinary ROS: no dysuria, trouble voiding, or hematuria  Musculoskeletal ROS: negative for gait disturbance or muscular weakness  Neurological ROS: no syncope or seizures; no ataxia  Dermatological ROS: negative for pruritis, rash and jaundice      Physical Exam:  /68   Pulse 78   Temp 98.6 °F (37 °C) (Oral)   Resp 18   Ht 5' 5" (1.651 m)   Wt (!) 153.3 kg (337 lb 15.4 oz)   LMP 03/25/2006   SpO2 99%   BMI 56.24 kg/m²   General appearance: alert, cooperative, no distress  Constitutional:Oriented to person, place, and time.appears well-developed and well-nourished.  HEENT: Normocephalic, atraumatic, neck symmetrical, no nasal discharge, TM - clear bilaterally   Eyes: conjunctivae/corneas clear, PERRL, EOM's intact  Lungs: clear to auscultation bilaterally, no dullness to percussion bilaterally  Heart: regular rate and rhythm without rub; no displacement of the PMI   Abdomen: soft, non-tender; bowel sounds normoactive; no organomegaly  Extremities: extremities symmetric; no clubbing, cyanosis, or edema  Integument: Skin color, texture, turgor normal; no rashes; hair distrubution normal  Neurologic: Alert and oriented X 3, normal strength, normal coordination and gait  Psychiatric: no pressured speech; normal affect; no evidence of impaired cognition   Physical Exam  LABS:    Complete Blood Count  Lab Results   Component Value Date    RBC 4.66 02/07/2019    HGB 12.4 02/07/2019    " HCT 40.5 02/07/2019    MCV 87 02/07/2019    MCH 26.6 (L) 02/07/2019    MCHC 30.6 (L) 02/07/2019    RDW 14.1 02/07/2019     02/07/2019    MPV 11.0 02/07/2019    GRAN 1.9 02/07/2019    GRAN 30.3 (L) 02/07/2019    LYMPH 3.7 02/07/2019    LYMPH 58.5 (H) 02/07/2019    MONO 0.5 02/07/2019    MONO 8.3 02/07/2019    EOS 0.2 02/07/2019    BASO 0.02 02/07/2019    EOSINOPHIL 2.4 02/07/2019    BASOPHIL 0.3 02/07/2019    DIFFMETHOD Automated 02/07/2019       Comprehensive Metabolic Panel  Lab Results   Component Value Date     (H) 02/07/2019    BUN 12 02/07/2019    CREATININE 0.7 02/07/2019     02/07/2019    K 3.9 02/07/2019     02/07/2019    PROT 6.5 02/07/2019    ALBUMIN 3.4 (L) 02/07/2019    BILITOT 0.3 02/07/2019    AST 16 02/07/2019    ALKPHOS 70 02/07/2019    CO2 25 02/07/2019    ALT 23 02/07/2019    ANIONGAP 10 02/07/2019    EGFRNONAA >60 02/07/2019    ESTGFRAFRICA >60 02/07/2019       LIPID  No results found for: CHOL, HDL    TSH  Lab Results   Component Value Date    TSH 0.467 08/12/2018       Current Outpatient Medications   Medication Sig Dispense Refill    ezetimibe (ZETIA) 10 mg tablet Take 10 mg by mouth once daily.      fish oil-omega-3 fatty acids 300-1,000 mg capsule Take 2 g by mouth once daily.      hydroCHLOROthiazide (MICROZIDE) 12.5 mg capsule       ibuprofen (ADVIL,MOTRIN) 800 MG tablet       levothyroxine (SYNTHROID) 125 MCG tablet Take 125 mcg by mouth once daily.      lidocaine-prilocaine (EMLA) cream       meloxicam (MOBIC) 15 MG tablet TAKE 1 TABLET ONCE DAILY OR AS NEEDED.FOR PAIN, TAKE WITH FOOD  11    telmisartan (MICARDIS) 80 MG Tab Take 40 mg by mouth once daily.      sertraline (ZOLOFT) 50 MG tablet Take 1 tablet (50 mg total) by mouth once daily. 30 tablet 2     No current facility-administered medications for this visit.        Assessment:    ICD-10-CM ICD-9-CM    1. Depression, unspecified depression type F32.9 311 sertraline (ZOLOFT) 50 MG tablet          Plan:  Suggested that patient acquire the book Sacred pain bring principles  woman.  Follow-up in 2 weeks (on 2/28/2019).          Mayda Graham MD

## 2019-02-20 ENCOUNTER — TELEPHONE (OUTPATIENT)
Dept: FAMILY MEDICINE | Facility: CLINIC | Age: 59
End: 2019-02-20

## 2019-02-20 NOTE — TELEPHONE ENCOUNTER
----- Message from Clarence Nguyen sent at 2/20/2019  9:52 AM CST -----  Contact: [phone]/self  Patient requesting to speak with you concerning rescheduling her appointment.  Please call and advise

## 2019-02-25 ENCOUNTER — TELEPHONE (OUTPATIENT)
Dept: FAMILY MEDICINE | Facility: CLINIC | Age: 59
End: 2019-02-25

## 2019-02-25 NOTE — TELEPHONE ENCOUNTER
----- Message from Clarence Nguyen sent at 2/25/2019 11:47 AM CST -----  Contact: [phone]/self  Patient states she's returning your call

## 2019-02-25 NOTE — TELEPHONE ENCOUNTER
----- Message from Heather North sent at 2/25/2019  8:47 AM CST -----  Contact: [phone]  Patient called in requesting to speak with you. Patient prefers to speak with a nurse. Please call.

## 2019-03-11 ENCOUNTER — OFFICE VISIT (OUTPATIENT)
Dept: FAMILY MEDICINE | Facility: CLINIC | Age: 59
End: 2019-03-11
Payer: COMMERCIAL

## 2019-03-11 VITALS
DIASTOLIC BLOOD PRESSURE: 70 MMHG | SYSTOLIC BLOOD PRESSURE: 118 MMHG | WEIGHT: 293 LBS | HEIGHT: 65 IN | RESPIRATION RATE: 18 BRPM | BODY MASS INDEX: 48.82 KG/M2 | TEMPERATURE: 99 F | HEART RATE: 83 BPM | OXYGEN SATURATION: 97 %

## 2019-03-11 DIAGNOSIS — I10 ESSENTIAL HYPERTENSION: Primary | Chronic | ICD-10-CM

## 2019-03-11 DIAGNOSIS — Z12.31 ENCOUNTER FOR SCREENING MAMMOGRAM FOR BREAST CANCER: ICD-10-CM

## 2019-03-11 DIAGNOSIS — M48.061 SPINAL STENOSIS OF LUMBAR REGION, UNSPECIFIED WHETHER NEUROGENIC CLAUDICATION PRESENT: ICD-10-CM

## 2019-03-11 DIAGNOSIS — F32.A DEPRESSION, UNSPECIFIED DEPRESSION TYPE: ICD-10-CM

## 2019-03-11 DIAGNOSIS — E78.2 MIXED HYPERLIPIDEMIA: ICD-10-CM

## 2019-03-11 DIAGNOSIS — M25.50 ARTHRALGIA, UNSPECIFIED JOINT: ICD-10-CM

## 2019-03-11 DIAGNOSIS — E03.9 HYPOTHYROIDISM, UNSPECIFIED TYPE: Chronic | ICD-10-CM

## 2019-03-11 DIAGNOSIS — Z11.59 NEED FOR HEPATITIS C SCREENING TEST: ICD-10-CM

## 2019-03-11 PROCEDURE — 3074F SYST BP LT 130 MM HG: CPT | Mod: CPTII,S$GLB,, | Performed by: FAMILY MEDICINE

## 2019-03-11 PROCEDURE — 99214 OFFICE O/P EST MOD 30 MIN: CPT | Mod: S$GLB,,, | Performed by: FAMILY MEDICINE

## 2019-03-11 PROCEDURE — 99999 PR PBB SHADOW E&M-EST. PATIENT-LVL IV: CPT | Mod: PBBFAC,,, | Performed by: FAMILY MEDICINE

## 2019-03-11 PROCEDURE — 3008F BODY MASS INDEX DOCD: CPT | Mod: CPTII,S$GLB,, | Performed by: FAMILY MEDICINE

## 2019-03-11 PROCEDURE — 3008F PR BODY MASS INDEX (BMI) DOCUMENTED: ICD-10-PCS | Mod: CPTII,S$GLB,, | Performed by: FAMILY MEDICINE

## 2019-03-11 PROCEDURE — 3078F DIAST BP <80 MM HG: CPT | Mod: CPTII,S$GLB,, | Performed by: FAMILY MEDICINE

## 2019-03-11 PROCEDURE — 99214 PR OFFICE/OUTPT VISIT, EST, LEVL IV, 30-39 MIN: ICD-10-PCS | Mod: S$GLB,,, | Performed by: FAMILY MEDICINE

## 2019-03-11 PROCEDURE — 99999 PR PBB SHADOW E&M-EST. PATIENT-LVL IV: ICD-10-PCS | Mod: PBBFAC,,, | Performed by: FAMILY MEDICINE

## 2019-03-11 PROCEDURE — 3078F PR MOST RECENT DIASTOLIC BLOOD PRESSURE < 80 MM HG: ICD-10-PCS | Mod: CPTII,S$GLB,, | Performed by: FAMILY MEDICINE

## 2019-03-11 PROCEDURE — 3074F PR MOST RECENT SYSTOLIC BLOOD PRESSURE < 130 MM HG: ICD-10-PCS | Mod: CPTII,S$GLB,, | Performed by: FAMILY MEDICINE

## 2019-03-11 RX ORDER — TELMISARTAN 80 MG/1
80 TABLET ORAL DAILY
Qty: 90 TABLET | Refills: 0 | Status: SHIPPED | OUTPATIENT
Start: 2019-03-11 | End: 2020-04-16 | Stop reason: SDUPTHER

## 2019-03-11 RX ORDER — LEVOTHYROXINE SODIUM 112 UG/1
112 TABLET ORAL
Qty: 90 TABLET | Refills: 0 | Status: SHIPPED | OUTPATIENT
Start: 2019-03-11 | End: 2019-07-22

## 2019-03-11 RX ORDER — MELOXICAM 15 MG/1
15 TABLET ORAL DAILY
Qty: 90 TABLET | Refills: 0 | Status: SHIPPED | OUTPATIENT
Start: 2019-03-11 | End: 2019-05-30

## 2019-03-11 RX ORDER — LEVOTHYROXINE SODIUM 112 UG/1
TABLET ORAL
COMMUNITY
Start: 2010-12-27 | End: 2019-03-11 | Stop reason: SDUPTHER

## 2019-03-11 RX ORDER — MELOXICAM 15 MG/1
TABLET ORAL
COMMUNITY
Start: 2010-12-27 | End: 2019-03-11

## 2019-03-11 RX ORDER — HYDROCHLOROTHIAZIDE 12.5 MG/1
CAPSULE ORAL
Qty: 15 CAPSULE | Refills: 0 | Status: SHIPPED | OUTPATIENT
Start: 2019-03-11 | End: 2019-06-19

## 2019-03-11 RX ORDER — EZETIMIBE 10 MG/1
10 TABLET ORAL DAILY
Qty: 90 TABLET | Refills: 0 | Status: SHIPPED | OUTPATIENT
Start: 2019-03-11 | End: 2019-07-22

## 2019-03-11 NOTE — PROGRESS NOTES
HPI:  Yulissa Hatfield is a 58 y.o. year old female that  presents for depression.  She is taking her Sertraline at 4 am due to trying to takes it in the morning and because it makes her sleepy.It intially caused her to lose her appetite.She does not like to eat breakfast.She gets to the gym a couple of times a week but will try to increase this.  Chief Complaint   Patient presents with    Depression     F/U    .           Past Medical History:   Diagnosis Date    HTN (hypertension)     Hyperlipidemia     Hypothyroid      Social History     Socioeconomic History    Marital status:      Spouse name: Not on file    Number of children: Not on file    Years of education: Not on file    Highest education level: Not on file   Social Needs    Financial resource strain: Not on file    Food insecurity - worry: Not on file    Food insecurity - inability: Not on file    Transportation needs - medical: Not on file    Transportation needs - non-medical: Not on file   Occupational History    Not on file   Tobacco Use    Smoking status: Never Smoker    Smokeless tobacco: Never Used   Substance and Sexual Activity    Alcohol use: No    Drug use: No    Sexual activity: Not on file   Other Topics Concern    Not on file   Social History Narrative    Not on file     Past Surgical History:   Procedure Laterality Date    CARPAL TUNNEL RELEASE       SECTION, CLASSIC      x 3    EGD (ESOPHAGOGASTRODUODENOSCOPY) N/A 3/25/2013    Performed by [facility] at [facility]    ENDOMETRIAL ABLATION      FOOT SURGERY      KNEE SURGERY      THYROID SURGERY       Family History   Problem Relation Age of Onset    Heart disease Mother     Heart disease Father     No Known Problems Sister     Heart disease Brother     No Known Problems Daughter     No Known Problems Son     No Known Problems Daughter     No Known Problems Daughter            Review of Systems  General ROS: negative for  "chills, fever or weight loss  ENT ROS: negative for epistaxis, sore throat or rhinorrhea  Respiratory ROS: no cough, shortness of breath, or wheezing  Cardiovascular ROS: no chest pain or dyspnea on exertion  Gastrointestinal ROS: no abdominal pain, change in bowel habits, or black/ bloody stools    Physical Exam:  /70   Pulse 83   Temp 98.5 °F (36.9 °C) (Oral)   Resp 18   Ht 5' 5" (1.651 m)   Wt (!) 152 kg (335 lb 1.6 oz)   LMP 03/25/2006   SpO2 97%   BMI 55.76 kg/m²   General appearance: alert, cooperative, no distress  Constitutional:Oriented to person, place, and time.appears well-developed and well-nourished.  HEENT: Normocephalic, atraumatic, neck symmetrical, no nasal discharge, TM- clear bilaterally  Lungs: clear to auscultation bilaterally, no dullness to percussion bilaterally  Heart: regular rate and rhythm without rub; no displacement of the PMI , S1&S2 present  Abdomen: soft, non-tender; bowel sounds normoactive; no organomegaly  Physical Exam      LABS:    Complete Blood Count  Lab Results   Component Value Date    RBC 4.66 02/07/2019    HGB 12.4 02/07/2019    HCT 40.5 02/07/2019    MCV 87 02/07/2019    MCH 26.6 (L) 02/07/2019    MCHC 30.6 (L) 02/07/2019    RDW 14.1 02/07/2019     02/07/2019    MPV 11.0 02/07/2019    GRAN 1.9 02/07/2019    GRAN 30.3 (L) 02/07/2019    LYMPH 3.7 02/07/2019    LYMPH 58.5 (H) 02/07/2019    MONO 0.5 02/07/2019    MONO 8.3 02/07/2019    EOS 0.2 02/07/2019    BASO 0.02 02/07/2019    EOSINOPHIL 2.4 02/07/2019    BASOPHIL 0.3 02/07/2019    DIFFMETHOD Automated 02/07/2019       Comprehensive Metabolic Panel  Lab Results   Component Value Date     (H) 02/07/2019    BUN 12 02/07/2019    CREATININE 0.7 02/07/2019     02/07/2019    K 3.9 02/07/2019     02/07/2019    PROT 6.5 02/07/2019    ALBUMIN 3.4 (L) 02/07/2019    BILITOT 0.3 02/07/2019    AST 16 02/07/2019    ALKPHOS 70 02/07/2019    CO2 25 02/07/2019    ALT 23 02/07/2019    ANIONGAP 10 " 02/07/2019    EGFRNONAA >60 02/07/2019    ESTGFRAFRICA >60 02/07/2019       LIPID  No results found for: CHOL, HDL      TSH  Lab Results   Component Value Date    TSH 0.467 08/12/2018       Current Outpatient Medications   Medication Sig Dispense Refill    ezetimibe (ZETIA) 10 mg tablet Take 1 tablet (10 mg total) by mouth once daily. 90 tablet 0    fish oil-omega-3 fatty acids 300-1,000 mg capsule Take 2 g by mouth once daily.      hydroCHLOROthiazide (MICROZIDE) 12.5 mg capsule Take every other day 45 capsule 0    levothyroxine (SYNTHROID) 112 MCG tablet Take 1 tablet (112 mcg total) by mouth before breakfast. 90 tablet 0    lidocaine-prilocaine (EMLA) cream       sertraline (ZOLOFT) 50 MG tablet Take 1 tablet (50 mg total) by mouth once daily. 30 tablet 2    telmisartan (MICARDIS) 80 MG Tab Take 1 tablet (80 mg total) by mouth once daily. 90 tablet 0    meloxicam (MOBIC) 15 MG tablet Take 1 tablet (15 mg total) by mouth once daily. 90 tablet 0     No current facility-administered medications for this visit.        Assessment:    ICD-10-CM ICD-9-CM    1. Essential hypertension I10 401.9 hydroCHLOROthiazide (MICROZIDE) 12.5 mg capsule      telmisartan (MICARDIS) 80 MG Tab   2. Depression, unspecified depression type F32.9 311    3. Encounter for screening mammogram for breast cancer Z12.31 V76.12 Mammo Digital Screening Bilat      Mammo Digital Screening Bilat   4. Need for hepatitis C screening test Z11.59 V73.89 Hepatitis C antibody      Hepatitis C antibody      CANCELED: Hepatitis C antibody   5. Hypothyroidism, unspecified type E03.9 244.9 levothyroxine (SYNTHROID) 112 MCG tablet   6. Mixed hyperlipidemia E78.2 272.2 ezetimibe (ZETIA) 10 mg tablet   7. Spinal stenosis of lumbar region, unspecified whether neurogenic claudication present M48.061 724.02 MRI Lumbar Spine Without Contrast      MRI Lumbar Spine Without Contrast   8. Arthralgia, unspecified joint M25.50 719.40 meloxicam (MOBIC) 15 MG tablet          Plan:    Follow-up in about 1 week (around 3/18/2019).          Mayda Graham MD

## 2019-03-12 ENCOUNTER — TELEPHONE (OUTPATIENT)
Dept: ADMINISTRATIVE | Facility: HOSPITAL | Age: 59
End: 2019-03-12

## 2019-03-12 ENCOUNTER — TELEPHONE (OUTPATIENT)
Dept: FAMILY MEDICINE | Facility: CLINIC | Age: 59
End: 2019-03-12

## 2019-03-14 ENCOUNTER — TELEPHONE (OUTPATIENT)
Dept: FAMILY MEDICINE | Facility: CLINIC | Age: 59
End: 2019-03-14

## 2019-03-14 NOTE — TELEPHONE ENCOUNTER
----- Message from Yanci Almanza sent at 3/14/2019  9:35 AM CDT -----  Contact: Self/ [phone]  Patient called in requesting to speak with you. Patient prefers to speak with a nurse.     Please call and advise.

## 2019-03-16 LAB
HCV AB S/CO SERPL IA: 0.01
HCV AB SERPL QL IA: NORMAL

## 2019-03-18 ENCOUNTER — OFFICE VISIT (OUTPATIENT)
Dept: FAMILY MEDICINE | Facility: CLINIC | Age: 59
End: 2019-03-18
Payer: COMMERCIAL

## 2019-03-18 VITALS
WEIGHT: 293 LBS | SYSTOLIC BLOOD PRESSURE: 136 MMHG | HEIGHT: 65 IN | DIASTOLIC BLOOD PRESSURE: 78 MMHG | BODY MASS INDEX: 48.82 KG/M2 | TEMPERATURE: 98 F | RESPIRATION RATE: 18 BRPM | HEART RATE: 68 BPM | OXYGEN SATURATION: 95 %

## 2019-03-18 DIAGNOSIS — M51.34 BULGING OF THORACIC INTERVERTEBRAL DISC: ICD-10-CM

## 2019-03-18 DIAGNOSIS — I10 ESSENTIAL HYPERTENSION: ICD-10-CM

## 2019-03-18 DIAGNOSIS — M48.062 SPINAL STENOSIS OF LUMBAR REGION WITH NEUROGENIC CLAUDICATION: ICD-10-CM

## 2019-03-18 DIAGNOSIS — F32.A DEPRESSION, UNSPECIFIED DEPRESSION TYPE: ICD-10-CM

## 2019-03-18 DIAGNOSIS — M54.14 THORACIC RADICULOPATHY: Primary | ICD-10-CM

## 2019-03-18 PROCEDURE — 3075F PR MOST RECENT SYSTOLIC BLOOD PRESS GE 130-139MM HG: ICD-10-PCS | Mod: CPTII,S$GLB,, | Performed by: FAMILY MEDICINE

## 2019-03-18 PROCEDURE — 3008F BODY MASS INDEX DOCD: CPT | Mod: CPTII,S$GLB,, | Performed by: FAMILY MEDICINE

## 2019-03-18 PROCEDURE — 3078F DIAST BP <80 MM HG: CPT | Mod: CPTII,S$GLB,, | Performed by: FAMILY MEDICINE

## 2019-03-18 PROCEDURE — 3075F SYST BP GE 130 - 139MM HG: CPT | Mod: CPTII,S$GLB,, | Performed by: FAMILY MEDICINE

## 2019-03-18 PROCEDURE — 3078F PR MOST RECENT DIASTOLIC BLOOD PRESSURE < 80 MM HG: ICD-10-PCS | Mod: CPTII,S$GLB,, | Performed by: FAMILY MEDICINE

## 2019-03-18 PROCEDURE — 3008F PR BODY MASS INDEX (BMI) DOCUMENTED: ICD-10-PCS | Mod: CPTII,S$GLB,, | Performed by: FAMILY MEDICINE

## 2019-03-18 PROCEDURE — 99214 PR OFFICE/OUTPT VISIT, EST, LEVL IV, 30-39 MIN: ICD-10-PCS | Mod: S$GLB,,, | Performed by: FAMILY MEDICINE

## 2019-03-18 PROCEDURE — 99999 PR PBB SHADOW E&M-EST. PATIENT-LVL III: CPT | Mod: PBBFAC,,, | Performed by: FAMILY MEDICINE

## 2019-03-18 PROCEDURE — 99214 OFFICE O/P EST MOD 30 MIN: CPT | Mod: S$GLB,,, | Performed by: FAMILY MEDICINE

## 2019-03-18 PROCEDURE — 99999 PR PBB SHADOW E&M-EST. PATIENT-LVL III: ICD-10-PCS | Mod: PBBFAC,,, | Performed by: FAMILY MEDICINE

## 2019-03-18 RX ORDER — GABAPENTIN 300 MG/1
300 CAPSULE ORAL 3 TIMES DAILY
Qty: 90 CAPSULE | Refills: 2 | Status: SHIPPED | OUTPATIENT
Start: 2019-03-18 | End: 2020-01-21

## 2019-03-18 NOTE — PROGRESS NOTES
HPI:  Yulissa Hatfield is a 58 y.o. year old female that  presents for follow-up of MRI results.  Chief Complaint   Patient presents with    Results     F/U MRI test   .           Past Medical History:   Diagnosis Date    HTN (hypertension)     Hyperlipidemia     Hypothyroid      Social History     Socioeconomic History    Marital status:      Spouse name: Not on file    Number of children: Not on file    Years of education: Not on file    Highest education level: Not on file   Occupational History    Not on file   Social Needs    Financial resource strain: Not on file    Food insecurity:     Worry: Not on file     Inability: Not on file    Transportation needs:     Medical: Not on file     Non-medical: Not on file   Tobacco Use    Smoking status: Never Smoker    Smokeless tobacco: Never Used   Substance and Sexual Activity    Alcohol use: No    Drug use: No    Sexual activity: Not on file   Lifestyle    Physical activity:     Days per week: Not on file     Minutes per session: Not on file    Stress: Not on file   Relationships    Social connections:     Talks on phone: Not on file     Gets together: Not on file     Attends Gnosticist service: Not on file     Active member of club or organization: Not on file     Attends meetings of clubs or organizations: Not on file     Relationship status: Not on file    Intimate partner violence:     Fear of current or ex partner: Not on file     Emotionally abused: Not on file     Physically abused: Not on file     Forced sexual activity: Not on file   Other Topics Concern    Not on file   Social History Narrative    Not on file     Past Surgical History:   Procedure Laterality Date    CARPAL TUNNEL RELEASE       SECTION, CLASSIC      x 3    EGD (ESOPHAGOGASTRODUODENOSCOPY) N/A 3/25/2013    [facility]formed by [facility] at [facility]    ENDOMETRIAL ABLATION      FOOT SURGERY      KNEE SURGERY      THYROID SURGERY    "    Family History   Problem Relation Age of Onset    Heart disease Mother     Heart disease Father     No Known Problems Sister     Heart disease Brother     No Known Problems Daughter     No Known Problems Son     No Known Problems Daughter     No Known Problems Daughter            Review of Systems  General ROS: negative for chills, fever or weight loss  ENT ROS: negative for epistaxis, sore throat or rhinorrhea  Respiratory ROS: no cough, shortness of breath, or wheezing  Cardiovascular ROS: no chest pain or dyspnea on exertion  Gastrointestinal ROS: no abdominal pain, change in bowel habits, or black/ bloody stools    Physical Exam:  /78 (BP Location: Left arm, Patient Position: Sitting, BP Method: Thigh Cuff (Manual))   Pulse 68   Temp 98.1 °F (36.7 °C) (Oral)   Resp 18   Ht 5' 5" (1.651 m)   Wt (!) 153.9 kg (339 lb 4.6 oz)   LMP 03/25/2006   SpO2 95%   BMI 56.46 kg/m²   General appearance: alert, cooperative, no distress  Constitutional:Oriented to person, place, and time.appears well-developed and well-nourished.  HEENT: Normocephalic, atraumatic, neck symmetrical, no nasal discharge, TM- clear bilaterally  Lungs: clear to auscultation bilaterally, no dullness to percussion bilaterally  Heart: regular rate and rhythm without rub; no displacement of the PMI , S1&S2 present  Abdomen: soft, non-tender; bowel sounds normoactive; no organomegaly  Musculoskeletal:  Positive pain upon flexion it waist line  Physical Exam      LABS:    Complete Blood Count  Lab Results   Component Value Date    RBC 4.66 02/07/2019    HGB 12.4 02/07/2019    HCT 40.5 02/07/2019    MCV 87 02/07/2019    MCH 26.6 (L) 02/07/2019    MCHC 30.6 (L) 02/07/2019    RDW 14.1 02/07/2019     02/07/2019    MPV 11.0 02/07/2019    GRAN 1.9 02/07/2019    GRAN 30.3 (L) 02/07/2019    LYMPH 3.7 02/07/2019    LYMPH 58.5 (H) 02/07/2019    MONO 0.5 02/07/2019    MONO 8.3 02/07/2019    EOS 0.2 02/07/2019    BASO 0.02 02/07/2019 "    EOSINOPHIL 2.4 02/07/2019    BASOPHIL 0.3 02/07/2019    DIFFMETHOD Automated 02/07/2019       Comprehensive Metabolic Panel  Lab Results   Component Value Date     (H) 02/07/2019    BUN 12 02/07/2019    CREATININE 0.7 02/07/2019     02/07/2019    K 3.9 02/07/2019     02/07/2019    PROT 6.5 02/07/2019    ALBUMIN 3.4 (L) 02/07/2019    BILITOT 0.3 02/07/2019    AST 16 02/07/2019    ALKPHOS 70 02/07/2019    CO2 25 02/07/2019    ALT 23 02/07/2019    ANIONGAP 10 02/07/2019    EGFRNONAA >60 02/07/2019    ESTGFRAFRICA >60 02/07/2019       LIPID  Lab Results   Component Value Date    CHOL 244 (A) 12/31/2018    HDL 60 12/31/2018         TSH  Lab Results   Component Value Date    TSH 0.467 08/12/2018       Current Outpatient Medications   Medication Sig Dispense Refill    ezetimibe (ZETIA) 10 mg tablet Take 1 tablet (10 mg total) by mouth once daily. 90 tablet 0    fish oil-omega-3 fatty acids 300-1,000 mg capsule Take 2 g by mouth once daily.      hydroCHLOROthiazide (MICROZIDE) 12.5 mg capsule Take 1 tablet by mouth every other day. 15 capsule 0    levothyroxine (SYNTHROID) 112 MCG tablet Take 1 tablet (112 mcg total) by mouth before breakfast. 90 tablet 0    lidocaine-prilocaine (EMLA) cream       meloxicam (MOBIC) 15 MG tablet Take 1 tablet (15 mg total) by mouth once daily. 90 tablet 0    sertraline (ZOLOFT) 50 MG tablet Take 1 tablet (50 mg total) by mouth once daily. 30 tablet 2    telmisartan (MICARDIS) 80 MG Tab Take 1 tablet (80 mg total) by mouth once daily. 90 tablet 0    gabapentin (NEURONTIN) 300 MG capsule Take 1 capsule (300 mg total) by mouth 3 (three) times daily. 90 capsule 2     No current facility-administered medications for this visit.        Assessment:    ICD-10-CM ICD-9-CM    1. Thoracic radiculopathy M54.14 724.4 gabapentin (NEURONTIN) 300 MG capsule   2. Bulging of thoracic intervertebral disc M51.24 722.11    3. Essential hypertension I10 401.9    4. Spinal stenosis  of lumbar region with neurogenic claudication M48.062 724.03    5. Depression, unspecified depression type F32.9 311          Plan:  Will try gabapentin for pain control.  Follow up in 3 weeks (on 4/9/2019).          Mayda Graham MD

## 2019-03-20 ENCOUNTER — TELEPHONE (OUTPATIENT)
Dept: ADMINISTRATIVE | Facility: HOSPITAL | Age: 59
End: 2019-03-20

## 2019-03-22 ENCOUNTER — TELEPHONE (OUTPATIENT)
Dept: ADMINISTRATIVE | Facility: HOSPITAL | Age: 59
End: 2019-03-22

## 2019-03-26 ENCOUNTER — PATIENT OUTREACH (OUTPATIENT)
Dept: ADMINISTRATIVE | Facility: HOSPITAL | Age: 59
End: 2019-03-26

## 2019-04-09 ENCOUNTER — OFFICE VISIT (OUTPATIENT)
Dept: FAMILY MEDICINE | Facility: CLINIC | Age: 59
End: 2019-04-09
Payer: COMMERCIAL

## 2019-04-09 VITALS
WEIGHT: 293 LBS | HEIGHT: 65 IN | RESPIRATION RATE: 18 BRPM | OXYGEN SATURATION: 97 % | DIASTOLIC BLOOD PRESSURE: 84 MMHG | SYSTOLIC BLOOD PRESSURE: 138 MMHG | HEART RATE: 82 BPM | BODY MASS INDEX: 48.82 KG/M2 | TEMPERATURE: 98 F

## 2019-04-09 DIAGNOSIS — I10 ESSENTIAL HYPERTENSION: Primary | Chronic | ICD-10-CM

## 2019-04-09 DIAGNOSIS — E03.9 ACQUIRED HYPOTHYROIDISM: Chronic | ICD-10-CM

## 2019-04-09 DIAGNOSIS — E66.01 MORBID OBESITY WITH BMI OF 50.0-59.9, ADULT: ICD-10-CM

## 2019-04-09 DIAGNOSIS — R60.0 LOWER EXTREMITY EDEMA: ICD-10-CM

## 2019-04-09 DIAGNOSIS — I83.813 VARICOSE VEINS OF BOTH LOWER EXTREMITIES WITH PAIN: ICD-10-CM

## 2019-04-09 DIAGNOSIS — E78.2 MIXED HYPERLIPIDEMIA: ICD-10-CM

## 2019-04-09 PROCEDURE — 99214 PR OFFICE/OUTPT VISIT, EST, LEVL IV, 30-39 MIN: ICD-10-PCS | Mod: S$GLB,,, | Performed by: FAMILY MEDICINE

## 2019-04-09 PROCEDURE — 99214 OFFICE O/P EST MOD 30 MIN: CPT | Mod: S$GLB,,, | Performed by: FAMILY MEDICINE

## 2019-04-09 PROCEDURE — 3008F PR BODY MASS INDEX (BMI) DOCUMENTED: ICD-10-PCS | Mod: CPTII,S$GLB,, | Performed by: FAMILY MEDICINE

## 2019-04-09 PROCEDURE — 99999 PR PBB SHADOW E&M-EST. PATIENT-LVL IV: ICD-10-PCS | Mod: PBBFAC,,, | Performed by: FAMILY MEDICINE

## 2019-04-09 PROCEDURE — 3075F SYST BP GE 130 - 139MM HG: CPT | Mod: CPTII,S$GLB,, | Performed by: FAMILY MEDICINE

## 2019-04-09 PROCEDURE — 3075F PR MOST RECENT SYSTOLIC BLOOD PRESS GE 130-139MM HG: ICD-10-PCS | Mod: CPTII,S$GLB,, | Performed by: FAMILY MEDICINE

## 2019-04-09 PROCEDURE — 3008F BODY MASS INDEX DOCD: CPT | Mod: CPTII,S$GLB,, | Performed by: FAMILY MEDICINE

## 2019-04-09 PROCEDURE — 99999 PR PBB SHADOW E&M-EST. PATIENT-LVL IV: CPT | Mod: PBBFAC,,, | Performed by: FAMILY MEDICINE

## 2019-04-09 PROCEDURE — 3079F DIAST BP 80-89 MM HG: CPT | Mod: CPTII,S$GLB,, | Performed by: FAMILY MEDICINE

## 2019-04-09 PROCEDURE — 3079F PR MOST RECENT DIASTOLIC BLOOD PRESSURE 80-89 MM HG: ICD-10-PCS | Mod: CPTII,S$GLB,, | Performed by: FAMILY MEDICINE

## 2019-04-09 NOTE — PROGRESS NOTES
HPI:  Yulissa Hatfield is a 58 y.o. year old female that  presents for f/u of her Neurontin and HCTZ. She is taking the Neurontin 3 times per day.It has decreased the twinges of pain in her back. She has stopped taking the HCTZ because it was not helping.  Chief Complaint   Patient presents with    Depression     F/U Medication   .           Past Medical History:   Diagnosis Date    HTN (hypertension)     Hyperlipidemia     Hypothyroid      Social History     Socioeconomic History    Marital status:      Spouse name: Not on file    Number of children: Not on file    Years of education: Not on file    Highest education level: Not on file   Occupational History    Not on file   Social Needs    Financial resource strain: Not on file    Food insecurity:     Worry: Not on file     Inability: Not on file    Transportation needs:     Medical: Not on file     Non-medical: Not on file   Tobacco Use    Smoking status: Never Smoker    Smokeless tobacco: Never Used   Substance and Sexual Activity    Alcohol use: No    Drug use: No    Sexual activity: Not on file   Lifestyle    Physical activity:     Days per week: Not on file     Minutes per session: Not on file    Stress: Not on file   Relationships    Social connections:     Talks on phone: Not on file     Gets together: Not on file     Attends Bahai service: Not on file     Active member of club or organization: Not on file     Attends meetings of clubs or organizations: Not on file     Relationship status: Not on file   Other Topics Concern    Not on file   Social History Narrative    Not on file     Past Surgical History:   Procedure Laterality Date    CARPAL TUNNEL RELEASE       SECTION, CLASSIC      x 3    EGD (ESOPHAGOGASTRODUODENOSCOPY) N/A 3/25/2013    Performed by [facility] at [facility] ENDO    ENDOMETRIAL ABLATION      FOOT SURGERY      KNEE SURGERY      THYROID SURGERY       Family History   Problem  "Relation Age of Onset    Heart disease Mother     Heart disease Father     No Known Problems Sister     Heart disease Brother     No Known Problems Daughter     No Known Problems Son     No Known Problems Daughter     No Known Problems Daughter            Review of Systems  General ROS: negative for chills, fever or weight loss  ENT ROS: negative for epistaxis, sore throat or rhinorrhea  Respiratory ROS: no cough, shortness of breath, or wheezing  Cardiovascular ROS: no chest pain or dyspnea on exertion  Gastrointestinal ROS: no abdominal pain, change in bowel habits, or black/ bloody stools  Extrem: MUSTAPHA LE edema  Physical Exam:  /84 (BP Location: Right arm, Patient Position: Sitting, BP Method: Thigh Cuff (Manual))   Pulse 82   Temp 98.2 °F (36.8 °C) (Oral)   Resp 18   Ht 5' 5" (1.651 m)   Wt (!) 154.9 kg (341 lb 9.6 oz)   LMP 03/25/2006   SpO2 97%   BMI 56.85 kg/m²   General appearance: alert, cooperative, no distress  Constitutional:Oriented to person, place, and time.appears well-developed and well-nourished.  HEENT: Normocephalic, atraumatic, neck symmetrical, no nasal discharge, TM- clear bilaterally  Lungs: clear to auscultation bilaterally, no dullness to percussion bilaterally  Heart: regular rate and rhythm without rub; no displacement of the PMI , S1&S2 present  Abdomen: soft, non-tender; bowel sounds normoactive; no organomegaly  Extrem: Mustapha LE edema with varices, no pitting  Physical Exam      LABS:    Complete Blood Count  Lab Results   Component Value Date    RBC 4.66 02/07/2019    HGB 12.4 02/07/2019    HCT 40.5 02/07/2019    MCV 87 02/07/2019    MCH 26.6 (L) 02/07/2019    MCHC 30.6 (L) 02/07/2019    RDW 14.1 02/07/2019     02/07/2019    MPV 11.0 02/07/2019    GRAN 1.9 02/07/2019    GRAN 30.3 (L) 02/07/2019    LYMPH 3.7 02/07/2019    LYMPH 58.5 (H) 02/07/2019    MONO 0.5 02/07/2019    MONO 8.3 02/07/2019    EOS 0.2 02/07/2019    BASO 0.02 02/07/2019    EOSINOPHIL 2.4 " 02/07/2019    BASOPHIL 0.3 02/07/2019    DIFFMETHOD Automated 02/07/2019       Comprehensive Metabolic Panel  Lab Results   Component Value Date     (H) 02/07/2019    BUN 12 02/07/2019    CREATININE 0.7 02/07/2019     02/07/2019    K 3.9 02/07/2019     02/07/2019    PROT 6.5 02/07/2019    ALBUMIN 3.4 (L) 02/07/2019    BILITOT 0.3 02/07/2019    AST 16 02/07/2019    ALKPHOS 70 02/07/2019    CO2 25 02/07/2019    ALT 23 02/07/2019    ANIONGAP 10 02/07/2019    EGFRNONAA >60 02/07/2019    ESTGFRAFRICA >60 02/07/2019       LIPID  Lab Results   Component Value Date    CHOL 244 (A) 12/31/2018    HDL 60 12/31/2018         TSH  Lab Results   Component Value Date    TSH 0.467 08/12/2018       Current Outpatient Medications   Medication Sig Dispense Refill    ezetimibe (ZETIA) 10 mg tablet Take 1 tablet (10 mg total) by mouth once daily. 90 tablet 0    fish oil-omega-3 fatty acids 300-1,000 mg capsule Take 2 g by mouth once daily.      gabapentin (NEURONTIN) 300 MG capsule Take 1 capsule (300 mg total) by mouth 3 (three) times daily. 90 capsule 2    levothyroxine (SYNTHROID) 112 MCG tablet Take 1 tablet (112 mcg total) by mouth before breakfast. 90 tablet 0    lidocaine-prilocaine (EMLA) cream       meloxicam (MOBIC) 15 MG tablet Take 1 tablet (15 mg total) by mouth once daily. 90 tablet 0    sertraline (ZOLOFT) 50 MG tablet Take 1 tablet (50 mg total) by mouth once daily. 30 tablet 2    telmisartan (MICARDIS) 80 MG Tab Take 1 tablet (80 mg total) by mouth once daily. 90 tablet 0    hydroCHLOROthiazide (MICROZIDE) 12.5 mg capsule Take 1 tablet by mouth every other day. 15 capsule 0     No current facility-administered medications for this visit.        Assessment:    ICD-10-CM ICD-9-CM    1. Essential hypertension I10 401.9    2. Acquired hypothyroidism E03.9 244.9    3. Lower extremity edema R60.0 782.3    4. Mixed hyperlipidemia E78.2 272.2    5. BMI 50.0-59.9, adult Z68.43 V85.43 Ambulatory  [facility]   6. Varicose veins of both lower extremities with pain I83.813 454.8          Plan:    Follow up in 3 months (on 7/9/2019).          Mayda Graham MD

## 2019-04-10 ENCOUNTER — TELEPHONE (OUTPATIENT)
Dept: FAMILY MEDICINE | Facility: CLINIC | Age: 59
End: 2019-04-10

## 2019-04-18 ENCOUNTER — PATIENT MESSAGE (OUTPATIENT)
Dept: FAMILY MEDICINE | Facility: CLINIC | Age: 59
End: 2019-04-18

## 2019-04-18 DIAGNOSIS — M54.14 THORACIC RADICULOPATHY: ICD-10-CM

## 2019-04-26 RX ORDER — GABAPENTIN 100 MG/1
100 CAPSULE ORAL 3 TIMES DAILY
Qty: 90 CAPSULE | Refills: 2 | Status: SHIPPED | OUTPATIENT
Start: 2019-04-26 | End: 2020-01-21

## 2019-05-08 ENCOUNTER — TELEPHONE (OUTPATIENT)
Dept: FAMILY MEDICINE | Facility: CLINIC | Age: 59
End: 2019-05-08

## 2019-05-08 NOTE — TELEPHONE ENCOUNTER
Spoke with patient I advised Dr Graham had change the diagnosis and sent to referral dept will wait to see if the codes will be approve.

## 2019-05-08 NOTE — TELEPHONE ENCOUNTER
----- Message from Loni Graham sent at 5/8/2019 11:01 AM CDT -----  Patient called today to r/s her Nutrition referral. Patient referral was DENIED per [employer]. It is not a covered service per the diag. Patient would like to know what is her next step or options. She inform me that she was given a card for free fitness and nutrition plan. Please call patient.

## 2019-05-24 DIAGNOSIS — F32.A DEPRESSION, UNSPECIFIED DEPRESSION TYPE: ICD-10-CM

## 2019-05-24 RX ORDER — SERTRALINE HYDROCHLORIDE 50 MG/1
50 TABLET, FILM COATED ORAL DAILY
Qty: 30 TABLET | Refills: 2 | Status: SHIPPED | OUTPATIENT
Start: 2019-05-24 | End: 2019-07-22

## 2019-05-30 DIAGNOSIS — M25.50 ARTHRALGIA, UNSPECIFIED JOINT: ICD-10-CM

## 2019-05-30 RX ORDER — MELOXICAM 15 MG/1
15 TABLET ORAL DAILY
Qty: 90 TABLET | Refills: 0 | Status: SHIPPED | OUTPATIENT
Start: 2019-05-30 | End: 2019-07-22

## 2019-06-10 ENCOUNTER — PATIENT MESSAGE (OUTPATIENT)
Dept: FAMILY MEDICINE | Facility: CLINIC | Age: 59
End: 2019-06-10

## 2019-06-10 DIAGNOSIS — Z00.00 ANNUAL PHYSICAL EXAM: Primary | ICD-10-CM

## 2019-06-10 DIAGNOSIS — M25.50 ARTHRALGIA, UNSPECIFIED JOINT: ICD-10-CM

## 2019-06-12 ENCOUNTER — PATIENT MESSAGE (OUTPATIENT)
Dept: FAMILY MEDICINE | Facility: CLINIC | Age: 59
End: 2019-06-12

## 2019-06-13 ENCOUNTER — TELEPHONE (OUTPATIENT)
Dept: SLEEP MEDICINE | Facility: CLINIC | Age: 59
End: 2019-06-13

## 2019-06-13 NOTE — TELEPHONE ENCOUNTER
----- Message from Nati Cornejo sent at 6/13/2019  9:13 AM CDT -----  Contact: Self  Pt made contact via [facility] Portal as follows:    Appointment Request From: Yulissa Hatfield    With Provider: Constance Fisher MD [[facility]]    Preferred Date Range: 7/1/2019 - 8/30/2019    Preferred Times: Any time    Reason for visit: Existing Patient    Comments:  Received a letter to schedule appointment August 2019 or soon after.    Thank you.

## 2019-06-19 DIAGNOSIS — I10 ESSENTIAL HYPERTENSION: Chronic | ICD-10-CM

## 2019-06-19 RX ORDER — HYDROCHLOROTHIAZIDE 12.5 MG/1
CAPSULE ORAL
Qty: 15 CAPSULE | Refills: 2 | Status: SHIPPED | OUTPATIENT
Start: 2019-06-19 | End: 2019-11-13 | Stop reason: SDUPTHER

## 2019-06-27 ENCOUNTER — PATIENT MESSAGE (OUTPATIENT)
Dept: FAMILY MEDICINE | Facility: CLINIC | Age: 59
End: 2019-06-27

## 2019-07-19 LAB
ALBUMIN SERPL-MCNC: 3.9 G/DL (ref 3.6–5.1)
ALBUMIN/GLOB SERPL: 1.2 (CALC) (ref 1–2.5)
ALP SERPL-CCNC: 64 U/L (ref 33–130)
ALT SERPL-CCNC: 29 U/L (ref 6–29)
AST SERPL-CCNC: 21 U/L (ref 10–35)
BASOPHILS # BLD AUTO: 50 CELLS/UL (ref 0–200)
BASOPHILS NFR BLD AUTO: 0.9 %
BILIRUB SERPL-MCNC: 0.4 MG/DL (ref 0.2–1.2)
BUN SERPL-MCNC: 21 MG/DL (ref 7–25)
BUN/CREAT SERPL: ABNORMAL (CALC) (ref 6–22)
CALCIUM SERPL-MCNC: 9.1 MG/DL (ref 8.6–10.4)
CHLORIDE SERPL-SCNC: 105 MMOL/L (ref 98–110)
CHOLEST SERPL-MCNC: 217 MG/DL
CHOLEST/HDLC SERPL: 4 (CALC)
CO2 SERPL-SCNC: 28 MMOL/L (ref 20–32)
CREAT SERPL-MCNC: 0.58 MG/DL (ref 0.5–1.05)
CRP SERPL-MCNC: 5.3 MG/L
EOSINOPHIL # BLD AUTO: 138 CELLS/UL (ref 15–500)
EOSINOPHIL NFR BLD AUTO: 2.5 %
ERYTHROCYTE [DISTWIDTH] IN BLOOD BY AUTOMATED COUNT: 13.5 % (ref 11–15)
GFRSERPLBLD MDRD-ARVRAT: 103 ML/MIN/1.73M2
GLOBULIN SER CALC-MCNC: 3.2 G/DL (CALC) (ref 1.9–3.7)
GLUCOSE SERPL-MCNC: 135 MG/DL (ref 65–99)
HCT VFR BLD AUTO: 41.3 % (ref 35–45)
HDLC SERPL-MCNC: 54 MG/DL
HGB BLD-MCNC: 13.1 G/DL (ref 11.7–15.5)
LDLC SERPL CALC-MCNC: 142 MG/DL (CALC)
LYMPHOCYTES # BLD AUTO: 2382 CELLS/UL (ref 850–3900)
LYMPHOCYTES NFR BLD AUTO: 43.3 %
MCH RBC QN AUTO: 26.7 PG (ref 27–33)
MCHC RBC AUTO-ENTMCNC: 31.7 G/DL (ref 32–36)
MCV RBC AUTO: 84.3 FL (ref 80–100)
MONOCYTES # BLD AUTO: 429 CELLS/UL (ref 200–950)
MONOCYTES NFR BLD AUTO: 7.8 %
NEUTROPHILS # BLD AUTO: 2503 CELLS/UL (ref 1500–7800)
NEUTROPHILS NFR BLD AUTO: 45.5 %
NONHDLC SERPL-MCNC: 163 MG/DL (CALC)
PLATELET # BLD AUTO: 278 THOUSAND/UL (ref 140–400)
PMV BLD REES-ECKER: 11.4 FL (ref 7.5–12.5)
POTASSIUM SERPL-SCNC: 4.9 MMOL/L (ref 3.5–5.3)
PROT SERPL-MCNC: 7.1 G/DL (ref 6.1–8.1)
RBC # BLD AUTO: 4.9 MILLION/UL (ref 3.8–5.1)
SODIUM SERPL-SCNC: 139 MMOL/L (ref 135–146)
TRIGL SERPL-MCNC: 97 MG/DL
TSH SERPL-ACNC: 1.54 MIU/L (ref 0.4–4.5)
WBC # BLD AUTO: 5.5 THOUSAND/UL (ref 3.8–10.8)

## 2019-07-22 ENCOUNTER — OFFICE VISIT (OUTPATIENT)
Dept: FAMILY MEDICINE | Facility: CLINIC | Age: 59
End: 2019-07-22
Payer: COMMERCIAL

## 2019-07-22 VITALS
RESPIRATION RATE: 17 BRPM | SYSTOLIC BLOOD PRESSURE: 138 MMHG | TEMPERATURE: 99 F | DIASTOLIC BLOOD PRESSURE: 72 MMHG | BODY MASS INDEX: 48.82 KG/M2 | WEIGHT: 293 LBS | OXYGEN SATURATION: 98 % | HEART RATE: 77 BPM | HEIGHT: 65 IN

## 2019-07-22 DIAGNOSIS — R73.01 IMPAIRED FASTING GLUCOSE: ICD-10-CM

## 2019-07-22 DIAGNOSIS — E66.01 MORBID OBESITY WITH BMI OF 50.0-59.9, ADULT: ICD-10-CM

## 2019-07-22 DIAGNOSIS — E78.2 MIXED HYPERLIPIDEMIA: ICD-10-CM

## 2019-07-22 DIAGNOSIS — G47.33 OBSTRUCTIVE SLEEP APNEA ON CPAP: Chronic | ICD-10-CM

## 2019-07-22 DIAGNOSIS — E03.9 ACQUIRED HYPOTHYROIDISM: Chronic | ICD-10-CM

## 2019-07-22 DIAGNOSIS — F32.A DEPRESSION, UNSPECIFIED DEPRESSION TYPE: ICD-10-CM

## 2019-07-22 DIAGNOSIS — E03.9 HYPOTHYROIDISM, UNSPECIFIED TYPE: Chronic | ICD-10-CM

## 2019-07-22 DIAGNOSIS — I10 HYPERTENSION, UNSPECIFIED TYPE: Primary | ICD-10-CM

## 2019-07-22 PROCEDURE — 3078F PR MOST RECENT DIASTOLIC BLOOD PRESSURE < 80 MM HG: ICD-10-PCS | Mod: CPTII,S$GLB,, | Performed by: NURSE PRACTITIONER

## 2019-07-22 PROCEDURE — 99999 PR PBB SHADOW E&M-EST. PATIENT-LVL V: CPT | Mod: PBBFAC,,, | Performed by: NURSE PRACTITIONER

## 2019-07-22 PROCEDURE — 99999 PR PBB SHADOW E&M-EST. PATIENT-LVL V: ICD-10-PCS | Mod: PBBFAC,,, | Performed by: NURSE PRACTITIONER

## 2019-07-22 PROCEDURE — 99214 PR OFFICE/OUTPT VISIT, EST, LEVL IV, 30-39 MIN: ICD-10-PCS | Mod: S$GLB,,, | Performed by: NURSE PRACTITIONER

## 2019-07-22 PROCEDURE — 3075F SYST BP GE 130 - 139MM HG: CPT | Mod: CPTII,S$GLB,, | Performed by: NURSE PRACTITIONER

## 2019-07-22 PROCEDURE — 3008F PR BODY MASS INDEX (BMI) DOCUMENTED: ICD-10-PCS | Mod: CPTII,S$GLB,, | Performed by: NURSE PRACTITIONER

## 2019-07-22 PROCEDURE — 3078F DIAST BP <80 MM HG: CPT | Mod: CPTII,S$GLB,, | Performed by: NURSE PRACTITIONER

## 2019-07-22 PROCEDURE — 99214 OFFICE O/P EST MOD 30 MIN: CPT | Mod: S$GLB,,, | Performed by: NURSE PRACTITIONER

## 2019-07-22 PROCEDURE — 3008F BODY MASS INDEX DOCD: CPT | Mod: CPTII,S$GLB,, | Performed by: NURSE PRACTITIONER

## 2019-07-22 PROCEDURE — 3075F PR MOST RECENT SYSTOLIC BLOOD PRESS GE 130-139MM HG: ICD-10-PCS | Mod: CPTII,S$GLB,, | Performed by: NURSE PRACTITIONER

## 2019-07-22 RX ORDER — FLUTICASONE PROPIONATE 50 MCG
SPRAY, SUSPENSION (ML) NASAL
Refills: 11 | COMMUNITY
Start: 2019-05-24 | End: 2020-05-18

## 2019-07-22 RX ORDER — BUPROPION HYDROCHLORIDE 150 MG/1
150 TABLET ORAL DAILY
Qty: 30 TABLET | Refills: 1 | Status: SHIPPED | OUTPATIENT
Start: 2019-07-22 | End: 2019-10-28 | Stop reason: SDUPTHER

## 2019-07-22 RX ORDER — EZETIMIBE 10 MG/1
10 TABLET ORAL DAILY
Qty: 90 TABLET | Refills: 0 | Status: SHIPPED | OUTPATIENT
Start: 2019-07-22 | End: 2019-10-28

## 2019-07-22 RX ORDER — LEVOTHYROXINE SODIUM 112 UG/1
112 TABLET ORAL
Qty: 90 TABLET | Refills: 1 | Status: SHIPPED | OUTPATIENT
Start: 2019-07-22 | End: 2020-02-05 | Stop reason: SDUPTHER

## 2019-07-22 NOTE — PROGRESS NOTES
"Subjective:       Patient ID: Yulissa Hatfield is a 58 y.o. female.    Chief Complaint: Follow-up (Pt here for a f/u visit/ Dr. Graham pt )    57 y/o female with history of hypertension, hyperlipidemia, thyroid disease, and depression presents to clinic for follow up and lab results.    Patient has hypertension. She is prescribed telmisartan 80 mg daily. Blood pressure controlled. Blood pressure 138/72, pulse 77, temperature 98.8 °F (37.1 °C), resp. rate 17, height 5' 5" (1.651 m), weight (!) 160.2 kg (353 lb 2.8 oz), last menstrual period 03/25/2006, SpO2 98 %.    Patient hyperlipidemia. She is prescribed Zetia 10 mg and fish oil-omega 3 supplement daily. Total cholesterol has improved compared to 6 months ago 217 vs 244;  vs 164; decreased HDL 54 vs 60. Patient reports plans to start plant-based diet.     Patient has history of depression. Previously prescribed Prozac. Patient discontinued medication due to side effects, night terrors, SI. See add'l notes below.      Depression   Visit Type: follow-up  Patient presents with the following symptoms: depressed mood and irritability.  Patient is not experiencing: compulsions, excessive worry, nervousness/anxiety, palpitations, shortness of breath, suicidal planning and thoughts of death.  Severity: mild   Sleep quality: fair  Nighttime awakenings: one to two         Ref. Range 7/18/2019 07:35   WBC Latest Ref Range: 3.8 - 10.8 Thousand/uL 5.5   RBC Latest Ref Range: 3.80 - 5.10 Million/uL 4.90   Hemoglobin Latest Ref Range: 11.7 - 15.5 g/dL 13.1   Hematocrit Latest Ref Range: 35.0 - 45.0 % 41.3   MCV Latest Ref Range: 80.0 - 100.0 fL 84.3   MCH Latest Ref Range: 27.0 - 33.0 pg 26.7 (L)   MCHC Latest Ref Range: 32.0 - 36.0 g/dL 31.7 (L)   RDW Latest Ref Range: 11.0 - 15.0 % 13.5   Platelets Latest Ref Range: 140 - 400 Thousand/uL 278   MPV Latest Ref Range: 7.5 - 12.5 fL 11.4   Lymph% Latest Units: % 43.3   Lymph # Latest Ref Range: 850 - 3,900 cells/uL " 2,382   Mono% Latest Units: % 7.8   Mono # Latest Ref Range: 200 - 950 cells/uL 429   Eosinophil% Latest Units: % 2.5   Eos # Latest Ref Range: 15 - 500 cells/uL 138   Basophil% Latest Units: % 0.9   Baso # Latest Ref Range: 0 - 200 cells/uL 50   Neutrophils Relative Latest Units: % 45.5   Neutrophils Absolute Latest Ref Range: 1,500 - 7,800 cells/uL 2,503   Sodium Latest Ref Range: 135 - 146 mmol/L 139   Potassium Latest Ref Range: 3.5 - 5.3 mmol/L 4.9   Chloride Latest Ref Range: 98 - 110 mmol/L 105   CO2 Latest Ref Range: 20 - 32 mmol/L 28   BUN, Bld Latest Ref Range: 7 - 25 mg/dL 21   Creatinine Latest Ref Range: 0.50 - 1.05 mg/dL 0.58   BUN/Creatinine Ratio Latest Ref Range: 6 - 22 (calc) NOT APPLICABLE   eGFR if non African American Latest Ref Range: > OR = 60 mL/min/1.73m2 103   eGFR if African American Latest Ref Range: > OR = 60 mL/min/1.73m2 119   Glucose Latest Ref Range: 65 - 99 mg/dL 135 (H)   Calcium Latest Ref Range: 8.6 - 10.4 mg/dL 9.1   Alkaline Phosphatase Latest Ref Range: 33 - 130 U/L 64   PROTEIN TOTAL Latest Ref Range: 6.1 - 8.1 g/dL 7.1   Albumin Latest Ref Range: 3.6 - 5.1 g/dL 3.9   Albumin/Globulin Ratio Latest Ref Range: 1.0 - 2.5 (calc) 1.2   BILIRUBIN TOTAL Latest Ref Range: 0.2 - 1.2 mg/dL 0.4   AST Latest Ref Range: 10 - 35 U/L 21   ALT Latest Ref Range: 6 - 29 U/L 29   CRP Latest Ref Range: <8.0 mg/L 5.3   Triglycerides Latest Ref Range: <150 mg/dL 97   Globulin, Total Latest Ref Range: 1.9 - 3.7 g/dL (calc) 3.2   Cholesterol Latest Ref Range: <200 mg/dL 217 (H)   HDL Latest Ref Range: >50 mg/dL 54   Hdl/Cholesterol Ratio Latest Ref Range: <5.0 (calc) 4.0   LDL Cholesterol External Latest Units: mg/dL (calc) 142 (H)   Non HDL Chol. (LDL+VLDL) Latest Ref Range: <130 mg/dL (calc) 163 (H)   TSH Latest Ref Range: 0.40 - 4.50 mIU/L 1.54     Current Outpatient Medications   Medication Sig Dispense Refill    fish oil-omega-3 fatty acids 300-1,000 mg capsule Take 2 g by mouth once daily.       fluticasone propionate (FLONASE) 50 mcg/actuation nasal spray INHALE 1 SPRAY EACH NOSTRIL TWICE DAILY  11    gabapentin (NEURONTIN) 100 MG capsule Take 1 capsule (100 mg total) by mouth 3 (three) times daily. To be taken with the 300mg dose 90 capsule 2    gabapentin (NEURONTIN) 300 MG capsule Take 1 capsule (300 mg total) by mouth 3 (three) times daily. 90 capsule 2    hydroCHLOROthiazide (MICROZIDE) 12.5 mg capsule Take 1 tablet by mouth every other day. 15 capsule 2    telmisartan (MICARDIS) 80 MG Tab Take 1 tablet (80 mg total) by mouth once daily. 90 tablet 0    buPROPion (WELLBUTRIN XL) 150 MG TB24 tablet Take 1 tablet (150 mg total) by mouth once daily. 30 tablet 1    ezetimibe (ZETIA) 10 mg tablet Take 1 tablet (10 mg total) by mouth once daily. 90 tablet 0    levothyroxine (SYNTHROID) 112 MCG tablet Take 1 tablet (112 mcg total) by mouth before breakfast. 90 tablet 1     No current facility-administered medications for this visit.        Past Medical History:   Diagnosis Date    HTN (hypertension)     Hyperlipidemia     Hypothyroid        Past Surgical History:   Procedure Laterality Date    CARPAL TUNNEL RELEASE       SECTION, CLASSIC      x 3    EGD (ESOPHAGOGASTRODUODENOSCOPY) N/A 3/25/2013    [facility] ENDO    ENDOMETRIAL ABLATION      FOOT SURGERY      KNEE SURGERY      THYROID SURGERY         Family History   Problem Relation Age of Onset    Heart disease Mother     Heart disease Father     No Known Problems Sister     Heart disease Brother     No Known Problems Daughter     No Known Problems Son     No Known Problems Daughter     No Known Problems Daughter        Social History     Socioeconomic History    Marital status:      Spouse name: Not on file    Number of children: Not on file    Years of education: Not on file    Highest education level: Not on file   Occupational History    Not on file   Social Needs     Financial resource strain: Not on file    Food insecurity:     Worry: Not on file     Inability: Not on file    Transportation needs:     Medical: Not on file     Non-medical: Not on file   Tobacco Use    Smoking status: Never Smoker    Smokeless tobacco: Never Used   Substance and Sexual Activity    Alcohol use: No    Drug use: No    Sexual activity: Not on file   Lifestyle    Physical activity:     Days per week: Not on file     Minutes per session: Not on file    Stress: Not on file   Relationships    Social connections:     Talks on phone: Not on file     Gets together: Not on file     Attends Alevism service: Not on file     Active member of club or organization: Not on file     Attends meetings of clubs or organizations: Not on file     Relationship status: Not on file   Other Topics Concern    Not on file   Social History Narrative    Not on file       Review of Systems   Constitutional: Positive for irritability and unexpected weight change. Negative for fatigue and fever.   HENT: Negative for nosebleeds, tinnitus and trouble swallowing.    Eyes: Negative for visual disturbance.   Respiratory: Negative for shortness of breath.    Cardiovascular: Negative for chest pain, palpitations and leg swelling.   Gastrointestinal: Negative for abdominal pain, blood in stool, constipation and diarrhea.   Endocrine: Negative for polydipsia, polyphagia and polyuria.   Genitourinary: Negative for dysuria.   Musculoskeletal: Positive for back pain.   Neurological: Negative for dizziness, syncope, weakness and light-headedness.   Hematological: Negative for adenopathy. Does not bruise/bleed easily.   Psychiatric/Behavioral: Positive for depression and dysphoric mood. The patient is not nervous/anxious.          Objective:     Vitals:    07/22/19 0850   BP: 138/72   BP Location: Right arm   Patient Position: Sitting   BP Method: Thigh Cuff (Manual)   Pulse: 77   Resp: 17   Temp: 98.8 °F (37.1 °C)   SpO2: 98%  "  Weight: (!) 160.2 kg (353 lb 2.8 oz)   Height: 5' 5" (1.651 m)          Physical Exam   Constitutional: She is oriented to person, place, and time. She appears well-developed. No distress.   +obesity with: Body mass index is 58.77 kg/m².   HENT:   Head: Normocephalic.   Right Ear: Tympanic membrane and ear canal normal.   Left Ear: Tympanic membrane and ear canal normal.   Nose: Nose normal.   Mouth/Throat: Uvula is midline and oropharynx is clear and moist.   Eyes: Pupils are equal, round, and reactive to light. Conjunctivae are normal.   Neck: Normal range of motion. Neck supple.   Cardiovascular: Normal rate, regular rhythm and intact distal pulses.   Pulmonary/Chest: Effort normal and breath sounds normal.   Abdominal: Bowel sounds are normal. There is no tenderness.   Musculoskeletal: Normal range of motion.   Lymphadenopathy:     She has no cervical adenopathy.   Neurological: She is alert and oriented to person, place, and time.   Skin: Skin is warm and dry.   Psychiatric: She has a normal mood and affect. Her behavior is normal.         Assessment:         ICD-10-CM ICD-9-CM   1. Hypertension, unspecified type I10 401.9   2. Impaired fasting glucose R73.01 790.21   3. Acquired hypothyroidism E03.9 244.9   4. Obstructive sleep apnea on CPAP G47.33 327.23    Z99.89 V46.8   5. Depression, unspecified depression type F32.9 311   6. Morbid obesity with BMI of 50.0-59.9, adult E66.01 278.01    Z68.43 V85.43       Plan:       Hypertension, unspecified type       -   Chronic, stable, continue current medication therapy    Impaired fasting glucose  -     Hemoglobin A1c; Future; Expected date: 07/22/2019    Acquired hypothyroidism        -    Chronic, stable, continue current medication therapy    Obstructive sleep apnea on CPAP  - Stable, continue CPAP use    Depression, unspecified depression type  -   Start Wellbutrin daily. Follow up in 4 weeks for medication management  -     buPROPion (WELLBUTRIN XL) 150 MG " TB24 tablet; Take 1 tablet (150 mg total) by mouth once daily.  Dispense: 30 tablet; Refill: 1    Morbid obesity with BMI of 50.0-59.9, adult  - Weight loss advised. Dietary and exercise counseling done.      Follow up in about 6 months (around 1/22/2020).     Patient's Medications   New Prescriptions    BUPROPION (WELLBUTRIN XL) 150 MG TB24 TABLET    Take 1 tablet (150 mg total) by mouth once daily.   Previous Medications    FISH OIL-OMEGA-3 FATTY ACIDS 300-1,000 MG CAPSULE    Take 2 g by mouth once daily.    FLUTICASONE PROPIONATE (FLONASE) 50 MCG/ACTUATION NASAL SPRAY    INHALE 1 SPRAY EACH NOSTRIL TWICE DAILY    GABAPENTIN (NEURONTIN) 100 MG CAPSULE    Take 1 capsule (100 mg total) by mouth 3 (three) times daily. To be taken with the 300mg dose    GABAPENTIN (NEURONTIN) 300 MG CAPSULE    Take 1 capsule (300 mg total) by mouth 3 (three) times daily.    HYDROCHLOROTHIAZIDE (MICROZIDE) 12.5 MG CAPSULE    Take 1 tablet by mouth every other day.    TELMISARTAN (MICARDIS) 80 MG TAB    Take 1 tablet (80 mg total) by mouth once daily.   Modified Medications    Modified Medication Previous Medication    EZETIMIBE (ZETIA) 10 MG TABLET ezetimibe (ZETIA) 10 mg tablet       Take 1 tablet (10 mg total) by mouth once daily.    Take 1 tablet (10 mg total) by mouth once daily.    LEVOTHYROXINE (SYNTHROID) 112 MCG TABLET levothyroxine (SYNTHROID) 112 MCG tablet       Take 1 tablet (112 mcg total) by mouth before breakfast.    Take 1 tablet (112 mcg total) by mouth before breakfast.   Discontinued Medications    LIDOCAINE-PRILOCAINE (EMLA) CREAM        MELOXICAM (MOBIC) 15 MG TABLET    Take 1 tablet (15 mg total) by mouth once daily.    SERTRALINE (ZOLOFT) 50 MG TABLET    Take 1 tablet (50 mg total) by mouth once daily.

## 2019-10-16 ENCOUNTER — OFFICE VISIT (OUTPATIENT)
Dept: SLEEP MEDICINE | Facility: CLINIC | Age: 59
End: 2019-10-16
Payer: COMMERCIAL

## 2019-10-16 VITALS
WEIGHT: 293 LBS | BODY MASS INDEX: 48.82 KG/M2 | SYSTOLIC BLOOD PRESSURE: 138 MMHG | HEIGHT: 65 IN | HEART RATE: 72 BPM | DIASTOLIC BLOOD PRESSURE: 84 MMHG

## 2019-10-16 DIAGNOSIS — G47.33 OSA (OBSTRUCTIVE SLEEP APNEA): Primary | ICD-10-CM

## 2019-10-16 PROCEDURE — 3008F BODY MASS INDEX DOCD: CPT | Mod: CPTII,S$GLB,, | Performed by: PSYCHIATRY & NEUROLOGY

## 2019-10-16 PROCEDURE — 99214 OFFICE O/P EST MOD 30 MIN: CPT | Mod: S$GLB,,, | Performed by: PSYCHIATRY & NEUROLOGY

## 2019-10-16 PROCEDURE — 3075F SYST BP GE 130 - 139MM HG: CPT | Mod: CPTII,S$GLB,, | Performed by: PSYCHIATRY & NEUROLOGY

## 2019-10-16 PROCEDURE — 99214 PR OFFICE/OUTPT VISIT, EST, LEVL IV, 30-39 MIN: ICD-10-PCS | Mod: S$GLB,,, | Performed by: PSYCHIATRY & NEUROLOGY

## 2019-10-16 PROCEDURE — 3075F PR MOST RECENT SYSTOLIC BLOOD PRESS GE 130-139MM HG: ICD-10-PCS | Mod: CPTII,S$GLB,, | Performed by: PSYCHIATRY & NEUROLOGY

## 2019-10-16 PROCEDURE — 3079F DIAST BP 80-89 MM HG: CPT | Mod: CPTII,S$GLB,, | Performed by: PSYCHIATRY & NEUROLOGY

## 2019-10-16 PROCEDURE — 99999 PR PBB SHADOW E&M-EST. PATIENT-LVL III: ICD-10-PCS | Mod: PBBFAC,,, | Performed by: PSYCHIATRY & NEUROLOGY

## 2019-10-16 PROCEDURE — 3008F PR BODY MASS INDEX (BMI) DOCUMENTED: ICD-10-PCS | Mod: CPTII,S$GLB,, | Performed by: PSYCHIATRY & NEUROLOGY

## 2019-10-16 PROCEDURE — 99999 PR PBB SHADOW E&M-EST. PATIENT-LVL III: CPT | Mod: PBBFAC,,, | Performed by: PSYCHIATRY & NEUROLOGY

## 2019-10-16 PROCEDURE — 3079F PR MOST RECENT DIASTOLIC BLOOD PRESSURE 80-89 MM HG: ICD-10-PCS | Mod: CPTII,S$GLB,, | Performed by: PSYCHIATRY & NEUROLOGY

## 2019-10-16 NOTE — PATIENT INSTRUCTIONS
Sheela Bonilla, Ph.D.  [address]  [address]  [phone]  [email or web address]      10/16/2019      Dear Yulissa Hatfield,    You have been referred to CBT-i (Cognitive Behavioral Therapy for Insomnia) by your Sleep Clinic provider.  CBT-i is a manualized evidence-based treatment (CBT-i) for adult patients with insomnia.  Evidence-based means that this treatment has been scientifically proven to be effective for treating insomnia.  Treatment with CBT-i will be offered in a group format.      Treatment Information:   Insomnia-focused.  This treatment specifically focuses primarily on insomnia.   Therapy only.  Medication management is not included in this treatment.  Please discuss your medications with your referring provider.   Time-limited. This means that services with Dr. Bonilla are concluded when treatment ends.    CBT-i group will run for 6 weekly sessions.   Structured. CBT-i involves manualized treatment with structured and pre-determined topics, discussions, and practice assignments tailored to treat insomnia.   Active Treatment. Once admitted to CBT-i, treatment requires your consistent attendance and commitment to daily practice assignments.  Practice assignments include daily sleep diaries and changes in thoughts and behaviors related to sleep.      If you would like more detailed information about CBT-i, please visit the following websites:   https://www.acponline.org/acp-newsroom/epg-kshcvekane-nozunrxso-behavioral-therapy-as-initial-treatment-for-chronic-insomnia   https://www.sleepfoundation.org/articles/cognitive-behavioral-therapy-insomnia      How to Join CBT-i:  If you are interested in attending CBT-i, please call the Department of Psychiatry appointment line ([phone]) and request to speak to Yani Jerez RN.  Please indicate which cohort you would like to attend.  Each group will be capped at 6-8 members.    Cohort Start Date End Date List  of Dates   5 09/04/2019 10/09/2019 09/04, 09/11, 09/18, 09/25, 10/02, 10/09     6 10/30/2019 12/11/2019 10/30, 11/06, 11/13, 11/20, (SKIP 11/27 THANKSGIVING WEEK), 12/4, 12/11         Billing & Insurance:  Please check with your insurance about coverage for the group therapy sessions.  The CPT code is 01686 for a group session.  You can call your insurance directly or contact our patient , Elham Evans, at [phone].      Location:   CBT-i will be located on the 4th floor of the [facility] in the Department of Psychiatry at [facility] main campus on [facility].     For the first session, Dr. Bonilla will escort you from the waiting room to the group therapy room.   For subsequent sessions, you may walk to the group therapy room on your own.  Please check in at the  before you walk back to room #[id number].  When you exit the waiting room, you will take your second right past the soda machine through the fire door (you may open this).  You will cross the hallway above the atrium and open the door to a hallway with elevators.  Past the elevators to the left is a glass door with an intercom on the left.  Press the button so the  can buzz you in.  Room #[id number] is located in the back of the long hallway on the right.      Questions:  If you have any questions please call the Department of Psychiatry appointment line ([phone]) and request to speak to Yani Jerez RN.  She will direct you to Dr. Bonilla if needed.        We hope to speak to you soon!

## 2019-10-16 NOTE — PROGRESS NOTES
"   Yulissa Hatfield 58 y.o. female returns for management of obstructive sleep apnea and CPAP equipment check.     10/23/2015 INITIAL HISTORY OF PRESENT ILLNESS:  Yulissa Hatfield is a 58 y.o. female is here to be evaluated for a sleep disorder.       CHIEF COMPLAINT:        The patient has mentioned difficulty falling and staying asleep.    The patient states that she has already made some changes in regard to sleep hygiene, making sure that the bedroom is dark, features comfortable temperature and humidity level. The patient does watch TV and read in bed before turning light off. she avoids going to bed before she is sleepy and stays in bed if not asleep within 30 minutes. she avoids clock watching and worries about her ability to fall asleep.     The patient has tried the following sleeping aids: Lunesta (did not work), Melatonin and Benadryl helps to fall, but not stay asleep. Belsomra - mouth trembling at 10 mg. Restoril - "crazy dreams".     The patient's complaints include excessive daytime sleepiness, excessive daytime fatigue and snoring.  Denied choking, gasping for air, witnessed paneas.      Denies  dry mouth and sore throat  Denies nasal congestion   Reports  morning headaches  Reports  interrupted sleep  Reports frequent leg movements  Denies symptoms concerning for parasomnia    The ESS (Parks Sleepiness Score) taken on initial visit is 3 /24    The patient never had tonsillectomy, adenoidectomy or UPPP     She had a previous negative experience with PSG and an attempt of CPAP titration (combination of factors).      INTERVAL HISTORY:      10/16/2019: Dr. Fisher:      Using Magnesium and meditations for insomnia  Had an episode of hypertensive encephalopathy 3 hrs after taking Belsomra. I could not determine precipitating factor or med interactions.  Not taking it anymore    No significant sleepiness    The patient reports improved sleep continuity and daytime sleepiness on PAP. ESS today is " "1/24.  Denies break through snoring. No dry mouth. She is managing insomnia with magnesium; at times Advil and Tylenol PM as needed. Waking up no more than 2 times per night - returning to sleep right away,. Tried bath bombs with CBD  and cream.  APAP `12-18 cm H2O  90% 15.6 cm H2O     She is interested in our new CBTi program    Therapy Event Summary Date Range: 9/16/2019 - 10/15/2019     Hide      Compliance Summary  Apnea Indices  Ventilator Statistics    Days with Device Usage:  30 days  Average AHI:  0.8  Average Breath Rate:  20.3 bpm    Percentage of Days >=4 Hours:  100.0%  Average OA Index:  0.3  Average % Patient Triggered Breaths:  N/A    Average Usage (Days Used):  8 hrs. 21 mins. 14 secs.  Average CA Index:  0.0  Average Tidal Volume:  394.3 ml    Average Usage (All Days):  8 hrs. 21 mins. 14 secs.    Average Minute Vent:  N/A        Large Leak  Periodic Breathing     Average Time in Large Leak:  3 mins. 26 secs.  Average % of Night in PB:  0.2%     Average % of Night in Large Leak:  0.7%                  03/24/2016 Dr. Fisher:  The patient has not presented any new complaints since the previous visit. Did not tolerate Trazodone - 50 mg - reports "crazy dreams", at least it gave her 4 hours of sleep. Not taking Xanax for years.  Still having has a lot of sleep related anxiety. Using different mediations. Does sleepy time tea, meditation, bedroom dark and cold, no caffeine after lunch. Tried sleep meditation 3-4 times over the last week with progressive muscle relaxation.   Not watching the clock. A lot of her work is on her mind when going to bed.  We just recently got her sleep study. APAP was ordered on 3/18 - has not received it yet.   ESS 3/24.    05/18/2016 Dr. Fisher: She has been getting used to CPAP - still taking mask off on the early morning. Modest improvement in continuity. Still feeling sleepy and tired though.  Does not like Milagro View mask - eye leaks.    CPAP pressure: 9-18 cm " H2O  Mask comfort / fit:  Milagro View    Pressure tolerance: OK   Humidification: OK yes  CPAP Interrogation:    Ave daily usage:5./6 hours /7days  Ave daily usage:5 hours /30days  Days >4 hours usage: 5 /7 days  Days >4 hours usage: 25/30 days  (86% compliance)  Machine condition: good     90-%tile pressure: 13-14 cm H2O  Large leak 20-28 L/min  AHI 0.8      09/07/2016 Dr. Fisher: States that her sleep schedule and diet have been off track. We are discussing her PSG - some trends suggest complex apnea, though she is doing much better on her side. Taking 1 hr to fall asleep. Sleep continuity improved. No problem going back to sleep. Trazodone  mg did not work.     Waking up with a headache. Reports left side of the face droopy, but headache is not unilateral. No weakness/numbeness in the body, no trouble walking. Reports dizziness.     Tried meditation.    Trying to turn TV off and keep electronics away.     Consistency is the key.    In the past - PM, valerian root;      Not going to the kitchen anymore.    90% pressure - 12-15        Compliance Summary  Apnea Indices  Ventilator Statistics    Days with Device Usage:  30 days  Average AHI:  0.9  Average Breath Rate:  N/A    Percentage of Days >=4 Hours:  100.0%  Average OA Index:  0.3  Average % Patient Triggered Breaths:  N/A    Average Usage (Days Used):  7 hrs. 50 mins. 21 secs.  Average CA Index:  0.0  Average Tidal Volume:  N/A    Average Usage (All Days):  7 hrs. 50 mins. 21 secs.    Average Minute Vent:  N/A        Large Leak  Periodic Breathing     Average Time in Large Leak:  6 mins. 28 secs.  Average % of Night in PB:  0.1%     Average % of Night in Large Leak:  1.4%               12/06/2016 LETICIA Grimm NP: Pt returns today in follow-up. Reports that sleep schedule is much better and she's following better sleep hygiene. She rarely uses sleep meds now. She has increased her exercise and is now doing it more consistently. She has also been meditating  more frequently. Denies breakthrough symptoms on CPAP. Only complaint since last clinic visit is that ramp time of 20 minutes does not seem to be long enough, as she usually falls asleep 30+ minutes. ESS 1.     CPAP pressure: 12 - 18 cm H2O  Mask comfort / fit:  Milagro View    Pressure tolerance: OK   Humidification: OK yes  CPAP Interrogation:    Blower hours: 1791.7   Therapy hours: 1644.9  Ave daily usage: 7 hours /7days  Ave daily usage: 6.3 hours /30days  Days >4 hours usage: 26/30 days    Machine condition: good   90-% pressure: 13.4 cm H2O  AHI 1.2      03/29/2017:  Still interrupted sleep - Taking 2 Alteril and 2 5 mg Melatonin IR. Still compliant with CPAP.   Started line dancing classed and gym + meditation class.  Showed me her Fitbit download. ESS 6/24.    CPAP 12-18  Dreamwear mask  90% pressure - 12    Therapy Event Summary (Last 14 Days)     Hide      Compliance Summary  Apnea Indices  Ventilator Statistics    Days with Device Usage:  14 days  Average AHI:  0.5  Average Breath Rate:  18.9 bpm    Percentage of Days >=4 Hours:  100.0%  Average OA Index:  0.2  Average % Patient Triggered Breaths:  N/A    Average Usage (Days Used):  7 hrs. 18 mins. 48 secs.  Average CA Index:  0.0  Average Tidal Volume:  394.4 ml    Average Usage (All Days):  7 hrs. 18 mins. 48 secs.    Average Minute Vent:  N/A        Large Leak  Periodic Breathing     Average Time in Large Leak:  3 mins. 17 secs.  Average % of Night in PB:  0.0%     Average % of Night in Large Leak:  0.7%              08/18/2017:   She has been CPAP compliant 12-18 cm H2). 90% was 13.7 cm. Not using Trazodone and even Melatonin anymore - just Magnesium.   She is trying to switch natural treatments around the year. She has not tried hemp seed oil - could not get the food grade one.     Therapy Event Summary (Last 14 Days)     Using Dreamwear.    Hide      Compliance Summary  Apnea Indices  Ventilator Statistics    Days with Device Usage:  14 days  Average  AHI:  0.5  Average Breath Rate:  20.2 bpm    Percentage of Days >=4 Hours:  100.0%  Average OA Index:  0.2  Average % Patient Triggered Breaths:  N/A    Average Usage (Days Used):  7 hrs. 52 mins. 35 secs.  Average CA Index:  0.0  Average Tidal Volume:  384.4 ml    Average Usage (All Days):  7 hrs. 52 mins. 35 secs.    Average Minute Vent:  N/A        Large Leak  Periodic Breathing     Average Time in Large Leak:  1 mins. 43 secs.  Average % of Night in PB:  0.0%     Average % of Night in Large Leak:  0.4%              05/02/2018:    Has been having harder time falling and staying asleep. Using wrist splint. Her  is 1 mo post prostate surgery.  Usign CPAP compliantly  Sleep schedule updated below.  She finally found sleep guided meditation that she liked  Her 's need also interrupt her sleep.  Planning to visit her GYN - she feels her foam mattress is a little warm.  She has not been to the gym lately. She believes she is at her heaviest now.    APAP 12-18 cm   90% 13-14 cm H2O    Therapy Event Summary Date Range: 4/2/2018 - 5/1/2018     Hide      Compliance Summary  Apnea Indices  Ventilator Statistics    Days with Device Usage:  30 days  Average AHI:  0.8  Average Breath Rate:  20.0 bpm    Percentage of Days >=4 Hours:  100.0%  Average OA Index:  0.4  Average % Patient Triggered Breaths:  N/A    Average Usage (Days Used):  9 hrs. 23 mins. 46 secs.  Average CA Index:  0.0  Average Tidal Volume:  409.4 ml    Average Usage (All Days):  9 hrs. 23 mins. 46 secs.    Average Minute Vent:  N/A        Large Leak  Periodic Breathing     Average Time in Large Leak:  1 mins. 6 secs.  Average % of Night in PB:  0.1%     Average % of Night in Large Leak:  0.2%                08/22/2018  Since last time Valsartan was recalled -> since then medication was changed twice  Sleep scheduled reviewed.  TV is loud. Due to her . Her 's rest was negative for CA fortunately.  Started seeing a psychologist ([facility])  "due to significant anxiety.  Has been using aromatherapy for insomnia "good night sleep" - lost effect lately.  No caffeine and no screens after 4 PM  Has been feeling a little depressed lately. A lot of stressed this year. Trying to get back on track.  Has not been journaling.   APPA 12-18  90% 12.8  Using under the nose mask.  Therapy Event Summary Date Range: 7/23/2018 - 8/21/2018 02/04/2019    APAP 12-18  90: 12-13; on bad nights - 15 cm  Stopped Amitriptyline.  Now has bursitis. Alternates Meloxicam  Occasionally taking xanax for anxiety  Trying Tylenol.    Again would like to revisit natural treatment options.  Tried CBTi on her phone, but lost a habit. Tends to be aggravated with routines.  Sometimes watching TV on  Bed. Eating a lot of sweets at night.  Sleeping more on her back - at times congested.      Compliance Summary  Apnea Indices  Ventilator Statistics    Days with Device Usage:  30 days  Average AHI:  0.7  Average Breath Rate:  20.2 bpm    Percentage of Days >=4 Hours:  100.0%  Average OA Index:  0.4  Average % Patient Triggered Breaths:  N/A    Average Usage (Days Used):  8 hrs. 51 mins. 48 secs.  Average CA Index:  0.0  Average Tidal Volume:  395.3 ml    Average Usage (All Days):  8 hrs. 51 mins. 48 secs.    Average Minute Vent:  N/A        Large Leak  Periodic Breathing     Average Time in Large Leak:  56 secs.  Average % of Night in PB:  0.2%     Average % of Night in Large Leak:  0.2%                    SLEEP ROUTINE AND LIFESTYLE :    Occupation:3 degrees - doing social work now.  She lost all her kids in an accident 13 years ago.    Gained 150 lbs since that time.   Bed partner: 10-11 pm --> 9 PM to watch TV (not sleepy yet)  Time to bed: 2-3 hrs -> now 1 hr-> now less and TV on later and not on timer -> ready to sleep at 10  Sleep onset latency: 45 min  Disruptions or awakenings: 3-4 ->2 at least  Time to fall back into sleep: 45 min-60 min->30 min-> over 5 min to return to " sleep  Wakeup time: 6:30-7AM   Perceived sleep quality: 0/5  Perceived total sleep time:  3-4  hours.  Daytime naps: 0  Weekend sleep routine: till 7:30  Exercise routine: yes  Caffeine: no     PREVIOUS SLEEP STUDIES:     PSG 9/17/15: Significant Obstructive sleep apnea (DANIELLE) with AHI (apnea hypopnea Index) of 52 and SaO2 of 81 (weight  336 lbs).    PAST MEDICAL HISTORY:    Active Ambulatory Problems     Diagnosis Date Noted    Primary localized osteoarthrosis, lower leg 2014    Obstructive sleep apnea on CPAP     Hypertension 2016    Varicose veins of both lower extremities with pain 2016    Hypothyroidism 2017    Chronic non-seasonal allergic rhinitis 2017     Resolved Ambulatory Problems     Diagnosis Date Noted    Chest pressure 2019    Dizziness 2019     Past Medical History:   Diagnosis Date    HTN (hypertension)     Hyperlipidemia     Hypothyroid                 PAST SURGICAL HISTORY:    Past Surgical History:   Procedure Laterality Date    CARPAL TUNNEL RELEASE       SECTION, CLASSIC      x 3    ENDOMETRIAL ABLATION      FOOT SURGERY      KNEE SURGERY      THYROID SURGERY           FAMILY HISTORY:                Family History   Problem Relation Age of Onset    Heart disease Mother     Heart disease Father     No Known Problems Sister     Heart disease Brother     No Known Problems Daughter     No Known Problems Son     No Known Problems Daughter     No Known Problems Daughter        SOCIAL HISTORY:          Tobacco:   Social History     Tobacco Use   Smoking Status Never Smoker   Smokeless Tobacco Never Used       alcohol use:    Social History     Substance and Sexual Activity   Alcohol Use No                   ALLERGIES:    Review of patient's allergies indicates:   Allergen Reactions    Cortisone Rash       CURRENT MEDICATIONS:    Current Outpatient Medications   Medication Sig Dispense Refill    buPROPion (WELLBUTRIN XL) 150 MG  "TB24 tablet Take 1 tablet (150 mg total) by mouth once daily. 30 tablet 1    ezetimibe (ZETIA) 10 mg tablet Take 1 tablet (10 mg total) by mouth once daily. 90 tablet 0    fish oil-omega-3 fatty acids 300-1,000 mg capsule Take 2 g by mouth once daily.      fluticasone propionate (FLONASE) 50 mcg/actuation nasal spray INHALE 1 SPRAY EACH NOSTRIL TWICE DAILY  11    hydroCHLOROthiazide (MICROZIDE) 12.5 mg capsule Take 1 capsule by mouth every other day. 15 capsule 2    levothyroxine (SYNTHROID) 112 MCG tablet Take 1 tablet (112 mcg total) by mouth before breakfast. 90 tablet 1    telmisartan (MICARDIS) 80 MG Tab Take 1 tablet (80 mg total) by mouth once daily. 90 tablet 0    gabapentin (NEURONTIN) 100 MG capsule Take 1 capsule (100 mg total) by mouth 3 (three) times daily. To be taken with the 300mg dose 90 capsule 2    gabapentin (NEURONTIN) 300 MG capsule Take 1 capsule (300 mg total) by mouth 3 (three) times daily. 90 capsule 2     No current facility-administered medications for this visit.                       REVIEW OF SYSTEMS:   Sleep related symptoms as per HPI    reports weight gain 150 lbs   Denies dyspnea  Denies palpitations  Denies acid reflux   Reports polyuria  Reports  mood diturbance  Denies  anemia  Denies  muscle pain  Denies  Gait imbalance    Otherwise, a balance of 10 systems reviewed is negative.    PHYSICAL EXAM:  /84 (BP Location: Left arm, Patient Position: Sitting, BP Method: Large (Automatic))   Pulse 72   Ht 5' 5" (1.651 m)   Wt (!) 153.9 kg (339 lb 6.4 oz)   LMP 03/25/2006   BMI 56.48 kg/m²   GENERAL: Overweight body habitus, well groomed.    ASSESSMENT:    1. Obstructive sleep apnea, severe by AHI with prior symptoms of snoring, excessive daytime sleepiness, and fatigue, now resolved with CPAP use. The patient is adherent on CPAP and experiencing symptomatic benefit. Medical co-mobidities: insomnia.     2. Insomnia NEC. Multi-factorial -  excess time in bed, poor sleep " hygiene (TV loud); recent traumatic events. Not currently on a medication. Melatonin helps her to fall but not to stay asleep.      PLAN:    Will refer to [facility] - brochure provided   For now - will start trial of Belsomra. Will give coupon.  Potential side effects were explained.     Continue CPAP 12-18 cm H2O     exercise 1 step at a time    CBTI-     Continue Magnesium 200 Bisglycinate - 1-2 pills      Continue Melatonin and CBTi    She will consider CBT          -Education: During our discussion today, we talked about the etiology of obstructive sleep apnea as well as the potential ramifications of untreated sleep apnea, which could include daytime sleepiness, hypertension, heart disease and/or stroke.  We discussed potential treatment options, which could include weight loss, body positioning, continuous positive airway pressure (CPAP), or referral for surgical consideration. The patient preferred CPAP option.    She should avoid ETOH and sedatives at night, as it tends to aggravate DANIELLE. Regular replacement of CPAP mask, tubing and filter was recommended.    Precautions: The patient was advised to abstain from driving should he feel sleepy or drowsy.

## 2019-10-28 DIAGNOSIS — E78.2 MIXED HYPERLIPIDEMIA: ICD-10-CM

## 2019-10-28 DIAGNOSIS — F32.A DEPRESSION, UNSPECIFIED DEPRESSION TYPE: ICD-10-CM

## 2019-10-28 RX ORDER — EZETIMIBE 10 MG/1
10 TABLET ORAL DAILY
Qty: 90 TABLET | Refills: 0 | Status: SHIPPED | OUTPATIENT
Start: 2019-10-28 | End: 2020-02-05

## 2019-10-28 RX ORDER — BUPROPION HYDROCHLORIDE 150 MG/1
150 TABLET ORAL DAILY
Qty: 30 TABLET | Refills: 1 | Status: SHIPPED | OUTPATIENT
Start: 2019-10-28 | End: 2020-01-02 | Stop reason: SDUPTHER

## 2019-10-29 ENCOUNTER — TELEPHONE (OUTPATIENT)
Dept: PRIMARY CARE CLINIC | Facility: CLINIC | Age: 59
End: 2019-10-29

## 2019-11-08 DIAGNOSIS — M25.50 ARTHRALGIA, UNSPECIFIED JOINT: ICD-10-CM

## 2019-11-08 RX ORDER — MELOXICAM 15 MG/1
15 TABLET ORAL DAILY
Qty: 90 TABLET | Refills: 0 | Status: SHIPPED | OUTPATIENT
Start: 2019-11-08 | End: 2020-01-21

## 2019-11-15 ENCOUNTER — TELEPHONE (OUTPATIENT)
Dept: FAMILY MEDICINE | Facility: CLINIC | Age: 59
End: 2019-11-15

## 2019-11-15 DIAGNOSIS — I10 ESSENTIAL HYPERTENSION: Chronic | ICD-10-CM

## 2019-11-15 RX ORDER — HYDROCHLOROTHIAZIDE 12.5 MG/1
CAPSULE ORAL
Qty: 15 CAPSULE | Refills: 2 | Status: SHIPPED | OUTPATIENT
Start: 2019-11-15 | End: 2020-03-16 | Stop reason: SDUPTHER

## 2019-11-15 NOTE — TELEPHONE ENCOUNTER
----- Message from Radha Valentine sent at 11/15/2019  9:56 AM CST -----  Contact: Patient   Patient would like a call back concerning a medication that is being refused to get a refill.     Please call [phone] to discuss today.

## 2019-12-05 ENCOUNTER — PATIENT MESSAGE (OUTPATIENT)
Dept: PSYCHIATRY | Facility: CLINIC | Age: 59
End: 2019-12-05

## 2020-01-02 DIAGNOSIS — F32.A DEPRESSION, UNSPECIFIED DEPRESSION TYPE: ICD-10-CM

## 2020-01-02 RX ORDER — BUPROPION HYDROCHLORIDE 150 MG/1
150 TABLET ORAL DAILY
Qty: 30 TABLET | Refills: 1 | Status: SHIPPED | OUTPATIENT
Start: 2020-01-02 | End: 2020-02-10 | Stop reason: SDUPTHER

## 2020-01-07 ENCOUNTER — TELEPHONE (OUTPATIENT)
Dept: ADMINISTRATIVE | Facility: HOSPITAL | Age: 60
End: 2020-01-07

## 2020-01-07 ENCOUNTER — PATIENT MESSAGE (OUTPATIENT)
Dept: SLEEP MEDICINE | Facility: CLINIC | Age: 60
End: 2020-01-07

## 2020-01-07 ENCOUNTER — PATIENT MESSAGE (OUTPATIENT)
Dept: FAMILY MEDICINE | Facility: CLINIC | Age: 60
End: 2020-01-07

## 2020-01-07 ENCOUNTER — PATIENT OUTREACH (OUTPATIENT)
Dept: ADMINISTRATIVE | Facility: HOSPITAL | Age: 60
End: 2020-01-07

## 2020-01-07 DIAGNOSIS — Z01.818 PRE-OP EVALUATION: Primary | ICD-10-CM

## 2020-01-09 ENCOUNTER — TELEPHONE (OUTPATIENT)
Dept: FAMILY MEDICINE | Facility: CLINIC | Age: 60
End: 2020-01-09

## 2020-01-16 ENCOUNTER — TELEPHONE (OUTPATIENT)
Dept: FAMILY MEDICINE | Facility: CLINIC | Age: 60
End: 2020-01-16

## 2020-01-16 DIAGNOSIS — Z01.818 PRE-OP EVALUATION: ICD-10-CM

## 2020-01-16 DIAGNOSIS — E78.2 MIXED HYPERLIPIDEMIA: ICD-10-CM

## 2020-01-16 DIAGNOSIS — R73.01 IMPAIRED FASTING GLUCOSE: ICD-10-CM

## 2020-01-16 DIAGNOSIS — M25.50 ARTHRALGIA, UNSPECIFIED JOINT: Primary | ICD-10-CM

## 2020-01-16 DIAGNOSIS — E03.9 ACQUIRED HYPOTHYROIDISM: ICD-10-CM

## 2020-01-16 NOTE — TELEPHONE ENCOUNTER
Called and spoke with pt in regards of message. Informed pt that she wouldneed fasting labs and a chest x-ray before her appt on 1/21/2020. Pt states she goes to [facility] for labs, so I will release orders to [facility]. Patient would like another lab added to her order. Pt would like a CRP added to labs. Please advise.    Once added I will release order to [facility].

## 2020-01-16 NOTE — TELEPHONE ENCOUNTER
----- Message from Bailey Eden sent at 1/16/2020  2:28 PM CST -----  Contact: self / [phone]  Needs to speak with you on her 6 month labs. Needs CRP as well. Please advise

## 2020-01-16 NOTE — TELEPHONE ENCOUNTER
----- Message from Phyllis Stevenson sent at 1/16/2020  3:10 PM CST -----  Contact: self  Type:  Needs Medical Advice    Who Called: self  Would the patient rather a call back or a response via [facility]? Call back   Best Call Back Number: [phone]  Additional Information: Pt is returning call for someone in office

## 2020-01-20 LAB
ALBUMIN SERPL-MCNC: 3.7 G/DL (ref 3.6–5.1)
ALBUMIN/GLOB SERPL: 1.3 (CALC) (ref 1–2.5)
ALP SERPL-CCNC: 68 U/L (ref 33–130)
ALT SERPL-CCNC: 19 U/L (ref 6–29)
AST SERPL-CCNC: 14 U/L (ref 10–35)
BASOPHILS # BLD AUTO: 29 CELLS/UL (ref 0–200)
BASOPHILS NFR BLD AUTO: 0.6 %
BILIRUB SERPL-MCNC: 0.4 MG/DL (ref 0.2–1.2)
BUN SERPL-MCNC: 15 MG/DL (ref 7–25)
BUN/CREAT SERPL: ABNORMAL (CALC) (ref 6–22)
CALCIUM SERPL-MCNC: 9.1 MG/DL (ref 8.6–10.4)
CHLORIDE SERPL-SCNC: 104 MMOL/L (ref 98–110)
CHOLEST SERPL-MCNC: 210 MG/DL
CHOLEST/HDLC SERPL: 3.4 (CALC)
CO2 SERPL-SCNC: 29 MMOL/L (ref 20–32)
CREAT SERPL-MCNC: 0.64 MG/DL (ref 0.5–1.05)
CRP SERPL-MCNC: 5.5 MG/L
EOSINOPHIL # BLD AUTO: 168 CELLS/UL (ref 15–500)
EOSINOPHIL NFR BLD AUTO: 3.5 %
ERYTHROCYTE [DISTWIDTH] IN BLOOD BY AUTOMATED COUNT: 13.6 % (ref 11–15)
GFRSERPLBLD MDRD-ARVRAT: 99 ML/MIN/1.73M2
GLOBULIN SER CALC-MCNC: 2.8 G/DL (CALC) (ref 1.9–3.7)
GLUCOSE SERPL-MCNC: 102 MG/DL (ref 65–99)
HBA1C MFR BLD: 6.5 % OF TOTAL HGB
HCT VFR BLD AUTO: 39.1 % (ref 35–45)
HDLC SERPL-MCNC: 62 MG/DL
HGB BLD-MCNC: 12.4 G/DL (ref 11.7–15.5)
LDLC SERPL CALC-MCNC: 126 MG/DL (CALC)
LYMPHOCYTES # BLD AUTO: 2242 CELLS/UL (ref 850–3900)
LYMPHOCYTES NFR BLD AUTO: 46.7 %
MCH RBC QN AUTO: 27.1 PG (ref 27–33)
MCHC RBC AUTO-ENTMCNC: 31.7 G/DL (ref 32–36)
MCV RBC AUTO: 85.6 FL (ref 80–100)
MONOCYTES # BLD AUTO: 470 CELLS/UL (ref 200–950)
MONOCYTES NFR BLD AUTO: 9.8 %
NEUTROPHILS # BLD AUTO: 1891 CELLS/UL (ref 1500–7800)
NEUTROPHILS NFR BLD AUTO: 39.4 %
NONHDLC SERPL-MCNC: 148 MG/DL (CALC)
PLATELET # BLD AUTO: 265 THOUSAND/UL (ref 140–400)
PMV BLD REES-ECKER: 10.8 FL (ref 7.5–12.5)
POTASSIUM SERPL-SCNC: 4.9 MMOL/L (ref 3.5–5.3)
PROT SERPL-MCNC: 6.5 G/DL (ref 6.1–8.1)
RBC # BLD AUTO: 4.57 MILLION/UL (ref 3.8–5.1)
SODIUM SERPL-SCNC: 139 MMOL/L (ref 135–146)
T4 FREE SERPL-MCNC: 1.3 NG/DL (ref 0.8–1.8)
TRIGL SERPL-MCNC: 110 MG/DL
TSH SERPL-ACNC: 1.42 MIU/L (ref 0.4–4.5)
WBC # BLD AUTO: 4.8 THOUSAND/UL (ref 3.8–10.8)

## 2020-01-21 ENCOUNTER — OFFICE VISIT (OUTPATIENT)
Dept: FAMILY MEDICINE | Facility: CLINIC | Age: 60
End: 2020-01-21
Payer: COMMERCIAL

## 2020-01-21 VITALS
TEMPERATURE: 98 F | DIASTOLIC BLOOD PRESSURE: 92 MMHG | RESPIRATION RATE: 18 BRPM | HEIGHT: 65 IN | WEIGHT: 293 LBS | BODY MASS INDEX: 48.82 KG/M2 | SYSTOLIC BLOOD PRESSURE: 142 MMHG | HEART RATE: 72 BPM | OXYGEN SATURATION: 98 %

## 2020-01-21 DIAGNOSIS — F41.9 ANXIETY AND DEPRESSION: ICD-10-CM

## 2020-01-21 DIAGNOSIS — E03.9 ACQUIRED HYPOTHYROIDISM: Chronic | ICD-10-CM

## 2020-01-21 DIAGNOSIS — M25.512 ACUTE PAIN OF LEFT SHOULDER: ICD-10-CM

## 2020-01-21 DIAGNOSIS — F32.A ANXIETY AND DEPRESSION: ICD-10-CM

## 2020-01-21 DIAGNOSIS — Z01.818 PRE-OP EXAMINATION: ICD-10-CM

## 2020-01-21 DIAGNOSIS — I10 ESSENTIAL HYPERTENSION: Primary | ICD-10-CM

## 2020-01-21 DIAGNOSIS — E78.2 MIXED HYPERLIPIDEMIA: ICD-10-CM

## 2020-01-21 DIAGNOSIS — G47.33 OBSTRUCTIVE SLEEP APNEA ON CPAP: Chronic | ICD-10-CM

## 2020-01-21 DIAGNOSIS — E66.01 MORBID OBESITY WITH BMI OF 50.0-59.9, ADULT: ICD-10-CM

## 2020-01-21 DIAGNOSIS — E11.9 NEW ONSET TYPE 2 DIABETES MELLITUS: ICD-10-CM

## 2020-01-21 PROCEDURE — 3077F SYST BP >= 140 MM HG: CPT | Mod: CPTII,S$GLB,, | Performed by: NURSE PRACTITIONER

## 2020-01-21 PROCEDURE — 93010 EKG 12-LEAD: ICD-10-PCS | Mod: S$GLB,,, | Performed by: INTERNAL MEDICINE

## 2020-01-21 PROCEDURE — 99999 PR PBB SHADOW E&M-EST. PATIENT-LVL IV: CPT | Mod: PBBFAC,,, | Performed by: NURSE PRACTITIONER

## 2020-01-21 PROCEDURE — 93005 ELECTROCARDIOGRAM TRACING: CPT | Mod: S$GLB,,, | Performed by: NURSE PRACTITIONER

## 2020-01-21 PROCEDURE — 3080F PR MOST RECENT DIASTOLIC BLOOD PRESSURE >= 90 MM HG: ICD-10-PCS | Mod: CPTII,S$GLB,, | Performed by: NURSE PRACTITIONER

## 2020-01-21 PROCEDURE — 3044F HG A1C LEVEL LT 7.0%: CPT | Mod: CPTII,S$GLB,, | Performed by: NURSE PRACTITIONER

## 2020-01-21 PROCEDURE — 99214 PR OFFICE/OUTPT VISIT, EST, LEVL IV, 30-39 MIN: ICD-10-PCS | Mod: S$GLB,,, | Performed by: NURSE PRACTITIONER

## 2020-01-21 PROCEDURE — 3008F PR BODY MASS INDEX (BMI) DOCUMENTED: ICD-10-PCS | Mod: CPTII,S$GLB,, | Performed by: NURSE PRACTITIONER

## 2020-01-21 PROCEDURE — 3044F PR MOST RECENT HEMOGLOBIN A1C LEVEL <7.0%: ICD-10-PCS | Mod: CPTII,S$GLB,, | Performed by: NURSE PRACTITIONER

## 2020-01-21 PROCEDURE — 99214 OFFICE O/P EST MOD 30 MIN: CPT | Mod: S$GLB,,, | Performed by: NURSE PRACTITIONER

## 2020-01-21 PROCEDURE — 93005 EKG 12-LEAD: ICD-10-PCS | Mod: S$GLB,,, | Performed by: NURSE PRACTITIONER

## 2020-01-21 PROCEDURE — 3008F BODY MASS INDEX DOCD: CPT | Mod: CPTII,S$GLB,, | Performed by: NURSE PRACTITIONER

## 2020-01-21 PROCEDURE — 99999 PR PBB SHADOW E&M-EST. PATIENT-LVL IV: ICD-10-PCS | Mod: PBBFAC,,, | Performed by: NURSE PRACTITIONER

## 2020-01-21 PROCEDURE — 93010 ELECTROCARDIOGRAM REPORT: CPT | Mod: S$GLB,,, | Performed by: INTERNAL MEDICINE

## 2020-01-21 PROCEDURE — 3080F DIAST BP >= 90 MM HG: CPT | Mod: CPTII,S$GLB,, | Performed by: NURSE PRACTITIONER

## 2020-01-21 PROCEDURE — 3077F PR MOST RECENT SYSTOLIC BLOOD PRESSURE >= 140 MM HG: ICD-10-PCS | Mod: CPTII,S$GLB,, | Performed by: NURSE PRACTITIONER

## 2020-01-21 RX ORDER — IVERMECTIN 10 MG/G
CREAM TOPICAL
COMMUNITY
Start: 2020-01-13

## 2020-01-21 RX ORDER — CELECOXIB 200 MG/1
CAPSULE ORAL
COMMUNITY
Start: 2019-12-27 | End: 2020-05-18

## 2020-01-21 RX ORDER — EMOLLIENT COMBINATION NO.32
EMULSION, EXTENDED RELEASE TOPICAL
Refills: 6 | COMMUNITY
Start: 2019-10-29 | End: 2023-01-23

## 2020-01-21 NOTE — PROGRESS NOTES
"Subjective:       Patient ID: Yulissa Hatfield is a 59 y.o. female.    Chief Complaint: Follow-up (6 months F/U) and Shoulder Pain (both shoulders)    57 y/o female with history of hypertension, hyperlipidemia, thyroid disease, sleep apnea, chronic left knee pain, and anxiety with depression presents to clinic for follow up, fasting lab results, and shoulder pain.     Patient has hypertension. She is prescribed telmisartan 80 mg daily and hctz 12.5 mg daily. Patient brought blood pressure log with consistent BP readings <140/90. Today in office blood pressure is elevated. Blood pressure (!) 142/92, pulse 72, temperature 97.6 °F (36.4 °C), temperature source Oral, resp. rate 18, height 5' 5" (1.651 m), weight (!) 153.5 kg (338 lb 6.5 oz), last menstrual period 03/25/2006, SpO2 98 %. No chest pain, shortness of breath, or headache.     Patient hyperlipidemia. She is prescribed Zetia 10 mg. Current labs with total cholesterol 210 and .     Patient has hypothyroidism stable with current dose of levothyroxine. TSH 1.42 with free T4 1.3.    Patient has sleep apnea. Has nightly CPAP therapy. Followed by sleep medicine, Dr. Fisher.      Patient has history of anxiety and depression. States she self titrated off Wellbutrin 2 weeks ago. States she felt she no longer needed medication. Patient reports slight irritability and anxiety. Declines medication therapy and currently receiving cognitive behavioral therapy with licensed counselor.     Patient has chronic knee pain. Followed by orthopedic specialist. Reports she has torn left ACL and meniscus. Possible arthroscopic left knee surgery 2/17/2020.    Newly diagnosed type 2 diabetes. Current Hemoglobin A1c 6.5 with . Advised lifestyle modifications including diet and exercise. Advised to decrease your intake of white bread, white rice, corn, pasta, potatoes and sugar to prevent diabetes. Patient declined oral hypoglycemic medications. Will follow up and " repeat labs in 3 months.    Shoulder Pain    The pain is present in the left shoulder. This is a new problem. The current episode started 1 to 4 weeks ago. There has been no history of extremity trauma. The problem occurs intermittently. The problem has been waxing and waning. The quality of the pain is described as aching. The pain is at a severity of 5/10. Associated symptoms include stiffness. Pertinent negatives include no fever, headaches, joint locking, joint swelling, limited range of motion or numbness. She has tried acetaminophen and NSAIDS for the symptoms. The treatment provided mild relief. There is no history of Injuries to Extremity.     Component      Latest Ref Rng & Units 1/17/2020 7/18/2019   WBC      3.8 - 10.8 Thousand/uL 4.8 5.5   RBC      3.80 - 5.10 Million/uL 4.57 4.90   Hemoglobin      11.7 - 15.5 g/dL 12.4 13.1   Hematocrit      35.0 - 45.0 % 39.1 41.3   MCV      80.0 - 100.0 fL 85.6 84.3   MCH      27.0 - 33.0 pg 27.1 26.7 (L)   MCHC      32.0 - 36.0 g/dL 31.7 (L) 31.7 (L)   RDW      11.0 - 15.0 % 13.6 13.5   Platelets      140 - 400 Thousand/uL 265 278   MPV      7.5 - 12.5 fL 10.8 11.4   Neutrophils Absolute      1,500 - 7,800 cells/uL 1,891 2,503   Lymph #      850 - 3,900 cells/uL 2,242 2,382   Mono #      200 - 950 cells/uL 470 429   Eos #      15 - 500 cells/uL 168 138   Baso #      0 - 200 cells/uL 29 50   Neutrophils Relative      % 39.4 45.5   Lymph%      % 46.7 43.3   Mono%      % 9.8 7.8   Eosinophil%      % 3.5 2.5   Basophil%      % 0.6 0.9   Glucose      65 - 99 mg/dL 102 (H) 135 (H)   BUN, Bld      7 - 25 mg/dL 15 21   Creatinine      0.50 - 1.05 mg/dL 0.64 0.58   eGFR if non       > OR = 60 mL/min/1.73m2 99 103   eGFR if       > OR = 60 mL/min/1.73m2 115 119   BUN/Creatinine Ratio      6 - 22 (calc) NOT APPLICABLE NOT APPLICABLE   Sodium      135 - 146 mmol/L 139 139   Potassium      3.5 - 5.3 mmol/L 4.9 4.9   Chloride      98 - 110 mmol/L  104 105   CO2      20 - 32 mmol/L 29 28   Calcium      8.6 - 10.4 mg/dL 9.1 9.1   PROTEIN TOTAL      6.1 - 8.1 g/dL 6.5 7.1   Albumin      3.6 - 5.1 g/dL 3.7 3.9   Globulin, Total      1.9 - 3.7 g/dL (calc) 2.8 3.2   Albumin/Globulin Ratio      1.0 - 2.5 (calc) 1.3 1.2   BILIRUBIN TOTAL      0.2 - 1.2 mg/dL 0.4 0.4   Alkaline Phosphatase      33 - 130 U/L 68 64   AST      10 - 35 U/L 14 21   ALT      6 - 29 U/L 19 29   Cholesterol      <200 mg/dL 210 (H) 217 (H)   HDL      >50 mg/dL 62 54   Triglycerides      <150 mg/dL 110 97   LDL Cholesterol External      mg/dL (calc) 126 (H) 142 (H)   Hdl/Cholesterol Ratio      <5.0 (calc) 3.4 4.0   Non HDL Chol. (LDL+VLDL)      <130 mg/dL (calc) 148 (H) 163 (H)   CRP      <8.0 mg/L 5.5 5.3   TSH      0.40 - 4.50 mIU/L 1.42 1.54   Hemoglobin A1C External      <5.7 % of total Hgb 6.5 (H)    T4, Free      0.8 - 1.8 ng/dL 1.3      Current Outpatient Medications   Medication Sig Dispense Refill    buPROPion (WELLBUTRIN XL) 150 MG TB24 tablet Take 1 tablet (150 mg total) by mouth once daily. 30 tablet 1    EPICERAM Diogenes USE AS A MOISTURIZING BARRIER AT LEAST TWICE DAILY  6    ezetimibe (ZETIA) 10 mg tablet Take 1 tablet (10 mg total) by mouth once daily. 90 tablet 0    fluticasone propionate (FLONASE) 50 mcg/actuation nasal spray INHALE 1 SPRAY EACH NOSTRIL TWICE DAILY  11    hydroCHLOROthiazide (MICROZIDE) 12.5 mg capsule Take 1 capsule by mouth every other day. 15 capsule 2    levothyroxine (SYNTHROID) 112 MCG tablet Take 1 tablet (112 mcg total) by mouth before breakfast. 90 tablet 1    SOOLANTRA 1 % Crea       telmisartan (MICARDIS) 80 MG Tab Take 1 tablet (80 mg total) by mouth once daily. 90 tablet 0    celecoxib (CELEBREX) 200 MG capsule        No current facility-administered medications for this visit.        Past Medical History:   Diagnosis Date    HTN (hypertension)     Hyperlipidemia     Hypothyroid        Past Surgical History:   Procedure Laterality Date     CARPAL TUNNEL RELEASE       SECTION, CLASSIC      x 3    ENDOMETRIAL ABLATION      FOOT SURGERY      KNEE SURGERY      THYROID SURGERY         Family History   Problem Relation Age of Onset    Heart disease Mother     Heart disease Father     No Known Problems Sister     Heart disease Brother     No Known Problems Daughter     No Known Problems Son     No Known Problems Daughter     No Known Problems Daughter        Social History     Socioeconomic History    Marital status:      Spouse name: Not on file    Number of children: Not on file    Years of education: Not on file    Highest education level: Not on file   Occupational History    Not on file   Social Needs    Financial resource strain: Not on file    Food insecurity:     Worry: Not on file     Inability: Not on file    Transportation needs:     Medical: Not on file     Non-medical: Not on file   Tobacco Use    Smoking status: Never Smoker    Smokeless tobacco: Never Used   Substance and Sexual Activity    Alcohol use: No    Drug use: No    Sexual activity: Not on file   Lifestyle    Physical activity:     Days per week: Not on file     Minutes per session: Not on file    Stress: Not on file   Relationships    Social connections:     Talks on phone: Not on file     Gets together: Not on file     Attends Temple service: Not on file     Active member of club or organization: Not on file     Attends meetings of clubs or organizations: Not on file     Relationship status: Not on file   Other Topics Concern    Not on file   Social History Narrative    Not on file       Review of Systems   Constitutional: Positive for unexpected weight change. Negative for fatigue and fever.   HENT: Negative for congestion, nosebleeds, tinnitus and trouble swallowing.    Eyes: Negative for visual disturbance.   Respiratory: Negative for cough and shortness of breath.    Cardiovascular: Negative for chest pain, palpitations and  "leg swelling.   Gastrointestinal: Negative for abdominal pain, blood in stool, constipation and diarrhea.   Endocrine: Negative for polydipsia, polyphagia and polyuria.   Genitourinary: Negative for dysuria.   Musculoskeletal: Positive for back pain and stiffness.   Neurological: Negative for dizziness, syncope, weakness, light-headedness, numbness and headaches.   Hematological: Negative for adenopathy. Does not bruise/bleed easily.   Psychiatric/Behavioral: Negative for dysphoric mood. The patient is not nervous/anxious.          Objective:     Vitals:    01/21/20 0752 01/21/20 0902   BP: (!) 146/96 (!) 142/92   BP Location: Right arm    Patient Position: Sitting    BP Method: Thigh Cuff (Manual)    Pulse: 72    Resp: 18    Temp: 97.6 °F (36.4 °C)    TempSrc: Oral    SpO2: 98%    Weight: (!) 153.5 kg (338 lb 6.5 oz)    Height: 5' 5" (1.651 m)           Physical Exam   Constitutional: She is oriented to person, place, and time. She appears well-developed. No distress.   +obesity with: Body mass index is 56.31 kg/m².   HENT:   Head: Normocephalic.   Right Ear: Tympanic membrane and ear canal normal.   Left Ear: Tympanic membrane and ear canal normal.   Nose: Nose normal.   Mouth/Throat: Uvula is midline and oropharynx is clear and moist.   Eyes: Pupils are equal, round, and reactive to light. Conjunctivae are normal.   Neck: Normal range of motion. Neck supple.   Cardiovascular: Normal rate, regular rhythm and intact distal pulses.   Pulmonary/Chest: Effort normal and breath sounds normal.   Abdominal: Bowel sounds are normal. There is no tenderness.   Musculoskeletal: Normal range of motion.        Left shoulder: She exhibits normal range of motion, no tenderness, normal pulse and normal strength.   Lymphadenopathy:     She has no cervical adenopathy.   Neurological: She is alert and oriented to person, place, and time.   Skin: Skin is warm and dry.   Psychiatric: She has a normal mood and affect. Her behavior is " normal.         Assessment:         ICD-10-CM ICD-9-CM   1. Essential hypertension I10 401.9   2. New onset type 2 diabetes mellitus E11.9 250.00   3. Mixed hyperlipidemia E78.2 272.2   4. Acquired hypothyroidism E03.9 244.9   5. Morbid obesity with BMI of 50.0-59.9, adult E66.01 278.01    Z68.43 V85.43   6. Obstructive sleep apnea on CPAP G47.33 327.23    Z99.89 V46.8   7. Acute pain of left shoulder M25.512 719.41   8. Anxiety and depression F41.9 300.00    F32.9 311   9. Pre-op examination Z01.818 V72.84       Plan:       Essential hypertension  -     Comprehensive metabolic panel; Future; Expected date: 04/21/2020    New onset type 2 diabetes mellitus  -     Comprehensive metabolic panel; Future; Expected date: 04/21/2020  -     Hemoglobin A1c; Future; Expected date: 04/21/2020    Mixed hyperlipidemia  -     Lipid panel; Future; Expected date: 04/21/2020    Acquired hypothyroidism        -       Chronic, stable, continue current medication therapy    Morbid obesity with BMI of 50.0-59.9, adult        -        Advised healthy diet and exercise. Patient declined referral Bariatric medicine  Obstructive sleep apnea on CPAP        -        Chronic, stable, continue CPAP        -        Followed by sleep medicine    Acute pain of left shoulder        -        Patient to follow up with Orthopedic specialist    Anxiety and depression        -        Stable, continue counseling mental health provider    Pre-op examination  -     IN OFFICE EKG 12-LEAD (to Muse): EKG normal; patient unsure if she will proceed with surgery. Will contact office for surgical clearance if needed      Follow up in about 3 months (around 4/21/2020) for lab results, medication management.     Patient's Medications   New Prescriptions    No medications on file   Previous Medications    BUPROPION (WELLBUTRIN XL) 150 MG TB24 TABLET    Take 1 tablet (150 mg total) by mouth once daily.    CELECOXIB (CELEBREX) 200 MG CAPSULE        EPICERAM FLORIN     USE AS A MOISTURIZING BARRIER AT LEAST TWICE DAILY    EZETIMIBE (ZETIA) 10 MG TABLET    Take 1 tablet (10 mg total) by mouth once daily.    FLUTICASONE PROPIONATE (FLONASE) 50 MCG/ACTUATION NASAL SPRAY    INHALE 1 SPRAY EACH NOSTRIL TWICE DAILY    HYDROCHLOROTHIAZIDE (MICROZIDE) 12.5 MG CAPSULE    Take 1 capsule by mouth every other day.    LEVOTHYROXINE (SYNTHROID) 112 MCG TABLET    Take 1 tablet (112 mcg total) by mouth before breakfast.    SOOLANTRA 1 % CREA        TELMISARTAN (MICARDIS) 80 MG TAB    Take 1 tablet (80 mg total) by mouth once daily.   Modified Medications    No medications on file   Discontinued Medications    AFLURIA QD 2019-20,3YR UP,,PF, 60 MCG (15 MCG X 4)/0.5 ML SYRG        FISH OIL-OMEGA-3 FATTY ACIDS 300-1,000 MG CAPSULE    Take 2 g by mouth once daily.    GABAPENTIN (NEURONTIN) 100 MG CAPSULE    Take 1 capsule (100 mg total) by mouth 3 (three) times daily. To be taken with the 300mg dose    GABAPENTIN (NEURONTIN) 300 MG CAPSULE    Take 1 capsule (300 mg total) by mouth 3 (three) times daily.    MELOXICAM (MOBIC) 15 MG TABLET    Take 1 tablet (15 mg total) by mouth once daily.

## 2020-02-03 ENCOUNTER — TELEPHONE (OUTPATIENT)
Dept: FAMILY MEDICINE | Facility: CLINIC | Age: 60
End: 2020-02-03

## 2020-02-03 NOTE — TELEPHONE ENCOUNTER
----- Message from India Castillo sent at 2/3/2020 11:48 AM CST -----  Contact: [phone]/patient  New patient requesting to establish care. Please call and advise.

## 2020-02-04 ENCOUNTER — PATIENT MESSAGE (OUTPATIENT)
Dept: FAMILY MEDICINE | Facility: CLINIC | Age: 60
End: 2020-02-04

## 2020-02-04 ENCOUNTER — TELEPHONE (OUTPATIENT)
Dept: SLEEP MEDICINE | Facility: CLINIC | Age: 60
End: 2020-02-04

## 2020-02-04 ENCOUNTER — PATIENT MESSAGE (OUTPATIENT)
Dept: SLEEP MEDICINE | Facility: CLINIC | Age: 60
End: 2020-02-04

## 2020-02-04 DIAGNOSIS — R91.8 X-RAY OF LUNG, ABNORMAL: Primary | ICD-10-CM

## 2020-02-04 DIAGNOSIS — M25.511 BILATERAL SHOULDER PAIN, UNSPECIFIED CHRONICITY: Primary | ICD-10-CM

## 2020-02-04 DIAGNOSIS — M25.512 BILATERAL SHOULDER PAIN, UNSPECIFIED CHRONICITY: Primary | ICD-10-CM

## 2020-02-04 NOTE — TELEPHONE ENCOUNTER
CB, please call  [phone] Pulmonary MD Dr. Flanagan (see below) and check for their fax number to forward the referral for Yulissa Hatfield (I have the referral in my office, already printed it out)    TY!  _____________    Dr. Fisher,    you please provide me with a referral to Pulmonologist Eliana Flanagan [phone]? I had a chest xray to be cleared for knee procedure and DEEPTHI Wade stated I had something with my lung  which was probably due to my sleep apnea. You should be able to see her notes in my chart. Thanks

## 2020-02-05 ENCOUNTER — TELEPHONE (OUTPATIENT)
Dept: SLEEP MEDICINE | Facility: CLINIC | Age: 60
End: 2020-02-05

## 2020-02-05 DIAGNOSIS — E03.9 HYPOTHYROIDISM, UNSPECIFIED TYPE: Chronic | ICD-10-CM

## 2020-02-05 DIAGNOSIS — E78.2 MIXED HYPERLIPIDEMIA: ICD-10-CM

## 2020-02-05 RX ORDER — EZETIMIBE 10 MG/1
10 TABLET ORAL DAILY
Qty: 90 TABLET | Refills: 0 | Status: SHIPPED | OUTPATIENT
Start: 2020-02-05 | End: 2020-05-19 | Stop reason: SDUPTHER

## 2020-02-05 RX ORDER — LEVOTHYROXINE SODIUM 112 UG/1
112 TABLET ORAL
Qty: 90 TABLET | Refills: 1 | Status: SHIPPED | OUTPATIENT
Start: 2020-02-05 | End: 2021-06-09 | Stop reason: SDUPTHER

## 2020-02-10 ENCOUNTER — PATIENT MESSAGE (OUTPATIENT)
Dept: FAMILY MEDICINE | Facility: CLINIC | Age: 60
End: 2020-02-10

## 2020-02-10 DIAGNOSIS — F32.A DEPRESSION, UNSPECIFIED DEPRESSION TYPE: ICD-10-CM

## 2020-02-10 RX ORDER — BUPROPION HYDROCHLORIDE 150 MG/1
150 TABLET ORAL DAILY
Qty: 30 TABLET | Refills: 1 | Status: SHIPPED | OUTPATIENT
Start: 2020-02-10 | End: 2020-05-18

## 2020-02-13 ENCOUNTER — TELEPHONE (OUTPATIENT)
Dept: ORTHOPEDICS | Facility: CLINIC | Age: 60
End: 2020-02-13

## 2020-02-13 ENCOUNTER — OFFICE VISIT (OUTPATIENT)
Dept: ORTHOPEDICS | Facility: CLINIC | Age: 60
End: 2020-02-13
Payer: COMMERCIAL

## 2020-02-13 ENCOUNTER — HOSPITAL ENCOUNTER (OUTPATIENT)
Dept: RADIOLOGY | Facility: HOSPITAL | Age: 60
Discharge: HOME | End: 2020-02-13
Attending: ORTHOPAEDIC SURGERY
Payer: COMMERCIAL

## 2020-02-13 DIAGNOSIS — M25.511 BILATERAL SHOULDER PAIN, UNSPECIFIED CHRONICITY: ICD-10-CM

## 2020-02-13 DIAGNOSIS — M19.019 SHOULDER ARTHRITIS: ICD-10-CM

## 2020-02-13 DIAGNOSIS — M25.511 BILATERAL SHOULDER PAIN, UNSPECIFIED CHRONICITY: Primary | ICD-10-CM

## 2020-02-13 DIAGNOSIS — M25.512 BILATERAL SHOULDER PAIN, UNSPECIFIED CHRONICITY: ICD-10-CM

## 2020-02-13 DIAGNOSIS — M25.512 BILATERAL SHOULDER PAIN, UNSPECIFIED CHRONICITY: Primary | ICD-10-CM

## 2020-02-13 PROCEDURE — 73030 X-RAY EXAM OF SHOULDER: CPT | Mod: 26,50,, | Performed by: RADIOLOGY

## 2020-02-13 PROCEDURE — 99203 OFFICE O/P NEW LOW 30 MIN: CPT | Mod: 25,S$GLB,, | Performed by: ORTHOPAEDIC SURGERY

## 2020-02-13 PROCEDURE — 20610 DRAIN/INJ JOINT/BURSA W/O US: CPT | Mod: 50,S$GLB,, | Performed by: ORTHOPAEDIC SURGERY

## 2020-02-13 PROCEDURE — 73030 XR SHOULDER COMPLETE 2 OR MORE VIEWS BILATERAL: ICD-10-PCS | Mod: 26,50,, | Performed by: RADIOLOGY

## 2020-02-13 PROCEDURE — 73030 X-RAY EXAM OF SHOULDER: CPT | Mod: TC,50,PN

## 2020-02-13 PROCEDURE — 99203 PR OFFICE/OUTPT VISIT, NEW, LEVL III, 30-44 MIN: ICD-10-PCS | Mod: 25,S$GLB,, | Performed by: ORTHOPAEDIC SURGERY

## 2020-02-13 PROCEDURE — 99999 PR PBB SHADOW E&M-EST. PATIENT-LVL III: CPT | Mod: PBBFAC,,, | Performed by: ORTHOPAEDIC SURGERY

## 2020-02-13 PROCEDURE — 99999 PR PBB SHADOW E&M-EST. PATIENT-LVL III: ICD-10-PCS | Mod: PBBFAC,,, | Performed by: ORTHOPAEDIC SURGERY

## 2020-02-13 PROCEDURE — 20610 PR DRAIN/INJECT LARGE JOINT/BURSA: ICD-10-PCS | Mod: 50,S$GLB,, | Performed by: ORTHOPAEDIC SURGERY

## 2020-02-13 RX ORDER — TRIAMCINOLONE ACETONIDE 40 MG/ML
40 INJECTION, SUSPENSION INTRA-ARTICULAR; INTRAMUSCULAR
Status: COMPLETED | OUTPATIENT
Start: 2020-02-13 | End: 2020-02-13

## 2020-02-13 RX ORDER — ETODOLAC 400 MG/1
400 TABLET, EXTENDED RELEASE ORAL DAILY
Qty: 30 TABLET | Refills: 0 | Status: SHIPPED | OUTPATIENT
Start: 2020-02-13 | End: 2020-03-14

## 2020-02-13 RX ADMIN — TRIAMCINOLONE ACETONIDE 40 MG: 40 INJECTION, SUSPENSION INTRA-ARTICULAR; INTRAMUSCULAR at 09:02

## 2020-02-13 NOTE — PROGRESS NOTES
Subjective:      Patient ID: Yulissa Hatfield is a 59 y.o. female.    Chief Complaint: Pain of the Left Shoulder and Pain of the Right Shoulder      HPI  Yulissa Hatfield is a  59 y.o. female presenting today for bilateral shoulder pain left worse than right.  There was not a history of trauma.  Onset of symptoms began about 18 months ago symptoms getting worse on the left side especially  Has difficulty with elevation overhead and sleeping on the left side at night  No numbness or tingling reported no neck problems reported.      Review of patient's allergies indicates:  No Known Allergies      Current Outpatient Medications   Medication Sig Dispense Refill    buPROPion (WELLBUTRIN XL) 150 MG TB24 tablet Take 1 tablet (150 mg total) by mouth once daily. 30 tablet 1    celecoxib (CELEBREX) 200 MG capsule       EPICERAM Diogenes USE AS A MOISTURIZING BARRIER AT LEAST TWICE DAILY  6    ezetimibe (ZETIA) 10 mg tablet Take 1 tablet (10 mg total) by mouth once daily. 90 tablet 0    fluticasone propionate (FLONASE) 50 mcg/actuation nasal spray INHALE 1 SPRAY EACH NOSTRIL TWICE DAILY  11    hydroCHLOROthiazide (MICROZIDE) 12.5 mg capsule Take 1 capsule by mouth every other day. 15 capsule 2    levothyroxine (SYNTHROID) 112 MCG tablet Take 1 tablet (112 mcg total) by mouth before breakfast. 90 tablet 1    SOOLANTRA 1 % Crea       telmisartan (MICARDIS) 80 MG Tab Take 1 tablet (80 mg total) by mouth once daily. 90 tablet 0     No current facility-administered medications for this visit.        Past Medical History:   Diagnosis Date    HTN (hypertension)     Hyperlipidemia     Hypothyroid        Past Surgical History:   Procedure Laterality Date    CARPAL TUNNEL RELEASE       SECTION, CLASSIC      x 3    ENDOMETRIAL ABLATION      FOOT SURGERY      KNEE SURGERY      THYROID SURGERY         Review of Systems:  ROS    OBJECTIVE:     PHYSICAL EXAM:       Vitals:    20 0918   PainSc:   8     Well  developed, well nourished female in no acute distress  Alert and oriented x 3  HEENT- Normal exam  Lungs- Clear to auscultation  Heart- Regular rate and rhythm  Abdomen- Soft nontender  Extremity exam- examination shoulders both shoulders have pretty good range of motion but the left side has pain with abduction and external rotation  Positive impingement sign on the left negative on the right  Supraspinatus stress test mildly positive on the left  Neurologic exam intact  Neck nontender    RADIOGRAPHS:  AP lateral x-rays both shoulders demonstrates some glenohumeral changes including osteophyte off the humeral head mild to moderate  Comments: I have personally reviewed the imaging and I agree with the above radiologist's report.    ASSESSMENT/PLAN:     IMPRESSION:  1.  Bilateral shoulder pain left worse than right.  2.  Glenohumeral arthritis bilateral    PLAN:  I explained the nature of the problem to the patient recommended injections today  After pause for time-out identified each shoulder injected bilaterally with combination Kenalog 40 mg 2 cc xylocaine sterile technique  Tolerated the procedure well without complication  I have also started her on Lodine 400 mg once a day with food  Follow-up 4-6 weeks       - We talked at length about the anatomy and pathophysiology of   Encounter Diagnosis   Name Primary?    Bilateral shoulder pain, unspecified chronicity            Disclaimer: This note has been generated using voice-recognition software. There may be typographical errors that have been missed during proof-reading.

## 2020-02-13 NOTE — LETTER
February 13, 2020      KENROY Baca  [address]  [address]  [facility] [address]           [facility]  [address],   [facility] [address]  Phone: [phone]          Patient: Yulissa Hatfield   MR Number: [id number]   YOB: 1960   Date of Visit: 2/13/2020       Dear Kapil Wade:    Thank you for referring Yulissa Hatfield to me for evaluation. Attached you will find relevant portions of my assessment and plan of care.    If you have questions, please do not hesitate to call me. I look forward to following Yulissa Hatfield along with you.    Sincerely,    Robb Rosas Jr., MD    Enclosure  CC:  No Recipients    If you would like to receive this communication electronically, please contact [email or web address] or [phone] to request more information on [employer] access.    For providers and/or their staff who would like to refer a patient to [facility], please contact us through our one-stop-shop provider referral line, Omari Conteh, at [phone].    If you feel you have received this communication in error or would no longer like to receive these types of communications, please e-mail [email or web address]

## 2020-03-02 ENCOUNTER — HOSPITAL ENCOUNTER (OUTPATIENT)
Dept: PULMONOLOGY | Facility: CLINIC | Age: 60
Discharge: HOME | End: 2020-03-02
Payer: COMMERCIAL

## 2020-03-02 ENCOUNTER — OFFICE VISIT (OUTPATIENT)
Dept: PULMONOLOGY | Facility: CLINIC | Age: 60
End: 2020-03-02
Payer: COMMERCIAL

## 2020-03-02 VITALS
BODY MASS INDEX: 48.82 KG/M2 | SYSTOLIC BLOOD PRESSURE: 132 MMHG | HEIGHT: 65 IN | OXYGEN SATURATION: 99 % | HEART RATE: 94 BPM | WEIGHT: 293 LBS | DIASTOLIC BLOOD PRESSURE: 76 MMHG

## 2020-03-02 DIAGNOSIS — R06.09 DOE (DYSPNEA ON EXERTION): ICD-10-CM

## 2020-03-02 DIAGNOSIS — R91.8 X-RAY OF LUNG, ABNORMAL: ICD-10-CM

## 2020-03-02 DIAGNOSIS — R06.09 DOE (DYSPNEA ON EXERTION): Primary | ICD-10-CM

## 2020-03-02 LAB
DLCO ADJ PRE: 20.61 ML/(MIN*MMHG) (ref 18.05–29.51)
DLCO SINGLE BREATH LLN: 18.05
DLCO SINGLE BREATH PRE REF: 83.9 %
DLCO SINGLE BREATH REF: 23.78
DLCOC SBVA LLN: 3.22
DLCOC SBVA PRE REF: 114.9 %
DLCOC SBVA REF: 4.66
DLCOC SINGLE BREATH LLN: 18.05
DLCOC SINGLE BREATH PRE REF: 86.7 %
DLCOC SINGLE BREATH REF: 23.78
DLCOCSBVAULN: 6.11
DLCOCSINGLEBREATHULN: 29.51
DLCOSINGLEBREATHULN: 29.51
DLCOVA LLN: 3.22
DLCOVA PRE REF: 111.3 %
DLCOVA PRE: 5.19 ML/(MIN*MMHG*L) (ref 3.22–6.11)
DLCOVA REF: 4.66
DLCOVAULN: 6.11
DLVAADJ PRE: 5.36 ML/(MIN*MMHG*L) (ref 3.22–6.11)
FEF 25 75 LLN: 0.97
FEF 25 75 PRE REF: 141.6 %
FEF 25 75 REF: 2.16
FEV05 LLN: 1.08
FEV05 REF: 1.93
FEV1 FVC LLN: 69
FEV1 FVC PRE REF: 107.8 %
FEV1 FVC REF: 80
FEV1 LLN: 1.69
FEV1 PRE REF: 88.5 %
FEV1 REF: 2.29
FVC LLN: 2.15
FVC PRE REF: 81.7 %
FVC REF: 2.88
IVC PRE: 2.41 L (ref 2.15–3.61)
IVC SINGLE BREATH LLN: 2.15
IVC SINGLE BREATH PRE REF: 83.8 %
IVC SINGLE BREATH REF: 2.88
IVCSINGLEBREATHULN: 3.61
PEF LLN: 3.89
PEF PRE REF: 102.8 %
PEF REF: 5.95
PHYSICIAN COMMENT: ABNORMAL
PRE DLCO: 19.95 ML/(MIN*MMHG) (ref 18.05–29.51)
PRE FEF 25 75: 3.06 L/S (ref 0.97–3.35)
PRE FET 100: 6.4 SEC
PRE FEV05 REF: 92 %
PRE FEV1 FVC: 86.23 % (ref 68.6–91.34)
PRE FEV1: 2.03 L (ref 1.69–2.89)
PRE FEV5: 1.78 L (ref 1.08–2.79)
PRE FVC: 2.35 L (ref 2.15–3.61)
PRE PEF: 6.12 L/S (ref 3.89–8.02)
VA PRE: 3.85 L (ref 4.95–4.95)
VA SINGLE BREATH LLN: 4.95
VA SINGLE BREATH PRE REF: 77.7 %
VA SINGLE BREATH REF: 4.95
VASINGLEBREATHULN: 4.95

## 2020-03-02 PROCEDURE — 3075F PR MOST RECENT SYSTOLIC BLOOD PRESS GE 130-139MM HG: ICD-10-PCS | Mod: CPTII,S$GLB,, | Performed by: INTERNAL MEDICINE

## 2020-03-02 PROCEDURE — 99999 PR PBB SHADOW E&M-EST. PATIENT-LVL IV: CPT | Mod: PBBFAC,,, | Performed by: INTERNAL MEDICINE

## 2020-03-02 PROCEDURE — 94729 DIFFUSING CAPACITY: CPT | Mod: S$GLB,,, | Performed by: INTERNAL MEDICINE

## 2020-03-02 PROCEDURE — 3008F BODY MASS INDEX DOCD: CPT | Mod: CPTII,S$GLB,, | Performed by: INTERNAL MEDICINE

## 2020-03-02 PROCEDURE — 3078F DIAST BP <80 MM HG: CPT | Mod: CPTII,S$GLB,, | Performed by: INTERNAL MEDICINE

## 2020-03-02 PROCEDURE — 3078F PR MOST RECENT DIASTOLIC BLOOD PRESSURE < 80 MM HG: ICD-10-PCS | Mod: CPTII,S$GLB,, | Performed by: INTERNAL MEDICINE

## 2020-03-02 PROCEDURE — 94010 BREATHING CAPACITY TEST: ICD-10-PCS | Mod: S$GLB,,, | Performed by: INTERNAL MEDICINE

## 2020-03-02 PROCEDURE — 99204 OFFICE O/P NEW MOD 45 MIN: CPT | Mod: S$GLB,,, | Performed by: INTERNAL MEDICINE

## 2020-03-02 PROCEDURE — 99204 PR OFFICE/OUTPT VISIT, NEW, LEVL IV, 45-59 MIN: ICD-10-PCS | Mod: S$GLB,,, | Performed by: INTERNAL MEDICINE

## 2020-03-02 PROCEDURE — 94010 BREATHING CAPACITY TEST: CPT | Mod: S$GLB,,, | Performed by: INTERNAL MEDICINE

## 2020-03-02 PROCEDURE — 3008F PR BODY MASS INDEX (BMI) DOCUMENTED: ICD-10-PCS | Mod: CPTII,S$GLB,, | Performed by: INTERNAL MEDICINE

## 2020-03-02 PROCEDURE — 99999 PR PBB SHADOW E&M-EST. PATIENT-LVL IV: ICD-10-PCS | Mod: PBBFAC,,, | Performed by: INTERNAL MEDICINE

## 2020-03-02 PROCEDURE — 94729 PR C02/MEMBANE DIFFUSE CAPACITY: ICD-10-PCS | Mod: S$GLB,,, | Performed by: INTERNAL MEDICINE

## 2020-03-02 PROCEDURE — 3075F SYST BP GE 130 - 139MM HG: CPT | Mod: CPTII,S$GLB,, | Performed by: INTERNAL MEDICINE

## 2020-03-02 RX ORDER — TRAMADOL HYDROCHLORIDE 50 MG/1
TABLET ORAL
COMMUNITY
Start: 2020-02-28 | End: 2020-05-18

## 2020-03-02 RX ORDER — CRISABOROLE 20 MG/G
OINTMENT TOPICAL
COMMUNITY
Start: 2020-01-31 | End: 2020-05-18

## 2020-03-02 RX ORDER — TRAMADOL HYDROCHLORIDE 50 MG/1
50 TABLET ORAL
COMMUNITY
Start: 2020-02-28 | End: 2020-03-02

## 2020-03-02 NOTE — PROGRESS NOTES
Subjective:       Patient ID: Yulissa Hatfield is a 59 y.o. female.    Chief Complaint: No chief complaint on file.    59 year old female with HTN and obesity who presents for evaluation of dyspnea on exertion. Patient states that she has arthritis of the knees that may require surgery. She states she also recently fell and injured her knee.   Patient asked her sleep medicine provider to schedule an appointment to see if her SOB was related to her lungs. She had a CXR that showed perihilar fullness but had a negative Cardiology workup.   She denies known lung disease.   Never smoker.       Review of Systems   Constitutional: Negative for activity change and appetite change.   HENT: Negative for postnasal drip and congestion.    Respiratory: Positive for dyspnea on extertion. Negative for cough and shortness of breath.    Cardiovascular: Negative for chest pain and leg swelling.   Endocrine: Negative for cold intolerance and heat intolerance.    Musculoskeletal: Negative for arthralgias and back pain.   Skin: Negative for rash.   Gastrointestinal: Negative for abdominal pain and abdominal distention.   Neurological: Negative for dizziness and headaches.   Hematological: Negative for adenopathy.   Psychiatric/Behavioral: Negative for confusion. The patient is not nervous/anxious.        Past Medical History:   Diagnosis Date    HTN (hypertension)     Hyperlipidemia     Hypothyroid      Past Surgical History:   Procedure Laterality Date    CARPAL TUNNEL RELEASE       SECTION, CLASSIC      x 3    ENDOMETRIAL ABLATION      FOOT SURGERY      KNEE SURGERY      THYROID SURGERY       Past Surgical History:   Procedure Laterality Date    CARPAL TUNNEL RELEASE       SECTION, CLASSIC      x 3    ENDOMETRIAL ABLATION      FOOT SURGERY      KNEE SURGERY      THYROID SURGERY       Family History   Problem Relation Age of Onset    Heart disease Mother     Heart disease Father     No Known  Problems Sister     Heart disease Brother     No Known Problems Daughter     No Known Problems Son     No Known Problems Daughter     No Known Problems Daughter      Social History     Socioeconomic History    Marital status:      Spouse name: Not on file    Number of children: Not on file    Years of education: Not on file    Highest education level: Not on file   Occupational History    Not on file   Social Needs    Financial resource strain: Not on file    Food insecurity:     Worry: Not on file     Inability: Not on file    Transportation needs:     Medical: Not on file     Non-medical: Not on file   Tobacco Use    Smoking status: Never Smoker    Smokeless tobacco: Never Used   Substance and Sexual Activity    Alcohol use: No    Drug use: No    Sexual activity: Not on file   Lifestyle    Physical activity:     Days per week: Not on file     Minutes per session: Not on file    Stress: Not on file   Relationships    Social connections:     Talks on phone: Not on file     Gets together: Not on file     Attends Yarsani service: Not on file     Active member of club or organization: Not on file     Attends meetings of clubs or organizations: Not on file     Relationship status: Not on file   Other Topics Concern    Not on file   Social History Narrative    Not on file       Objective:      Physical Exam   Constitutional: She is oriented to person, place, and time. She appears well-developed and well-nourished. No distress.   HENT:   Head: Normocephalic.   Nose: Nose normal.   Neck: Normal range of motion. Neck supple.   Cardiovascular: Normal rate and regular rhythm.   Pulmonary/Chest: Normal expansion, symmetric chest wall expansion and effort normal.   Abdominal: Soft. Bowel sounds are normal.   Musculoskeletal: Normal range of motion. She exhibits no edema.   Neurological: She is alert and oriented to person, place, and time.   Skin: Skin is warm and dry. She is not diaphoretic.    Psychiatric: She has a normal mood and affect. Her behavior is normal.   Nursing note and vitals reviewed.    Personal Diagnostic Review  Chest x-ray: No acute pulmonary findings. Possible cardiomegaly.   No flowsheet data found.      Assessment:       1. VIEIRA (dyspnea on exertion)    2. X-ray of lung, abnormal        Outpatient Encounter Medications as of 3/2/2020   Medication Sig Dispense Refill    buPROPion (WELLBUTRIN XL) 150 MG TB24 tablet Take 1 tablet (150 mg total) by mouth once daily. 30 tablet 1    celecoxib (CELEBREX) 200 MG capsule       EPICERAM Diogenes USE AS A MOISTURIZING BARRIER AT LEAST TWICE DAILY  6    etodolac (LODINE XL) 400 MG 24 hr tablet Take 1 tablet (400 mg total) by mouth once daily. 30 tablet 0    EUCRISA 2 % Oint       ezetimibe (ZETIA) 10 mg tablet Take 1 tablet (10 mg total) by mouth once daily. 90 tablet 0    fluticasone propionate (FLONASE) 50 mcg/actuation nasal spray INHALE 1 SPRAY EACH NOSTRIL TWICE DAILY  11    hydroCHLOROthiazide (MICROZIDE) 12.5 mg capsule Take 1 capsule by mouth every other day. 15 capsule 2    levothyroxine (SYNTHROID) 112 MCG tablet Take 1 tablet (112 mcg total) by mouth before breakfast. 90 tablet 1    SOOLANTRA 1 % Crea       telmisartan (MICARDIS) 80 MG Tab Take 1 tablet (80 mg total) by mouth once daily. 90 tablet 0    [] traMADol (ULTRAM) 50 mg tablet Take 50 mg by mouth.      traMADol (ULTRAM) 50 mg tablet        No facility-administered encounter medications on file as of 3/2/2020.      Orders Placed This Encounter   Procedures    Spirometry without Bronchodilator     Standing Status:   Future     Number of Occurrences:   1     Standing Expiration Date:   3/2/2021    DLCO-Carbon Monoxide Diffusing Capacity     Standing Status:   Future     Number of Occurrences:   1     Standing Expiration Date:   3/2/2021       Plan:          VIEIRA (dyspnea on exertion)  Patient's VIEIRA seems most likely related to her DANIELLE and deconditioning. She has  normal spirometry and normal DLCO. CXR without any parenchymal lung processes seen.     Recommend:   -Continue CPAP  -Continue HTN control  -Weight loss and diet    No need for further Pulmonary follow up.     Eugenia Fair MD

## 2020-03-02 NOTE — LETTER
March 6, 2020      Constance Fisher MD  [address]  [facility] [address]           [facility] Pulmonary [facility]  [address]  [place] [address]  Phone: [phone]          Patient: Yulissa Hatfield   MR Number: [id number]   YOB: 1960   Date of Visit: 3/2/2020       Dear Dr. Constance Fisher:    Thank you for referring Yulissa Hatfield to me for evaluation. Attached you will find relevant portions of my assessment and plan of care.    If you have questions, please do not hesitate to call me. I look forward to following Yulissa Hatfield along with you.    Sincerely,    Eugenia Fair MD    Enclosure  CC:  No Recipients    If you would like to receive this communication electronically, please contact [email or web address] or [phone] to request more information on [employer] access.    For providers and/or their staff who would like to refer a patient to [facility], please contact us through our one-stop-shop provider referral line, [facility], at [phone].    If you feel you have received this communication in error or would no longer like to receive these types of communications, please e-mail [email or web address]

## 2020-03-06 NOTE — ASSESSMENT & PLAN NOTE
Patient's VIEIRA seems most likely related to her DANIELLE and deconditioning. She has normal spirometry and normal DLCO. CXR without any parenchymal lung processes seen.

## 2020-03-16 DIAGNOSIS — I10 ESSENTIAL HYPERTENSION: Chronic | ICD-10-CM

## 2020-03-16 RX ORDER — HYDROCHLOROTHIAZIDE 12.5 MG/1
CAPSULE ORAL
Qty: 15 CAPSULE | Refills: 2 | Status: SHIPPED | OUTPATIENT
Start: 2020-03-16 | End: 2021-01-11

## 2020-03-30 ENCOUNTER — TELEPHONE (OUTPATIENT)
Dept: SLEEP MEDICINE | Facility: CLINIC | Age: 60
End: 2020-03-30

## 2020-03-30 NOTE — TELEPHONE ENCOUNTER
Called pt to ask if she would like to convert to virtual visit due to Dr. Fisher being out today. Pt didn't answer. I left a vm.

## 2020-04-16 ENCOUNTER — PATIENT MESSAGE (OUTPATIENT)
Dept: ORTHOPEDICS | Facility: CLINIC | Age: 60
End: 2020-04-16

## 2020-04-16 DIAGNOSIS — I10 ESSENTIAL HYPERTENSION: Chronic | ICD-10-CM

## 2020-04-16 RX ORDER — TELMISARTAN 80 MG/1
80 TABLET ORAL DAILY
Qty: 90 TABLET | Refills: 0 | Status: SHIPPED | OUTPATIENT
Start: 2020-04-16 | End: 2021-06-09 | Stop reason: SDUPTHER

## 2020-05-18 ENCOUNTER — OFFICE VISIT (OUTPATIENT)
Dept: ORTHOPEDICS | Facility: CLINIC | Age: 60
End: 2020-05-18
Payer: COMMERCIAL

## 2020-05-18 VITALS — BODY MASS INDEX: 48.82 KG/M2 | HEIGHT: 65 IN | WEIGHT: 293 LBS

## 2020-05-18 DIAGNOSIS — M19.019 SHOULDER ARTHRITIS: Primary | ICD-10-CM

## 2020-05-18 PROCEDURE — 99999 PR PBB SHADOW E&M-EST. PATIENT-LVL III: ICD-10-PCS | Mod: PBBFAC,,, | Performed by: ORTHOPAEDIC SURGERY

## 2020-05-18 PROCEDURE — 3008F BODY MASS INDEX DOCD: CPT | Mod: CPTII,S$GLB,, | Performed by: ORTHOPAEDIC SURGERY

## 2020-05-18 PROCEDURE — 20610 PR DRAIN/INJECT LARGE JOINT/BURSA: ICD-10-PCS | Mod: 50,S$GLB,, | Performed by: ORTHOPAEDIC SURGERY

## 2020-05-18 PROCEDURE — 99213 OFFICE O/P EST LOW 20 MIN: CPT | Mod: 25,S$GLB,, | Performed by: ORTHOPAEDIC SURGERY

## 2020-05-18 PROCEDURE — 99999 PR PBB SHADOW E&M-EST. PATIENT-LVL III: CPT | Mod: PBBFAC,,, | Performed by: ORTHOPAEDIC SURGERY

## 2020-05-18 PROCEDURE — 3008F PR BODY MASS INDEX (BMI) DOCUMENTED: ICD-10-PCS | Mod: CPTII,S$GLB,, | Performed by: ORTHOPAEDIC SURGERY

## 2020-05-18 PROCEDURE — 99213 PR OFFICE/OUTPT VISIT, EST, LEVL III, 20-29 MIN: ICD-10-PCS | Mod: 25,S$GLB,, | Performed by: ORTHOPAEDIC SURGERY

## 2020-05-18 PROCEDURE — 20610 DRAIN/INJ JOINT/BURSA W/O US: CPT | Mod: 50,S$GLB,, | Performed by: ORTHOPAEDIC SURGERY

## 2020-05-18 RX ORDER — TRIAMCINOLONE ACETONIDE 40 MG/ML
40 INJECTION, SUSPENSION INTRA-ARTICULAR; INTRAMUSCULAR
Status: COMPLETED | OUTPATIENT
Start: 2020-05-18 | End: 2020-05-18

## 2020-05-18 RX ADMIN — TRIAMCINOLONE ACETONIDE 40 MG: 40 INJECTION, SUSPENSION INTRA-ARTICULAR; INTRAMUSCULAR at 08:05

## 2020-05-18 NOTE — PROGRESS NOTES
"Subjective:      Patient ID: Yulissa Hatfield is a 59 y.o. female.  Chief Complaint: Pain of the Left Shoulder and Pain of the Right Shoulder      HPI  Yulissa Hatfield is a  59 y.o. female presenting today for follow up of bilateral shoulder arthritis.  She reports that she is having a flare-up again she did have some improvement with the injection a few months ago symptoms getting worse now no numbness or tingling reported.    Review of patient's allergies indicates:  No Known Allergies      Current Outpatient Medications   Medication Sig Dispense Refill    EPICERAM Diogenes USE AS A MOISTURIZING BARRIER AT LEAST TWICE DAILY  6    ezetimibe (ZETIA) 10 mg tablet Take 1 tablet (10 mg total) by mouth once daily. 90 tablet 0    hydroCHLOROthiazide (MICROZIDE) 12.5 mg capsule Take 1 capsule by mouth every other day. 15 capsule 2    levothyroxine (SYNTHROID) 112 MCG tablet Take 1 tablet (112 mcg total) by mouth before breakfast. 90 tablet 1    SOOLANTRA 1 % Crea       telmisartan (MICARDIS) 80 MG Tab Take 1 tablet (80 mg total) by mouth once daily. 90 tablet 0     No current facility-administered medications for this visit.        Past Medical History:   Diagnosis Date    HTN (hypertension)     Hyperlipidemia     Hypothyroid        Past Surgical History:   Procedure Laterality Date    CARPAL TUNNEL RELEASE       SECTION, CLASSIC      x 3    ENDOMETRIAL ABLATION      FOOT SURGERY      KNEE SURGERY      THYROID SURGERY         OBJECTIVE:   PHYSICAL EXAM:  Height: 5' 5" (165.1 cm) Weight: (!) 152 kg (335 lb)  Vitals:    20 0815   Weight: (!) 152 kg (335 lb)   Height: 5' 5" (1.651 m)   PainSc:   8     Ortho/SPM Exam  Examination shoulders demonstrates some mild tenderness anteriorly of each shoulder  Range of motion shoulders slightly decreased secondary to pain  She does have pain with elevation bilaterally left worse than right  No crepitation  Strength a little bit weak  Neurologic exam " intact    RADIOGRAPHS:  Previous x-rays both shoulders demonstrate mild to moderate arthritic changes of the glenohumeral joint  Comments: I have personally reviewed the imaging and I agree with the above radiologist's report.    ASSESSMENT/PLAN:     IMPRESSION:  Bilateral shoulder arthritis    PLAN:  Recommended we try the injections again  After pause for time-out identified each shoulder injected bilaterally combination Kenalog 40 mg 2 cc xylocaine sterile technique  Tolerated the procedure well without complication  I have also started her on Duexis because of history of stomach upset  And started some physical therapy both shoulders to work on range of motion strengthening      FOLLOW UP:  6 weeks    Disclaimer: This note has been generated using voice-recognition software. There may be typographical errors that have been missed during proof-reading.

## 2020-05-19 DIAGNOSIS — E78.2 MIXED HYPERLIPIDEMIA: ICD-10-CM

## 2020-05-19 RX ORDER — EZETIMIBE 10 MG/1
10 TABLET ORAL DAILY
Qty: 90 TABLET | Refills: 0 | Status: SHIPPED | OUTPATIENT
Start: 2020-05-19 | End: 2021-06-09 | Stop reason: SDUPTHER

## 2020-05-20 ENCOUNTER — TELEPHONE (OUTPATIENT)
Dept: FAMILY MEDICINE | Facility: CLINIC | Age: 60
End: 2020-05-20

## 2020-05-20 NOTE — TELEPHONE ENCOUNTER
Called and spoke with pt in regards of message. Informed pt that NP Mauro is no longer in Primary Care, and that she will need to est care with another provider for continued medication refills.  Pt verbalized understanding of message.

## 2020-05-20 NOTE — TELEPHONE ENCOUNTER
----- Message from KENROY Baca sent at 5/19/2020  4:12 PM CDT -----  Patient needs to establish care with new PCP for continued refills.

## 2020-06-04 ENCOUNTER — TELEPHONE (OUTPATIENT)
Dept: PODIATRY | Facility: CLINIC | Age: 60
End: 2020-06-04

## 2020-06-04 ENCOUNTER — OFFICE VISIT (OUTPATIENT)
Dept: PODIATRY | Facility: CLINIC | Age: 60
End: 2020-06-04
Payer: COMMERCIAL

## 2020-06-04 VITALS
BODY MASS INDEX: 48.82 KG/M2 | HEART RATE: 76 BPM | SYSTOLIC BLOOD PRESSURE: 151 MMHG | HEIGHT: 65 IN | WEIGHT: 293 LBS | DIASTOLIC BLOOD PRESSURE: 90 MMHG

## 2020-06-04 DIAGNOSIS — B07.0 PLANTAR WART, RIGHT FOOT: Primary | ICD-10-CM

## 2020-06-04 PROCEDURE — 3008F PR BODY MASS INDEX (BMI) DOCUMENTED: ICD-10-PCS | Mod: CPTII,S$GLB,, | Performed by: PODIATRIST

## 2020-06-04 PROCEDURE — 99999 PR PBB SHADOW E&M-EST. PATIENT-LVL III: CPT | Mod: PBBFAC,,, | Performed by: PODIATRIST

## 2020-06-04 PROCEDURE — 3080F PR MOST RECENT DIASTOLIC BLOOD PRESSURE >= 90 MM HG: ICD-10-PCS | Mod: CPTII,S$GLB,, | Performed by: PODIATRIST

## 2020-06-04 PROCEDURE — 17000 PR DESTRUCTION(LASER SURGERY,CRYOSURGERY,CHEMOSURGERY),PREMALIGNANT LESIONS,FIRST LESION: ICD-10-PCS | Mod: S$GLB,,, | Performed by: PODIATRIST

## 2020-06-04 PROCEDURE — 3077F PR MOST RECENT SYSTOLIC BLOOD PRESSURE >= 140 MM HG: ICD-10-PCS | Mod: CPTII,S$GLB,, | Performed by: PODIATRIST

## 2020-06-04 PROCEDURE — 17000 DESTRUCT PREMALG LESION: CPT | Mod: S$GLB,,, | Performed by: PODIATRIST

## 2020-06-04 PROCEDURE — 3080F DIAST BP >= 90 MM HG: CPT | Mod: CPTII,S$GLB,, | Performed by: PODIATRIST

## 2020-06-04 PROCEDURE — 99213 OFFICE O/P EST LOW 20 MIN: CPT | Mod: 25,S$GLB,, | Performed by: PODIATRIST

## 2020-06-04 PROCEDURE — 3077F SYST BP >= 140 MM HG: CPT | Mod: CPTII,S$GLB,, | Performed by: PODIATRIST

## 2020-06-04 PROCEDURE — 99999 PR PBB SHADOW E&M-EST. PATIENT-LVL III: ICD-10-PCS | Mod: PBBFAC,,, | Performed by: PODIATRIST

## 2020-06-04 PROCEDURE — 99213 PR OFFICE/OUTPT VISIT, EST, LEVL III, 20-29 MIN: ICD-10-PCS | Mod: 25,S$GLB,, | Performed by: PODIATRIST

## 2020-06-04 PROCEDURE — 3008F BODY MASS INDEX DOCD: CPT | Mod: CPTII,S$GLB,, | Performed by: PODIATRIST

## 2020-06-04 RX ORDER — TRAMADOL HYDROCHLORIDE 50 MG/1
TABLET ORAL
COMMUNITY
Start: 2020-06-03 | End: 2021-01-11

## 2020-06-04 NOTE — PROGRESS NOTES
"Subjective:      Patient ID: Yulissa Hatfield is a 59 y.o. female.    Chief Complaint: Heel Pain (Right)    complains of painful callus on the bottom of the right foot for the past 3 months.  Relates that she was placing most of pressure on the ball the right foot in order to avoid pressure to the heel which caused her flare-up some pain in her Achilles tendon.  She does have a history of previous Achilles tendinitis which was treated approximately 3.5 years ago.  Today she relates her pain is mild overall.  No other complaints period    Vitals:    20 0954   BP: (!) 151/90   Pulse: 76   Weight: (!) 152 kg (335 lb)   Height: 5' 5" (1.651 m)   PainSc:   3   PainLoc: Foot      Past Medical History:   Diagnosis Date    HTN (hypertension)     Hyperlipidemia     Hypothyroid        Past Surgical History:   Procedure Laterality Date    CARPAL TUNNEL RELEASE       SECTION, CLASSIC      x 3    ENDOMETRIAL ABLATION      FOOT SURGERY      KNEE SURGERY      THYROID SURGERY         Family History   Problem Relation Age of Onset    Heart disease Mother     Heart disease Father     No Known Problems Sister     Heart disease Brother     No Known Problems Daughter     No Known Problems Son     No Known Problems Daughter     No Known Problems Daughter        Social History     Socioeconomic History    Marital status:      Spouse name: Not on file    Number of children: Not on file    Years of education: Not on file    Highest education level: Not on file   Occupational History    Not on file   Social Needs    Financial resource strain: Not on file    Food insecurity:     Worry: Not on file     Inability: Not on file    Transportation needs:     Medical: Not on file     Non-medical: Not on file   Tobacco Use    Smoking status: Never Smoker    Smokeless tobacco: Never Used   Substance and Sexual Activity    Alcohol use: No    Drug use: No    Sexual activity: Not on file   Lifestyle "    Physical activity:     Days per week: Not on file     Minutes per session: Not on file    Stress: Not on file   Relationships    Social connections:     Talks on phone: Not on file     Gets together: Not on file     Attends Restorationism service: Not on file     Active member of club or organization: Not on file     Attends meetings of clubs or organizations: Not on file     Relationship status: Not on file   Other Topics Concern    Not on file   Social History Narrative    Not on file       Current Outpatient Medications   Medication Sig Dispense Refill    EPICERAM Diogenes USE AS A MOISTURIZING BARRIER AT LEAST TWICE DAILY  6    ezetimibe (ZETIA) 10 mg tablet Take 1 tablet (10 mg total) by mouth once daily. 90 tablet 0    hydroCHLOROthiazide (MICROZIDE) 12.5 mg capsule Take 1 capsule by mouth every other day. 15 capsule 2    levothyroxine (SYNTHROID) 112 MCG tablet Take 1 tablet (112 mcg total) by mouth before breakfast. 90 tablet 1    SOOLANTRA 1 % Crea       telmisartan (MICARDIS) 80 MG Tab Take 1 tablet (80 mg total) by mouth once daily. 90 tablet 0    traMADoL (ULTRAM) 50 mg tablet        No current facility-administered medications for this visit.        Review of patient's allergies indicates:  No Known Allergies      Review of Systems   Constitution: Negative for chills, fever and malaise/fatigue.   HENT: Negative for congestion and hearing loss.    Cardiovascular: Positive for leg swelling. Negative for chest pain and claudication.   Respiratory: Negative for cough and shortness of breath.    Skin: Positive for nail changes.   Musculoskeletal: Negative for muscle weakness.   Gastrointestinal: Negative for nausea and vomiting.   Neurological: Negative for numbness and paresthesias.   Psychiatric/Behavioral: Negative for altered mental status.           Objective:      Physical Exam   Constitutional: She is oriented to person, place, and time. No distress.   Cardiovascular:   Pulses:       Dorsalis  pedis pulses are 2+ on the right side, and 2+ on the left side.        Posterior tibial pulses are 1+ on the right side, and 1+ on the left side.   Mild to moderate lower extremity edema with varicosities and telangiectasias.    No hair growth bilateral lower extremity.    Skin temp warm to lower extremity bilateral.   Musculoskeletal:   Localized pain on palpation to hyperkeratotic lesion plantar central right heel.    No pain with ROM or MMT bilateral lower extremity.    Mild pain on palpation overlying Achilles tendon posterior distal right leg.   Neurological: She is alert and oriented to person, place, and time.   Skin: Skin is warm, dry and intact. Capillary refill takes less than 2 seconds. No ecchymosis and no rash noted. She is not diaphoretic. No cyanosis. Nails show no clubbing.   Raised hyperkeratotic lesion plantar right heel with disappearance of RSTLs, spongy core and pain during squeeze test.               Assessment:       Encounter Diagnosis   Name Primary?    Plantar wart, right foot Yes         Plan:       Yulissa was seen today for heel pain.    Diagnoses and all orders for this visit:    Plantar wart, right foot      I counseled the patient on her conditions, their implications and medical management.    With the patient's consent a sterile #15 scalpel was used to debride the lesion plantar right heel. Discussed treatment options in detail. He elected for treatment with application of Cantharidin gel applied to the lesion and covered with a mepilex border which was further secured with tape. The patient tolerated the procedure well without pain or complication. No blood loss. Instructed to keep intact x 3 days then may remove and shower. Keep covered with bandaid until follow up.    Discussed activity restrictions and modifications.    RTC 2-3 weeks or p.r.n. as discussed.      .

## 2020-06-04 NOTE — TELEPHONE ENCOUNTER
----- Message from Dl Lowe sent at 6/4/2020  9:46 AM CDT -----  Contact: pt  Pt Yulissa Hatfield    Pt arrived at the wrong location in [place]. Pt has been checked in [facility]. Pt is on her way to the correct location in [place] right  Now.    Pt can be reached at [phone]

## 2020-06-25 ENCOUNTER — OFFICE VISIT (OUTPATIENT)
Dept: PODIATRY | Facility: CLINIC | Age: 60
End: 2020-06-25
Payer: COMMERCIAL

## 2020-06-25 VITALS
WEIGHT: 293 LBS | HEIGHT: 65 IN | SYSTOLIC BLOOD PRESSURE: 154 MMHG | BODY MASS INDEX: 48.82 KG/M2 | HEART RATE: 80 BPM | DIASTOLIC BLOOD PRESSURE: 83 MMHG

## 2020-06-25 DIAGNOSIS — B07.0 PLANTAR WART, RIGHT FOOT: Primary | ICD-10-CM

## 2020-06-25 PROCEDURE — 99499 NO LOS: ICD-10-PCS | Mod: S$GLB,,, | Performed by: PODIATRIST

## 2020-06-25 PROCEDURE — 99999 PR PBB SHADOW E&M-EST. PATIENT-LVL III: CPT | Mod: PBBFAC,,, | Performed by: PODIATRIST

## 2020-06-25 PROCEDURE — 99499 UNLISTED E&M SERVICE: CPT | Mod: S$GLB,,, | Performed by: PODIATRIST

## 2020-06-25 PROCEDURE — 99999 PR PBB SHADOW E&M-EST. PATIENT-LVL III: ICD-10-PCS | Mod: PBBFAC,,, | Performed by: PODIATRIST

## 2020-06-25 NOTE — PROGRESS NOTES
"Subjective:      Patient ID: Yulissa Hatfield is a 59 y.o. female.    Chief Complaint: Follow-up (Wart)    complains of painful callus on the bottom of the right foot for the past 3 months.  Relates that she was placing most of pressure on the ball the right foot in order to avoid pressure to the heel which caused her flare-up some pain in her Achilles tendon.  She does have a history of previous Achilles tendinitis which was treated approximately 3.5 years ago.  Today she relates her pain is mild overall.  No other complaints period    2020:  Presents follow-up from cantharidin application to plantar wart plantar left heel.  Relates no pain however she still notes that there.    Vitals:    20 0803   BP: (!) 154/83   Pulse: 80   Weight: (!) 152 kg (335 lb)   Height: 5' 5" (1.651 m)   PainSc: 0-No pain      Past Medical History:   Diagnosis Date    HTN (hypertension)     Hyperlipidemia     Hypothyroid        Past Surgical History:   Procedure Laterality Date    CARPAL TUNNEL RELEASE       SECTION, CLASSIC      x 3    ENDOMETRIAL ABLATION      FOOT SURGERY      KNEE SURGERY      THYROID SURGERY         Family History   Problem Relation Age of Onset    Heart disease Mother     Heart disease Father     No Known Problems Sister     Heart disease Brother     No Known Problems Daughter     No Known Problems Son     No Known Problems Daughter     No Known Problems Daughter        Social History     Socioeconomic History    Marital status:      Spouse name: Not on file    Number of children: Not on file    Years of education: Not on file    Highest education level: Not on file   Occupational History    Not on file   Social Needs    Financial resource strain: Not on file    Food insecurity     Worry: Not on file     Inability: Not on file    Transportation needs     Medical: Not on file     Non-medical: Not on file   Tobacco Use    Smoking status: Never Smoker    " Smokeless tobacco: Never Used   Substance and Sexual Activity    Alcohol use: No    Drug use: No    Sexual activity: Not on file   Lifestyle    Physical activity     Days per week: Not on file     Minutes per session: Not on file    Stress: Not on file   Relationships    Social connections     Talks on phone: Not on file     Gets together: Not on file     Attends Nondenominational service: Not on file     Active member of club or organization: Not on file     Attends meetings of clubs or organizations: Not on file     Relationship status: Not on file   Other Topics Concern    Not on file   Social History Narrative    Not on file       Current Outpatient Medications   Medication Sig Dispense Refill    EPICERAM Diogenes USE AS A MOISTURIZING BARRIER AT LEAST TWICE DAILY  6    ezetimibe (ZETIA) 10 mg tablet Take 1 tablet (10 mg total) by mouth once daily. 90 tablet 0    hydroCHLOROthiazide (MICROZIDE) 12.5 mg capsule Take 1 capsule by mouth every other day. 15 capsule 2    levothyroxine (SYNTHROID) 112 MCG tablet Take 1 tablet (112 mcg total) by mouth before breakfast. 90 tablet 1    SOOLANTRA 1 % Crea       telmisartan (MICARDIS) 80 MG Tab Take 1 tablet (80 mg total) by mouth once daily. 90 tablet 0    traMADoL (ULTRAM) 50 mg tablet        No current facility-administered medications for this visit.        Review of patient's allergies indicates:  No Known Allergies      Review of Systems   Constitution: Negative for chills, fever and malaise/fatigue.   HENT: Negative for congestion and hearing loss.    Cardiovascular: Positive for leg swelling. Negative for chest pain and claudication.   Respiratory: Negative for cough and shortness of breath.    Skin: Positive for nail changes.   Musculoskeletal: Negative for muscle weakness.   Gastrointestinal: Negative for nausea and vomiting.   Neurological: Negative for numbness and paresthesias.   Psychiatric/Behavioral: Negative for altered mental status.            Objective:      Physical Exam  Constitutional:       General: She is not in acute distress.     Appearance: She is not diaphoretic.   Cardiovascular:      Pulses:           Dorsalis pedis pulses are 2+ on the right side and 2+ on the left side.        Posterior tibial pulses are 1+ on the right side and 1+ on the left side.      Comments: Mild to moderate lower extremity edema with varicosities and telangiectasias.    No hair growth bilateral lower extremity.    Skin temp warm to lower extremity bilateral.  Musculoskeletal:      Comments: Localized pain on palpation to hyperkeratotic lesion plantar central right heel.    No pain with ROM or MMT bilateral lower extremity.    Mild pain on palpation overlying Achilles tendon posterior distal right leg.   Skin:     General: Skin is warm and dry.      Capillary Refill: Capillary refill takes less than 2 seconds.      Findings: No ecchymosis or rash.      Nails: There is no clubbing.        Comments: Flat hyperkeratotic lesion plantar right heel with disappearance of RSTLs, spongy core and pain during squeeze test.  Note the skin is healed from a previous blister ruptured and there is moderate surrounding hyperkeratotic skin.     Neurological:      Mental Status: She is alert and oriented to person, place, and time.               Assessment:       Encounter Diagnosis   Name Primary?    Plantar wart, right foot Yes         Plan:       Yulissa was seen today for follow-up.    Diagnoses and all orders for this visit:    Plantar wart, right foot      I counseled the patient on her conditions, their implications and medical management.    Discussed continued treatment with the cantharidin since the lesion is improving however not resolved.    With the patient's consent a sterile #15 scalpel was used to debride the lesion plantar right heel. Discussed treatment options in detail. He elected for treatment with application of Cantharidin gel applied to the lesion and covered  with a mepilex border which was further secured with tape. The patient tolerated the procedure well without pain or complication. No blood loss. Instructed to keep intact x 3 days then may remove and shower. Keep covered with bandaid until follow up.    Discussed activity restrictions and modifications.    RTC 2-3 weeks or p.r.n. as discussed.      .

## 2020-07-17 ENCOUNTER — TELEPHONE (OUTPATIENT)
Dept: PODIATRY | Facility: CLINIC | Age: 60
End: 2020-07-17

## 2020-07-17 DIAGNOSIS — I10 ESSENTIAL HYPERTENSION: Chronic | ICD-10-CM

## 2020-07-17 RX ORDER — HYDROCHLOROTHIAZIDE 12.5 MG/1
CAPSULE ORAL
Qty: 15 CAPSULE | Refills: 2 | Status: CANCELLED | OUTPATIENT
Start: 2020-07-17

## 2020-07-17 NOTE — TELEPHONE ENCOUNTER
----- Message from Ellen Grier sent at 7/17/2020  9:48 AM CDT -----  Regarding: call back  Pt requesting a call  back in regards to rescheduling appt     Pt states that she made a mistake and canceled appt for 07/20/202, pt would like to know if she can get appt on that day    Please call and advise    Phone [phone]

## 2020-08-07 ENCOUNTER — OFFICE VISIT (OUTPATIENT)
Dept: PODIATRY | Facility: CLINIC | Age: 60
End: 2020-08-07
Payer: COMMERCIAL

## 2020-08-07 VITALS — BODY MASS INDEX: 48.82 KG/M2 | WEIGHT: 293 LBS | HEIGHT: 65 IN

## 2020-08-07 DIAGNOSIS — B07.0 PLANTAR WART, RIGHT FOOT: Primary | ICD-10-CM

## 2020-08-07 DIAGNOSIS — L60.0 ONYCHOCRYPTOSIS: ICD-10-CM

## 2020-08-07 PROCEDURE — 99999 PR PBB SHADOW E&M-EST. PATIENT-LVL III: ICD-10-PCS | Mod: PBBFAC,,, | Performed by: PODIATRIST

## 2020-08-07 PROCEDURE — 99213 PR OFFICE/OUTPT VISIT, EST, LEVL III, 20-29 MIN: ICD-10-PCS | Mod: S$GLB,,, | Performed by: PODIATRIST

## 2020-08-07 PROCEDURE — 3008F BODY MASS INDEX DOCD: CPT | Mod: CPTII,S$GLB,, | Performed by: PODIATRIST

## 2020-08-07 PROCEDURE — 3008F PR BODY MASS INDEX (BMI) DOCUMENTED: ICD-10-PCS | Mod: CPTII,S$GLB,, | Performed by: PODIATRIST

## 2020-08-07 PROCEDURE — 99999 PR PBB SHADOW E&M-EST. PATIENT-LVL III: CPT | Mod: PBBFAC,,, | Performed by: PODIATRIST

## 2020-08-07 PROCEDURE — 99213 OFFICE O/P EST LOW 20 MIN: CPT | Mod: S$GLB,,, | Performed by: PODIATRIST

## 2020-08-07 NOTE — PROGRESS NOTES
"Subjective:      Patient ID: Yulissa aHtfield is a 59 y.o. female.    Chief Complaint: Follow-up (3 weeks )    complains of painful callus on the bottom of the right foot for the past 3 months.  Relates that she was placing most of pressure on the ball the right foot in order to avoid pressure to the heel which caused her flare-up some pain in her Achilles tendon.  She does have a history of previous Achilles tendinitis which was treated approximately 3.5 years ago.  Today she relates her pain is mild overall.  No other complaints period    2020:  Presents follow-up from cantharidin application to plantar wart plantar right heel.  Relates no pain however she still notes that there.    2020:  Follow-up from cantharidin application to plantar right heel.  Relates that a large piece of skin fell off.  She no longer has pain this area.  She also complains of intermittent pain to a right big toenail along the distal lateral nail border.  She has a history previous nail procedures to this area.    Vitals:    20 1615   Weight: (!) 152 kg (335 lb 1.6 oz)   Height: 5' 5" (1.651 m)   PainSc: 0-No pain      Past Medical History:   Diagnosis Date    HTN (hypertension)     Hyperlipidemia     Hypothyroid        Past Surgical History:   Procedure Laterality Date    CARPAL TUNNEL RELEASE       SECTION, CLASSIC      x 3    ENDOMETRIAL ABLATION      FOOT SURGERY      KNEE SURGERY      THYROID SURGERY         Family History   Problem Relation Age of Onset    Heart disease Mother     Heart disease Father     No Known Problems Sister     Heart disease Brother     No Known Problems Daughter     No Known Problems Son     No Known Problems Daughter     No Known Problems Daughter        Social History     Socioeconomic History    Marital status:      Spouse name: Not on file    Number of children: Not on file    Years of education: Not on file    Highest education level: Not on file "   Occupational History    Not on file   Social Needs    Financial resource strain: Not on file    Food insecurity     Worry: Not on file     Inability: Not on file    Transportation needs     Medical: Not on file     Non-medical: Not on file   Tobacco Use    Smoking status: Never Smoker    Smokeless tobacco: Never Used   Substance and Sexual Activity    Alcohol use: No    Drug use: No    Sexual activity: Not on file   Lifestyle    Physical activity     Days per week: Not on file     Minutes per session: Not on file    Stress: Not on file   Relationships    Social connections     Talks on phone: Not on file     Gets together: Not on file     Attends [employer] service: Not on file     Active member of club or organization: Not on file     Attends meetings of clubs or organizations: Not on file     Relationship status: Not on file   Other Topics Concern    Not on file   Social History Narrative    Not on file       Current Outpatient Medications   Medication Sig Dispense Refill    EPICERAM Diogenes USE AS A MOISTURIZING BARRIER AT LEAST TWICE DAILY  6    ezetimibe (ZETIA) 10 mg tablet Take 1 tablet (10 mg total) by mouth once daily. 90 tablet 0    hydroCHLOROthiazide (MICROZIDE) 12.5 mg capsule Take 1 capsule by mouth every other day. 15 capsule 2    levothyroxine (SYNTHROID) 112 MCG tablet Take 1 tablet (112 mcg total) by mouth before breakfast. 90 tablet 1    SOOLANTRA 1 % Crea       telmisartan (MICARDIS) 80 MG Tab Take 1 tablet (80 mg total) by mouth once daily. 90 tablet 0    traMADoL (ULTRAM) 50 mg tablet        No current facility-administered medications for this visit.        Review of patient's allergies indicates:  No Known Allergies      Review of Systems   Constitution: Negative for chills, fever and malaise/fatigue.   HENT: Negative for congestion and hearing loss.    Cardiovascular: Positive for leg swelling. Negative for chest pain and claudication.   Respiratory: Negative for cough and  shortness of breath.    Skin: Positive for nail changes.   Musculoskeletal: Negative for muscle weakness.   Gastrointestinal: Negative for nausea and vomiting.   Neurological: Negative for numbness and paresthesias.   Psychiatric/Behavioral: Negative for altered mental status.           Objective:      Physical Exam  Constitutional:       General: She is not in acute distress.     Appearance: She is not diaphoretic.   Cardiovascular:      Pulses:           Dorsalis pedis pulses are 2+ on the right side and 2+ on the left side.        Posterior tibial pulses are 1+ on the right side and 1+ on the left side.      Comments: Mild to moderate lower extremity edema with varicosities and telangiectasias.    No hair growth bilateral lower extremity.    Skin temp warm to lower extremity bilateral.  Musculoskeletal:      Comments: Localized pain on palpation to hyperkeratotic lesion plantar central right heel.    No pain with ROM or MMT bilateral lower extremity.    Mild pain on palpation overlying Achilles tendon posterior distal right leg.   Skin:     General: Skin is warm and dry.      Capillary Refill: Capillary refill takes less than 2 seconds.      Findings: No ecchymosis or rash.      Nails: There is no clubbing.        Comments: Mild hyperkeratotic skin plantar right heel.  Once the skin was trimmed note normal appearing skin and no recurrence of the plantar wart.    Right hallux nail is thickened with incurvated medial lateral nail borders.  There is no localized pain on palpation or signs infection.     Neurological:      Mental Status: She is alert and oriented to person, place, and time.               Assessment:       Encounter Diagnoses   Name Primary?    Plantar wart, right foot Yes    Onychocryptosis          Plan:       Yulissa was seen today for follow-up.    Diagnoses and all orders for this visit:    Plantar wart, right foot    Onychocryptosis      I counseled the patient on her conditions, their  implications and medical management.    No further treatment noted for the previous plantar wart to the plantar right heel.  We discussed appropriate hygiene in order prevent recurrence.    Inspected her right great toenails discussed above noting no signs infection.  We did discuss that she could receive partial nail avulsion phenol alcohol matrixectomy if recurrence.  We did discussed appropriate sizing shoes to prevent rubbing and irritation of her toes which could lead to painful and infected ingrown toenail.    RTC p.r.n. as discussed.      .

## 2020-08-11 ENCOUNTER — OFFICE VISIT (OUTPATIENT)
Dept: ORTHOPEDICS | Facility: CLINIC | Age: 60
End: 2020-08-11
Payer: COMMERCIAL

## 2020-08-11 DIAGNOSIS — M25.512 LEFT SHOULDER PAIN, UNSPECIFIED CHRONICITY: Primary | ICD-10-CM

## 2020-08-11 DIAGNOSIS — M19.019 SHOULDER ARTHRITIS: ICD-10-CM

## 2020-08-11 PROCEDURE — 99213 PR OFFICE/OUTPT VISIT, EST, LEVL III, 20-29 MIN: ICD-10-PCS | Mod: 95,,, | Performed by: ORTHOPAEDIC SURGERY

## 2020-08-11 PROCEDURE — 99213 OFFICE O/P EST LOW 20 MIN: CPT | Mod: 95,,, | Performed by: ORTHOPAEDIC SURGERY

## 2020-08-11 RX ORDER — TRAMADOL HYDROCHLORIDE 50 MG/1
50 TABLET ORAL EVERY 6 HOURS PRN
Qty: 60 TABLET | Refills: 0 | Status: SHIPPED | OUTPATIENT
Start: 2020-08-11 | End: 2021-01-11

## 2020-08-11 NOTE — PROGRESS NOTES
The patient location is:  at home  The chief complaint leading to consultation is:  Bilateral shoulder pain left worse than right    Visit type: audiovisual    Face to Face time with patient:  15 min  Fifteen minutes of total time spent on the encounter, which includes face to face time and non-face to face time preparing to see the patient (eg, review of tests), Obtaining and/or reviewing separately obtained history, Documenting clinical information in the electronic or other health record, Independently interpreting results (not separately reported) and communicating results to the patient/family/caregiver, or Care coordination (not separately reported).         Each patient to whom he or she provides medical services by telemedicine is:  (1) informed of the relationship between the physician and patient and the respective role of any other health care provider with respect to management of the patient; and (2) notified that he or she may decline to receive medical services by telemedicine and may withdraw from such care at any time.    Notes:   Subjective:      Patient ID: Yulissa Hatfield is a 59 y.o. female.  Chief Complaint: No chief complaint on file.      HPI  Yulissa Hatfield is a  59 y.o. female presenting today for follow up of bilateral shoulder pain related to arthritis.  She reports that she is having worsening symptoms recently after starting physical therapy left shoulder worse than right  The therapist has recommended an MRI scan of the left shoulder  She has also had stomach problems related to the Duexis and would like a different pain medicine.    Review of patient's allergies indicates:  No Known Allergies      Current Outpatient Medications   Medication Sig Dispense Refill    EPICERAM Diogenes USE AS A MOISTURIZING BARRIER AT LEAST TWICE DAILY  6    ezetimibe (ZETIA) 10 mg tablet Take 1 tablet (10 mg total) by mouth once daily. 90 tablet 0    hydroCHLOROthiazide (MICROZIDE) 12.5 mg capsule  Take 1 capsule by mouth every other day. 15 capsule 2    levothyroxine (SYNTHROID) 112 MCG tablet Take 1 tablet (112 mcg total) by mouth before breakfast. 90 tablet 1    SOOLANTRA 1 % Crea       telmisartan (MICARDIS) 80 MG Tab Take 1 tablet (80 mg total) by mouth once daily. 90 tablet 0    traMADoL (ULTRAM) 50 mg tablet       traMADoL (ULTRAM) 50 mg tablet Take 1 tablet (50 mg total) by mouth every 6 (six) hours as needed for Pain. 60 tablet 0     No current facility-administered medications for this visit.        Past Medical History:   Diagnosis Date    HTN (hypertension)     Hyperlipidemia     Hypothyroid        Past Surgical History:   Procedure Laterality Date    CARPAL TUNNEL RELEASE       SECTION, CLASSIC      x 3    ENDOMETRIAL ABLATION      FOOT SURGERY      KNEE SURGERY      THYROID SURGERY         OBJECTIVE:   PHYSICAL EXAM:       There were no vitals filed for this visit.  Ortho/SPM Exam  By report she is having difficulty with lifting and reaching especially with the left hand and shoulder    RADIOGRAPHS:  None  Comments: I have personally reviewed the imaging and I agree with the above radiologist's report.    ASSESSMENT/PLAN:   1.  Bilateral shoulder arthritis.  2.  Possible rotator cuff tear left shoulder  IMPRESSION:  As above    PLAN:  I have ordered some tramadol for pain  Recommended she discontinue therapy  I have also ordered an MRI scan left shoulder to check the rotator cuff      FOLLOW UP:  After the MRI is complete    Disclaimer: This note has been generated using voice-recognition software. There may be typographical errors that have been missed during proof-reading.

## 2020-09-14 ENCOUNTER — TELEPHONE (OUTPATIENT)
Dept: GASTROENTEROLOGY | Facility: CLINIC | Age: 60
End: 2020-09-14

## 2020-09-14 NOTE — TELEPHONE ENCOUNTER
Pt notified of appt with  10/26/20 @ 10:30am        ----- Message from Aria Rubina sent at 9/14/2020  8:26 AM CDT -----  Type:  Sooner Appointment Request    Patient is requesting a sooner appointment.  Patient declined first available appointment listed as well as another facility and provider .  Patient will not accept being placed on the waitlist and is requesting a message be sent to doctor.    Name of Caller: spouse/ Yulissa    When is the first available appointment? December    Symptoms: Cardiology appt f/u / colonoscopy.     Would the patient rather a call back or a response via [facility]? Call back     Best Call Back Number: [phone]    Additional Information: asking to be scheduled along with .

## 2020-09-28 ENCOUNTER — OFFICE VISIT (OUTPATIENT)
Dept: URGENT CARE | Facility: CLINIC | Age: 60
End: 2020-09-28
Payer: COMMERCIAL

## 2020-09-28 VITALS
BODY MASS INDEX: 48.82 KG/M2 | HEIGHT: 65 IN | DIASTOLIC BLOOD PRESSURE: 55 MMHG | WEIGHT: 293 LBS | SYSTOLIC BLOOD PRESSURE: 107 MMHG | HEART RATE: 78 BPM | TEMPERATURE: 98 F | OXYGEN SATURATION: 98 %

## 2020-09-28 DIAGNOSIS — T14.90XA INJURY: Primary | ICD-10-CM

## 2020-09-28 PROCEDURE — 99214 PR OFFICE/OUTPT VISIT, EST, LEVL IV, 30-39 MIN: ICD-10-PCS | Mod: S$GLB,,, | Performed by: NURSE PRACTITIONER

## 2020-09-28 PROCEDURE — 73610 X-RAY EXAM OF ANKLE: CPT | Mod: FY,RT,S$GLB, | Performed by: RADIOLOGY

## 2020-09-28 PROCEDURE — 73630 XR FOOT COMPLETE 3 VIEW RIGHT: ICD-10-PCS | Mod: FY,RT,S$GLB, | Performed by: RADIOLOGY

## 2020-09-28 PROCEDURE — 73630 X-RAY EXAM OF FOOT: CPT | Mod: FY,RT,S$GLB, | Performed by: RADIOLOGY

## 2020-09-28 PROCEDURE — 99214 OFFICE O/P EST MOD 30 MIN: CPT | Mod: S$GLB,,, | Performed by: NURSE PRACTITIONER

## 2020-09-28 PROCEDURE — 73610 XR ANKLE COMPLETE 3 VIEW RIGHT: ICD-10-PCS | Mod: FY,RT,S$GLB, | Performed by: RADIOLOGY

## 2020-09-28 RX ORDER — ASPIRIN 81 MG/1
TABLET ORAL
COMMUNITY
Start: 2020-09-22 | End: 2022-05-23

## 2020-09-28 NOTE — PROGRESS NOTES
"Subjective:       Patient ID: Yulissa Hatfield is a 59 y.o. female.    Vitals:  height is 5' 5" (1.651 m) and weight is 152 kg (335 lb) (abnormal). Her temporal temperature is 97.5 °F (36.4 °C). Her blood pressure is 107/55 (abnormal) and her pulse is 78. Her oxygen saturation is 98%.     Chief Complaint: Foot Pain    Patient states she stepped into a wedge shoe and her right foot/ankle leaned outward.    Foot Pain  This is a new problem. Episode onset: 6 days ago. The problem occurs constantly. The problem has been gradually worsening. Pertinent negatives include no abdominal pain, anorexia, arthralgias, change in bowel habit, chest pain, chills, congestion, coughing, diaphoresis, fatigue, fever, headaches, joint swelling, myalgias, nausea, neck pain, numbness, rash, sore throat, swollen glands, urinary symptoms, vertigo, visual change, vomiting or weakness. The symptoms are aggravated by walking and standing. She has tried ice (tylenol, aleve, elevation, ankle brace) for the symptoms. The treatment provided mild relief.       Constitution: Negative for chills, sweating, fatigue and fever.   HENT: Negative for congestion and sore throat.    Neck: Negative for neck pain and painful lymph nodes.   Cardiovascular: Negative for chest pain and leg swelling.   Eyes: Negative for double vision and blurred vision.   Respiratory: Negative for cough and shortness of breath.    Gastrointestinal: Negative for abdominal pain, nausea, vomiting and diarrhea.   Genitourinary: Negative for dysuria, frequency, urgency and history of kidney stones.   Musculoskeletal: Positive for pain. Negative for joint pain, joint swelling, muscle cramps and muscle ache.   Skin: Negative for color change, pale, rash and bruising.   Allergic/Immunologic: Negative for seasonal allergies.   Neurological: Negative for dizziness, history of vertigo, light-headedness, passing out, headaches and numbness.   Hematologic/Lymphatic: Negative for swollen " lymph nodes.   Psychiatric/Behavioral: Negative for nervous/anxious, sleep disturbance and depression. The patient is not nervous/anxious.        Objective:      Physical Exam   Constitutional: She is oriented to person, place, and time. She appears well-developed. She is cooperative. No distress.   HENT:   Head: Normocephalic and atraumatic.   Nose: Nose normal.   Eyes: Conjunctivae and lids are normal.   Cardiovascular: Normal rate, regular rhythm, normal heart sounds and normal pulses.   Pulmonary/Chest: Effort normal and breath sounds normal.   Musculoskeletal:         General: No deformity.      Right ankle: She exhibits decreased range of motion and swelling. She exhibits no ecchymosis, no deformity, no laceration and normal pulse. Tenderness (TTP, see diagram). Achilles tendon normal.      Right foot: Decreased range of motion. Normal capillary refill. Tenderness, bony tenderness and swelling present. No crepitus, deformity or laceration.   Neurological: She is alert and oriented to person, place, and time. She has normal strength and normal reflexes. She is not disoriented. No sensory deficit.   Skin: Skin is warm, dry, intact and not diaphoretic. not right foot and not right anklePsychiatric: Her speech is normal and behavior is normal. Judgment and thought content normal.   Nursing note and vitals reviewed.        Assessment:       1. Injury        Plan:       X-ray Ankle Complete 3 View Right    Result Date: 9/28/2020  EXAMINATION: XR FOOT COMPLETE 3 VIEW RIGHT; XR ANKLE COMPLETE 3 VIEW RIGHT CLINICAL HISTORY: . Injury, unspecified, initial encounter TECHNIQUE: AP, lateral, and oblique views of the right foot and ankle were performed. COMPARISON: None FINDINGS: Diffuse prominence of the soft tissues with subcutaneous stranding about the imaged distal right lower leg and ankle and also foot particularly along the dorsal aspect.  No subcutaneous emphysema or radiodense retained foreign body.  Scattered  nonspecific soft tissue calcifications about the posterior aspect of the ankle and hindfoot. Ankle mortise is intact noting mild degenerative change.  Lisfranc articulation is congruent.  Minimal degenerative change at the 1st MTP joint.  No displaced fracture, dislocation or destructive osseous process.     Distal right lower leg, ankle and dorsal foot diffuse nonspecific soft tissue swelling without acute displaced fracture-dislocation identified. Electronically signed by: Eric Parks MD Date:    09/28/2020 Time:    18:56    X-ray Foot Complete 3 View Right    Result Date: 9/28/2020  EXAMINATION: XR FOOT COMPLETE 3 VIEW RIGHT; XR ANKLE COMPLETE 3 VIEW RIGHT CLINICAL HISTORY: . Injury, unspecified, initial encounter TECHNIQUE: AP, lateral, and oblique views of the right foot and ankle were performed. COMPARISON: None FINDINGS: Diffuse prominence of the soft tissues with subcutaneous stranding about the imaged distal right lower leg and ankle and also foot particularly along the dorsal aspect.  No subcutaneous emphysema or radiodense retained foreign body.  Scattered nonspecific soft tissue calcifications about the posterior aspect of the ankle and hindfoot. Ankle mortise is intact noting mild degenerative change.  Lisfranc articulation is congruent.  Minimal degenerative change at the 1st MTP joint.  No displaced fracture, dislocation or destructive osseous process.     Distal right lower leg, ankle and dorsal foot diffuse nonspecific soft tissue swelling without acute displaced fracture-dislocation identified. Electronically signed by: Eric Parks MD Date:    09/28/2020 Time:    18:56    Injury  -     X-Ray Foot Complete 3 view Right; Future; Expected date: 09/28/2020  -     X-Ray Ankle Complete 3 View Right; Future; Expected date: 09/28/2020      Patient Instructions     Please follow up with your Primary care provider within 2-5 days if your signs and symptoms have not resolved or worsen.     If your condition  worsens or fails to improve we recommend that you receive another evaluation at the emergency room immediately or contact your primary medical clinic to discuss your concerns.    You must understand that you have received an Urgent Care treatment only and that you may be released before all of your medical problems are known or treated.   You, the patient, will arrange for follow up care as instructed.       ORTHO    R.I.C.E. to the affected joint or limb as needed:    Rest- the injured or sore extremity.  Ice- for the next 24-48 hours, alternating 20 minutes on and 20 minutes off as needed up to 3 times a day.   Compression-Use bandages to stabilize injured limb.  Check circulation to make sure  bandage is not too tight.    Elevate-when possible to reduce swelling.      Ice 20 minutes on and 20 minutes off as needed for pain.     Please drink plenty of fluids.    Please get plenty of rest.    Please return here or go to the Emergency Department for any concerns or worsening of condition.    Please be aware that narcotics can be addictive. If I gave you narcotics, I have given you a limited quantity to take as it is needed at this time. However take it sparingly and only when needed.  Do not operate machinery or drive on this medication.      If you were not prescribed an anti-inflammatory medication, and if you do not have any history of stomach/intestinal ulcers, or kidney disease, or are not taking a blood thinner such as Coumadin, Plavix, Pradaxa, Eloquis, or Xaralta for example, it is OK to take over the counter Ibuprofen or Advil or Motrin or Aleve as directed.  Do not take these medications on an empty stomach.    If you were given a splint wear it at all times unless otherwise instructed.     If you were given crutches use them as we instructed. Do not rest your armpits on the foam pad or you risk compressing the nerves and the vessels there.    Please follow up with your primary care doctor or specialist as  needed.    If you lose control of your bowel and/or bladder, please go to the nearest Emergency Department immediately.  If you lose sensation in between your legs by your genitalia and/or rectum, please go to the nearest Emergency Department immediately.  If you lose control or sensation of any extremity, please go to the nearest Emergency Department immediately.      R.I.C.E.    R.I.C.E. stands for Rest, Ice, Compression, and Elevation. Doing these things helps limit pain and swelling after an injury. R.I.C.E. also helps injuries heal faster. Use R.I.C.E. for sprains, strains, and severe bruises or bumps. Follow the tips on this handout and begin R.I.C.E. as soon as possible after an injury.  ? Rest  Pain is your bodys way of telling you to rest an injured area. Whether you have hurt an elbow, hand, foot, or knee, limiting its use will prevent further injury and help you heal.  ? Ice  Applying ice right after an injury helps prevent swelling and reduce pain. Dont place ice directly on your skin.  · Wrap a cold pack or bag of ice in a thin cloth. Place it over the injured area.  · Ice for 10 minutes every 3 hours. Dont ice for more than 20 minutes at a time.  ? Compression  Putting pressure (compression) on an injury helps prevent swelling and provides support.  · Wrap the injured area firmly with an elastic bandage. If your hand or foot tingles, becomes discolored, or feels cold to the touch, the bandage may be too tight. Rewrap it more loosely.  · If your bandage becomes too loose, rewrap it.  · Do not wear an elastic bandage overnight.  ? Elevation  Keeping an injury elevated helps reduce swelling, pain, and throbbing. Elevation is most effective when the injury is kept elevated higher than the heart.     Call your healthcare provider if you notice any of the following:  · Fingers or toes feel numb, are cold to the touch, or change color  · Skin looks shiny or tight  · Pain, swelling, or bruising worsens and  is not improved with elevation   Date Last Reviewed: 9/3/2015  © 6655-9131 The "[employer]. [address] [place] [address], [place] [address]. All rights reserved. This information is not intended as a substitute for professional medical care. Always follow your healthcare professional's instructions.

## 2020-09-29 NOTE — PATIENT INSTRUCTIONS
Please follow up with your Primary care provider within 2-5 days if your signs and symptoms have not resolved or worsen.     If your condition worsens or fails to improve we recommend that you receive another evaluation at the emergency room immediately or contact your primary medical clinic to discuss your concerns.    You must understand that you have received an Urgent Care treatment only and that you may be released before all of your medical problems are known or treated.   You, the patient, will arrange for follow up care as instructed.       ORTHO    R.I.C.E. to the affected joint or limb as needed:    Rest- the injured or sore extremity.  Ice- for the next 24-48 hours, alternating 20 minutes on and 20 minutes off as needed up to 3 times a day.   Compression-Use bandages to stabilize injured limb.  Check circulation to make sure  bandage is not too tight.    Elevate-when possible to reduce swelling.      Ice 20 minutes on and 20 minutes off as needed for pain.     Please drink plenty of fluids.    Please get plenty of rest.    Please return here or go to the Emergency Department for any concerns or worsening of condition.    Please be aware that narcotics can be addictive. If I gave you narcotics, I have given you a limited quantity to take as it is needed at this time. However take it sparingly and only when needed.  Do not operate machinery or drive on this medication.      If you were not prescribed an anti-inflammatory medication, and if you do not have any history of stomach/intestinal ulcers, or kidney disease, or are not taking a blood thinner such as Coumadin, Plavix, Pradaxa, Eloquis, or Xaralta for example, it is OK to take over the counter Ibuprofen or Advil or Motrin or Aleve as directed.  Do not take these medications on an empty stomach.    If you were given a splint wear it at all times unless otherwise instructed.     If you were given crutches use them as we instructed. Do not rest your  armpits on the foam pad or you risk compressing the nerves and the vessels there.    Please follow up with your primary care doctor or specialist as needed.    If you lose control of your bowel and/or bladder, please go to the nearest Emergency Department immediately.  If you lose sensation in between your legs by your genitalia and/or rectum, please go to the nearest Emergency Department immediately.  If you lose control or sensation of any extremity, please go to the nearest Emergency Department immediately.      R.I.C.E.    R.I.C.E. stands for Rest, Ice, Compression, and Elevation. Doing these things helps limit pain and swelling after an injury. R.I.C.E. also helps injuries heal faster. Use R.I.C.E. for sprains, strains, and severe bruises or bumps. Follow the tips on this handout and begin R.I.C.E. as soon as possible after an injury.  ? Rest  Pain is your bodys way of telling you to rest an injured area. Whether you have hurt an elbow, hand, foot, or knee, limiting its use will prevent further injury and help you heal.  ? Ice  Applying ice right after an injury helps prevent swelling and reduce pain. Dont place ice directly on your skin.  · Wrap a cold pack or bag of ice in a thin cloth. Place it over the injured area.  · Ice for 10 minutes every 3 hours. Dont ice for more than 20 minutes at a time.  ? Compression  Putting pressure (compression) on an injury helps prevent swelling and provides support.  · Wrap the injured area firmly with an elastic bandage. If your hand or foot tingles, becomes discolored, or feels cold to the touch, the bandage may be too tight. Rewrap it more loosely.  · If your bandage becomes too loose, rewrap it.  · Do not wear an elastic bandage overnight.  ? Elevation  Keeping an injury elevated helps reduce swelling, pain, and throbbing. Elevation is most effective when the injury is kept elevated higher than the heart.     Call your healthcare provider if you notice any of the  following:  · Fingers or toes feel numb, are cold to the touch, or change color  · Skin looks shiny or tight  · Pain, swelling, or bruising worsens and is not improved with elevation   Date Last Reviewed: 9/3/2015  © 6205-0662 [employer]. [address] [place] [address]. All rights reserved. This information is not intended as a substitute for professional medical care. Always follow your healthcare professional's instructions.

## 2020-10-01 ENCOUNTER — OFFICE VISIT (OUTPATIENT)
Dept: PODIATRY | Facility: CLINIC | Age: 60
End: 2020-10-01
Payer: COMMERCIAL

## 2020-10-01 ENCOUNTER — TELEPHONE (OUTPATIENT)
Dept: URGENT CARE | Facility: CLINIC | Age: 60
End: 2020-10-01

## 2020-10-01 VITALS
HEIGHT: 65 IN | DIASTOLIC BLOOD PRESSURE: 89 MMHG | HEART RATE: 68 BPM | WEIGHT: 293 LBS | BODY MASS INDEX: 48.82 KG/M2 | SYSTOLIC BLOOD PRESSURE: 141 MMHG

## 2020-10-01 DIAGNOSIS — I87.2 VENOUS INSUFFICIENCY OF BOTH LOWER EXTREMITIES: ICD-10-CM

## 2020-10-01 PROCEDURE — 99999 PR PBB SHADOW E&M-EST. PATIENT-LVL IV: ICD-10-PCS | Mod: PBBFAC,,, | Performed by: PODIATRIST

## 2020-10-01 PROCEDURE — 3077F SYST BP >= 140 MM HG: CPT | Mod: CPTII,S$GLB,, | Performed by: PODIATRIST

## 2020-10-01 PROCEDURE — 99999 PR PBB SHADOW E&M-EST. PATIENT-LVL IV: CPT | Mod: PBBFAC,,, | Performed by: PODIATRIST

## 2020-10-01 PROCEDURE — 3008F BODY MASS INDEX DOCD: CPT | Mod: CPTII,S$GLB,, | Performed by: PODIATRIST

## 2020-10-01 PROCEDURE — 3008F PR BODY MASS INDEX (BMI) DOCUMENTED: ICD-10-PCS | Mod: CPTII,S$GLB,, | Performed by: PODIATRIST

## 2020-10-01 PROCEDURE — 3077F PR MOST RECENT SYSTOLIC BLOOD PRESSURE >= 140 MM HG: ICD-10-PCS | Mod: CPTII,S$GLB,, | Performed by: PODIATRIST

## 2020-10-01 PROCEDURE — 99213 OFFICE O/P EST LOW 20 MIN: CPT | Mod: S$GLB,,, | Performed by: PODIATRIST

## 2020-10-01 PROCEDURE — 3079F PR MOST RECENT DIASTOLIC BLOOD PRESSURE 80-89 MM HG: ICD-10-PCS | Mod: CPTII,S$GLB,, | Performed by: PODIATRIST

## 2020-10-01 PROCEDURE — 99213 PR OFFICE/OUTPT VISIT, EST, LEVL III, 20-29 MIN: ICD-10-PCS | Mod: S$GLB,,, | Performed by: PODIATRIST

## 2020-10-01 PROCEDURE — 3079F DIAST BP 80-89 MM HG: CPT | Mod: CPTII,S$GLB,, | Performed by: PODIATRIST

## 2020-10-01 RX ORDER — MELOXICAM 15 MG/1
15 TABLET ORAL DAILY
Qty: 30 TABLET | Refills: 1 | Status: SHIPPED | OUTPATIENT
Start: 2020-10-01 | End: 2021-01-11

## 2020-10-01 NOTE — PROGRESS NOTES
"Subjective:      Patient ID: Yulissa Hatfield is a 59 y.o. female.    Chief Complaint: Ankle Pain (right )    complains of painful callus on the bottom of the right foot for the past 3 months.  Relates that she was placing most of pressure on the ball the right foot in order to avoid pressure to the heel which caused her flare-up some pain in her Achilles tendon.  She does have a history of previous Achilles tendinitis which was treated approximately 3.5 years ago.  Today she relates her pain is mild overall.  No other complaints period    2020:  Presents follow-up from cantharidin application to plantar wart plantar right heel.  Relates no pain however she still notes that there.    2020:  Follow-up from cantharidin application to plantar right heel.  Relates that a large piece of skin fell off.  She no longer has pain this area.  She also complains of intermittent pain to a right big toenail along the distal lateral nail border.  She has a history previous nail procedures to this area.    10/01/2020:  Presents today complaining of persistent pain and swelling to the right ankle and foot after she rolled over the ankle while walking approximately 11 days ago.  She was seen at Urgent Care 2 days ago wound performed right foot and ankle x-ray.  No fracture or dislocation was noted.  She was offered orthopedic boot however turned down.  She has been taking Aleve as needed for pain.  She does note that the pain has gradually improved and she is able to walk on the foot again.    Vitals:    10/01/20 0807   BP: (!) 141/89   Pulse: 68   Weight: (!) 152 kg (335 lb 1.6 oz)   Height: 5' 5" (1.651 m)   PainSc:   6   PainLoc: Ankle      Past Medical History:   Diagnosis Date    HTN (hypertension)     Hyperlipidemia     Hypothyroid        Past Surgical History:   Procedure Laterality Date    CARPAL TUNNEL RELEASE       SECTION, CLASSIC      x 3    ENDOMETRIAL ABLATION      FOOT SURGERY      KNEE " SURGERY      THYROID SURGERY         Family History   Problem Relation Age of Onset    Heart disease Mother     Heart disease Father     No Known Problems Sister     Heart disease Brother     No Known Problems Daughter     No Known Problems Son     No Known Problems Daughter     No Known Problems Daughter        Social History     Socioeconomic History    Marital status:      Spouse name: Not on file    Number of children: Not on file    Years of education: Not on file    Highest education level: Not on file   Occupational History    Not on file   Social Needs    Financial resource strain: Not on file    Food insecurity     Worry: Not on file     Inability: Not on file    Transportation needs     Medical: Not on file     Non-medical: Not on file   Tobacco Use    Smoking status: Never Smoker    Smokeless tobacco: Never Used   Substance and Sexual Activity    Alcohol use: No    Drug use: No    Sexual activity: Not on file   Lifestyle    Physical activity     Days per week: Not on file     Minutes per session: Not on file    Stress: Not on file   Relationships    Social connections     Talks on phone: Not on file     Gets together: Not on file     Attends Sabianism service: Not on file     Active member of club or organization: Not on file     Attends meetings of clubs or organizations: Not on file     Relationship status: Not on file   Other Topics Concern    Not on file   Social History Narrative    Not on file       Current Outpatient Medications   Medication Sig Dispense Refill    aspirin (ECOTRIN) 81 MG EC tablet Take by mouth.      EPICERAM Diogenes USE AS A MOISTURIZING BARRIER AT LEAST TWICE DAILY  6    ezetimibe (ZETIA) 10 mg tablet Take 1 tablet (10 mg total) by mouth once daily. 90 tablet 0    hydroCHLOROthiazide (MICROZIDE) 12.5 mg capsule Take 1 capsule by mouth every other day. 15 capsule 2    levothyroxine (SYNTHROID) 112 MCG tablet Take 1 tablet (112 mcg total) by  mouth before breakfast. 90 tablet 1    SOOLANTRA 1 % Crea       telmisartan (MICARDIS) 80 MG Tab Take 1 tablet (80 mg total) by mouth once daily. 90 tablet 0    meloxicam (MOBIC) 15 MG tablet Take 1 tablet (15 mg total) by mouth once daily. 30 tablet 1    traMADoL (ULTRAM) 50 mg tablet       traMADoL (ULTRAM) 50 mg tablet Take 1 tablet (50 mg total) by mouth every 6 (six) hours as needed for Pain. (Patient not taking: Reported on 9/28/2020) 60 tablet 0     No current facility-administered medications for this visit.        Review of patient's allergies indicates:   Allergen Reactions    Cortisone Rash     CORTISONE         Review of Systems   Constitution: Negative for chills, fever and malaise/fatigue.   HENT: Negative for congestion and hearing loss.    Cardiovascular: Positive for leg swelling. Negative for chest pain and claudication.   Respiratory: Negative for cough and shortness of breath.    Skin: Positive for nail changes.   Musculoskeletal: Negative for muscle weakness.   Gastrointestinal: Negative for nausea and vomiting.   Neurological: Negative for numbness and paresthesias.   Psychiatric/Behavioral: Negative for altered mental status.           Objective:      Physical Exam  Constitutional:       General: She is not in acute distress.     Appearance: She is not diaphoretic.   Cardiovascular:      Pulses:           Dorsalis pedis pulses are 2+ on the right side and 2+ on the left side.        Posterior tibial pulses are 1+ on the right side and 1+ on the left side.      Comments: Mild to moderate lower extremity edema with varicosities and telangiectasias.    No hair growth bilateral lower extremity.    Skin temp warm to lower extremity bilateral.  Musculoskeletal:      Comments: Localized pain on palpation overlying the distal fibula/lateral malleolus in overlying the ATF and CF ligament regions of the right ankle.  Negative anterior drawer sign.  No pain with stress inversion or eversion of the  ankle.  No pain with active resistive eversion/plantar flexion with eversion of the right foot.    Mild pain on palpation overlying the base of 5th metatarsal along the 5th metatarsal shaft right foot.    No pain with ROM or MMT bilateral lower extremity.       Skin:     General: Skin is warm and dry.      Capillary Refill: Capillary refill takes less than 2 seconds.      Findings: No ecchymosis or rash.      Nails: There is no clubbing.        Comments: Mild hyperkeratotic skin plantar right heel.  Once the skin was trimmed note normal appearing skin and no recurrence of the plantar wart.    Right hallux nail is thickened with incurvated medial lateral nail borders.  There is no localized pain on palpation or signs infection.     Neurological:      Mental Status: She is alert and oriented to person, place, and time.               Assessment:       Encounter Diagnoses   Name Primary?    First degree ankle sprain, right, initial encounter Yes    Venous insufficiency of both lower extremities          Plan:       Yulissa was seen today for ankle pain.    Diagnoses and all orders for this visit:    First degree ankle sprain, right, initial encounter  -     Ambulatory referral/consult to Physical/Occupational Therapy; Future    Venous insufficiency of both lower extremities    Other orders  -     meloxicam (MOBIC) 15 MG tablet; Take 1 tablet (15 mg total) by mouth once daily.      I counseled the patient on her conditions, their implications and medical management.    Reviewed right foot ankle x-ray in detail noting no fracture subluxation.    Applied Ace wrap compression to the right ankle.  Did recommend that the patient obtain over-the-counter compression sleeve for her right ankle.  He can also try her compression socks.  Recommended she wear good supportive tennis shoes.    Rest, ice and elevate p.r.n. as discussed.    Referred to physical therapy for proprioceptive exercises in for gradual strengthening and  retraining to the right lower extremity.    RTC p.r.n. as discussed.    .

## 2020-10-20 ENCOUNTER — TELEPHONE (OUTPATIENT)
Dept: SLEEP MEDICINE | Facility: CLINIC | Age: 60
End: 2020-10-20

## 2020-10-20 NOTE — TELEPHONE ENCOUNTER
----- Message from Rossi Covington sent at 10/20/2020  8:47 AM CDT -----  Regarding: appt  Name of Who is Calling: ANDREW HURD [[id number]]What is the request in detail: Requesting a call back to see if she can get an follow up appt. There were no appt available. Can the clinic reply by [facility]: no What Number to Call Back if not in [facility]: [phone]

## 2020-10-20 NOTE — TELEPHONE ENCOUNTER
----- Message from Rossi Covington sent at 10/20/2020  8:47 AM CDT -----  Regarding: appt  Name of Who is Calling: ANDREW HURD [[id number]]What is the request in detail: Requesting a call back to see if she can get an follow up appt. There were no appt available. Can the clinic reply by [facility]: no What Number to Call Back if not in [facility]: [phone]

## 2020-10-26 ENCOUNTER — TELEPHONE (OUTPATIENT)
Dept: GASTROENTEROLOGY | Facility: CLINIC | Age: 60
End: 2020-10-26

## 2020-10-26 NOTE — TELEPHONE ENCOUNTER
Left message for pt to call me back        ----- Message from Sunny Cochran sent at 10/26/2020  8:21 AM CDT -----  Regarding: Appointment  Contact: Self/ [phone]  Patient would like to be seen sooner than the next available appointment. Please advise.

## 2021-01-08 ENCOUNTER — TELEPHONE (OUTPATIENT)
Dept: GASTROENTEROLOGY | Facility: CLINIC | Age: 61
End: 2021-01-08

## 2021-01-11 ENCOUNTER — OFFICE VISIT (OUTPATIENT)
Dept: GASTROENTEROLOGY | Facility: CLINIC | Age: 61
End: 2021-01-11
Payer: COMMERCIAL

## 2021-01-11 VITALS
SYSTOLIC BLOOD PRESSURE: 147 MMHG | HEART RATE: 92 BPM | DIASTOLIC BLOOD PRESSURE: 83 MMHG | BODY MASS INDEX: 56.02 KG/M2 | WEIGHT: 293 LBS

## 2021-01-11 DIAGNOSIS — Z86.010 HISTORY OF COLONIC POLYPS: ICD-10-CM

## 2021-01-11 DIAGNOSIS — R10.12 LUQ ABDOMINAL PAIN: Primary | ICD-10-CM

## 2021-01-11 DIAGNOSIS — Z12.11 COLON CANCER SCREENING: ICD-10-CM

## 2021-01-11 PROCEDURE — 3079F DIAST BP 80-89 MM HG: CPT | Mod: CPTII,S$GLB,, | Performed by: INTERNAL MEDICINE

## 2021-01-11 PROCEDURE — 3077F PR MOST RECENT SYSTOLIC BLOOD PRESSURE >= 140 MM HG: ICD-10-PCS | Mod: CPTII,S$GLB,, | Performed by: INTERNAL MEDICINE

## 2021-01-11 PROCEDURE — 99999 PR PBB SHADOW E&M-EST. PATIENT-LVL III: CPT | Mod: PBBFAC,,, | Performed by: INTERNAL MEDICINE

## 2021-01-11 PROCEDURE — 3008F BODY MASS INDEX DOCD: CPT | Mod: CPTII,S$GLB,, | Performed by: INTERNAL MEDICINE

## 2021-01-11 PROCEDURE — 99204 PR OFFICE/OUTPT VISIT, NEW, LEVL IV, 45-59 MIN: ICD-10-PCS | Mod: S$GLB,,, | Performed by: INTERNAL MEDICINE

## 2021-01-11 PROCEDURE — 99999 PR PBB SHADOW E&M-EST. PATIENT-LVL III: ICD-10-PCS | Mod: PBBFAC,,, | Performed by: INTERNAL MEDICINE

## 2021-01-11 PROCEDURE — 3079F PR MOST RECENT DIASTOLIC BLOOD PRESSURE 80-89 MM HG: ICD-10-PCS | Mod: CPTII,S$GLB,, | Performed by: INTERNAL MEDICINE

## 2021-01-11 PROCEDURE — 1125F AMNT PAIN NOTED PAIN PRSNT: CPT | Mod: S$GLB,,, | Performed by: INTERNAL MEDICINE

## 2021-01-11 PROCEDURE — 3077F SYST BP >= 140 MM HG: CPT | Mod: CPTII,S$GLB,, | Performed by: INTERNAL MEDICINE

## 2021-01-11 PROCEDURE — 99204 OFFICE O/P NEW MOD 45 MIN: CPT | Mod: S$GLB,,, | Performed by: INTERNAL MEDICINE

## 2021-01-11 PROCEDURE — 1125F PR PAIN SEVERITY QUANTIFIED, PAIN PRESENT: ICD-10-PCS | Mod: S$GLB,,, | Performed by: INTERNAL MEDICINE

## 2021-01-11 PROCEDURE — 3008F PR BODY MASS INDEX (BMI) DOCUMENTED: ICD-10-PCS | Mod: CPTII,S$GLB,, | Performed by: INTERNAL MEDICINE

## 2021-01-11 RX ORDER — HYDROCODONE BITARTRATE AND ACETAMINOPHEN 5; 325 MG/1; MG/1
TABLET ORAL
COMMUNITY
Start: 2020-12-27 | End: 2021-01-13

## 2021-01-11 RX ORDER — OMEPRAZOLE 40 MG/1
40 CAPSULE, DELAYED RELEASE ORAL DAILY
Qty: 30 CAPSULE | Refills: 3 | Status: SHIPPED | OUTPATIENT
Start: 2021-01-11 | End: 2021-04-08

## 2021-01-11 RX ORDER — HYDROCHLOROTHIAZIDE 25 MG/1
25 TABLET ORAL DAILY
COMMUNITY
Start: 2020-11-05 | End: 2021-01-11

## 2021-01-11 RX ORDER — ETODOLAC 400 MG/1
400 TABLET, FILM COATED ORAL EVERY 8 HOURS
COMMUNITY
Start: 2020-12-27 | End: 2021-01-13

## 2021-01-11 RX ORDER — HYDROCHLOROTHIAZIDE 25 MG/1
TABLET ORAL
COMMUNITY
Start: 2021-01-01 | End: 2021-06-09 | Stop reason: SDUPTHER

## 2021-01-13 ENCOUNTER — PATIENT MESSAGE (OUTPATIENT)
Dept: FAMILY MEDICINE | Facility: CLINIC | Age: 61
End: 2021-01-13

## 2021-01-13 ENCOUNTER — OFFICE VISIT (OUTPATIENT)
Dept: SLEEP MEDICINE | Facility: CLINIC | Age: 61
End: 2021-01-13
Payer: COMMERCIAL

## 2021-01-13 ENCOUNTER — OFFICE VISIT (OUTPATIENT)
Dept: FAMILY MEDICINE | Facility: CLINIC | Age: 61
End: 2021-01-13
Payer: COMMERCIAL

## 2021-01-13 VITALS
HEART RATE: 81 BPM | DIASTOLIC BLOOD PRESSURE: 74 MMHG | TEMPERATURE: 97 F | SYSTOLIC BLOOD PRESSURE: 126 MMHG | WEIGHT: 293 LBS | BODY MASS INDEX: 55.91 KG/M2

## 2021-01-13 VITALS
BODY MASS INDEX: 48.82 KG/M2 | OXYGEN SATURATION: 99 % | TEMPERATURE: 98 F | DIASTOLIC BLOOD PRESSURE: 84 MMHG | SYSTOLIC BLOOD PRESSURE: 138 MMHG | HEIGHT: 65 IN | HEART RATE: 82 BPM | WEIGHT: 293 LBS

## 2021-01-13 DIAGNOSIS — F41.1 GENERALIZED ANXIETY DISORDER WITH PANIC ATTACKS: ICD-10-CM

## 2021-01-13 DIAGNOSIS — Z00.00 ANNUAL PHYSICAL EXAM: Primary | ICD-10-CM

## 2021-01-13 DIAGNOSIS — G47.33 OSA (OBSTRUCTIVE SLEEP APNEA): Primary | ICD-10-CM

## 2021-01-13 DIAGNOSIS — I87.2 CHRONIC VENOUS STASIS DERMATITIS OF BOTH LOWER EXTREMITIES: ICD-10-CM

## 2021-01-13 DIAGNOSIS — F41.0 GENERALIZED ANXIETY DISORDER WITH PANIC ATTACKS: ICD-10-CM

## 2021-01-13 DIAGNOSIS — I10 ESSENTIAL HYPERTENSION: ICD-10-CM

## 2021-01-13 DIAGNOSIS — E03.9 ACQUIRED HYPOTHYROIDISM: Chronic | ICD-10-CM

## 2021-01-13 DIAGNOSIS — E78.2 MIXED HYPERLIPIDEMIA: ICD-10-CM

## 2021-01-13 DIAGNOSIS — M51.37 DDD (DEGENERATIVE DISC DISEASE), LUMBOSACRAL: ICD-10-CM

## 2021-01-13 DIAGNOSIS — E11.9 TYPE 2 DIABETES MELLITUS WITHOUT COMPLICATION, WITHOUT LONG-TERM CURRENT USE OF INSULIN: ICD-10-CM

## 2021-01-13 DIAGNOSIS — G47.33 OSA ON CPAP: ICD-10-CM

## 2021-01-13 PROBLEM — M51.379 DDD (DEGENERATIVE DISC DISEASE), LUMBOSACRAL: Status: ACTIVE | Noted: 2021-01-13

## 2021-01-13 PROCEDURE — 3075F SYST BP GE 130 - 139MM HG: CPT | Mod: CPTII,S$GLB,, | Performed by: INTERNAL MEDICINE

## 2021-01-13 PROCEDURE — 3079F PR MOST RECENT DIASTOLIC BLOOD PRESSURE 80-89 MM HG: ICD-10-PCS | Mod: CPTII,S$GLB,, | Performed by: INTERNAL MEDICINE

## 2021-01-13 PROCEDURE — 99214 OFFICE O/P EST MOD 30 MIN: CPT | Mod: S$GLB,,, | Performed by: PSYCHIATRY & NEUROLOGY

## 2021-01-13 PROCEDURE — 1125F PR PAIN SEVERITY QUANTIFIED, PAIN PRESENT: ICD-10-PCS | Mod: S$GLB,,, | Performed by: PSYCHIATRY & NEUROLOGY

## 2021-01-13 PROCEDURE — 3074F SYST BP LT 130 MM HG: CPT | Mod: CPTII,S$GLB,, | Performed by: PSYCHIATRY & NEUROLOGY

## 2021-01-13 PROCEDURE — 3008F BODY MASS INDEX DOCD: CPT | Mod: CPTII,S$GLB,, | Performed by: PSYCHIATRY & NEUROLOGY

## 2021-01-13 PROCEDURE — 3078F DIAST BP <80 MM HG: CPT | Mod: CPTII,S$GLB,, | Performed by: PSYCHIATRY & NEUROLOGY

## 2021-01-13 PROCEDURE — 3078F PR MOST RECENT DIASTOLIC BLOOD PRESSURE < 80 MM HG: ICD-10-PCS | Mod: CPTII,S$GLB,, | Performed by: PSYCHIATRY & NEUROLOGY

## 2021-01-13 PROCEDURE — 99214 PR OFFICE/OUTPT VISIT, EST, LEVL IV, 30-39 MIN: ICD-10-PCS | Mod: S$GLB,,, | Performed by: PSYCHIATRY & NEUROLOGY

## 2021-01-13 PROCEDURE — 3008F PR BODY MASS INDEX (BMI) DOCUMENTED: ICD-10-PCS | Mod: CPTII,S$GLB,, | Performed by: INTERNAL MEDICINE

## 2021-01-13 PROCEDURE — 1125F PR PAIN SEVERITY QUANTIFIED, PAIN PRESENT: ICD-10-PCS | Mod: S$GLB,,, | Performed by: INTERNAL MEDICINE

## 2021-01-13 PROCEDURE — 99396 PR PREVENTIVE VISIT,EST,40-64: ICD-10-PCS | Mod: S$GLB,,, | Performed by: INTERNAL MEDICINE

## 2021-01-13 PROCEDURE — 3008F PR BODY MASS INDEX (BMI) DOCUMENTED: ICD-10-PCS | Mod: CPTII,S$GLB,, | Performed by: PSYCHIATRY & NEUROLOGY

## 2021-01-13 PROCEDURE — 1125F AMNT PAIN NOTED PAIN PRSNT: CPT | Mod: S$GLB,,, | Performed by: PSYCHIATRY & NEUROLOGY

## 2021-01-13 PROCEDURE — 99396 PREV VISIT EST AGE 40-64: CPT | Mod: S$GLB,,, | Performed by: INTERNAL MEDICINE

## 2021-01-13 PROCEDURE — 3075F PR MOST RECENT SYSTOLIC BLOOD PRESS GE 130-139MM HG: ICD-10-PCS | Mod: CPTII,S$GLB,, | Performed by: INTERNAL MEDICINE

## 2021-01-13 PROCEDURE — 99999 PR PBB SHADOW E&M-EST. PATIENT-LVL IV: ICD-10-PCS | Mod: PBBFAC,,, | Performed by: INTERNAL MEDICINE

## 2021-01-13 PROCEDURE — 3044F PR MOST RECENT HEMOGLOBIN A1C LEVEL <7.0%: ICD-10-PCS | Mod: CPTII,S$GLB,, | Performed by: INTERNAL MEDICINE

## 2021-01-13 PROCEDURE — 3074F PR MOST RECENT SYSTOLIC BLOOD PRESSURE < 130 MM HG: ICD-10-PCS | Mod: CPTII,S$GLB,, | Performed by: PSYCHIATRY & NEUROLOGY

## 2021-01-13 PROCEDURE — 99999 PR PBB SHADOW E&M-EST. PATIENT-LVL IV: CPT | Mod: PBBFAC,,, | Performed by: INTERNAL MEDICINE

## 2021-01-13 PROCEDURE — 99999 PR PBB SHADOW E&M-EST. PATIENT-LVL IV: CPT | Mod: PBBFAC,,, | Performed by: PSYCHIATRY & NEUROLOGY

## 2021-01-13 PROCEDURE — 3044F HG A1C LEVEL LT 7.0%: CPT | Mod: CPTII,S$GLB,, | Performed by: INTERNAL MEDICINE

## 2021-01-13 PROCEDURE — 3008F BODY MASS INDEX DOCD: CPT | Mod: CPTII,S$GLB,, | Performed by: INTERNAL MEDICINE

## 2021-01-13 PROCEDURE — 1125F AMNT PAIN NOTED PAIN PRSNT: CPT | Mod: S$GLB,,, | Performed by: INTERNAL MEDICINE

## 2021-01-13 PROCEDURE — 99999 PR PBB SHADOW E&M-EST. PATIENT-LVL IV: ICD-10-PCS | Mod: PBBFAC,,, | Performed by: PSYCHIATRY & NEUROLOGY

## 2021-01-13 PROCEDURE — 3079F DIAST BP 80-89 MM HG: CPT | Mod: CPTII,S$GLB,, | Performed by: INTERNAL MEDICINE

## 2021-01-13 RX ORDER — PRAVASTATIN SODIUM 10 MG/1
10 TABLET ORAL DAILY
Qty: 90 TABLET | Refills: 3
Start: 2021-01-13 | End: 2021-06-09 | Stop reason: SDUPTHER

## 2021-01-14 ENCOUNTER — HOSPITAL ENCOUNTER (OUTPATIENT)
Dept: CARDIOLOGY | Facility: HOSPITAL | Age: 61
Discharge: HOME | End: 2021-01-14
Attending: STUDENT IN AN ORGANIZED HEALTH CARE EDUCATION/TRAINING PROGRAM
Payer: COMMERCIAL

## 2021-01-14 VITALS — WEIGHT: 293 LBS | HEIGHT: 65 IN | BODY MASS INDEX: 48.82 KG/M2

## 2021-01-14 DIAGNOSIS — R07.89 PRESSURE IN LEFT SIDE OF CHEST: ICD-10-CM

## 2021-01-14 LAB
BSA FOR ECHO PROCEDURE: 2.64 M2
CV ECHO LV RWT: 0.43 CM
CV STRESS BASE HR: 97 BPM
DIASTOLIC BLOOD PRESSURE: 59 MMHG
ECHO LV POSTERIOR WALL: 1 CM (ref 0.6–1.1)
FRACTIONAL SHORTENING: 38 % (ref 28–44)
INTERVENTRICULAR SEPTUM: 1.2 CM (ref 0.6–1.1)
LEFT INTERNAL DIMENSION IN SYSTOLE: 2.9 CM (ref 2.1–4)
LEFT VENTRICLE MASS INDEX: 76 G/M2
LEFT VENTRICULAR INTERNAL DIMENSION IN DIASTOLE: 4.7 CM (ref 3.5–6)
LEFT VENTRICULAR MASS: 187.54 G
OHS CV CPX 85 PERCENT MAX PREDICTED HEART RATE MALE: 132
OHS CV CPX MAX PREDICTED HEART RATE: 155
OHS CV CPX PATIENT IS FEMALE: 1
OHS CV CPX PATIENT IS MALE: 0
OHS CV CPX PEAK DIASTOLIC BLOOD PRESSURE: 58 MMHG
OHS CV CPX PEAK HEAR RATE: 134 BPM
OHS CV CPX PEAK RATE PRESSURE PRODUCT: NORMAL
OHS CV CPX PEAK SYSTOLIC BLOOD PRESSURE: 224 MMHG
OHS CV CPX PERCENT MAX PREDICTED HEART RATE ACHIEVED: 86
OHS CV CPX RATE PRESSURE PRODUCT PRESENTING: NORMAL
SYSTOLIC BLOOD PRESSURE: 133 MMHG

## 2021-01-14 PROCEDURE — 93351 STRESS TTE COMPLETE: CPT | Mod: 26,,, | Performed by: INTERNAL MEDICINE

## 2021-01-14 PROCEDURE — 93351 STRESS ECHO (CUPID ONLY): ICD-10-PCS | Mod: 26,,, | Performed by: INTERNAL MEDICINE

## 2021-01-14 PROCEDURE — 93351 STRESS TTE COMPLETE: CPT

## 2021-01-14 PROCEDURE — 63600175 PHARM REV CODE 636 W HCPCS: Performed by: INTERNAL MEDICINE

## 2021-01-14 RX ORDER — DOBUTAMINE HYDROCHLORIDE 400 MG/100ML
10 INJECTION INTRAVENOUS CONTINUOUS
Status: DISCONTINUED | OUTPATIENT
Start: 2021-01-14 | End: 2022-06-27

## 2021-01-14 RX ADMIN — DOBUTAMINE HYDROCHLORIDE 10 MCG/KG/MIN: 400 INJECTION INTRAVENOUS at 01:01

## 2021-01-20 ENCOUNTER — PATIENT MESSAGE (OUTPATIENT)
Dept: ENDOSCOPY | Facility: HOSPITAL | Age: 61
End: 2021-01-20

## 2021-01-22 ENCOUNTER — TELEPHONE (OUTPATIENT)
Dept: GASTROENTEROLOGY | Facility: CLINIC | Age: 61
End: 2021-01-22

## 2021-01-22 ENCOUNTER — PATIENT MESSAGE (OUTPATIENT)
Dept: ENDOSCOPY | Facility: HOSPITAL | Age: 61
End: 2021-01-22

## 2021-01-26 ENCOUNTER — PATIENT OUTREACH (OUTPATIENT)
Dept: ADMINISTRATIVE | Facility: HOSPITAL | Age: 61
End: 2021-01-26

## 2021-01-26 ENCOUNTER — PATIENT MESSAGE (OUTPATIENT)
Dept: ADMINISTRATIVE | Facility: HOSPITAL | Age: 61
End: 2021-01-26

## 2021-01-28 ENCOUNTER — PATIENT MESSAGE (OUTPATIENT)
Dept: FAMILY MEDICINE | Facility: CLINIC | Age: 61
End: 2021-01-28

## 2021-02-03 ENCOUNTER — TELEPHONE (OUTPATIENT)
Dept: FAMILY MEDICINE | Facility: CLINIC | Age: 61
End: 2021-02-03

## 2021-02-03 ENCOUNTER — PATIENT MESSAGE (OUTPATIENT)
Dept: FAMILY MEDICINE | Facility: CLINIC | Age: 61
End: 2021-02-03

## 2021-02-23 ENCOUNTER — PATIENT MESSAGE (OUTPATIENT)
Dept: FAMILY MEDICINE | Facility: CLINIC | Age: 61
End: 2021-02-23

## 2021-02-24 ENCOUNTER — PATIENT OUTREACH (OUTPATIENT)
Dept: ADMINISTRATIVE | Facility: OTHER | Age: 61
End: 2021-02-24

## 2021-03-01 ENCOUNTER — PATIENT MESSAGE (OUTPATIENT)
Dept: ADMINISTRATIVE | Facility: HOSPITAL | Age: 61
End: 2021-03-01

## 2021-03-01 ENCOUNTER — OFFICE VISIT (OUTPATIENT)
Dept: PODIATRY | Facility: CLINIC | Age: 61
End: 2021-03-01
Payer: COMMERCIAL

## 2021-03-01 VITALS
DIASTOLIC BLOOD PRESSURE: 89 MMHG | SYSTOLIC BLOOD PRESSURE: 151 MMHG | HEART RATE: 71 BPM | WEIGHT: 293 LBS | BODY MASS INDEX: 48.82 KG/M2 | HEIGHT: 65 IN

## 2021-03-01 DIAGNOSIS — E11.9 TYPE 2 DIABETES MELLITUS WITHOUT COMPLICATION, WITHOUT LONG-TERM CURRENT USE OF INSULIN: Primary | ICD-10-CM

## 2021-03-01 DIAGNOSIS — I87.2 VENOUS INSUFFICIENCY OF BOTH LOWER EXTREMITIES: ICD-10-CM

## 2021-03-01 PROCEDURE — 1126F PR PAIN SEVERITY QUANTIFIED, NO PAIN PRESENT: ICD-10-PCS | Mod: S$GLB,,, | Performed by: PODIATRIST

## 2021-03-01 PROCEDURE — 1126F AMNT PAIN NOTED NONE PRSNT: CPT | Mod: S$GLB,,, | Performed by: PODIATRIST

## 2021-03-01 PROCEDURE — 3079F PR MOST RECENT DIASTOLIC BLOOD PRESSURE 80-89 MM HG: ICD-10-PCS | Mod: CPTII,S$GLB,, | Performed by: PODIATRIST

## 2021-03-01 PROCEDURE — 3077F PR MOST RECENT SYSTOLIC BLOOD PRESSURE >= 140 MM HG: ICD-10-PCS | Mod: CPTII,S$GLB,, | Performed by: PODIATRIST

## 2021-03-01 PROCEDURE — 3008F PR BODY MASS INDEX (BMI) DOCUMENTED: ICD-10-PCS | Mod: CPTII,S$GLB,, | Performed by: PODIATRIST

## 2021-03-01 PROCEDURE — 99213 PR OFFICE/OUTPT VISIT, EST, LEVL III, 20-29 MIN: ICD-10-PCS | Mod: S$GLB,,, | Performed by: PODIATRIST

## 2021-03-01 PROCEDURE — 99999 PR PBB SHADOW E&M-EST. PATIENT-LVL IV: ICD-10-PCS | Mod: PBBFAC,,, | Performed by: PODIATRIST

## 2021-03-01 PROCEDURE — 99213 OFFICE O/P EST LOW 20 MIN: CPT | Mod: S$GLB,,, | Performed by: PODIATRIST

## 2021-03-01 PROCEDURE — 99999 PR PBB SHADOW E&M-EST. PATIENT-LVL IV: CPT | Mod: PBBFAC,,, | Performed by: PODIATRIST

## 2021-03-01 PROCEDURE — 3044F PR MOST RECENT HEMOGLOBIN A1C LEVEL <7.0%: ICD-10-PCS | Mod: CPTII,S$GLB,, | Performed by: PODIATRIST

## 2021-03-01 PROCEDURE — 3044F HG A1C LEVEL LT 7.0%: CPT | Mod: CPTII,S$GLB,, | Performed by: PODIATRIST

## 2021-03-01 PROCEDURE — 3077F SYST BP >= 140 MM HG: CPT | Mod: CPTII,S$GLB,, | Performed by: PODIATRIST

## 2021-03-01 PROCEDURE — 3079F DIAST BP 80-89 MM HG: CPT | Mod: CPTII,S$GLB,, | Performed by: PODIATRIST

## 2021-03-01 PROCEDURE — 3008F BODY MASS INDEX DOCD: CPT | Mod: CPTII,S$GLB,, | Performed by: PODIATRIST

## 2021-03-01 RX ORDER — POLYETHYLENE GLYCOL-3350 AND ELECTROLYTES WITH FLAVOR PACK 240; 5.84; 2.98; 6.72; 22.72 G/278.26G; G/278.26G; G/278.26G; G/278.26G; G/278.26G
POWDER, FOR SOLUTION ORAL
Status: ON HOLD | COMMUNITY
Start: 2021-01-11 | End: 2021-11-10 | Stop reason: HOSPADM

## 2021-03-01 RX ORDER — METHOCARBAMOL 500 MG/1
500 TABLET, FILM COATED ORAL NIGHTLY
COMMUNITY
Start: 2021-02-12 | End: 2021-08-21

## 2021-03-05 ENCOUNTER — TELEPHONE (OUTPATIENT)
Dept: FAMILY MEDICINE | Facility: CLINIC | Age: 61
End: 2021-03-05

## 2021-03-09 LAB
LEFT EYE DM RETINOPATHY: NEGATIVE
RIGHT EYE DM RETINOPATHY: NEGATIVE

## 2021-04-06 ENCOUNTER — PATIENT MESSAGE (OUTPATIENT)
Dept: ADMINISTRATIVE | Facility: HOSPITAL | Age: 61
End: 2021-04-06

## 2021-04-09 ENCOUNTER — PATIENT MESSAGE (OUTPATIENT)
Dept: FAMILY MEDICINE | Facility: CLINIC | Age: 61
End: 2021-04-09

## 2021-04-09 ENCOUNTER — PATIENT OUTREACH (OUTPATIENT)
Dept: ADMINISTRATIVE | Facility: HOSPITAL | Age: 61
End: 2021-04-09

## 2021-04-09 DIAGNOSIS — I87.2 CHRONIC VENOUS STASIS DERMATITIS OF BOTH LOWER EXTREMITIES: Primary | ICD-10-CM

## 2021-04-19 ENCOUNTER — PATIENT OUTREACH (OUTPATIENT)
Dept: ADMINISTRATIVE | Facility: HOSPITAL | Age: 61
End: 2021-04-19

## 2021-05-05 DIAGNOSIS — E11.9 TYPE 2 DIABETES MELLITUS WITHOUT COMPLICATION: ICD-10-CM

## 2021-05-17 ENCOUNTER — TELEPHONE (OUTPATIENT)
Dept: SLEEP MEDICINE | Facility: CLINIC | Age: 61
End: 2021-05-17

## 2021-05-17 ENCOUNTER — PATIENT MESSAGE (OUTPATIENT)
Dept: SLEEP MEDICINE | Facility: CLINIC | Age: 61
End: 2021-05-17

## 2021-05-17 ENCOUNTER — PATIENT MESSAGE (OUTPATIENT)
Dept: FAMILY MEDICINE | Facility: CLINIC | Age: 61
End: 2021-05-17

## 2021-05-17 DIAGNOSIS — G47.00 INSOMNIA, UNSPECIFIED TYPE: Primary | ICD-10-CM

## 2021-05-17 RX ORDER — TRAZODONE HYDROCHLORIDE 50 MG/1
TABLET ORAL
Qty: 30 TABLET | Refills: 0 | Status: SHIPPED | OUTPATIENT
Start: 2021-05-17 | End: 2021-08-21

## 2021-05-21 ENCOUNTER — TELEPHONE (OUTPATIENT)
Dept: SLEEP MEDICINE | Facility: CLINIC | Age: 61
End: 2021-05-21

## 2021-06-08 ENCOUNTER — PATIENT MESSAGE (OUTPATIENT)
Dept: FAMILY MEDICINE | Facility: CLINIC | Age: 61
End: 2021-06-08

## 2021-06-08 DIAGNOSIS — I10 ESSENTIAL HYPERTENSION: Chronic | ICD-10-CM

## 2021-06-08 DIAGNOSIS — E03.9 HYPOTHYROIDISM, UNSPECIFIED TYPE: Chronic | ICD-10-CM

## 2021-06-08 DIAGNOSIS — E78.2 MIXED HYPERLIPIDEMIA: ICD-10-CM

## 2021-06-09 RX ORDER — PRAVASTATIN SODIUM 10 MG/1
10 TABLET ORAL DAILY
Qty: 90 TABLET | Refills: 0 | Status: SHIPPED | OUTPATIENT
Start: 2021-06-09 | End: 2021-08-19 | Stop reason: SDUPTHER

## 2021-06-09 RX ORDER — TELMISARTAN 80 MG/1
80 TABLET ORAL DAILY
Qty: 90 TABLET | Refills: 0 | Status: SHIPPED | OUTPATIENT
Start: 2021-06-09 | End: 2021-08-19 | Stop reason: SDUPTHER

## 2021-06-09 RX ORDER — LEVOTHYROXINE SODIUM 112 UG/1
112 TABLET ORAL
Qty: 90 TABLET | Refills: 0 | Status: SHIPPED | OUTPATIENT
Start: 2021-06-09 | End: 2021-08-19 | Stop reason: SDUPTHER

## 2021-06-09 RX ORDER — EZETIMIBE 10 MG/1
10 TABLET ORAL DAILY
Qty: 90 TABLET | Refills: 0 | Status: SHIPPED | OUTPATIENT
Start: 2021-06-09 | End: 2021-08-19 | Stop reason: SDUPTHER

## 2021-06-09 RX ORDER — HYDROCHLOROTHIAZIDE 25 MG/1
25 TABLET ORAL DAILY
Qty: 90 TABLET | Refills: 0 | Status: SHIPPED | OUTPATIENT
Start: 2021-06-09 | End: 2021-08-19 | Stop reason: SDUPTHER

## 2021-07-06 ENCOUNTER — PATIENT MESSAGE (OUTPATIENT)
Dept: ADMINISTRATIVE | Facility: HOSPITAL | Age: 61
End: 2021-07-06

## 2021-07-15 DIAGNOSIS — E11.9 TYPE 2 DIABETES MELLITUS WITHOUT COMPLICATION, WITHOUT LONG-TERM CURRENT USE OF INSULIN: Primary | ICD-10-CM

## 2021-07-20 ENCOUNTER — PATIENT OUTREACH (OUTPATIENT)
Dept: ADMINISTRATIVE | Facility: HOSPITAL | Age: 61
End: 2021-07-20

## 2021-07-20 ENCOUNTER — PATIENT MESSAGE (OUTPATIENT)
Dept: ADMINISTRATIVE | Facility: HOSPITAL | Age: 61
End: 2021-07-20

## 2021-08-04 ENCOUNTER — PATIENT MESSAGE (OUTPATIENT)
Dept: ADMINISTRATIVE | Facility: HOSPITAL | Age: 61
End: 2021-08-04

## 2021-08-09 ENCOUNTER — PATIENT MESSAGE (OUTPATIENT)
Dept: FAMILY MEDICINE | Facility: CLINIC | Age: 61
End: 2021-08-09

## 2021-08-15 ENCOUNTER — PATIENT MESSAGE (OUTPATIENT)
Dept: FAMILY MEDICINE | Facility: CLINIC | Age: 61
End: 2021-08-15

## 2021-08-19 ENCOUNTER — OFFICE VISIT (OUTPATIENT)
Dept: FAMILY MEDICINE | Facility: CLINIC | Age: 61
End: 2021-08-19
Payer: COMMERCIAL

## 2021-08-19 VITALS — DIASTOLIC BLOOD PRESSURE: 76 MMHG | SYSTOLIC BLOOD PRESSURE: 107 MMHG

## 2021-08-19 DIAGNOSIS — I10 ESSENTIAL HYPERTENSION: ICD-10-CM

## 2021-08-19 DIAGNOSIS — E03.9 HYPOTHYROIDISM, UNSPECIFIED TYPE: Chronic | ICD-10-CM

## 2021-08-19 DIAGNOSIS — Z71.2 ENCOUNTER TO DISCUSS TEST RESULTS: ICD-10-CM

## 2021-08-19 DIAGNOSIS — E78.2 MIXED HYPERLIPIDEMIA: ICD-10-CM

## 2021-08-19 DIAGNOSIS — E11.9 TYPE 2 DIABETES MELLITUS WITHOUT COMPLICATION, WITHOUT LONG-TERM CURRENT USE OF INSULIN: Primary | ICD-10-CM

## 2021-08-19 PROCEDURE — 99214 OFFICE O/P EST MOD 30 MIN: CPT | Mod: 95,,, | Performed by: INTERNAL MEDICINE

## 2021-08-19 PROCEDURE — 3074F PR MOST RECENT SYSTOLIC BLOOD PRESSURE < 130 MM HG: ICD-10-PCS | Mod: CPTII,,, | Performed by: INTERNAL MEDICINE

## 2021-08-19 PROCEDURE — 1159F PR MEDICATION LIST DOCUMENTED IN MEDICAL RECORD: ICD-10-PCS | Mod: CPTII,,, | Performed by: INTERNAL MEDICINE

## 2021-08-19 PROCEDURE — 99214 PR OFFICE/OUTPT VISIT, EST, LEVL IV, 30-39 MIN: ICD-10-PCS | Mod: 95,,, | Performed by: INTERNAL MEDICINE

## 2021-08-19 PROCEDURE — 1160F RVW MEDS BY RX/DR IN RCRD: CPT | Mod: CPTII,,, | Performed by: INTERNAL MEDICINE

## 2021-08-19 PROCEDURE — 3078F PR MOST RECENT DIASTOLIC BLOOD PRESSURE < 80 MM HG: ICD-10-PCS | Mod: CPTII,,, | Performed by: INTERNAL MEDICINE

## 2021-08-19 PROCEDURE — 3074F SYST BP LT 130 MM HG: CPT | Mod: CPTII,,, | Performed by: INTERNAL MEDICINE

## 2021-08-19 PROCEDURE — 3078F DIAST BP <80 MM HG: CPT | Mod: CPTII,,, | Performed by: INTERNAL MEDICINE

## 2021-08-19 PROCEDURE — 3044F HG A1C LEVEL LT 7.0%: CPT | Mod: CPTII,,, | Performed by: INTERNAL MEDICINE

## 2021-08-19 PROCEDURE — 1159F MED LIST DOCD IN RCRD: CPT | Mod: CPTII,,, | Performed by: INTERNAL MEDICINE

## 2021-08-19 PROCEDURE — 1160F PR REVIEW ALL MEDS BY PRESCRIBER/CLIN PHARMACIST DOCUMENTED: ICD-10-PCS | Mod: CPTII,,, | Performed by: INTERNAL MEDICINE

## 2021-08-19 PROCEDURE — 3044F PR MOST RECENT HEMOGLOBIN A1C LEVEL <7.0%: ICD-10-PCS | Mod: CPTII,,, | Performed by: INTERNAL MEDICINE

## 2021-08-19 RX ORDER — EZETIMIBE 10 MG/1
10 TABLET ORAL DAILY
Qty: 90 TABLET | Refills: 1 | Status: SHIPPED | OUTPATIENT
Start: 2021-08-19 | End: 2022-11-06

## 2021-08-19 RX ORDER — HYDROCHLOROTHIAZIDE 25 MG/1
25 TABLET ORAL DAILY
Qty: 90 TABLET | Refills: 1 | Status: SHIPPED | OUTPATIENT
Start: 2021-08-19 | End: 2022-09-06 | Stop reason: SDUPTHER

## 2021-08-19 RX ORDER — TELMISARTAN 80 MG/1
80 TABLET ORAL DAILY
Qty: 90 TABLET | Refills: 1 | Status: SHIPPED | OUTPATIENT
Start: 2021-08-19 | End: 2022-11-13

## 2021-08-19 RX ORDER — PRAVASTATIN SODIUM 10 MG/1
10 TABLET ORAL DAILY
Qty: 90 TABLET | Refills: 1 | Status: SHIPPED | OUTPATIENT
Start: 2021-08-19 | End: 2022-11-07

## 2021-08-19 RX ORDER — LEVOTHYROXINE SODIUM 112 UG/1
112 TABLET ORAL
Qty: 90 TABLET | Refills: 1 | Status: SHIPPED | OUTPATIENT
Start: 2021-08-19 | End: 2022-03-29

## 2021-08-20 ENCOUNTER — PATIENT MESSAGE (OUTPATIENT)
Dept: FAMILY MEDICINE | Facility: CLINIC | Age: 61
End: 2021-08-20

## 2021-08-21 DIAGNOSIS — E78.2 MIXED HYPERLIPIDEMIA: Primary | ICD-10-CM

## 2021-08-25 ENCOUNTER — PATIENT MESSAGE (OUTPATIENT)
Dept: FAMILY MEDICINE | Facility: CLINIC | Age: 61
End: 2021-08-25

## 2021-09-23 ENCOUNTER — TELEPHONE (OUTPATIENT)
Dept: GASTROENTEROLOGY | Facility: CLINIC | Age: 61
End: 2021-09-23

## 2021-10-06 ENCOUNTER — PATIENT MESSAGE (OUTPATIENT)
Dept: PODIATRY | Facility: CLINIC | Age: 61
End: 2021-10-06

## 2021-10-11 ENCOUNTER — OFFICE VISIT (OUTPATIENT)
Dept: FAMILY MEDICINE | Facility: CLINIC | Age: 61
End: 2021-10-11
Payer: COMMERCIAL

## 2021-10-11 ENCOUNTER — PATIENT OUTREACH (OUTPATIENT)
Dept: ADMINISTRATIVE | Facility: OTHER | Age: 61
End: 2021-10-11

## 2021-10-11 DIAGNOSIS — F41.9 ANXIETY: Primary | ICD-10-CM

## 2021-10-11 DIAGNOSIS — Z12.31 ENCOUNTER FOR SCREENING MAMMOGRAM FOR MALIGNANT NEOPLASM OF BREAST: Primary | ICD-10-CM

## 2021-10-11 PROCEDURE — 3061F PR NEG MICROALBUMINURIA RESULT DOCUMENTED/REVIEW: ICD-10-PCS | Mod: CPTII,95,, | Performed by: INTERNAL MEDICINE

## 2021-10-11 PROCEDURE — 3061F NEG MICROALBUMINURIA REV: CPT | Mod: CPTII,95,, | Performed by: INTERNAL MEDICINE

## 2021-10-11 PROCEDURE — 3044F PR MOST RECENT HEMOGLOBIN A1C LEVEL <7.0%: ICD-10-PCS | Mod: CPTII,95,, | Performed by: INTERNAL MEDICINE

## 2021-10-11 PROCEDURE — 3066F NEPHROPATHY DOC TX: CPT | Mod: CPTII,95,, | Performed by: INTERNAL MEDICINE

## 2021-10-11 PROCEDURE — 3044F HG A1C LEVEL LT 7.0%: CPT | Mod: CPTII,95,, | Performed by: INTERNAL MEDICINE

## 2021-10-11 PROCEDURE — 1159F MED LIST DOCD IN RCRD: CPT | Mod: CPTII,95,, | Performed by: INTERNAL MEDICINE

## 2021-10-11 PROCEDURE — 1160F RVW MEDS BY RX/DR IN RCRD: CPT | Mod: CPTII,95,, | Performed by: INTERNAL MEDICINE

## 2021-10-11 PROCEDURE — 4010F ACE/ARB THERAPY RXD/TAKEN: CPT | Mod: CPTII,95,, | Performed by: INTERNAL MEDICINE

## 2021-10-11 PROCEDURE — 1159F PR MEDICATION LIST DOCUMENTED IN MEDICAL RECORD: ICD-10-PCS | Mod: CPTII,95,, | Performed by: INTERNAL MEDICINE

## 2021-10-11 PROCEDURE — 1160F PR REVIEW ALL MEDS BY PRESCRIBER/CLIN PHARMACIST DOCUMENTED: ICD-10-PCS | Mod: CPTII,95,, | Performed by: INTERNAL MEDICINE

## 2021-10-11 PROCEDURE — 99214 PR OFFICE/OUTPT VISIT, EST, LEVL IV, 30-39 MIN: ICD-10-PCS | Mod: 95,,, | Performed by: INTERNAL MEDICINE

## 2021-10-11 PROCEDURE — 3066F PR DOCUMENTATION OF TREATMENT FOR NEPHROPATHY: ICD-10-PCS | Mod: CPTII,95,, | Performed by: INTERNAL MEDICINE

## 2021-10-11 PROCEDURE — 99214 OFFICE O/P EST MOD 30 MIN: CPT | Mod: 95,,, | Performed by: INTERNAL MEDICINE

## 2021-10-11 PROCEDURE — 4010F PR ACE/ARB THEARPY RXD/TAKEN: ICD-10-PCS | Mod: CPTII,95,, | Performed by: INTERNAL MEDICINE

## 2021-10-11 RX ORDER — HYDROXYZINE HYDROCHLORIDE 25 MG/1
25 TABLET, FILM COATED ORAL 3 TIMES DAILY PRN
Qty: 30 TABLET | Refills: 0 | Status: SHIPPED | OUTPATIENT
Start: 2021-10-11 | End: 2022-05-23

## 2021-10-13 ENCOUNTER — TELEPHONE (OUTPATIENT)
Dept: GASTROENTEROLOGY | Facility: CLINIC | Age: 61
End: 2021-10-13

## 2021-10-13 ENCOUNTER — OFFICE VISIT (OUTPATIENT)
Dept: PODIATRY | Facility: CLINIC | Age: 61
End: 2021-10-13
Payer: COMMERCIAL

## 2021-10-13 VITALS — HEIGHT: 65 IN | BODY MASS INDEX: 48.82 KG/M2 | WEIGHT: 293 LBS

## 2021-10-13 DIAGNOSIS — R09.89 DIMINISHED PULSE: Primary | ICD-10-CM

## 2021-10-13 DIAGNOSIS — M76.61 ACHILLES TENDINITIS OF RIGHT LOWER EXTREMITY: ICD-10-CM

## 2021-10-13 DIAGNOSIS — Z01.818 PRE-OP TESTING: ICD-10-CM

## 2021-10-13 DIAGNOSIS — M79.671 RIGHT FOOT PAIN: ICD-10-CM

## 2021-10-13 DIAGNOSIS — M72.2 PLANTAR FASCIITIS: ICD-10-CM

## 2021-10-13 PROCEDURE — 3061F NEG MICROALBUMINURIA REV: CPT | Mod: CPTII,S$GLB,, | Performed by: STUDENT IN AN ORGANIZED HEALTH CARE EDUCATION/TRAINING PROGRAM

## 2021-10-13 PROCEDURE — 3008F PR BODY MASS INDEX (BMI) DOCUMENTED: ICD-10-PCS | Mod: CPTII,S$GLB,, | Performed by: STUDENT IN AN ORGANIZED HEALTH CARE EDUCATION/TRAINING PROGRAM

## 2021-10-13 PROCEDURE — 3066F NEPHROPATHY DOC TX: CPT | Mod: CPTII,S$GLB,, | Performed by: STUDENT IN AN ORGANIZED HEALTH CARE EDUCATION/TRAINING PROGRAM

## 2021-10-13 PROCEDURE — 4010F ACE/ARB THERAPY RXD/TAKEN: CPT | Mod: CPTII,S$GLB,, | Performed by: STUDENT IN AN ORGANIZED HEALTH CARE EDUCATION/TRAINING PROGRAM

## 2021-10-13 PROCEDURE — 99999 PR PBB SHADOW E&M-EST. PATIENT-LVL III: ICD-10-PCS | Mod: PBBFAC,,, | Performed by: STUDENT IN AN ORGANIZED HEALTH CARE EDUCATION/TRAINING PROGRAM

## 2021-10-13 PROCEDURE — 3044F PR MOST RECENT HEMOGLOBIN A1C LEVEL <7.0%: ICD-10-PCS | Mod: CPTII,S$GLB,, | Performed by: STUDENT IN AN ORGANIZED HEALTH CARE EDUCATION/TRAINING PROGRAM

## 2021-10-13 PROCEDURE — 99999 PR PBB SHADOW E&M-EST. PATIENT-LVL III: CPT | Mod: PBBFAC,,, | Performed by: STUDENT IN AN ORGANIZED HEALTH CARE EDUCATION/TRAINING PROGRAM

## 2021-10-13 PROCEDURE — 3066F PR DOCUMENTATION OF TREATMENT FOR NEPHROPATHY: ICD-10-PCS | Mod: CPTII,S$GLB,, | Performed by: STUDENT IN AN ORGANIZED HEALTH CARE EDUCATION/TRAINING PROGRAM

## 2021-10-13 PROCEDURE — 99214 OFFICE O/P EST MOD 30 MIN: CPT | Mod: S$GLB,,, | Performed by: STUDENT IN AN ORGANIZED HEALTH CARE EDUCATION/TRAINING PROGRAM

## 2021-10-13 PROCEDURE — 1159F PR MEDICATION LIST DOCUMENTED IN MEDICAL RECORD: ICD-10-PCS | Mod: CPTII,S$GLB,, | Performed by: STUDENT IN AN ORGANIZED HEALTH CARE EDUCATION/TRAINING PROGRAM

## 2021-10-13 PROCEDURE — 3008F BODY MASS INDEX DOCD: CPT | Mod: CPTII,S$GLB,, | Performed by: STUDENT IN AN ORGANIZED HEALTH CARE EDUCATION/TRAINING PROGRAM

## 2021-10-13 PROCEDURE — 3061F PR NEG MICROALBUMINURIA RESULT DOCUMENTED/REVIEW: ICD-10-PCS | Mod: CPTII,S$GLB,, | Performed by: STUDENT IN AN ORGANIZED HEALTH CARE EDUCATION/TRAINING PROGRAM

## 2021-10-13 PROCEDURE — 3044F HG A1C LEVEL LT 7.0%: CPT | Mod: CPTII,S$GLB,, | Performed by: STUDENT IN AN ORGANIZED HEALTH CARE EDUCATION/TRAINING PROGRAM

## 2021-10-13 PROCEDURE — 4010F PR ACE/ARB THEARPY RXD/TAKEN: ICD-10-PCS | Mod: CPTII,S$GLB,, | Performed by: STUDENT IN AN ORGANIZED HEALTH CARE EDUCATION/TRAINING PROGRAM

## 2021-10-13 PROCEDURE — 1159F MED LIST DOCD IN RCRD: CPT | Mod: CPTII,S$GLB,, | Performed by: STUDENT IN AN ORGANIZED HEALTH CARE EDUCATION/TRAINING PROGRAM

## 2021-10-13 PROCEDURE — 99214 PR OFFICE/OUTPT VISIT, EST, LEVL IV, 30-39 MIN: ICD-10-PCS | Mod: S$GLB,,, | Performed by: STUDENT IN AN ORGANIZED HEALTH CARE EDUCATION/TRAINING PROGRAM

## 2021-10-13 RX ORDER — METHYLPREDNISOLONE 4 MG/1
TABLET ORAL
Qty: 1 PACKAGE | Refills: 0 | Status: SHIPPED | OUTPATIENT
Start: 2021-10-13 | End: 2021-11-03

## 2021-10-14 ENCOUNTER — TELEPHONE (OUTPATIENT)
Dept: PODIATRY | Facility: CLINIC | Age: 61
End: 2021-10-14

## 2021-10-18 ENCOUNTER — PATIENT MESSAGE (OUTPATIENT)
Dept: ADMINISTRATIVE | Facility: HOSPITAL | Age: 61
End: 2021-10-18
Payer: COMMERCIAL

## 2021-10-19 ENCOUNTER — TELEPHONE (OUTPATIENT)
Dept: GASTROENTEROLOGY | Facility: CLINIC | Age: 61
End: 2021-10-19

## 2021-11-09 ENCOUNTER — TELEPHONE (OUTPATIENT)
Dept: GASTROENTEROLOGY | Facility: CLINIC | Age: 61
End: 2021-11-09
Payer: COMMERCIAL

## 2021-11-10 ENCOUNTER — ANESTHESIA EVENT (OUTPATIENT)
Dept: ENDOSCOPY | Facility: HOSPITAL | Age: 61
End: 2021-11-10
Payer: COMMERCIAL

## 2021-11-10 ENCOUNTER — HOSPITAL ENCOUNTER (OUTPATIENT)
Facility: HOSPITAL | Age: 61
Discharge: HOME | End: 2021-11-10
Attending: INTERNAL MEDICINE | Admitting: INTERNAL MEDICINE
Payer: COMMERCIAL

## 2021-11-10 ENCOUNTER — ANESTHESIA (OUTPATIENT)
Dept: ENDOSCOPY | Facility: HOSPITAL | Age: 61
End: 2021-11-10
Payer: COMMERCIAL

## 2021-11-10 VITALS
DIASTOLIC BLOOD PRESSURE: 72 MMHG | HEART RATE: 88 BPM | TEMPERATURE: 98 F | OXYGEN SATURATION: 100 % | RESPIRATION RATE: 18 BRPM | SYSTOLIC BLOOD PRESSURE: 128 MMHG

## 2021-11-10 DIAGNOSIS — R10.9 ABDOMINAL PAIN: ICD-10-CM

## 2021-11-10 PROCEDURE — G0105 COLORECTAL SCRN; HI RISK IND: ICD-10-PCS | Mod: ,,, | Performed by: INTERNAL MEDICINE

## 2021-11-10 PROCEDURE — 63600175 PHARM REV CODE 636 W HCPCS: Performed by: NURSE ANESTHETIST, CERTIFIED REGISTERED

## 2021-11-10 PROCEDURE — 37000008 HC ANESTHESIA 1ST 15 MINUTES: Performed by: INTERNAL MEDICINE

## 2021-11-10 PROCEDURE — D9220A PRA ANESTHESIA: ICD-10-PCS | Mod: CRNA,,, | Performed by: NURSE ANESTHETIST, CERTIFIED REGISTERED

## 2021-11-10 PROCEDURE — 37000009 HC ANESTHESIA EA ADD 15 MINS: Performed by: INTERNAL MEDICINE

## 2021-11-10 PROCEDURE — D9220A PRA ANESTHESIA: ICD-10-PCS | Mod: ANES,,, | Performed by: ANESTHESIOLOGY

## 2021-11-10 PROCEDURE — D9220A PRA ANESTHESIA: Mod: ANES,,, | Performed by: ANESTHESIOLOGY

## 2021-11-10 PROCEDURE — 25000003 PHARM REV CODE 250: Performed by: NURSE ANESTHETIST, CERTIFIED REGISTERED

## 2021-11-10 PROCEDURE — 43239 EGD BIOPSY SINGLE/MULTIPLE: CPT | Performed by: INTERNAL MEDICINE

## 2021-11-10 PROCEDURE — D9220A PRA ANESTHESIA: Mod: CRNA,,, | Performed by: NURSE ANESTHETIST, CERTIFIED REGISTERED

## 2021-11-10 PROCEDURE — 88305 TISSUE EXAM BY PATHOLOGIST: ICD-10-PCS | Mod: 26,,, | Performed by: PATHOLOGY

## 2021-11-10 PROCEDURE — 27201012 HC FORCEPS, HOT/COLD, DISP: Performed by: INTERNAL MEDICINE

## 2021-11-10 PROCEDURE — 43239 PR EGD, FLEX, W/BIOPSY, SGL/MULTI: ICD-10-PCS | Mod: 51,,, | Performed by: INTERNAL MEDICINE

## 2021-11-10 PROCEDURE — 88305 TISSUE EXAM BY PATHOLOGIST: CPT | Mod: 26,,, | Performed by: PATHOLOGY

## 2021-11-10 PROCEDURE — 88305 TISSUE EXAM BY PATHOLOGIST: CPT | Performed by: PATHOLOGY

## 2021-11-10 PROCEDURE — 43239 EGD BIOPSY SINGLE/MULTIPLE: CPT | Mod: 51,,, | Performed by: INTERNAL MEDICINE

## 2021-11-10 PROCEDURE — G0105 COLORECTAL SCRN; HI RISK IND: HCPCS | Mod: ,,, | Performed by: INTERNAL MEDICINE

## 2021-11-10 PROCEDURE — 25000003 PHARM REV CODE 250: Performed by: ANESTHESIOLOGY

## 2021-11-10 PROCEDURE — G0105 COLORECTAL SCRN; HI RISK IND: HCPCS | Performed by: INTERNAL MEDICINE

## 2021-11-10 RX ORDER — SODIUM CHLORIDE 9 MG/ML
INJECTION, SOLUTION INTRAVENOUS ONCE
Status: COMPLETED | OUTPATIENT
Start: 2021-11-10 | End: 2021-11-10

## 2021-11-10 RX ORDER — PROPOFOL 10 MG/ML
VIAL (ML) INTRAVENOUS
Status: DISCONTINUED | OUTPATIENT
Start: 2021-11-10 | End: 2021-11-10

## 2021-11-10 RX ORDER — PROPOFOL 10 MG/ML
INJECTION, EMULSION INTRAVENOUS
Status: COMPLETED
Start: 2021-11-10 | End: 2021-11-10

## 2021-11-10 RX ORDER — PROPOFOL 10 MG/ML
INJECTION, EMULSION INTRAVENOUS
Status: DISCONTINUED
Start: 2021-11-10 | End: 2021-11-10 | Stop reason: HOSPADM

## 2021-11-10 RX ORDER — LIDOCAINE HYDROCHLORIDE 20 MG/ML
INJECTION, SOLUTION INFILTRATION; PERINEURAL
Status: DISCONTINUED
Start: 2021-11-10 | End: 2021-11-10 | Stop reason: HOSPADM

## 2021-11-10 RX ORDER — LIDOCAINE HYDROCHLORIDE 20 MG/ML
INJECTION INTRAVENOUS
Status: DISCONTINUED | OUTPATIENT
Start: 2021-11-10 | End: 2021-11-10

## 2021-11-10 RX ADMIN — PROPOFOL 60 MG: 10 INJECTION, EMULSION INTRAVENOUS at 07:11

## 2021-11-10 RX ADMIN — PROPOFOL 40 MG: 10 INJECTION, EMULSION INTRAVENOUS at 08:11

## 2021-11-10 RX ADMIN — PROPOFOL 60 MG: 10 INJECTION, EMULSION INTRAVENOUS at 08:11

## 2021-11-10 RX ADMIN — SODIUM CHLORIDE: 0.9 INJECTION, SOLUTION INTRAVENOUS at 07:11

## 2021-11-10 RX ADMIN — PROPOFOL 20 MG: 10 INJECTION, EMULSION INTRAVENOUS at 08:11

## 2021-11-10 RX ADMIN — PROPOFOL 40 MG: 10 INJECTION, EMULSION INTRAVENOUS at 07:11

## 2021-11-10 RX ADMIN — LIDOCAINE HYDROCHLORIDE 100 MG: 20 INJECTION, SOLUTION INTRAVENOUS at 07:11

## 2021-11-10 RX ADMIN — GLYCOPYRROLATE 0.2 MG: 0.2 INJECTION, SOLUTION INTRAMUSCULAR; INTRAVITREAL at 07:11

## 2021-11-10 RX ADMIN — PROPOFOL 100 MG: 10 INJECTION, EMULSION INTRAVENOUS at 07:11

## 2021-11-11 LAB
FINAL PATHOLOGIC DIAGNOSIS: NORMAL
GROSS: NORMAL
Lab: NORMAL

## 2021-11-17 ENCOUNTER — PATIENT MESSAGE (OUTPATIENT)
Dept: GASTROENTEROLOGY | Facility: CLINIC | Age: 61
End: 2021-11-17
Payer: COMMERCIAL

## 2022-01-12 LAB
HPV GENOTYPE 16/18,45 TRACKING: NORMAL
PAP SMEAR: ABNORMAL

## 2022-01-19 ENCOUNTER — PATIENT OUTREACH (OUTPATIENT)
Dept: ADMINISTRATIVE | Facility: HOSPITAL | Age: 62
End: 2022-01-19
Payer: COMMERCIAL

## 2022-02-10 ENCOUNTER — TELEPHONE (OUTPATIENT)
Dept: FAMILY MEDICINE | Facility: CLINIC | Age: 62
End: 2022-02-10
Payer: COMMERCIAL

## 2022-02-10 DIAGNOSIS — E11.638 TYPE 2 DIABETES MELLITUS WITH OTHER ORAL COMPLICATION, UNSPECIFIED WHETHER LONG TERM INSULIN USE: Primary | ICD-10-CM

## 2022-03-29 DIAGNOSIS — E03.9 HYPOTHYROIDISM, UNSPECIFIED TYPE: Chronic | ICD-10-CM

## 2022-03-29 RX ORDER — LEVOTHYROXINE SODIUM 112 UG/1
112 TABLET ORAL
Qty: 90 TABLET | Refills: 0 | Status: SHIPPED | OUTPATIENT
Start: 2022-03-29 | End: 2022-06-26

## 2022-03-29 NOTE — TELEPHONE ENCOUNTER
This Rx Request does not qualify for assessment with the [facility]   Please review protocol details and the Care Due Message for extra clinical information    Reasons Rx Request may be deferred:  Patient has been seen in the ED/Hospital since the last PCP visit    Note composed:12:55 PM 03/29/2022

## 2022-03-29 NOTE — TELEPHONE ENCOUNTER
Care Due:                  Date            Visit Type   Department     Provider  --------------------------------------------------------------------------------                                             [facility]      MEDICINE/INTERN  Last Visit: 10-      AUDIO ONLY   SOSA MARSH -         [facility]                              PRIMARY      MEDICINE/INTERN  Next Visit: 03-      CARE ([facility])   SOSA Velasquez                                                            Last  Test          Frequency    Reason                     Performed    Due Date  --------------------------------------------------------------------------------    TSH.........  12 months..  levothyroxine............  02- 02-    Powered by [employer]. Reference number: [id number].   3/29/2022 12:12:10 AM CDT

## 2022-03-30 ENCOUNTER — LAB VISIT (OUTPATIENT)
Dept: LAB | Facility: HOSPITAL | Age: 62
End: 2022-03-30
Attending: INTERNAL MEDICINE
Payer: COMMERCIAL

## 2022-03-30 ENCOUNTER — OFFICE VISIT (OUTPATIENT)
Dept: FAMILY MEDICINE | Facility: CLINIC | Age: 62
End: 2022-03-30
Payer: COMMERCIAL

## 2022-03-30 VITALS
TEMPERATURE: 98 F | OXYGEN SATURATION: 96 % | HEIGHT: 65 IN | WEIGHT: 293 LBS | BODY MASS INDEX: 48.82 KG/M2 | HEART RATE: 67 BPM | DIASTOLIC BLOOD PRESSURE: 80 MMHG | SYSTOLIC BLOOD PRESSURE: 136 MMHG | RESPIRATION RATE: 20 BRPM

## 2022-03-30 DIAGNOSIS — Z00.00 ANNUAL PHYSICAL EXAM: ICD-10-CM

## 2022-03-30 DIAGNOSIS — H81.10 BENIGN PAROXYSMAL POSITIONAL VERTIGO, UNSPECIFIED LATERALITY: ICD-10-CM

## 2022-03-30 DIAGNOSIS — I87.2 CHRONIC VENOUS STASIS DERMATITIS OF BOTH LOWER EXTREMITIES: ICD-10-CM

## 2022-03-30 DIAGNOSIS — Z00.00 ANNUAL PHYSICAL EXAM: Primary | ICD-10-CM

## 2022-03-30 LAB
ALBUMIN SERPL BCP-MCNC: 3.8 G/DL (ref 3.5–5.2)
ALP SERPL-CCNC: 69 U/L (ref 55–135)
ALT SERPL W/O P-5'-P-CCNC: 24 U/L (ref 10–44)
ANION GAP SERPL CALC-SCNC: 10 MMOL/L (ref 8–16)
AST SERPL-CCNC: 19 U/L (ref 10–40)
BASOPHILS # BLD AUTO: 0.03 K/UL (ref 0–0.2)
BASOPHILS NFR BLD: 0.5 % (ref 0–1.9)
BILIRUB SERPL-MCNC: 0.6 MG/DL (ref 0.1–1)
BUN SERPL-MCNC: 14 MG/DL (ref 6–20)
CALCIUM SERPL-MCNC: 9.1 MG/DL (ref 8.7–10.5)
CHLORIDE SERPL-SCNC: 105 MMOL/L (ref 95–110)
CHOLEST SERPL-MCNC: 223 MG/DL (ref 120–199)
CHOLEST/HDLC SERPL: 4.4 {RATIO} (ref 2–5)
CO2 SERPL-SCNC: 25 MMOL/L (ref 23–29)
CREAT SERPL-MCNC: 0.6 MG/DL (ref 0.5–1.4)
DIFFERENTIAL METHOD: ABNORMAL
EOSINOPHIL # BLD AUTO: 0.1 K/UL (ref 0–0.5)
EOSINOPHIL NFR BLD: 2.4 % (ref 0–8)
ERYTHROCYTE [DISTWIDTH] IN BLOOD BY AUTOMATED COUNT: 14.6 % (ref 11.5–14.5)
EST. GFR  (AFRICAN AMERICAN): >60 ML/MIN/1.73 M^2
EST. GFR  (NON AFRICAN AMERICAN): >60 ML/MIN/1.73 M^2
GLUCOSE SERPL-MCNC: 91 MG/DL (ref 70–110)
HCT VFR BLD AUTO: 43.2 % (ref 37–48.5)
HDLC SERPL-MCNC: 51 MG/DL (ref 40–75)
HDLC SERPL: 22.9 % (ref 20–50)
HGB BLD-MCNC: 13.1 G/DL (ref 12–16)
IMM GRANULOCYTES # BLD AUTO: 0.01 K/UL (ref 0–0.04)
IMM GRANULOCYTES NFR BLD AUTO: 0.2 % (ref 0–0.5)
LDLC SERPL CALC-MCNC: 150.2 MG/DL (ref 63–159)
LYMPHOCYTES # BLD AUTO: 2.7 K/UL (ref 1–4.8)
LYMPHOCYTES NFR BLD: 48.8 % (ref 18–48)
MCH RBC QN AUTO: 27.1 PG (ref 27–31)
MCHC RBC AUTO-ENTMCNC: 30.3 G/DL (ref 32–36)
MCV RBC AUTO: 89 FL (ref 82–98)
MONOCYTES # BLD AUTO: 0.5 K/UL (ref 0.3–1)
MONOCYTES NFR BLD: 8.2 % (ref 4–15)
NEUTROPHILS # BLD AUTO: 2.2 K/UL (ref 1.8–7.7)
NEUTROPHILS NFR BLD: 39.9 % (ref 38–73)
NONHDLC SERPL-MCNC: 172 MG/DL
NRBC BLD-RTO: 0 /100 WBC
PLATELET # BLD AUTO: 244 K/UL (ref 150–450)
PMV BLD AUTO: 11.8 FL (ref 9.2–12.9)
POTASSIUM SERPL-SCNC: 4.2 MMOL/L (ref 3.5–5.1)
PROT SERPL-MCNC: 7.6 G/DL (ref 6–8.4)
RBC # BLD AUTO: 4.83 M/UL (ref 4–5.4)
SODIUM SERPL-SCNC: 140 MMOL/L (ref 136–145)
TRIGL SERPL-MCNC: 109 MG/DL (ref 30–150)
TSH SERPL DL<=0.005 MIU/L-ACNC: 0.43 UIU/ML (ref 0.4–4)
URATE SERPL-MCNC: 6.3 MG/DL (ref 2.4–5.7)
WBC # BLD AUTO: 5.51 K/UL (ref 3.9–12.7)

## 2022-03-30 PROCEDURE — 85025 COMPLETE CBC W/AUTO DIFF WBC: CPT | Performed by: INTERNAL MEDICINE

## 2022-03-30 PROCEDURE — 4010F PR ACE/ARB THEARPY RXD/TAKEN: ICD-10-PCS | Mod: CPTII,S$GLB,, | Performed by: INTERNAL MEDICINE

## 2022-03-30 PROCEDURE — 1159F PR MEDICATION LIST DOCUMENTED IN MEDICAL RECORD: ICD-10-PCS | Mod: CPTII,S$GLB,, | Performed by: INTERNAL MEDICINE

## 2022-03-30 PROCEDURE — 36415 COLL VENOUS BLD VENIPUNCTURE: CPT | Mod: PO | Performed by: INTERNAL MEDICINE

## 2022-03-30 PROCEDURE — 80053 COMPREHEN METABOLIC PANEL: CPT | Performed by: INTERNAL MEDICINE

## 2022-03-30 PROCEDURE — 84443 ASSAY THYROID STIM HORMONE: CPT | Performed by: INTERNAL MEDICINE

## 2022-03-30 PROCEDURE — 3079F DIAST BP 80-89 MM HG: CPT | Mod: CPTII,S$GLB,, | Performed by: INTERNAL MEDICINE

## 2022-03-30 PROCEDURE — 99999 PR PBB SHADOW E&M-EST. PATIENT-LVL V: ICD-10-PCS | Mod: PBBFAC,,, | Performed by: INTERNAL MEDICINE

## 2022-03-30 PROCEDURE — 1160F RVW MEDS BY RX/DR IN RCRD: CPT | Mod: CPTII,S$GLB,, | Performed by: INTERNAL MEDICINE

## 2022-03-30 PROCEDURE — 80061 LIPID PANEL: CPT | Performed by: INTERNAL MEDICINE

## 2022-03-30 PROCEDURE — 83036 HEMOGLOBIN GLYCOSYLATED A1C: CPT | Performed by: INTERNAL MEDICINE

## 2022-03-30 PROCEDURE — 99999 PR PBB SHADOW E&M-EST. PATIENT-LVL V: CPT | Mod: PBBFAC,,, | Performed by: INTERNAL MEDICINE

## 2022-03-30 PROCEDURE — 3075F SYST BP GE 130 - 139MM HG: CPT | Mod: CPTII,S$GLB,, | Performed by: INTERNAL MEDICINE

## 2022-03-30 PROCEDURE — 1159F MED LIST DOCD IN RCRD: CPT | Mod: CPTII,S$GLB,, | Performed by: INTERNAL MEDICINE

## 2022-03-30 PROCEDURE — 99396 PREV VISIT EST AGE 40-64: CPT | Mod: S$GLB,,, | Performed by: INTERNAL MEDICINE

## 2022-03-30 PROCEDURE — 3075F PR MOST RECENT SYSTOLIC BLOOD PRESS GE 130-139MM HG: ICD-10-PCS | Mod: CPTII,S$GLB,, | Performed by: INTERNAL MEDICINE

## 2022-03-30 PROCEDURE — 99396 PR PREVENTIVE VISIT,EST,40-64: ICD-10-PCS | Mod: S$GLB,,, | Performed by: INTERNAL MEDICINE

## 2022-03-30 PROCEDURE — 3008F PR BODY MASS INDEX (BMI) DOCUMENTED: ICD-10-PCS | Mod: CPTII,S$GLB,, | Performed by: INTERNAL MEDICINE

## 2022-03-30 PROCEDURE — 3079F PR MOST RECENT DIASTOLIC BLOOD PRESSURE 80-89 MM HG: ICD-10-PCS | Mod: CPTII,S$GLB,, | Performed by: INTERNAL MEDICINE

## 2022-03-30 PROCEDURE — 4010F ACE/ARB THERAPY RXD/TAKEN: CPT | Mod: CPTII,S$GLB,, | Performed by: INTERNAL MEDICINE

## 2022-03-30 PROCEDURE — 1160F PR REVIEW ALL MEDS BY PRESCRIBER/CLIN PHARMACIST DOCUMENTED: ICD-10-PCS | Mod: CPTII,S$GLB,, | Performed by: INTERNAL MEDICINE

## 2022-03-30 PROCEDURE — 84550 ASSAY OF BLOOD/URIC ACID: CPT | Performed by: INTERNAL MEDICINE

## 2022-03-30 PROCEDURE — 3008F BODY MASS INDEX DOCD: CPT | Mod: CPTII,S$GLB,, | Performed by: INTERNAL MEDICINE

## 2022-03-30 NOTE — PROGRESS NOTES
SUBJECTIVE     Chief Complaint   Patient presents with    Annual Exam       HPI  Yulissa Hatfield is a 61 y.o. female with multiple medical diagnoses as listed in the medical history and problem list that presents for annual exam. Pt has been doing well since her last visit. She has a good appetite and eats well. She does exercise by riding the bike and doing PT. She sleeps for ~5-6 hours nightly. Pt does take OTC supplements, which is Mg, MVI, and melatonin. She does have any current stressors, but eats, meditates, and does deep breathing to de-stress. Pt is UTD on age appropriate CA screening.    PAST MEDICAL HISTORY:  Past Medical History:   Diagnosis Date    Generalized anxiety disorder with panic attacks     HTN (hypertension)     Hyperlipidemia     Hypothyroid     DANIELLE on CPAP     Type 2 diabetes mellitus        PAST SURGICAL HISTORY:  Past Surgical History:   Procedure Laterality Date    CARPAL TUNNEL RELEASE       SECTION, CLASSIC      x 3    COLONOSCOPY N/A 11/10/2021    Procedure: COLONOSCOPY;  Surgeon: Kayla Pope MD;  Location: [facility];  Service: Endoscopy;  Laterality: N/A;    ENDOMETRIAL ABLATION      ESOPHAGOGASTRODUODENOSCOPY N/A 11/10/2021    Procedure: EGD (ESOPHAGOGASTRODUODENOSCOPY);  Surgeon: Kayla Pope MD;  Location: [facility] ENDO;  Service: Endoscopy;  Laterality: N/A;    FOOT SURGERY      KNEE SURGERY      THYROID SURGERY         SOCIAL HISTORY:  Social History     Socioeconomic History    Marital status:    Tobacco Use    Smoking status: Never Smoker    Smokeless tobacco: Never Used   Substance and Sexual Activity    Alcohol use: No    Drug use: No       FAMILY HISTORY:  Family History   Problem Relation Age of Onset    Heart disease Mother     Heart disease Father     No Known Problems Sister     Heart disease Brother     No Known Problems Daughter     No Known Problems Son     No Known Problems Daughter     No Known Problems  Daughter     Colon cancer Neg Hx     Esophageal cancer Neg Hx        ALLERGIES AND MEDICATIONS: updated and reviewed.  Review of patient's allergies indicates:  No Known Allergies  Current Outpatient Medications   Medication Sig Dispense Refill    aspirin (ECOTRIN) 81 MG EC tablet Take by mouth.      EPICERAM Diogenes USE AS A MOISTURIZING BARRIER AT LEAST TWICE DAILY  6    ezetimibe (ZETIA) 10 mg tablet Take 1 tablet (10 mg total) by mouth once daily. 90 tablet 1    hydroCHLOROthiazide (HYDRODIURIL) 25 MG tablet Take 1 tablet (25 mg total) by mouth once daily. 90 tablet 1    hydrOXYzine HCL (ATARAX) 25 MG tablet Take 1 tablet (25 mg total) by mouth 3 (three) times daily as needed for Anxiety. 30 tablet 0    levothyroxine (SYNTHROID) 112 MCG tablet TAKE 1 TABLET (112 MCG TOTAL) BY MOUTH BEFORE BREAKFAST. 90 tablet 0    pravastatin (PRAVACHOL) 10 MG tablet Take 1 tablet (10 mg total) by mouth once daily. 90 tablet 1    SOOLANTRA 1 % Crea       telmisartan (MICARDIS) 80 MG Tab Take 1 tablet (80 mg total) by mouth once daily. 90 tablet 1    omeprazole (PRILOSEC) 40 MG capsule TAKE 1 CAPSULE BY MOUTH EVERY DAY 90 capsule 1     Current Facility-Administered Medications   Medication Dose Route Frequency Provider Last Rate Last Admin    DOBUTamine 1000 mg in D5W 250 mL infusion (premix titrating)  10 mcg/kg/min Intravenous Continuous Vane Becerril MD 22.9 mL/hr at 01/14/21 1340 10 mcg/kg/min at 01/14/21 1340       ROS  Review of Systems   Constitutional: Negative for chills and fever.   HENT: Negative for hearing loss and sore throat.    Eyes: Negative for visual disturbance.   Respiratory: Negative for cough and shortness of breath.    Cardiovascular: Negative for chest pain, palpitations and leg swelling.   Gastrointestinal: Negative for abdominal pain, constipation, diarrhea, nausea and vomiting.   Genitourinary: Negative for dysuria, frequency and urgency.   Musculoskeletal: Positive for arthralgias  "(B/L shoulders). Negative for joint swelling and myalgias.   Skin: Positive for color change (BLE skin darkening). Negative for rash and wound.   Neurological: Positive for dizziness. Negative for headaches.   Psychiatric/Behavioral: Negative for agitation and confusion. The patient is not nervous/anxious.          OBJECTIVE     Physical Exam  Vitals:    03/30/22 0933   BP: 136/80   Pulse: 67   Resp: 20   Temp: 97.9 °F (36.6 °C)    Body mass index is 53.93 kg/m².  Weight: (!) 147 kg (324 lb 1.2 oz)   Height: 5' 5" (165.1 cm)     Physical Exam  Constitutional:       General: She is not in acute distress.     Appearance: She is well-developed.   HENT:      Head: Normocephalic and atraumatic.      Right Ear: Tympanic membrane, ear canal and external ear normal.      Left Ear: Tympanic membrane, ear canal and external ear normal.      Nose: Nose normal.   Eyes:      General: No scleral icterus.        Right eye: No discharge.         Left eye: No discharge.      Conjunctiva/sclera: Conjunctivae normal.   Neck:      Vascular: No JVD.      Trachea: No tracheal deviation.   Cardiovascular:      Rate and Rhythm: Normal rate and regular rhythm.      Heart sounds: No murmur heard.    No friction rub. No gallop.   Pulmonary:      Effort: Pulmonary effort is normal. No respiratory distress.      Breath sounds: Normal breath sounds. No wheezing.   Abdominal:      General: Bowel sounds are normal. There is no distension.      Palpations: Abdomen is soft. There is no mass.      Tenderness: There is no abdominal tenderness. There is no guarding or rebound.   Musculoskeletal:         General: No tenderness or deformity. Normal range of motion.      Cervical back: Normal range of motion and neck supple.   Skin:     General: Skin is warm and dry.      Findings: No erythema or rash.      Comments: BLE hemosiderin patches   Neurological:      Mental Status: She is alert and oriented to person, place, and time.      Motor: No abnormal " muscle tone.      Coordination: Coordination normal.   Psychiatric:         Behavior: Behavior normal.         Thought Content: Thought content normal.         Judgment: Judgment normal.           Health Maintenance       Date Due Completion Date    COVID-19 Vaccine (3 - Booster for Moderna series) 09/07/2021 4/7/2021    Mammogram 10/01/2021 10/1/2020    Hemoglobin A1c 02/17/2022 8/17/2021    Eye Exam 03/09/2022 3/9/2021    HIV Screening 01/13/2027 (Originally 12/17/1975) ---    Diabetes Urine Screening 08/17/2022 8/17/2021    Lipid Panel 08/25/2022 8/25/2021    Foot Exam 10/13/2022 10/13/2021    Low Dose Statin 03/30/2023 3/30/2022    Cervical Cancer Screening 01/12/2027 1/12/2022    Pneumococcal Vaccines (Age 0-64) (2 of 2 - PPSV23) 12/17/2027 2/19/2021    TETANUS VACCINE 10/05/2028 10/5/2018    Colorectal Cancer Screening 11/10/2031 11/10/2021            ASSESSMENT     61 y.o. female with     1. Annual physical exam    2. Benign paroxysmal positional vertigo, unspecified laterality    3. Chronic venous stasis dermatitis of both lower extremities        PLAN:     1. Annual physical exam  - Counseled on age appropriate medical preventative services, including age appropriate cancer screenings, over all nutritional health, need for a consistent exercise regimen and an over all push towards maintaining a vigorous and active lifestyle.  Counseled on age appropriate vaccines and discussed upcoming health care needs based on age/gender.  Spent time with patient counseling on need for a good patient/doctor relationship moving forward.  Discussed use of common OTC medications and supplements.  Discussed common dietary aids and use of caffeine and the need for good sleep hygiene and stress management.  - CBC Auto Differential; Future  - Comprehensive Metabolic Panel; Future  - Hemoglobin A1C; Future  - Lipid Panel; Future  - TSH; Future  - Uric Acid; Future    2. Benign paroxysmal positional vertigo, unspecified  laterality  - Pt to start Epley maneuver bilaterally    3. Chronic venous stasis dermatitis of both lower extremities  - COMPRESSION STOCKINGS        RTC in 6 months    Emilia Velasquez MD  03/30/2022 10:03 AM        No follow-ups on file.

## 2022-03-31 LAB
ESTIMATED AVG GLUCOSE: 134 MG/DL (ref 68–131)
HBA1C MFR BLD: 6.3 % (ref 4–5.6)

## 2022-04-18 ENCOUNTER — PATIENT MESSAGE (OUTPATIENT)
Dept: FAMILY MEDICINE | Facility: CLINIC | Age: 62
End: 2022-04-18
Payer: COMMERCIAL

## 2022-04-22 LAB
LEFT EYE DM RETINOPATHY: NEGATIVE
RIGHT EYE DM RETINOPATHY: NEGATIVE

## 2022-04-24 DIAGNOSIS — M54.12 CERVICAL RADICULOPATHY: Primary | ICD-10-CM

## 2022-05-04 ENCOUNTER — PATIENT MESSAGE (OUTPATIENT)
Dept: FAMILY MEDICINE | Facility: CLINIC | Age: 62
End: 2022-05-04
Payer: COMMERCIAL

## 2022-05-04 DIAGNOSIS — M54.12 CERVICAL RADICULOPATHY: Primary | ICD-10-CM

## 2022-05-11 ENCOUNTER — PATIENT MESSAGE (OUTPATIENT)
Dept: ADMINISTRATIVE | Facility: OTHER | Age: 62
End: 2022-05-11
Payer: COMMERCIAL

## 2022-05-11 ENCOUNTER — PATIENT OUTREACH (OUTPATIENT)
Dept: ADMINISTRATIVE | Facility: OTHER | Age: 62
End: 2022-05-11
Payer: COMMERCIAL

## 2022-05-11 NOTE — LETTER
AUTHORIZATION FOR RELEASE OF   CONFIDENTIAL INFORMATION    Dear Sarah Gilbert MD,    We are seeing Yulissa Hatfield, date of birth 1960, in the clinic at [facility]. Emilia Velasquez MD is the patient's PCP. Yulissa Hatfield has an outstanding lab/procedure at the time we reviewed her chart. In order to help keep her health information updated, she has authorized us to request the following medical record(s):        (  )  MAMMOGRAM                                      (  )  COLONOSCOPY      (  )  PAP SMEAR                                          (  )  OUTSIDE LAB RESULTS     (  )  DEXA SCAN                                          ( X )  DIABETIC EYE EXAM--            (  )  FOOT EXAM                                          (  )  ENTIRE RECORD     (  )  OUTSIDE IMMUNIZATIONS                 (  )  _______________         Please fax records to [facility], Nadja N Jones, MD, [phone]     If you have any questions, please contact Daylin Guevara at [phone].           Patient Name: Yulissa Hatfield  : 1960  Patient Phone #: [phone]

## 2022-05-11 NOTE — PROGRESS NOTES
Care Everywhere: updated  Immunization: updated  Health Maintenance: updated  Media Review: review for outside mammogram and eye exam report   Legacy Review:   DIS:no updated mammogram report on file   Order placed:   Upcoming appts:  EFAX: sent to Dr. Gilbert office to possibly obtain update eye exam report. Sent to [facility] to possibly obtain updated mammogram report  Task Tickets:Mammogram scheduling ticket sent to patient's portal   Referrals:

## 2022-05-11 NOTE — LETTER
AUTHORIZATION FOR RELEASE OF   CONFIDENTIAL INFORMATION    Dear [facility],    We are seeing Yulissa Hatfield, date of birth 1960, in the clinic at [facility]. Emilia Velasquez MD is the patient's PCP. Yulissa Hatfield has an outstanding lab/procedure at the time we reviewed her chart. In order to help keep her health information updated, she has authorized us to request the following medical record(s):        ( x )  MAMMOGRAM for                                       (  )  COLONOSCOPY      (  )  PAP SMEAR                                          (  )  OUTSIDE LAB RESULTS     (  )  DEXA SCAN                                          (  )  EYE EXAM            (  )  FOOT EXAM                                          (  )  ENTIRE RECORD     (  )  OUTSIDE IMMUNIZATIONS                 (  )  _______________         Please fax records to [facility], Nadja N Jones, MD, [phone]     If you have any questions, please contact Daylin Ismael at [phone].           Patient Name: Yulissa Hatfield  : 1960  Patient Phone #: [phone]

## 2022-05-18 NOTE — TELEPHONE ENCOUNTER
----- Message from Daylin Hansen MA sent at 5/11/2017  2:34 PM CDT -----  Contact: [phone]  Pt states needs new rx for mask supplies send to [facility]   ----- Message -----     From: Maddi Graham     Sent: 5/11/2017   9:49 AM       To: Phillip Nolasco Staff    [phone] Pt requesting a nurse call back. Please advise.     show

## 2022-05-23 ENCOUNTER — OFFICE VISIT (OUTPATIENT)
Dept: NEUROLOGY | Facility: CLINIC | Age: 62
End: 2022-05-23
Payer: COMMERCIAL

## 2022-05-23 VITALS
RESPIRATION RATE: 16 BRPM | HEART RATE: 70 BPM | HEIGHT: 65 IN | DIASTOLIC BLOOD PRESSURE: 92 MMHG | SYSTOLIC BLOOD PRESSURE: 148 MMHG | BODY MASS INDEX: 48.82 KG/M2 | WEIGHT: 293 LBS

## 2022-05-23 DIAGNOSIS — M54.2 CERVICALGIA: ICD-10-CM

## 2022-05-23 DIAGNOSIS — R20.0 HAND NUMBNESS: ICD-10-CM

## 2022-05-23 DIAGNOSIS — M54.12 CERVICAL RADICULAR PAIN: Primary | ICD-10-CM

## 2022-05-23 PROCEDURE — 99204 PR OFFICE/OUTPT VISIT, NEW, LEVL IV, 45-59 MIN: ICD-10-PCS | Mod: S$GLB,,, | Performed by: PSYCHIATRY & NEUROLOGY

## 2022-05-23 PROCEDURE — 99999 PR PBB SHADOW E&M-EST. PATIENT-LVL V: ICD-10-PCS | Mod: PBBFAC,,, | Performed by: PSYCHIATRY & NEUROLOGY

## 2022-05-23 PROCEDURE — 3044F PR MOST RECENT HEMOGLOBIN A1C LEVEL <7.0%: ICD-10-PCS | Mod: CPTII,S$GLB,, | Performed by: PSYCHIATRY & NEUROLOGY

## 2022-05-23 PROCEDURE — 3008F PR BODY MASS INDEX (BMI) DOCUMENTED: ICD-10-PCS | Mod: CPTII,S$GLB,, | Performed by: PSYCHIATRY & NEUROLOGY

## 2022-05-23 PROCEDURE — 1160F PR REVIEW ALL MEDS BY PRESCRIBER/CLIN PHARMACIST DOCUMENTED: ICD-10-PCS | Mod: CPTII,S$GLB,, | Performed by: PSYCHIATRY & NEUROLOGY

## 2022-05-23 PROCEDURE — 1159F PR MEDICATION LIST DOCUMENTED IN MEDICAL RECORD: ICD-10-PCS | Mod: CPTII,S$GLB,, | Performed by: PSYCHIATRY & NEUROLOGY

## 2022-05-23 PROCEDURE — 3077F SYST BP >= 140 MM HG: CPT | Mod: CPTII,S$GLB,, | Performed by: PSYCHIATRY & NEUROLOGY

## 2022-05-23 PROCEDURE — 99999 PR PBB SHADOW E&M-EST. PATIENT-LVL V: CPT | Mod: PBBFAC,,, | Performed by: PSYCHIATRY & NEUROLOGY

## 2022-05-23 PROCEDURE — 99204 OFFICE O/P NEW MOD 45 MIN: CPT | Mod: S$GLB,,, | Performed by: PSYCHIATRY & NEUROLOGY

## 2022-05-23 PROCEDURE — 3077F PR MOST RECENT SYSTOLIC BLOOD PRESSURE >= 140 MM HG: ICD-10-PCS | Mod: CPTII,S$GLB,, | Performed by: PSYCHIATRY & NEUROLOGY

## 2022-05-23 PROCEDURE — 3044F HG A1C LEVEL LT 7.0%: CPT | Mod: CPTII,S$GLB,, | Performed by: PSYCHIATRY & NEUROLOGY

## 2022-05-23 PROCEDURE — 1159F MED LIST DOCD IN RCRD: CPT | Mod: CPTII,S$GLB,, | Performed by: PSYCHIATRY & NEUROLOGY

## 2022-05-23 PROCEDURE — 3080F DIAST BP >= 90 MM HG: CPT | Mod: CPTII,S$GLB,, | Performed by: PSYCHIATRY & NEUROLOGY

## 2022-05-23 PROCEDURE — 4010F ACE/ARB THERAPY RXD/TAKEN: CPT | Mod: CPTII,S$GLB,, | Performed by: PSYCHIATRY & NEUROLOGY

## 2022-05-23 PROCEDURE — 4010F PR ACE/ARB THEARPY RXD/TAKEN: ICD-10-PCS | Mod: CPTII,S$GLB,, | Performed by: PSYCHIATRY & NEUROLOGY

## 2022-05-23 PROCEDURE — 3080F PR MOST RECENT DIASTOLIC BLOOD PRESSURE >= 90 MM HG: ICD-10-PCS | Mod: CPTII,S$GLB,, | Performed by: PSYCHIATRY & NEUROLOGY

## 2022-05-23 PROCEDURE — 3008F BODY MASS INDEX DOCD: CPT | Mod: CPTII,S$GLB,, | Performed by: PSYCHIATRY & NEUROLOGY

## 2022-05-23 PROCEDURE — 1160F RVW MEDS BY RX/DR IN RCRD: CPT | Mod: CPTII,S$GLB,, | Performed by: PSYCHIATRY & NEUROLOGY

## 2022-05-23 RX ORDER — DIAZEPAM 5 MG/1
TABLET ORAL
Qty: 2 TABLET | Refills: 0 | Status: SHIPPED | OUTPATIENT
Start: 2022-05-23 | End: 2022-06-27

## 2022-05-23 RX ORDER — IBUPROFEN 200 MG
600 TABLET ORAL EVERY 6 HOURS PRN
COMMUNITY
End: 2023-12-21

## 2022-05-23 RX ORDER — ACETAMINOPHEN 500 MG
1000 TABLET ORAL EVERY 6 HOURS PRN
COMMUNITY

## 2022-05-23 NOTE — PROGRESS NOTES
Consult from Dr. Velasquez    HPI: Yulissa Hatfield is a 61 y.o. female referred for numb hands    She has had pain in the shoulders for 4 years. She states the symptoms started with pain in the right shoulder which seemed to move to the left side. She has been told by ortho she has arthritis in the shoulder and ?rotator cuff tear. She has had PT on more than one occasion.    At her last PT visit, the PT was concerned for a neck disorder.     She continues home exercises prescribed by PT.    Currently, the symptoms disturb her sleep with pain and now numbness in either hand with sleep (which can occur with driving as well). The entire hand is numb and tingling.    Pain starts in the lateral neck bilaterally. She states the pain has worsened. Pain does radiate to the upper arms.     She does use NSAIDs OTC PRN which helps minimally.She has tried massage and meditation.    She is a  and works mentoring youth. She lost her children in an MVA years ago.       In 2016 she had a headache/ saw neurology. States her headaches her rare.     Review of Systems   Constitutional: Negative for fever.   HENT: Negative for nosebleeds.    Eyes: Negative for double vision.   Respiratory: Negative for hemoptysis.    Cardiovascular: Negative for leg swelling.   Gastrointestinal: Negative for blood in stool.   Genitourinary: Negative for hematuria.   Musculoskeletal: Positive for neck pain.   Skin: Negative for rash.   Neurological: Positive for sensory change.   Endo/Heme/Allergies: Does not bruise/bleed easily.         I have reviewed all of this patient's past medical and surgical histories as well as family and social histories and active allergies and medications as documented in the electronic medical record.        Exam:  Gen Appearance, well developed/nourished in no apparent distress  CV: 2+ distal pulses with no edema or swelling  Neuro:  MS: Awake, alert, oriented to place, person, time, situation. Sustains  attention. Recent/remote memory intact, Language is full to spontaneous speech/repetition/naming/comprehension. Fund of Knowledge is full  CN: Optic discs are flat with normal vasculature, PERRL, Extraoccular movements and visual fields are full. Normal facial sensation and strength, Hearing symmetric, Tongue and Palate are midline and strong. Shoulder Shrug symmetric and strong.  Motor: Normal bulk, tone, no abnormal movements. 5/5 strength bilateral upper/lower extremities with 2+ reflexes and bilateral plantar response  Sensory: symmetric to light touch, pain, temp, and vibration/proprioception. Romberg negative  Cerebellar: Finger-nose,Heal-shin, Rapid alternating movements intact  Gait: Normal stance, no ataxia  ++ Palpable spasms in the C spine laterally          Assessment/Plan: Yulissa Hatfield is a 61 y.o. female with several years of shoulder and now neck pain bilaterally now radiating to both proximal arms and now with numbness and tingling in both hands  I recommend:     1. MRI C spine  -Valium given per orders for Claustrophobia. Patient was instructed not to drive to or from study.   2. EMG/NCS of the arms  -Looking for Cervical Radiculopathy +/- CTS  -She is s/p CTR on the right over 15 years ago (had remote EMG/NCS for that)  3. She completed PT and does not have full relief with NSAIDs OTC. 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 Note: She saw Neurology in 2016 for a headache consistent with TAC  -Has rare headache without autonomic symptoms now    RTC for EMG/NCS    CC: Dr. Velasquez

## 2022-05-24 ENCOUNTER — PATIENT MESSAGE (OUTPATIENT)
Dept: FAMILY MEDICINE | Facility: CLINIC | Age: 62
End: 2022-05-24
Payer: COMMERCIAL

## 2022-05-24 ENCOUNTER — PATIENT OUTREACH (OUTPATIENT)
Dept: ADMINISTRATIVE | Facility: OTHER | Age: 62
End: 2022-05-24
Payer: COMMERCIAL

## 2022-05-24 ENCOUNTER — PATIENT MESSAGE (OUTPATIENT)
Dept: NEUROLOGY | Facility: CLINIC | Age: 62
End: 2022-05-24
Payer: COMMERCIAL

## 2022-05-30 ENCOUNTER — PATIENT MESSAGE (OUTPATIENT)
Dept: NEUROLOGY | Facility: CLINIC | Age: 62
End: 2022-05-30
Payer: COMMERCIAL

## 2022-06-04 ENCOUNTER — PATIENT MESSAGE (OUTPATIENT)
Dept: NEUROLOGY | Facility: CLINIC | Age: 62
End: 2022-06-04
Payer: COMMERCIAL

## 2022-06-20 ENCOUNTER — HOSPITAL ENCOUNTER (OUTPATIENT)
Dept: RADIOLOGY | Facility: HOSPITAL | Age: 62
Discharge: HOME | End: 2022-06-20
Attending: PSYCHIATRY & NEUROLOGY
Payer: COMMERCIAL

## 2022-06-20 DIAGNOSIS — M54.12 CERVICAL RADICULAR PAIN: ICD-10-CM

## 2022-06-20 DIAGNOSIS — M54.2 CERVICALGIA: ICD-10-CM

## 2022-06-20 PROCEDURE — 72141 MRI NECK SPINE W/O DYE: CPT | Mod: TC

## 2022-06-20 PROCEDURE — 72141 MRI CERVICAL SPINE WITHOUT CONTRAST: ICD-10-PCS | Mod: 26,,, | Performed by: RADIOLOGY

## 2022-06-20 PROCEDURE — 72141 MRI NECK SPINE W/O DYE: CPT | Mod: 26,,, | Performed by: RADIOLOGY

## 2022-06-23 ENCOUNTER — TELEPHONE (OUTPATIENT)
Dept: NEUROLOGY | Facility: CLINIC | Age: 62
End: 2022-06-23
Payer: COMMERCIAL

## 2022-06-23 NOTE — TELEPHONE ENCOUNTER
----- Message from Marietta Abbasi sent at 2022  9:18 AM CDT -----  Contact: self  Yulissa Hatfield  MRN: [id number]  : 1960  PCP: Emilia Velasquez  Home Phone      [phone]  Work Phone      [phone]  Mobile          [phone]      MESSAGE: Patient requesting MRI results.        Phone [phone]

## 2022-06-26 DIAGNOSIS — E03.9 HYPOTHYROIDISM, UNSPECIFIED TYPE: Chronic | ICD-10-CM

## 2022-06-26 RX ORDER — LEVOTHYROXINE SODIUM 112 UG/1
TABLET ORAL
Qty: 90 TABLET | Refills: 3 | Status: SHIPPED | OUTPATIENT
Start: 2022-06-26 | End: 2022-07-06

## 2022-06-26 NOTE — TELEPHONE ENCOUNTER
No new care gaps identified.  [facility] Embedded Care Gaps. Reference number: [id number]. 6/26/2022   12:16:11 AM CDT

## 2022-06-26 NOTE — TELEPHONE ENCOUNTER
Refill Decision Note   Yulissa Washington  is requesting a refill authorization.  Brief Assessment and Rationale for Refill:  Approve     Medication Therapy Plan:       Medication Reconciliation Completed: No   Comments:     No Care Gaps recommended.     Note composed:2:07 PM 06/26/2022

## 2022-06-27 ENCOUNTER — OFFICE VISIT (OUTPATIENT)
Dept: PAIN MEDICINE | Facility: CLINIC | Age: 62
End: 2022-06-27
Payer: COMMERCIAL

## 2022-06-27 VITALS
HEIGHT: 65 IN | BODY MASS INDEX: 48.82 KG/M2 | HEART RATE: 69 BPM | WEIGHT: 293 LBS | RESPIRATION RATE: 20 BRPM | SYSTOLIC BLOOD PRESSURE: 114 MMHG | DIASTOLIC BLOOD PRESSURE: 72 MMHG

## 2022-06-27 DIAGNOSIS — M19.011 OSTEOARTHRITIS OF BILATERAL GLENOHUMERAL JOINTS: ICD-10-CM

## 2022-06-27 DIAGNOSIS — M75.52 SUBACROMIAL BURSITIS OF BOTH SHOULDERS: ICD-10-CM

## 2022-06-27 DIAGNOSIS — M54.2 NECK PAIN: ICD-10-CM

## 2022-06-27 DIAGNOSIS — M25.511 CHRONIC PAIN OF BOTH SHOULDERS: Primary | ICD-10-CM

## 2022-06-27 DIAGNOSIS — M54.12 CERVICAL RADICULOPATHY: ICD-10-CM

## 2022-06-27 DIAGNOSIS — M25.512 CHRONIC PAIN OF BOTH SHOULDERS: Primary | ICD-10-CM

## 2022-06-27 DIAGNOSIS — M75.51 SUBACROMIAL BURSITIS OF BOTH SHOULDERS: ICD-10-CM

## 2022-06-27 DIAGNOSIS — M79.18 MYOFASCIAL PAIN: ICD-10-CM

## 2022-06-27 DIAGNOSIS — M25.612 DECREASED RANGE OF MOTION OF LEFT SHOULDER: ICD-10-CM

## 2022-06-27 DIAGNOSIS — M19.012 OSTEOARTHRITIS OF BILATERAL GLENOHUMERAL JOINTS: ICD-10-CM

## 2022-06-27 DIAGNOSIS — M19.019 AC JOINT ARTHROPATHY: ICD-10-CM

## 2022-06-27 DIAGNOSIS — G89.29 CHRONIC PAIN OF BOTH SHOULDERS: Primary | ICD-10-CM

## 2022-06-27 PROCEDURE — 4010F ACE/ARB THERAPY RXD/TAKEN: CPT | Mod: CPTII,S$GLB,, | Performed by: PHYSICAL MEDICINE & REHABILITATION

## 2022-06-27 PROCEDURE — 99999 PR PBB SHADOW E&M-EST. PATIENT-LVL IV: ICD-10-PCS | Mod: PBBFAC,,, | Performed by: PHYSICAL MEDICINE & REHABILITATION

## 2022-06-27 PROCEDURE — 3008F PR BODY MASS INDEX (BMI) DOCUMENTED: ICD-10-PCS | Mod: CPTII,S$GLB,, | Performed by: PHYSICAL MEDICINE & REHABILITATION

## 2022-06-27 PROCEDURE — 4010F PR ACE/ARB THEARPY RXD/TAKEN: ICD-10-PCS | Mod: CPTII,S$GLB,, | Performed by: PHYSICAL MEDICINE & REHABILITATION

## 2022-06-27 PROCEDURE — 3044F HG A1C LEVEL LT 7.0%: CPT | Mod: CPTII,S$GLB,, | Performed by: PHYSICAL MEDICINE & REHABILITATION

## 2022-06-27 PROCEDURE — 3078F DIAST BP <80 MM HG: CPT | Mod: CPTII,S$GLB,, | Performed by: PHYSICAL MEDICINE & REHABILITATION

## 2022-06-27 PROCEDURE — 99204 OFFICE O/P NEW MOD 45 MIN: CPT | Mod: S$GLB,,, | Performed by: PHYSICAL MEDICINE & REHABILITATION

## 2022-06-27 PROCEDURE — 3078F PR MOST RECENT DIASTOLIC BLOOD PRESSURE < 80 MM HG: ICD-10-PCS | Mod: CPTII,S$GLB,, | Performed by: PHYSICAL MEDICINE & REHABILITATION

## 2022-06-27 PROCEDURE — 99999 PR PBB SHADOW E&M-EST. PATIENT-LVL IV: CPT | Mod: PBBFAC,,, | Performed by: PHYSICAL MEDICINE & REHABILITATION

## 2022-06-27 PROCEDURE — 3044F PR MOST RECENT HEMOGLOBIN A1C LEVEL <7.0%: ICD-10-PCS | Mod: CPTII,S$GLB,, | Performed by: PHYSICAL MEDICINE & REHABILITATION

## 2022-06-27 PROCEDURE — 99204 PR OFFICE/OUTPT VISIT, NEW, LEVL IV, 45-59 MIN: ICD-10-PCS | Mod: S$GLB,,, | Performed by: PHYSICAL MEDICINE & REHABILITATION

## 2022-06-27 PROCEDURE — 1159F MED LIST DOCD IN RCRD: CPT | Mod: CPTII,S$GLB,, | Performed by: PHYSICAL MEDICINE & REHABILITATION

## 2022-06-27 PROCEDURE — 3074F PR MOST RECENT SYSTOLIC BLOOD PRESSURE < 130 MM HG: ICD-10-PCS | Mod: CPTII,S$GLB,, | Performed by: PHYSICAL MEDICINE & REHABILITATION

## 2022-06-27 PROCEDURE — 1160F RVW MEDS BY RX/DR IN RCRD: CPT | Mod: CPTII,S$GLB,, | Performed by: PHYSICAL MEDICINE & REHABILITATION

## 2022-06-27 PROCEDURE — 3008F BODY MASS INDEX DOCD: CPT | Mod: CPTII,S$GLB,, | Performed by: PHYSICAL MEDICINE & REHABILITATION

## 2022-06-27 PROCEDURE — 3074F SYST BP LT 130 MM HG: CPT | Mod: CPTII,S$GLB,, | Performed by: PHYSICAL MEDICINE & REHABILITATION

## 2022-06-27 PROCEDURE — 1159F PR MEDICATION LIST DOCUMENTED IN MEDICAL RECORD: ICD-10-PCS | Mod: CPTII,S$GLB,, | Performed by: PHYSICAL MEDICINE & REHABILITATION

## 2022-06-27 PROCEDURE — 1160F PR REVIEW ALL MEDS BY PRESCRIBER/CLIN PHARMACIST DOCUMENTED: ICD-10-PCS | Mod: CPTII,S$GLB,, | Performed by: PHYSICAL MEDICINE & REHABILITATION

## 2022-06-27 RX ORDER — DULOXETIN HYDROCHLORIDE 30 MG/1
30 CAPSULE, DELAYED RELEASE ORAL DAILY
Qty: 30 CAPSULE | Refills: 11 | Status: SHIPPED | OUTPATIENT
Start: 2022-06-27 | End: 2022-09-01

## 2022-06-27 RX ORDER — TIZANIDINE 4 MG/1
4 TABLET ORAL NIGHTLY PRN
Qty: 30 TABLET | Refills: 5 | Status: SHIPPED | OUTPATIENT
Start: 2022-06-27 | End: 2022-10-13

## 2022-06-27 NOTE — PROGRESS NOTES
[facility]  New Patient H&P    Referring Provider: Edwin Tena Md  [address]  [place] [address]    Chief Complaint:   Chief Complaint   Patient presents with    Neck Pain    Shoulder Pain       History of Present Illness: Yulissa Hatfield is a 61 y.o. female referred by Dr. Edwin Tena for cervical radiculopathy.      Onset: 2018, no specific injury, insidious onset  Location: Bilateral anterior shoulders, worse on the left  Radiation: into neck and down both arms  Timing: constant  Quality: Aching and Deep  Exacerbating Factors: lifting  Alleviating Factors: medications and rest  Associated Symptoms: She feels weakness in both arms, numbness in both hands in C6/7 distribution. She denies night fever/night sweats, urinary incontinence, bowel incontinence and significant weight loss    Severity: Currently: 7/10   Typical Range: 4-10/10     Exacerbation: 10/10     Pain Disability Index  Family/Home Responsibilities:: 5  Recreation:: 5  Social Activity:: 5  Occupation:: 5  Sexual Behavior:: 8  Self Care:: 1  Life-Support Activities:: 0  Pain Disability Index (PDI): 29    Previous Interventions:  -     Previous Therapies:  PT/OT: yes   Relevant Surgery: no   Previous Medications:   - NSAIDS: Ibuprofen 200 mg  - Muscle Relaxants:    - TCAs:   - SNRIs:   - Topicals:   - Anticonvulsants:    - Opioids: tramadol    Current Pain Medications:  1. Ibuprofen  2. Tylenol     Blood Thinners: None    Full Medication List:    Current Outpatient Medications:     acetaminophen (TYLENOL) 500 MG tablet, Take 1,000 mg by mouth every 6 (six) hours as needed for Pain., Disp: , Rfl:     EPICERAM Diogenes, USE AS A MOISTURIZING BARRIER AT LEAST TWICE DAILY, Disp: , Rfl: 6    ezetimibe (ZETIA) 10 mg tablet, Take 1 tablet (10 mg total) by mouth once daily., Disp: 90 tablet, Rfl: 1    hydroCHLOROthiazide (HYDRODIURIL) 25 MG tablet, Take 1 tablet (25 mg total) by mouth once daily., Disp: 90 tablet, Rfl: 1     "ibuprofen (ADVIL,MOTRIN) 200 MG tablet, Take 600 mg by mouth every 6 (six) hours as needed for Pain., Disp: , Rfl:     levothyroxine (SYNTHROID) 112 MCG tablet, TAKE 1 TABLET BY MOUTH BEFORE BREAKFAST., Disp: 90 tablet, Rfl: 3    pravastatin (PRAVACHOL) 10 MG tablet, Take 1 tablet (10 mg total) by mouth once daily., Disp: 90 tablet, Rfl: 1    SOOLANTRA 1 % Crea, , Disp: , Rfl:     telmisartan (MICARDIS) 80 MG Tab, Take 1 tablet (80 mg total) by mouth once daily., Disp: 90 tablet, Rfl: 1    DULoxetine (CYMBALTA) 30 MG capsule, Take 1 capsule (30 mg total) by mouth once daily., Disp: 30 capsule, Rfl: 11    tiZANidine (ZANAFLEX) 4 MG tablet, Take 1 tablet (4 mg total) by mouth nightly as needed (pain, muscle spasm)., Disp: 30 tablet, Rfl: 5  No current facility-administered medications for this visit.     Review of Systems:  ROS    Allergies:  Patient has no known allergies.     Medical History:   has a past medical history of Generalized anxiety disorder with panic attacks, HTN (hypertension), Hyperlipidemia, Hypothyroid, DANIELLE on CPAP, and Type 2 diabetes mellitus.    Surgical History:   has a past surgical history that includes Carpal tunnel release; Foot surgery; Knee surgery;  section, classic; Thyroid surgery; Endometrial ablation; Colonoscopy (N/A, 11/10/2021); and Esophagogastroduodenoscopy (N/A, 11/10/2021).    Family History:  family history includes Heart disease in her brother, father, and mother; No Known Problems in her daughter, daughter, daughter, sister, and son.    Social History:   reports that she has never smoked. She has never used smokeless tobacco. She reports that she does not drink alcohol and does not use drugs.    Physical Exam:  /72 (BP Location: Left arm, Patient Position: Sitting, BP Method: Large (Manual))   Pulse 69   Resp 20   Ht 5' 5" (1.651 m)   Wt (!) 151.7 kg (334 lb 7 oz)   LMP 2006   BMI 55.65 kg/m²   GEN: No acute distress. Calm, comfortable  HENT: " Normocephalic, atraumatic, moist mucous membranes  EYE: Anicteric sclera, non-injected.   CV: Non-diaphoretic. Regular Rate. Radial Pulses 2+.  RESP: Breathing comfortably. Chest expansion symmetric.  EXT: No clubbing, cyanosis.   SKIN: Warm, & dry to palpation. No visible rashes or lesions of exposed skin.   PSYCH: Pleasant mood and appropriate affect. Recent and remote memory intact.   GAIT: Independent, normal ambulation  Neck Exam:       Inspection: No erythema, bruising.       Palpation: (+) TTP of bilateral trapezius, levator, rhomboids, worse on the left      ROM:  Slight Limitation in all planes.       Provocative Maneuvers:  (-) Spurling's bilaterally  Shoulder Exam:       Inspection: No erythema, bruising.       Palpation: (+) TTP of bilateral shoulders diffusely over b/l AC joint, subacromial area, proximal biceps tendon.       ROM:  Limited in abduction, internal rotation on the left > right   (+) Crepitus on left      Provocative Maneuvers:  (+) Hawkin's b/l, L > R       (+) Empty Can b/l, L > R       (+) Cross arm b/l   (+) Speed's on left  Neurologic Exam:     Alert. Speech is fluent and appropriate.     Strength:  5/5 throughout bilateral upper & lower extremities     Sensation:  Grossly intact to light touch in bilateral upper & lower extremities     Reflexes: 1+ in b/l patella, achilles, biceps, brachioradialis, triceps     Tone: No abnormality appreciated in bilateral upper or lower extremities     (-) Godwin bilaterally                         Imaging:  -MRI Cervical Spine 06/20/2022:  MRI examination of the cervical spine dated June 20, 2022.  There is straightening of the cervical spine with degenerative change greatest at C4-C5 and C5-C6.  No evidence of congenital spinal stenosis.  No abnormal signal change involving the visualized portion of the spinal cord.  No evidence of an acute cervical spine fracture.  At the C2-C3 and C3-C4 levels there is no evidence of a focal disc abnormality.  No  significant spinal canal or foraminal encroachment.  At the C4-C5 level there is a minimal posterior disc-osteophyte with mild effacement along the anterior margin of the subarachnoid space and spinal cord.  Mild spinal canal encroachment.  No foraminal encroachment.  At the C5-C6 level there is a broad-based and right paracentral disc-osteophyte.  There is effacement along the anterior margin of the subarachnoid space with right greater than left foraminal encroachment.  At the C6-C7 and C7-T1 levels there is no evidence of a significant focal disc abnormality.  No significant spinal canal or foraminal encroachment.    - X-ray shoulder complete b/l 2/13/20:   Right shoulder: The alignment is normal.  The glenohumeral joint appears normal.  Minimal osteophyte at the inferior humeral head.  There is mild osteophyte at the acromioclavicular joint.  No fracture, no osseous lesions.  The right upper thoracic region appear normal.  Left shoulder: The alignment is normal.  There is mild glenohumeral joint space narrowing.  Subchondral cystic changes at the superior glenoid.  Prominent inferior osteophyte at the humeral head.  Osteophyte noted at the acromioclavicular joint.  No fracture, no osseous lesions.  The left upper thoracic region appear normal    Labs:  BMP  Lab Results   Component Value Date     03/30/2022    K 4.2 03/30/2022     03/30/2022    CO2 25 03/30/2022    BUN 14 03/30/2022    CREATININE 0.6 03/30/2022    CALCIUM 9.1 03/30/2022    ANIONGAP 10 03/30/2022    ESTGFRAFRICA >60 03/30/2022    EGFRNONAA >60 03/30/2022     Lab Results   Component Value Date    ALT 24 03/30/2022    AST 19 03/30/2022    ALKPHOS 69 03/30/2022    BILITOT 0.6 03/30/2022     Lab Results   Component Value Date     03/30/2022       Assessment:  Yulissa Hatfield is a 61 y.o. female with the following diagnoses based on history, exam, and imaging:    Problem List Items Addressed This Visit        Neuro    Cervical  radiculopathy    Relevant Medications    DULoxetine (CYMBALTA) 30 MG capsule    tiZANidine (ZANAFLEX) 4 MG tablet      Other Visit Diagnoses     Chronic pain of both shoulders    -  Primary    Relevant Medications    DULoxetine (CYMBALTA) 30 MG capsule    tiZANidine (ZANAFLEX) 4 MG tablet    Neck pain        Relevant Medications    DULoxetine (CYMBALTA) 30 MG capsule    tiZANidine (ZANAFLEX) 4 MG tablet    Osteoarthritis of bilateral glenohumeral joints        Relevant Medications    DULoxetine (CYMBALTA) 30 MG capsule    tiZANidine (ZANAFLEX) 4 MG tablet    Subacromial bursitis of both shoulders        Relevant Medications    DULoxetine (CYMBALTA) 30 MG capsule    tiZANidine (ZANAFLEX) 4 MG tablet    Decreased range of motion of left shoulder        Relevant Medications    DULoxetine (CYMBALTA) 30 MG capsule    tiZANidine (ZANAFLEX) 4 MG tablet    Myofascial pain        AC joint arthropathy              This is a pleasant 61 y.o. lady presenting with:     - Chronic bilateral shoulder pain: Left worse than right. Imaging with OA in AC and GH joints. Impingement signs on exam b/l.   - Chronic neck pain and bilateral radicular arm pains. MRI with bilateral foraminal encroachment at C5-6. Myofascial pain on exam.   - Comorbidities: BMI > 50.     Treatment Plan:   - PT/OT/HEP: Provided HEP for RTC. Discussed benefits of exercise for pain.   - Procedures: Schedule left subacromial bursa CSI with US guidance in [place].   - Medications:    - Prescription for compound topical medication with Diclofenac 1.5%, Lidocaine 2.5%, Prilocaine 2.5%, and Gabapentin 4%.  Apply 1-2 grams to painful area up to 3-4 times daily   - Rx for cymbalta 30 mg daily and if tolerated, but inadequate benefit, plan to increase to 60 mg daily at next visit.   - Rx for tizanidine 4 mg qHS for sleep and pain  - Imaging: Reviewed. Consider x-ray of c-spine.   - Labs: Reviewed.  Medications are appropriately dosed for current hepatorenal  function.    Follow Up: RTC in 1 month to assess cymbalta and perform shoulder injection.     Sowmya Tellez M.D.  Interventional Pain Medicine / Physical Medicine & Rehabilitation    Disclaimer: This note was partly generated using dictation software which may occasionally result in transcription errors.

## 2022-07-06 RX ORDER — LEVOTHYROXINE SODIUM 112 UG/1
112 TABLET ORAL
Qty: 90 TABLET | Refills: 2 | Status: SHIPPED | OUTPATIENT
Start: 2022-07-06 | End: 2023-07-30

## 2022-07-06 NOTE — TELEPHONE ENCOUNTER
"Levothyroxine 112 mcg order discontinued due to cosign declined by Dr. Velasquez with comment stating "Pt only gets 2 refills". Pended 90-day supply + 2 refills for your review.       Pended Medication(s)   Requested Prescriptions     Pending Prescriptions Disp Refills    levothyroxine (SYNTHROID) 112 MCG tablet 90 tablet 2     Sig: Take 1 tablet (112 mcg total) by mouth before breakfast.         "

## 2022-07-26 ENCOUNTER — PATIENT MESSAGE (OUTPATIENT)
Dept: PAIN MEDICINE | Facility: CLINIC | Age: 62
End: 2022-07-26
Payer: COMMERCIAL

## 2022-07-28 ENCOUNTER — OFFICE VISIT (OUTPATIENT)
Dept: PAIN MEDICINE | Facility: CLINIC | Age: 62
End: 2022-07-28
Payer: COMMERCIAL

## 2022-07-28 DIAGNOSIS — M54.2 NECK PAIN: ICD-10-CM

## 2022-07-28 DIAGNOSIS — M79.18 MYOFASCIAL PAIN: ICD-10-CM

## 2022-07-28 DIAGNOSIS — M75.52 SUBACROMIAL BURSITIS OF BOTH SHOULDERS: ICD-10-CM

## 2022-07-28 DIAGNOSIS — G89.29 CHRONIC PAIN OF BOTH SHOULDERS: Primary | ICD-10-CM

## 2022-07-28 DIAGNOSIS — M25.612 DECREASED RANGE OF MOTION OF LEFT SHOULDER: ICD-10-CM

## 2022-07-28 DIAGNOSIS — M54.12 CERVICAL RADICULOPATHY: ICD-10-CM

## 2022-07-28 DIAGNOSIS — M75.51 SUBACROMIAL BURSITIS OF BOTH SHOULDERS: ICD-10-CM

## 2022-07-28 DIAGNOSIS — M19.011 OSTEOARTHRITIS OF BILATERAL GLENOHUMERAL JOINTS: ICD-10-CM

## 2022-07-28 DIAGNOSIS — M25.512 CHRONIC PAIN OF BOTH SHOULDERS: Primary | ICD-10-CM

## 2022-07-28 DIAGNOSIS — M25.511 CHRONIC PAIN OF BOTH SHOULDERS: Primary | ICD-10-CM

## 2022-07-28 DIAGNOSIS — M19.012 OSTEOARTHRITIS OF BILATERAL GLENOHUMERAL JOINTS: ICD-10-CM

## 2022-07-28 DIAGNOSIS — M19.019 AC JOINT ARTHROPATHY: ICD-10-CM

## 2022-07-28 PROCEDURE — 1159F PR MEDICATION LIST DOCUMENTED IN MEDICAL RECORD: ICD-10-PCS | Mod: CPTII,95,, | Performed by: PHYSICAL MEDICINE & REHABILITATION

## 2022-07-28 PROCEDURE — 3044F HG A1C LEVEL LT 7.0%: CPT | Mod: CPTII,95,, | Performed by: PHYSICAL MEDICINE & REHABILITATION

## 2022-07-28 PROCEDURE — 1159F MED LIST DOCD IN RCRD: CPT | Mod: CPTII,95,, | Performed by: PHYSICAL MEDICINE & REHABILITATION

## 2022-07-28 PROCEDURE — 1160F RVW MEDS BY RX/DR IN RCRD: CPT | Mod: CPTII,95,, | Performed by: PHYSICAL MEDICINE & REHABILITATION

## 2022-07-28 PROCEDURE — 3044F PR MOST RECENT HEMOGLOBIN A1C LEVEL <7.0%: ICD-10-PCS | Mod: CPTII,95,, | Performed by: PHYSICAL MEDICINE & REHABILITATION

## 2022-07-28 PROCEDURE — 4010F PR ACE/ARB THEARPY RXD/TAKEN: ICD-10-PCS | Mod: CPTII,95,, | Performed by: PHYSICAL MEDICINE & REHABILITATION

## 2022-07-28 PROCEDURE — 99213 OFFICE O/P EST LOW 20 MIN: CPT | Mod: 95,,, | Performed by: PHYSICAL MEDICINE & REHABILITATION

## 2022-07-28 PROCEDURE — 4010F ACE/ARB THERAPY RXD/TAKEN: CPT | Mod: CPTII,95,, | Performed by: PHYSICAL MEDICINE & REHABILITATION

## 2022-07-28 PROCEDURE — 1160F PR REVIEW ALL MEDS BY PRESCRIBER/CLIN PHARMACIST DOCUMENTED: ICD-10-PCS | Mod: CPTII,95,, | Performed by: PHYSICAL MEDICINE & REHABILITATION

## 2022-07-28 PROCEDURE — 99213 PR OFFICE/OUTPT VISIT, EST, LEVL III, 20-29 MIN: ICD-10-PCS | Mod: 95,,, | Performed by: PHYSICAL MEDICINE & REHABILITATION

## 2022-07-28 NOTE — PROGRESS NOTES
Pain Medicine  Telemedicine Virtual Visit    The patient location is: [place]  The chief complaint leading to consultation is: Shoulder pain  Visit type: Virtual visit with synchronous audio and video  Total time spent with patient: 15 mins  Each patient to whom he or she provides medical services by telemedicine is:  (1) informed of the relationship between the physician and patient and the respective role of any other health care provider with respect to management of the patient; and (2) notified that he or she may decline to receive medical services by telemedicine and may withdraw from such care at any time.      Chief Complaint:   Chief Complaint   Patient presents with    Shoulder Pain       History of Present Illness: Yulissa Hatfield is a 61 y.o. female referred by Dr. Edwin Tena for cervical radiculopathy.      Onset: 2018, no specific injury, insidious onset  Location: Bilateral anterior shoulders, worse on the left  Radiation: into neck and down both arms  Timing: constant  Quality: Aching and Deep  Exacerbating Factors: lifting  Alleviating Factors: medications and rest  Associated Symptoms: She feels weakness in both arms, numbness in both hands in C6/7 distribution. She denies night fever/night sweats, urinary incontinence, bowel incontinence and significant weight loss    Severity: Currently: 7/10   Typical Range: 4-10/10     Exacerbation: 10/10     Interval History (07/28/2022):  Yulissa Hatfield returns today for follow up.  At the last clinic visit, Rx-ed tizanidine, cymbalta, topical compound cream, and planned for shoulder CSI with US guidance.     Tizanidine 4 mg did not provide much benefit. Cymbalta 30 mg seems to be helping, but she has noticed slight nausea and dizziness, but not intolerable.     She has been doing her HEP and this seems to help.     Currently, the shoulder and neck pain is improved.  She has some decreased ROM still, more on the left.  No change in the  location or quality of the pain since the most recent visit is reported.  No significant interval events or traumas. No change in bowel or bladder function, & no new weakness or numbness is reported. No new musculoskeletal pain complaints.      Current Pain Scales:  Current: 4/10              Typical Range: 3-6/10           Previous Interventions:  -     Previous Therapies:  PT/OT: yes   Relevant Surgery: no   Previous Medications:   - NSAIDS: Ibuprofen 200 mg  - Muscle Relaxants:    - TCAs:   - SNRIs:   - Topicals:   - Anticonvulsants:    - Opioids: tramadol    Current Pain Medications:  1. Ibuprofen  2. Cymbalta 30 mg   3. Tizanidine 4 mg qHS  4. Tylenol     Blood Thinners: None    Full Medication List:    Current Outpatient Medications:     acetaminophen (TYLENOL) 500 MG tablet, Take 1,000 mg by mouth every 6 (six) hours as needed for Pain., Disp: , Rfl:     DULoxetine (CYMBALTA) 30 MG capsule, Take 1 capsule (30 mg total) by mouth once daily., Disp: 30 capsule, Rfl: 11    EPICERAM Diogenes, USE AS A MOISTURIZING BARRIER AT LEAST TWICE DAILY, Disp: , Rfl: 6    ezetimibe (ZETIA) 10 mg tablet, Take 1 tablet (10 mg total) by mouth once daily., Disp: 90 tablet, Rfl: 1    hydroCHLOROthiazide (HYDRODIURIL) 25 MG tablet, Take 1 tablet (25 mg total) by mouth once daily., Disp: 90 tablet, Rfl: 1    ibuprofen (ADVIL,MOTRIN) 200 MG tablet, Take 600 mg by mouth every 6 (six) hours as needed for Pain., Disp: , Rfl:     levothyroxine (SYNTHROID) 112 MCG tablet, Take 1 tablet (112 mcg total) by mouth before breakfast., Disp: 90 tablet, Rfl: 2    pravastatin (PRAVACHOL) 10 MG tablet, Take 1 tablet (10 mg total) by mouth once daily., Disp: 90 tablet, Rfl: 1    SOOLANTRA 1 % Crea, , Disp: , Rfl:     telmisartan (MICARDIS) 80 MG Tab, Take 1 tablet (80 mg total) by mouth once daily., Disp: 90 tablet, Rfl: 1    tiZANidine (ZANAFLEX) 4 MG tablet, Take 1 tablet (4 mg total) by mouth nightly as needed (pain, muscle spasm)., Disp:  30 tablet, Rfl: 5     Review of Systems:  ROS    Allergies:  Patient has no known allergies.     Medical History:   has a past medical history of Generalized anxiety disorder with panic attacks, HTN (hypertension), Hyperlipidemia, Hypothyroid, DANIELLE on CPAP, and Type 2 diabetes mellitus.    Surgical History:   has a past surgical history that includes Carpal tunnel release; Foot surgery; Knee surgery;  section, classic; Thyroid surgery; Endometrial ablation; Colonoscopy (N/A, 11/10/2021); and Esophagogastroduodenoscopy (N/A, 11/10/2021).    Family History:  family history includes Heart disease in her brother, father, and mother; No Known Problems in her daughter, daughter, daughter, sister, and son.    Social History:   reports that she has never smoked. She has never used smokeless tobacco. She reports that she does not drink alcohol and does not use drugs.    Physical Exam:  LMP 2006   GEN: No acute distress. Calm, comfortable  HENT: Normocephalic, atraumatic, moist mucous membranes  EYE: Anicteric sclera, non-injected.   CV: Non-diaphoretic.   RESP: Breathing comfortably. Chest expansion symmetric.  PSYCH: Pleasant mood and appropriate affect. Recent and remote memory intact.       Imaging:  -MRI Cervical Spine 2022:  MRI examination of the cervical spine dated 2022.  There is straightening of the cervical spine with degenerative change greatest at C4-C5 and C5-C6.  No evidence of congenital spinal stenosis.  No abnormal signal change involving the visualized portion of the spinal cord.  No evidence of an acute cervical spine fracture.  At the C2-C3 and C3-C4 levels there is no evidence of a focal disc abnormality.  No significant spinal canal or foraminal encroachment.  At the C4-C5 level there is a minimal posterior disc-osteophyte with mild effacement along the anterior margin of the subarachnoid space and spinal cord.  Mild spinal canal encroachment.  No foraminal encroachment.  At  the C5-C6 level there is a broad-based and right paracentral disc-osteophyte.  There is effacement along the anterior margin of the subarachnoid space with right greater than left foraminal encroachment.  At the C6-C7 and C7-T1 levels there is no evidence of a significant focal disc abnormality.  No significant spinal canal or foraminal encroachment.    - X-ray shoulder complete b/l 2/13/20:   Right shoulder: The alignment is normal.  The glenohumeral joint appears normal.  Minimal osteophyte at the inferior humeral head.  There is mild osteophyte at the acromioclavicular joint.  No fracture, no osseous lesions.  The right upper thoracic region appear normal.  Left shoulder: The alignment is normal.  There is mild glenohumeral joint space narrowing.  Subchondral cystic changes at the superior glenoid.  Prominent inferior osteophyte at the humeral head.  Osteophyte noted at the acromioclavicular joint.  No fracture, no osseous lesions.  The left upper thoracic region appear normal    Labs:  BMP  Lab Results   Component Value Date     03/30/2022    K 4.2 03/30/2022     03/30/2022    CO2 25 03/30/2022    BUN 14 03/30/2022    CREATININE 0.6 03/30/2022    CALCIUM 9.1 03/30/2022    ANIONGAP 10 03/30/2022    ESTGFRAFRICA >60 03/30/2022    EGFRNONAA >60 03/30/2022     Lab Results   Component Value Date    ALT 24 03/30/2022    AST 19 03/30/2022    ALKPHOS 69 03/30/2022    BILITOT 0.6 03/30/2022     Lab Results   Component Value Date     03/30/2022       Assessment:  Yulissa Hatfield is a 61 y.o. female with the following diagnoses based on history, exam, and imaging:    Problem List Items Addressed This Visit        Neuro    Cervical radiculopathy      Other Visit Diagnoses     Chronic pain of both shoulders    -  Primary    Neck pain        Osteoarthritis of bilateral glenohumeral joints        Subacromial bursitis of both shoulders        Decreased range of motion of left shoulder        Myofascial  pain        AC joint arthropathy              This is a pleasant 61 y.o. lady presenting with:     - Chronic bilateral shoulder pain: Left worse than right. Imaging with OA in AC and GH joints. Impingement signs on exam b/l.   - Chronic neck pain and bilateral radicular arm pains. MRI with bilateral foraminal encroachment at C5-6. Myofascial pain on exam.   - Comorbidities: BMI > 50.     Treatment Plan:   - PT/OT/HEP: Cont HEP for RTC. Discussed benefits of exercise for pain.   - Procedures: Schedule left subacromial bursa CSI with US guidance in [place].   - Medications:    - Cont compound topical medication with Diclofenac 1.5%, Lidocaine 2.5%, Prilocaine 2.5%, and Gabapentin 4%.  Apply 1-2 grams to painful area up to 3-4 times daily   - Cont cymbalta 30 mg daily. She is having some slight nausea, but states this is tolerable currently. If that improves and pain is not well controlled, consider increase to 60 mg daily    - Trial increase in tizanidine to 8 mg qHS for sleep and pain and if this helps better, we can change Rx.   - Imaging: Reviewed. Consider x-ray of c-spine.   - Labs: Reviewed.  Medications are appropriately dosed for current hepatorenal function.    Follow Up: RTC for left subacromial bursa injection.     Sowmya Tellez M.D.  Interventional Pain Medicine / Physical Medicine & Rehabilitation    Disclaimer: This note was partly generated using dictation software which may occasionally result in transcription errors.

## 2022-08-02 ENCOUNTER — PROCEDURE VISIT (OUTPATIENT)
Dept: NEUROLOGY | Facility: CLINIC | Age: 62
End: 2022-08-02
Payer: COMMERCIAL

## 2022-08-02 DIAGNOSIS — M54.12 CERVICAL RADICULAR PAIN: ICD-10-CM

## 2022-08-02 DIAGNOSIS — R20.0 HAND NUMBNESS: ICD-10-CM

## 2022-08-02 DIAGNOSIS — G56.03 BILATERAL CARPAL TUNNEL SYNDROME: ICD-10-CM

## 2022-08-02 PROCEDURE — 95886 MUSC TEST DONE W/N TEST COMP: CPT | Mod: S$GLB,,, | Performed by: PSYCHIATRY & NEUROLOGY

## 2022-08-02 PROCEDURE — 95911 NRV CNDJ TEST 9-10 STUDIES: CPT | Mod: S$GLB,,, | Performed by: PSYCHIATRY & NEUROLOGY

## 2022-08-02 PROCEDURE — 95911 PR NERVE CONDUCTION STUDY; 9-10 STUDIES: ICD-10-PCS | Mod: S$GLB,,, | Performed by: PSYCHIATRY & NEUROLOGY

## 2022-08-02 PROCEDURE — 95886 PR EMG COMPLETE, W/ NERVE CONDUCTION STUDIES, 5+ MUSCLES: ICD-10-PCS | Mod: S$GLB,,, | Performed by: PSYCHIATRY & NEUROLOGY

## 2022-08-02 NOTE — PROCEDURES
EMG W/ ULTRASOUND AND NERVE CONDUCTION TEST 2 Extremities    Date/Time: 8/2/2022 4:00 PM  Performed by: Edwin Tena MD  Authorized by: Edwin Tena MD       REPORT OF EMG and NERVE CONDUCTION STUDY    Name: Yulissa Hatfield  Date of Study:  8/2/2022  Referring Physician:  Darinel  Test Performed by:  MD Darinel  Full Values Attached  Informed Consent Scanned.   No anesthesia used.   Amount of Blood Loss: none. The patient tolerated this procedure well.       Informed consent was obtained prior to performing this study. Two patient identifiers were confirmed with the patient prior to performing this study. A time out to determine correct patient and and agreement on procedure performed was conducted prior to the concentric needle examination.    Reason for the study:  Numb hands, neck and shoulder pain      Findings:   Nerve conduction studies of the bilateral median (motor and sensory)nerves, bilateral ulnar (motor and sensory) nerves, bilateral radial sensory nerves were performed. Median motor distal latency was prolonged to 4.9ms on the left. Median palm to wrist latency was prolonged to 3.2ms bilaterally.  Otherwise, amplitudes, distal latencies, conduction velocities, and F-waves were normal.   EMG of selected muscles of the bilateral arms and  bilateral cervical paraspinal muscles and bilateral lumbar and sacral paraspinal muscles were performed as indicated on the attached sheets. There was mild increased insertional activity and a few large polyphasic units in the muscles of the bilateral C6 myotome with some increased insertional activity in the bilateral C6 paraspinal muscles. Otherwise,  Insertional activity was normal without fasciculation or fibrillation, normal sized and phasia of motor units.      Impression:  Abnormal Study secondary to the Presence of:    1. Mild Bilateral Carpal Tunnel Syndrome  2. Mild Cervical Radiculopathy at C6 Bilaterally     Edwin Tena M.D.  [facility].     Recommendations (discussed at this visit):  -can try CTS brace nightly   -seeing pain management for shoulder and myofascial pain as well as Cervical radiculopathy with good relief with Cymbalta given per Dr. Tellez  -she inquires about chiropractic care and we discussed the rare but significant risks.     RTC 6 months

## 2022-08-16 ENCOUNTER — OFFICE VISIT (OUTPATIENT)
Dept: PAIN MEDICINE | Facility: CLINIC | Age: 62
End: 2022-08-16
Payer: COMMERCIAL

## 2022-08-16 VITALS
WEIGHT: 293 LBS | BODY MASS INDEX: 55.65 KG/M2 | SYSTOLIC BLOOD PRESSURE: 130 MMHG | HEART RATE: 98 BPM | DIASTOLIC BLOOD PRESSURE: 78 MMHG

## 2022-08-16 DIAGNOSIS — M54.12 CERVICAL RADICULOPATHY: ICD-10-CM

## 2022-08-16 DIAGNOSIS — M75.52 SUBACROMIAL BURSITIS OF BOTH SHOULDERS: ICD-10-CM

## 2022-08-16 DIAGNOSIS — M25.512 CHRONIC PAIN OF BOTH SHOULDERS: Primary | ICD-10-CM

## 2022-08-16 DIAGNOSIS — M25.612 DECREASED RANGE OF MOTION OF LEFT SHOULDER: ICD-10-CM

## 2022-08-16 DIAGNOSIS — M75.51 SUBACROMIAL BURSITIS OF BOTH SHOULDERS: ICD-10-CM

## 2022-08-16 DIAGNOSIS — M25.511 CHRONIC PAIN OF BOTH SHOULDERS: Primary | ICD-10-CM

## 2022-08-16 DIAGNOSIS — M19.019 AC JOINT ARTHROPATHY: ICD-10-CM

## 2022-08-16 DIAGNOSIS — M54.2 NECK PAIN: ICD-10-CM

## 2022-08-16 DIAGNOSIS — G89.29 CHRONIC PAIN OF BOTH SHOULDERS: Primary | ICD-10-CM

## 2022-08-16 PROCEDURE — 3075F PR MOST RECENT SYSTOLIC BLOOD PRESS GE 130-139MM HG: ICD-10-PCS | Mod: CPTII,S$GLB,, | Performed by: PHYSICAL MEDICINE & REHABILITATION

## 2022-08-16 PROCEDURE — 20611 LARGE JOINT ASPIRATION/INJECTION: L SUBACROMIAL BURSA: ICD-10-PCS | Mod: LT,S$GLB,, | Performed by: PHYSICAL MEDICINE & REHABILITATION

## 2022-08-16 PROCEDURE — 1160F RVW MEDS BY RX/DR IN RCRD: CPT | Mod: CPTII,S$GLB,, | Performed by: PHYSICAL MEDICINE & REHABILITATION

## 2022-08-16 PROCEDURE — 3008F PR BODY MASS INDEX (BMI) DOCUMENTED: ICD-10-PCS | Mod: CPTII,S$GLB,, | Performed by: PHYSICAL MEDICINE & REHABILITATION

## 2022-08-16 PROCEDURE — 20611 DRAIN/INJ JOINT/BURSA W/US: CPT | Mod: LT,S$GLB,, | Performed by: PHYSICAL MEDICINE & REHABILITATION

## 2022-08-16 PROCEDURE — 99999 PR PBB SHADOW E&M-EST. PATIENT-LVL III: ICD-10-PCS | Mod: PBBFAC,,, | Performed by: PHYSICAL MEDICINE & REHABILITATION

## 2022-08-16 PROCEDURE — 99213 PR OFFICE/OUTPT VISIT, EST, LEVL III, 20-29 MIN: ICD-10-PCS | Mod: 25,S$GLB,, | Performed by: PHYSICAL MEDICINE & REHABILITATION

## 2022-08-16 PROCEDURE — 3044F PR MOST RECENT HEMOGLOBIN A1C LEVEL <7.0%: ICD-10-PCS | Mod: CPTII,S$GLB,, | Performed by: PHYSICAL MEDICINE & REHABILITATION

## 2022-08-16 PROCEDURE — 1159F MED LIST DOCD IN RCRD: CPT | Mod: CPTII,S$GLB,, | Performed by: PHYSICAL MEDICINE & REHABILITATION

## 2022-08-16 PROCEDURE — 4010F ACE/ARB THERAPY RXD/TAKEN: CPT | Mod: CPTII,S$GLB,, | Performed by: PHYSICAL MEDICINE & REHABILITATION

## 2022-08-16 PROCEDURE — 1160F PR REVIEW ALL MEDS BY PRESCRIBER/CLIN PHARMACIST DOCUMENTED: ICD-10-PCS | Mod: CPTII,S$GLB,, | Performed by: PHYSICAL MEDICINE & REHABILITATION

## 2022-08-16 PROCEDURE — 3078F DIAST BP <80 MM HG: CPT | Mod: CPTII,S$GLB,, | Performed by: PHYSICAL MEDICINE & REHABILITATION

## 2022-08-16 PROCEDURE — 4010F PR ACE/ARB THEARPY RXD/TAKEN: ICD-10-PCS | Mod: CPTII,S$GLB,, | Performed by: PHYSICAL MEDICINE & REHABILITATION

## 2022-08-16 PROCEDURE — 3078F PR MOST RECENT DIASTOLIC BLOOD PRESSURE < 80 MM HG: ICD-10-PCS | Mod: CPTII,S$GLB,, | Performed by: PHYSICAL MEDICINE & REHABILITATION

## 2022-08-16 PROCEDURE — 1159F PR MEDICATION LIST DOCUMENTED IN MEDICAL RECORD: ICD-10-PCS | Mod: CPTII,S$GLB,, | Performed by: PHYSICAL MEDICINE & REHABILITATION

## 2022-08-16 PROCEDURE — 99213 OFFICE O/P EST LOW 20 MIN: CPT | Mod: 25,S$GLB,, | Performed by: PHYSICAL MEDICINE & REHABILITATION

## 2022-08-16 PROCEDURE — 3075F SYST BP GE 130 - 139MM HG: CPT | Mod: CPTII,S$GLB,, | Performed by: PHYSICAL MEDICINE & REHABILITATION

## 2022-08-16 PROCEDURE — 3008F BODY MASS INDEX DOCD: CPT | Mod: CPTII,S$GLB,, | Performed by: PHYSICAL MEDICINE & REHABILITATION

## 2022-08-16 PROCEDURE — 99999 PR PBB SHADOW E&M-EST. PATIENT-LVL III: CPT | Mod: PBBFAC,,, | Performed by: PHYSICAL MEDICINE & REHABILITATION

## 2022-08-16 PROCEDURE — 3044F HG A1C LEVEL LT 7.0%: CPT | Mod: CPTII,S$GLB,, | Performed by: PHYSICAL MEDICINE & REHABILITATION

## 2022-08-16 RX ORDER — LIDOCAINE HYDROCHLORIDE 10 MG/ML
2 INJECTION, SOLUTION EPIDURAL; INFILTRATION; INTRACAUDAL; PERINEURAL
Status: DISCONTINUED | OUTPATIENT
Start: 2022-08-16 | End: 2022-08-16 | Stop reason: HOSPADM

## 2022-08-16 RX ORDER — METHYLPREDNISOLONE ACETATE 40 MG/ML
40 INJECTION, SUSPENSION INTRA-ARTICULAR; INTRALESIONAL; INTRAMUSCULAR; SOFT TISSUE
Status: DISCONTINUED | OUTPATIENT
Start: 2022-08-16 | End: 2022-08-16 | Stop reason: HOSPADM

## 2022-08-16 RX ADMIN — LIDOCAINE HYDROCHLORIDE 2 ML: 10 INJECTION, SOLUTION EPIDURAL; INFILTRATION; INTRACAUDAL; PERINEURAL at 01:08

## 2022-08-16 RX ADMIN — METHYLPREDNISOLONE ACETATE 40 MG: 40 INJECTION, SUSPENSION INTRA-ARTICULAR; INTRALESIONAL; INTRAMUSCULAR; SOFT TISSUE at 01:08

## 2022-08-16 NOTE — PROGRESS NOTES
Pain Medicine      Chief Complaint:   Chief Complaint   Patient presents with    Shoulder Pain       History of Present Illness: Yulissa Hatfield is a 61 y.o. female referred by Dr. Edwin Tena for cervical radiculopathy.      Onset: 2018, no specific injury, insidious onset  Location: Bilateral anterior shoulders, worse on the left  Radiation: into neck and down both arms  Timing: constant  Quality: Aching and Deep  Exacerbating Factors: lifting  Alleviating Factors: medications and rest  Associated Symptoms: She feels weakness in both arms, numbness in both hands in C6/7 distribution. She denies night fever/night sweats, urinary incontinence, bowel incontinence and significant weight loss    Severity: Currently: 7/10   Typical Range: 4-10/10     Exacerbation: 10/10     Interval History (07/28/2022):  Yulissa Hatfield returns today for follow up.  At the last clinic visit, Rx-ed tizanidine, cymbalta, topical compound cream, and planned for shoulder CSI with US guidance.     Tizanidine 4 mg did not provide much benefit. Cymbalta 30 mg seems to be helping, but she has noticed slight nausea and dizziness, but not intolerable.     She has been doing her HEP and this seems to help.     Currently, the shoulder and neck pain is improved.  She has some decreased ROM still, more on the left.  No change in the location or quality of the pain since the most recent visit is reported.  No significant interval events or traumas. No change in bowel or bladder function, & no new weakness or numbness is reported. No new musculoskeletal pain complaints.      Current Pain Scales:  Current: 4/10              Typical Range: 3-6/10    Interval History (08/16/2022):  Yulissa Hatfield returns today for follow up.  At the last clinic visit, cont cymbalta, cont topical med, trialed increase in tizanidine, and scheduled left shoulder injection.     She notes the medications have been helping. She stopped the HEP for a week  and the shoulder pain got worse.     Currently, the Shoulder pain is worse. The left shoulder pain is unchanged in character or location. Denies any new weakness or new numbness since the most recent visit. Denies any fevers or recent infections/antibiotics. Denies any unexplained weight loss.       Current Pain Scales:  Current: 5/10              Typical Range: 5-7/10        Pain Disability Index  Family/Home Responsibilities:: 3  Recreation:: 1  Social Activity:: 4  Occupation:: 5  Sexual Behavior:: 6  Self Care:: 3  Life-Support Activities:: 3  Pain Disability Index (PDI): 25    Previous Interventions:  -     Previous Therapies:  PT/OT: yes   Relevant Surgery: no   Previous Medications:   - NSAIDS: Ibuprofen 200 mg  - Muscle Relaxants:    - TCAs:   - SNRIs:   - Topicals:   - Anticonvulsants:    - Opioids: tramadol    Current Pain Medications:  1. Ibuprofen  2. Cymbalta 30 mg   3. Tizanidine 4 mg qHS  4. Tylenol     Blood Thinners: None    Full Medication List:    Current Outpatient Medications:     acetaminophen (TYLENOL) 500 MG tablet, Take 1,000 mg by mouth every 6 (six) hours as needed for Pain., Disp: , Rfl:     DULoxetine (CYMBALTA) 30 MG capsule, Take 1 capsule (30 mg total) by mouth once daily., Disp: 30 capsule, Rfl: 11    EPICERAM Diogenes, USE AS A MOISTURIZING BARRIER AT LEAST TWICE DAILY, Disp: , Rfl: 6    ezetimibe (ZETIA) 10 mg tablet, Take 1 tablet (10 mg total) by mouth once daily., Disp: 90 tablet, Rfl: 1    hydroCHLOROthiazide (HYDRODIURIL) 25 MG tablet, Take 1 tablet (25 mg total) by mouth once daily., Disp: 90 tablet, Rfl: 1    ibuprofen (ADVIL,MOTRIN) 200 MG tablet, Take 600 mg by mouth every 6 (six) hours as needed for Pain., Disp: , Rfl:     levothyroxine (SYNTHROID) 112 MCG tablet, Take 1 tablet (112 mcg total) by mouth before breakfast., Disp: 90 tablet, Rfl: 2    pravastatin (PRAVACHOL) 10 MG tablet, Take 1 tablet (10 mg total) by mouth once daily., Disp: 90 tablet, Rfl: 1     SOOLANTRA 1 % Crea, , Disp: , Rfl:     telmisartan (MICARDIS) 80 MG Tab, Take 1 tablet (80 mg total) by mouth once daily., Disp: 90 tablet, Rfl: 1    tiZANidine (ZANAFLEX) 4 MG tablet, Take 1 tablet (4 mg total) by mouth nightly as needed (pain, muscle spasm)., Disp: 30 tablet, Rfl: 5     Review of Systems:  ROS    Allergies:  Patient has no known allergies.     Medical History:   has a past medical history of Generalized anxiety disorder with panic attacks, HTN (hypertension), Hyperlipidemia, Hypothyroid, DANIELLE on CPAP, and Type 2 diabetes mellitus.    Surgical History:   has a past surgical history that includes Carpal tunnel release; Foot surgery; Knee surgery;  section, classic; Thyroid surgery; Endometrial ablation; Colonoscopy (N/A, 11/10/2021); and Esophagogastroduodenoscopy (N/A, 11/10/2021).    Family History:  family history includes Heart disease in her brother, father, and mother; No Known Problems in her daughter, daughter, daughter, sister, and son.    Social History:   reports that she has never smoked. She has never used smokeless tobacco. She reports that she does not drink alcohol and does not use drugs.    Physical Exam:  /78   Pulse 98   Wt (!) 151.7 kg (334 lb 7 oz)   LMP 2006   BMI 55.65 kg/m²   GEN: No acute distress. Calm, comfortable  HENT: Normocephalic, atraumatic, moist mucous membranes  EYE: Anicteric sclera, non-injected.   CV: Non-diaphoretic.   RESP: Breathing comfortably. Chest expansion symmetric.  PSYCH: Pleasant mood and appropriate affect. Recent and remote memory intact.       Imaging:  -MRI Cervical Spine 2022:  MRI examination of the cervical spine dated 2022.  There is straightening of the cervical spine with degenerative change greatest at C4-C5 and C5-C6.  No evidence of congenital spinal stenosis.  No abnormal signal change involving the visualized portion of the spinal cord.  No evidence of an acute cervical spine fracture.  At the  C2-C3 and C3-C4 levels there is no evidence of a focal disc abnormality.  No significant spinal canal or foraminal encroachment.  At the C4-C5 level there is a minimal posterior disc-osteophyte with mild effacement along the anterior margin of the subarachnoid space and spinal cord.  Mild spinal canal encroachment.  No foraminal encroachment.  At the C5-C6 level there is a broad-based and right paracentral disc-osteophyte.  There is effacement along the anterior margin of the subarachnoid space with right greater than left foraminal encroachment.  At the C6-C7 and C7-T1 levels there is no evidence of a significant focal disc abnormality.  No significant spinal canal or foraminal encroachment.    - X-ray shoulder complete b/l 2/13/20:   Right shoulder: The alignment is normal.  The glenohumeral joint appears normal.  Minimal osteophyte at the inferior humeral head.  There is mild osteophyte at the acromioclavicular joint.  No fracture, no osseous lesions.  The right upper thoracic region appear normal.  Left shoulder: The alignment is normal.  There is mild glenohumeral joint space narrowing.  Subchondral cystic changes at the superior glenoid.  Prominent inferior osteophyte at the humeral head.  Osteophyte noted at the acromioclavicular joint.  No fracture, no osseous lesions.  The left upper thoracic region appear normal    Labs:  BMP  Lab Results   Component Value Date     03/30/2022    K 4.2 03/30/2022     03/30/2022    CO2 25 03/30/2022    BUN 14 03/30/2022    CREATININE 0.6 03/30/2022    CALCIUM 9.1 03/30/2022    ANIONGAP 10 03/30/2022    ESTGFRAFRICA >60 03/30/2022    EGFRNONAA >60 03/30/2022     Lab Results   Component Value Date    ALT 24 03/30/2022    AST 19 03/30/2022    ALKPHOS 69 03/30/2022    BILITOT 0.6 03/30/2022     Lab Results   Component Value Date     03/30/2022       Assessment:  Yulissa Hatfield is a 61 y.o. female with the following diagnoses based on history, exam, and  imaging:    Problem List Items Addressed This Visit        Neuro    Cervical radiculopathy      Other Visit Diagnoses     Chronic pain of both shoulders    -  Primary    Subacromial bursitis of both shoulders        Relevant Orders    Large Joint Aspiration/Injection: L subacromial bursa    Decreased range of motion of left shoulder        AC joint arthropathy        Neck pain              This is a pleasant 61 y.o. lady presenting with:     - Chronic bilateral shoulder pain: Left worse than right. Imaging with OA in AC and GH joints. Impingement signs on exam b/l.   - Chronic neck pain and bilateral radicular arm pains. MRI with bilateral foraminal encroachment at C5-6. Myofascial pain on exam.   - Comorbidities: BMI > 50.     Treatment Plan:   - PT/OT/HEP: Cont HEP for RTC. Discussed benefits of exercise for pain.    - If no relief with subacromial bursa CSI, plan for formal PT for shoulder pain which we will order at next visit.   - Procedures: performed left subacromial bursa CSI with US guidance today   - Medications:    - Cont compound topical medication with Diclofenac 1.5%, Lidocaine 2.5%, Prilocaine 2.5%, and Gabapentin 4%.  Apply 1-2 grams to painful area up to 3-4 times daily   - Cont cymbalta 30 mg daily. She is having some slight nausea, but states this is tolerable currently. If that improves and pain is not well controlled, consider increase to 60 mg daily    - Cont tizanidine 8 mg qHS for sleep and pain and if this helps better, we can change Rx.   - Imaging: Reviewed.    - Consider x-ray of c-spine.    - If no relief with PT, plan for left shoulder MRI.   - Labs: Reviewed.  Medications are appropriately dosed for current hepatorenal function.    Follow Up: RTC in 3-4 weeks to assess left subacromial bursa injection.     Sowmya Tellez M.D.  Interventional Pain Medicine / Physical Medicine & Rehabilitation    Disclaimer: This note was partly generated using dictation software which may occasionally  result in transcription errors.

## 2022-08-16 NOTE — PROCEDURES
Large Joint Aspiration/Injection: L subacromial bursa    Date/Time: 8/16/2022 1:30 PM  Performed by: Sowmya Tellez MD  Authorized by: Sowmya Tellez MD     Consent Done?:  Yes (Written)  Indications:  Pain  Site marked: the procedure site was marked    Timeout: prior to procedure the correct patient, procedure, and site was verified    Prep: patient was prepped and draped in usual sterile fashion      Details:  Needle Size:  22 G  Ultrasonic Guidance for needle placement?: Yes    Images are saved and documented.  Approach:  Lateral  Location:  Shoulder  Site:  L subacromial bursa  Medications:  2 mL LIDOcaine (PF) 10 mg/ml (1%) 10 mg/mL (1 %); 40 mg methylPREDNISolone acetate 40 mg/mL  Patient tolerance:  Patient tolerated the procedure well with no immediate complications

## 2022-08-29 ENCOUNTER — PATIENT MESSAGE (OUTPATIENT)
Dept: FAMILY MEDICINE | Facility: CLINIC | Age: 62
End: 2022-08-29
Payer: COMMERCIAL

## 2022-08-29 DIAGNOSIS — I87.2 CHRONIC VENOUS STASIS DERMATITIS OF BOTH LOWER EXTREMITIES: Primary | ICD-10-CM

## 2022-09-01 ENCOUNTER — PATIENT MESSAGE (OUTPATIENT)
Dept: PAIN MEDICINE | Facility: CLINIC | Age: 62
End: 2022-09-01
Payer: COMMERCIAL

## 2022-09-01 DIAGNOSIS — M19.012 OSTEOARTHRITIS OF BILATERAL GLENOHUMERAL JOINTS: ICD-10-CM

## 2022-09-01 DIAGNOSIS — M75.51 SUBACROMIAL BURSITIS OF BOTH SHOULDERS: ICD-10-CM

## 2022-09-01 DIAGNOSIS — M25.612 DECREASED RANGE OF MOTION OF LEFT SHOULDER: ICD-10-CM

## 2022-09-01 DIAGNOSIS — M25.512 CHRONIC PAIN OF BOTH SHOULDERS: ICD-10-CM

## 2022-09-01 DIAGNOSIS — M19.011 OSTEOARTHRITIS OF BILATERAL GLENOHUMERAL JOINTS: ICD-10-CM

## 2022-09-01 DIAGNOSIS — M25.511 CHRONIC PAIN OF BOTH SHOULDERS: ICD-10-CM

## 2022-09-01 DIAGNOSIS — M54.12 CERVICAL RADICULOPATHY: ICD-10-CM

## 2022-09-01 DIAGNOSIS — G89.29 CHRONIC PAIN OF BOTH SHOULDERS: ICD-10-CM

## 2022-09-01 DIAGNOSIS — M54.2 NECK PAIN: ICD-10-CM

## 2022-09-01 DIAGNOSIS — M75.52 SUBACROMIAL BURSITIS OF BOTH SHOULDERS: ICD-10-CM

## 2022-09-01 RX ORDER — DULOXETIN HYDROCHLORIDE 60 MG/1
60 CAPSULE, DELAYED RELEASE ORAL DAILY
Qty: 30 CAPSULE | Refills: 11 | Status: SHIPPED | OUTPATIENT
Start: 2022-09-01 | End: 2022-10-13

## 2022-09-06 DIAGNOSIS — I10 ESSENTIAL HYPERTENSION: ICD-10-CM

## 2022-09-06 RX ORDER — HYDROCHLOROTHIAZIDE 25 MG/1
25 TABLET ORAL DAILY
Qty: 90 TABLET | Refills: 1 | Status: SHIPPED | OUTPATIENT
Start: 2022-09-06 | End: 2023-01-13 | Stop reason: SDUPTHER

## 2022-09-06 NOTE — TELEPHONE ENCOUNTER
No new care gaps identified.  [facility] Embedded Care Gaps. Reference number: [id number]. 9/06/2022   2:48:47 PM CDT

## 2022-10-05 ENCOUNTER — PATIENT MESSAGE (OUTPATIENT)
Dept: PAIN MEDICINE | Facility: CLINIC | Age: 62
End: 2022-10-05
Payer: COMMERCIAL

## 2022-10-13 ENCOUNTER — OFFICE VISIT (OUTPATIENT)
Dept: PAIN MEDICINE | Facility: CLINIC | Age: 62
End: 2022-10-13
Payer: COMMERCIAL

## 2022-10-13 ENCOUNTER — PATIENT MESSAGE (OUTPATIENT)
Dept: PAIN MEDICINE | Facility: CLINIC | Age: 62
End: 2022-10-13

## 2022-10-13 ENCOUNTER — TELEPHONE (OUTPATIENT)
Dept: PAIN MEDICINE | Facility: CLINIC | Age: 62
End: 2022-10-13
Payer: COMMERCIAL

## 2022-10-13 VITALS
SYSTOLIC BLOOD PRESSURE: 159 MMHG | BODY MASS INDEX: 48.82 KG/M2 | WEIGHT: 293 LBS | HEIGHT: 65 IN | DIASTOLIC BLOOD PRESSURE: 99 MMHG | HEART RATE: 78 BPM | RESPIRATION RATE: 18 BRPM

## 2022-10-13 DIAGNOSIS — M54.12 CERVICAL RADICULOPATHY: ICD-10-CM

## 2022-10-13 DIAGNOSIS — M19.012 OSTEOARTHRITIS OF BILATERAL GLENOHUMERAL JOINTS: Primary | ICD-10-CM

## 2022-10-13 DIAGNOSIS — G89.29 CHRONIC PAIN OF BOTH SHOULDERS: ICD-10-CM

## 2022-10-13 DIAGNOSIS — M19.011 OSTEOARTHRITIS OF BILATERAL GLENOHUMERAL JOINTS: Primary | ICD-10-CM

## 2022-10-13 DIAGNOSIS — M75.52 SUBACROMIAL BURSITIS OF BOTH SHOULDERS: ICD-10-CM

## 2022-10-13 DIAGNOSIS — M19.012 OSTEOARTHRITIS OF BILATERAL GLENOHUMERAL JOINTS: ICD-10-CM

## 2022-10-13 DIAGNOSIS — M75.51 SUBACROMIAL BURSITIS OF BOTH SHOULDERS: ICD-10-CM

## 2022-10-13 DIAGNOSIS — M19.011 OSTEOARTHRITIS OF BILATERAL GLENOHUMERAL JOINTS: ICD-10-CM

## 2022-10-13 DIAGNOSIS — M25.511 CHRONIC PAIN OF BOTH SHOULDERS: ICD-10-CM

## 2022-10-13 DIAGNOSIS — M25.612 DECREASED RANGE OF MOTION OF LEFT SHOULDER: ICD-10-CM

## 2022-10-13 DIAGNOSIS — M54.2 NECK PAIN: ICD-10-CM

## 2022-10-13 DIAGNOSIS — M25.512 CHRONIC PAIN OF BOTH SHOULDERS: ICD-10-CM

## 2022-10-13 PROCEDURE — 3044F PR MOST RECENT HEMOGLOBIN A1C LEVEL <7.0%: ICD-10-PCS | Mod: CPTII,S$GLB,, | Performed by: PHYSICAL MEDICINE & REHABILITATION

## 2022-10-13 PROCEDURE — 1160F PR REVIEW ALL MEDS BY PRESCRIBER/CLIN PHARMACIST DOCUMENTED: ICD-10-PCS | Mod: CPTII,S$GLB,, | Performed by: PHYSICAL MEDICINE & REHABILITATION

## 2022-10-13 PROCEDURE — 3077F SYST BP >= 140 MM HG: CPT | Mod: CPTII,S$GLB,, | Performed by: PHYSICAL MEDICINE & REHABILITATION

## 2022-10-13 PROCEDURE — 4010F ACE/ARB THERAPY RXD/TAKEN: CPT | Mod: CPTII,S$GLB,, | Performed by: PHYSICAL MEDICINE & REHABILITATION

## 2022-10-13 PROCEDURE — 3080F PR MOST RECENT DIASTOLIC BLOOD PRESSURE >= 90 MM HG: ICD-10-PCS | Mod: CPTII,S$GLB,, | Performed by: PHYSICAL MEDICINE & REHABILITATION

## 2022-10-13 PROCEDURE — 99214 PR OFFICE/OUTPT VISIT, EST, LEVL IV, 30-39 MIN: ICD-10-PCS | Mod: S$GLB,,, | Performed by: PHYSICAL MEDICINE & REHABILITATION

## 2022-10-13 PROCEDURE — 99214 OFFICE O/P EST MOD 30 MIN: CPT | Mod: S$GLB,,, | Performed by: PHYSICAL MEDICINE & REHABILITATION

## 2022-10-13 PROCEDURE — 1159F PR MEDICATION LIST DOCUMENTED IN MEDICAL RECORD: ICD-10-PCS | Mod: CPTII,S$GLB,, | Performed by: PHYSICAL MEDICINE & REHABILITATION

## 2022-10-13 PROCEDURE — 99999 PR PBB SHADOW E&M-EST. PATIENT-LVL III: ICD-10-PCS | Mod: PBBFAC,,, | Performed by: PHYSICAL MEDICINE & REHABILITATION

## 2022-10-13 PROCEDURE — 4010F PR ACE/ARB THEARPY RXD/TAKEN: ICD-10-PCS | Mod: CPTII,S$GLB,, | Performed by: PHYSICAL MEDICINE & REHABILITATION

## 2022-10-13 PROCEDURE — 1160F RVW MEDS BY RX/DR IN RCRD: CPT | Mod: CPTII,S$GLB,, | Performed by: PHYSICAL MEDICINE & REHABILITATION

## 2022-10-13 PROCEDURE — 99999 PR PBB SHADOW E&M-EST. PATIENT-LVL III: CPT | Mod: PBBFAC,,, | Performed by: PHYSICAL MEDICINE & REHABILITATION

## 2022-10-13 PROCEDURE — 3077F PR MOST RECENT SYSTOLIC BLOOD PRESSURE >= 140 MM HG: ICD-10-PCS | Mod: CPTII,S$GLB,, | Performed by: PHYSICAL MEDICINE & REHABILITATION

## 2022-10-13 PROCEDURE — 1159F MED LIST DOCD IN RCRD: CPT | Mod: CPTII,S$GLB,, | Performed by: PHYSICAL MEDICINE & REHABILITATION

## 2022-10-13 PROCEDURE — 3080F DIAST BP >= 90 MM HG: CPT | Mod: CPTII,S$GLB,, | Performed by: PHYSICAL MEDICINE & REHABILITATION

## 2022-10-13 PROCEDURE — 3044F HG A1C LEVEL LT 7.0%: CPT | Mod: CPTII,S$GLB,, | Performed by: PHYSICAL MEDICINE & REHABILITATION

## 2022-10-13 RX ORDER — BACLOFEN 10 MG/1
5-10 TABLET ORAL NIGHTLY PRN
Qty: 30 TABLET | Refills: 2 | Status: SHIPPED | OUTPATIENT
Start: 2022-10-13 | End: 2023-01-23

## 2022-10-13 RX ORDER — DULOXETINE 40 MG/1
40 CAPSULE, DELAYED RELEASE ORAL DAILY
Qty: 30 CAPSULE | Refills: 11 | Status: SHIPPED | OUTPATIENT
Start: 2022-10-13 | End: 2023-01-13

## 2022-10-13 NOTE — PROGRESS NOTES
Pain Medicine      Chief Complaint:   Chief Complaint   Patient presents with    Shoulder Pain         History of Present Illness: Yulissa Hatfield is a 61 y.o. female referred by Dr. Edwin Tena for cervical radiculopathy.      Onset: 2018, no specific injury, insidious onset  Location: Bilateral anterior shoulders, worse on the left  Radiation: into neck and down both arms  Timing: constant  Quality: Aching and Deep  Exacerbating Factors: lifting  Alleviating Factors: medications and rest  Associated Symptoms: She feels weakness in both arms, numbness in both hands in C6/7 distribution. She denies night fever/night sweats, urinary incontinence, bowel incontinence and significant weight loss    Severity: Currently: 7/10   Typical Range: 4-10/10     Exacerbation: 10/10     Interval History (10/13/2022):  Yulissa Hatfield returns today for follow up of left shoulder pain.  At the last clinic visit, patient received left subacromial bursa injection under US guidance. She reports her shoulder pain felt worse for 2 days post injection, after which her pain improved by 40-50% for one week. Her pain has since returned and is described as the same character as before.     Today, she reports her right shoulder pain is worse than her left shoulder pain.  Pain is worse with any overhead movements or performing ADL's. She is taking tylenol and ibuprofen with minimal relief. She continues to do HEP for her shoulders but reports that her ROM is not improving. She denies any new severe weakness in the arms or hands.    Cymbalta was increased to 60 mg at last visit as well. She reports having poor sleep and weird dreams with the new increased dose.  She does report it has helped with her knee pain.  Patient has stopped taking tizanidine as well due to dry mouth and night time cough.           Current Pain Scales:  Current: 8/10 (right shoulder), 7/10 (left shoulder)              Typical Range: 5-10/10         Pain  Disability Index  Family/Home Responsibilities:: 5  Recreation:: 8  Social Activity:: 8  Occupation:: 7  Sexual Behavior:: 9  Self Care:: 7  Life-Support Activities:: 6  Pain Disability Index (PDI): 50    Previous Interventions:  - Left subacromial bursa injection    Previous Therapies:  PT/OT: yes   Relevant Surgery: no   Previous Medications:   - NSAIDS: Ibuprofen 200 mg  - Muscle Relaxants:  Tizanidine > dry mouth, cough  - TCAs:   - SNRIs: Cymbalta  - Topicals:   - Anticonvulsants:    - Opioids: tramadol    Current Pain Medications:  Ibuprofen  Cymbalta 60 mg   Tylenol     Blood Thinners: None    Full Medication List:    Current Outpatient Medications:     acetaminophen (TYLENOL) 500 MG tablet, Take 1,000 mg by mouth every 6 (six) hours as needed for Pain., Disp: , Rfl:     EPICERAM Diogenes, USE AS A MOISTURIZING BARRIER AT LEAST TWICE DAILY, Disp: , Rfl: 6    ezetimibe (ZETIA) 10 mg tablet, Take 1 tablet (10 mg total) by mouth once daily., Disp: 90 tablet, Rfl: 1    hydroCHLOROthiazide (HYDRODIURIL) 25 MG tablet, Take 1 tablet (25 mg total) by mouth once daily., Disp: 90 tablet, Rfl: 1    ibuprofen (ADVIL,MOTRIN) 200 MG tablet, Take 600 mg by mouth every 6 (six) hours as needed for Pain., Disp: , Rfl:     levothyroxine (SYNTHROID) 112 MCG tablet, Take 1 tablet (112 mcg total) by mouth before breakfast., Disp: 90 tablet, Rfl: 2    pravastatin (PRAVACHOL) 10 MG tablet, Take 1 tablet (10 mg total) by mouth once daily., Disp: 90 tablet, Rfl: 1    SOOLANTRA 1 % Crea, , Disp: , Rfl:     telmisartan (MICARDIS) 80 MG Tab, Take 1 tablet (80 mg total) by mouth once daily., Disp: 90 tablet, Rfl: 1    baclofen (LIORESAL) 10 MG tablet, Take 0.5-1 tablets (5-10 mg total) by mouth nightly as needed (pain or spasm)., Disp: 30 tablet, Rfl: 2    DULoxetine 40 mg CpDR, Take 40 mg by mouth once daily., Disp: 30 capsule, Rfl: 11     Review of Systems:  ROS    Allergies:  Patient has no known allergies.     Medical History:   has a  "past medical history of Generalized anxiety disorder with panic attacks, HTN (hypertension), Hyperlipidemia, Hypothyroid, DANIELLE on CPAP, and Type 2 diabetes mellitus.    Surgical History:   has a past surgical history that includes Carpal tunnel release; Foot surgery; Knee surgery;  section, classic; Thyroid surgery; Endometrial ablation; Colonoscopy (N/A, 11/10/2021); and Esophagogastroduodenoscopy (N/A, 11/10/2021).    Family History:  family history includes Heart disease in her brother, father, and mother; No Known Problems in her daughter, daughter, daughter, sister, and son.    Social History:   reports that she has never smoked. She has never used smokeless tobacco. She reports that she does not drink alcohol and does not use drugs.    Physical Exam:  BP (!) 159/99   Pulse 78   Resp 18   Ht 5' 5" (1.651 m)   Wt (!) 151.5 kg (334 lb 1.7 oz)   LMP 2006   BMI 55.60 kg/m²   GEN: No acute distress. Calm, comfortable  HENT: Normocephalic, atraumatic, moist mucous membranes  EYE: Anicteric sclera, non-injected.   CV: Non-diaphoretic.   RESP: Breathing comfortably. Chest expansion symmetric.  PSYCH: Pleasant mood and appropriate affect. Recent and remote memory intact.   Shoulder Exam:      Inspection: No erythema, bruising, surgical incisions      Palpation: TTP of glenohumeral joint and AC joint of bilateral shoulders.       ROM:  Limited in abduction, internal rotation bilaterally, worse on the left than the right   (+) Painful arc sign with pain between  degrees abduction      Provocative Maneuvers for Biceps Tendon:   (-) Speed's test bilaterally      Provocative Maneuvers for Impingement:  (+) Hawkin's bilaterally  (+) Neer's bilaterally      Provocative Maneuvers for RTC:       (+) Empty Can bilaterally  Neuro:  (-) Godwin bilaterally         Imaging:  -MRI Cervical Spine 2022:  MRI examination of the cervical spine dated 2022.  There is straightening of the cervical " spine with degenerative change greatest at C4-C5 and C5-C6.  No evidence of congenital spinal stenosis.  No abnormal signal change involving the visualized portion of the spinal cord.  No evidence of an acute cervical spine fracture.  At the C2-C3 and C3-C4 levels there is no evidence of a focal disc abnormality.  No significant spinal canal or foraminal encroachment.  At the C4-C5 level there is a minimal posterior disc-osteophyte with mild effacement along the anterior margin of the subarachnoid space and spinal cord.  Mild spinal canal encroachment.  No foraminal encroachment.  At the C5-C6 level there is a broad-based and right paracentral disc-osteophyte.  There is effacement along the anterior margin of the subarachnoid space with right greater than left foraminal encroachment.  At the C6-C7 and C7-T1 levels there is no evidence of a significant focal disc abnormality.  No significant spinal canal or foraminal encroachment.    - X-ray shoulder complete b/l 2/13/20:   Right shoulder: The alignment is normal.  The glenohumeral joint appears normal.  Minimal osteophyte at the inferior humeral head.  There is mild osteophyte at the acromioclavicular joint.  No fracture, no osseous lesions.  The right upper thoracic region appear normal.  Left shoulder: The alignment is normal.  There is mild glenohumeral joint space narrowing.  Subchondral cystic changes at the superior glenoid.  Prominent inferior osteophyte at the humeral head.  Osteophyte noted at the acromioclavicular joint.  No fracture, no osseous lesions.  The left upper thoracic region appear normal    Labs:  BMP  Lab Results   Component Value Date     03/30/2022    K 4.2 03/30/2022     03/30/2022    CO2 25 03/30/2022    BUN 14 03/30/2022    CREATININE 0.6 03/30/2022    CALCIUM 9.1 03/30/2022    ANIONGAP 10 03/30/2022    ESTGFRAFRICA >60 03/30/2022    EGFRNONAA >60 03/30/2022     Lab Results   Component Value Date    ALT 24 03/30/2022    AST 19  03/30/2022    ALKPHOS 69 03/30/2022    BILITOT 0.6 03/30/2022     Lab Results   Component Value Date     03/30/2022       Assessment:  Yulissa Hatfield is a 61 y.o. female with the following diagnoses based on history, exam, and imaging:    Problem List Items Addressed This Visit          Neuro    Cervical radiculopathy    Relevant Medications    DULoxetine 40 mg CpDR    baclofen (LIORESAL) 10 MG tablet     Other Visit Diagnoses       Chronic pain of both shoulders        Relevant Medications    DULoxetine 40 mg CpDR    baclofen (LIORESAL) 10 MG tablet    Other Relevant Orders    Ambulatory referral/consult to Physical/Occupational Therapy    Neck pain        Relevant Medications    DULoxetine 40 mg CpDR    baclofen (LIORESAL) 10 MG tablet    Other Relevant Orders    Ambulatory referral/consult to Physical/Occupational Therapy    Osteoarthritis of bilateral glenohumeral joints        Relevant Medications    DULoxetine 40 mg CpDR    baclofen (LIORESAL) 10 MG tablet    Subacromial bursitis of both shoulders        Relevant Medications    DULoxetine 40 mg CpDR    baclofen (LIORESAL) 10 MG tablet    Other Relevant Orders    Ambulatory referral/consult to Physical/Occupational Therapy    Decreased range of motion of left shoulder        Relevant Medications    DULoxetine 40 mg CpDR    baclofen (LIORESAL) 10 MG tablet            This is a pleasant 61 y.o. lady presenting with:     - Chronic bilateral shoulder pain: Right worse than left. Imaging with OA in AC and GH joints. Impingement signs on exam b/l.   - Chronic neck pain and bilateral radicular arm pains. MRI with bilateral foraminal encroachment at C5-6.    - Comorbidities: BMI > 50, anxiety, hypertension, hypothyroidism, DM2     Treatment Plan:   - PT/OT/HEP: Refer to PT for strengthening and ROM exercises for bilateral shoulders. Continue HEP for shoulder pain as well. Discussed benefits of exercise for pain.   - Procedures: Schedule right subacromial  bursa CSI with US guidance. Then schedule left glenohumeral CSI under fluoroscopy 2 weeks after the right SAB injection.   - If no relief with steroid injections, will have patient follow back up with Ortho (Dr. Rosas).  - Medications:    - Cont compound topical medication with Diclofenac 1.5%, Lidocaine 2.5%, Prilocaine 2.5%, and Gabapentin 4%.  Apply 1-2 grams to painful area up to 3-4 times daily   - Decrease cymbalta to 40 mg daily. The 60 mg dose caused patient to have strange dreams and poor sleep.  - Discontinue tizanidine due to patient having dry mouth and cough.   - Trial baclofen 5-10 mg nightly.   - Imaging: Reviewed.   - Labs: Reviewed.     Follow Up: RTC next week for right subacromial bursa CSI with ultrasound guidance. Then schedule patient for left glenohumeral CSI under fluoroscopy 2 weeks after the right shoulder injection. Then schedule follow up for 2 weeks after left glenohumeral injection with NP.    JG. Nicolas Cleveland M.D.  [facility] PM&R  PGY-3    The patient was seen and examined with the resident. I agree with the above documentation, and have edited as necessary.       Sowmya Tellez M.D.  Interventional Pain Medicine / Physical Medicine & Rehabilitation    Disclaimer: This note was partly generated using dictation software which may occasionally result in transcription errors.

## 2022-10-13 NOTE — TELEPHONE ENCOUNTER
Patient scheduled for a Left Glenohumeral Joint Injection with Dr. Tellez on 11/09/2022. Patient notified of pre admit instructions and arrival time via patient portal.

## 2022-10-19 DIAGNOSIS — E11.9 TYPE 2 DIABETES MELLITUS WITHOUT COMPLICATION: ICD-10-CM

## 2022-10-20 ENCOUNTER — OFFICE VISIT (OUTPATIENT)
Dept: PAIN MEDICINE | Facility: CLINIC | Age: 62
End: 2022-10-20
Payer: COMMERCIAL

## 2022-10-20 VITALS
SYSTOLIC BLOOD PRESSURE: 127 MMHG | BODY MASS INDEX: 48.82 KG/M2 | RESPIRATION RATE: 18 BRPM | HEART RATE: 75 BPM | HEIGHT: 65 IN | DIASTOLIC BLOOD PRESSURE: 84 MMHG | WEIGHT: 293 LBS

## 2022-10-20 DIAGNOSIS — M25.511 CHRONIC PAIN OF BOTH SHOULDERS: ICD-10-CM

## 2022-10-20 DIAGNOSIS — M75.51 SUBACROMIAL BURSITIS OF BOTH SHOULDERS: Primary | ICD-10-CM

## 2022-10-20 DIAGNOSIS — M54.12 CERVICAL RADICULOPATHY: ICD-10-CM

## 2022-10-20 DIAGNOSIS — G89.29 CHRONIC PAIN OF BOTH SHOULDERS: ICD-10-CM

## 2022-10-20 DIAGNOSIS — M19.011 OSTEOARTHRITIS OF BILATERAL GLENOHUMERAL JOINTS: ICD-10-CM

## 2022-10-20 DIAGNOSIS — M75.52 SUBACROMIAL BURSITIS OF BOTH SHOULDERS: Primary | ICD-10-CM

## 2022-10-20 DIAGNOSIS — M19.012 OSTEOARTHRITIS OF BILATERAL GLENOHUMERAL JOINTS: ICD-10-CM

## 2022-10-20 DIAGNOSIS — M25.512 CHRONIC PAIN OF BOTH SHOULDERS: ICD-10-CM

## 2022-10-20 DIAGNOSIS — M54.2 NECK PAIN: ICD-10-CM

## 2022-10-20 PROCEDURE — 3074F PR MOST RECENT SYSTOLIC BLOOD PRESSURE < 130 MM HG: ICD-10-PCS | Mod: CPTII,S$GLB,, | Performed by: PHYSICAL MEDICINE & REHABILITATION

## 2022-10-20 PROCEDURE — 3079F PR MOST RECENT DIASTOLIC BLOOD PRESSURE 80-89 MM HG: ICD-10-PCS | Mod: CPTII,S$GLB,, | Performed by: PHYSICAL MEDICINE & REHABILITATION

## 2022-10-20 PROCEDURE — 1159F MED LIST DOCD IN RCRD: CPT | Mod: CPTII,S$GLB,, | Performed by: PHYSICAL MEDICINE & REHABILITATION

## 2022-10-20 PROCEDURE — 20611 DRAIN/INJ JOINT/BURSA W/US: CPT | Mod: RT,S$GLB,, | Performed by: PHYSICAL MEDICINE & REHABILITATION

## 2022-10-20 PROCEDURE — 99499 UNLISTED E&M SERVICE: CPT | Mod: S$GLB,,, | Performed by: PHYSICAL MEDICINE & REHABILITATION

## 2022-10-20 PROCEDURE — 20611 LARGE JOINT ASPIRATION/INJECTION: R SUBACROMIAL BURSA: ICD-10-PCS | Mod: RT,S$GLB,, | Performed by: PHYSICAL MEDICINE & REHABILITATION

## 2022-10-20 PROCEDURE — 3044F PR MOST RECENT HEMOGLOBIN A1C LEVEL <7.0%: ICD-10-PCS | Mod: CPTII,S$GLB,, | Performed by: PHYSICAL MEDICINE & REHABILITATION

## 2022-10-20 PROCEDURE — 1160F PR REVIEW ALL MEDS BY PRESCRIBER/CLIN PHARMACIST DOCUMENTED: ICD-10-PCS | Mod: CPTII,S$GLB,, | Performed by: PHYSICAL MEDICINE & REHABILITATION

## 2022-10-20 PROCEDURE — 3074F SYST BP LT 130 MM HG: CPT | Mod: CPTII,S$GLB,, | Performed by: PHYSICAL MEDICINE & REHABILITATION

## 2022-10-20 PROCEDURE — 99999 PR PBB SHADOW E&M-EST. PATIENT-LVL IV: ICD-10-PCS | Mod: PBBFAC,,, | Performed by: PHYSICAL MEDICINE & REHABILITATION

## 2022-10-20 PROCEDURE — 1159F PR MEDICATION LIST DOCUMENTED IN MEDICAL RECORD: ICD-10-PCS | Mod: CPTII,S$GLB,, | Performed by: PHYSICAL MEDICINE & REHABILITATION

## 2022-10-20 PROCEDURE — 4010F PR ACE/ARB THEARPY RXD/TAKEN: ICD-10-PCS | Mod: CPTII,S$GLB,, | Performed by: PHYSICAL MEDICINE & REHABILITATION

## 2022-10-20 PROCEDURE — 3079F DIAST BP 80-89 MM HG: CPT | Mod: CPTII,S$GLB,, | Performed by: PHYSICAL MEDICINE & REHABILITATION

## 2022-10-20 PROCEDURE — 1160F RVW MEDS BY RX/DR IN RCRD: CPT | Mod: CPTII,S$GLB,, | Performed by: PHYSICAL MEDICINE & REHABILITATION

## 2022-10-20 PROCEDURE — 99999 PR PBB SHADOW E&M-EST. PATIENT-LVL IV: CPT | Mod: PBBFAC,,, | Performed by: PHYSICAL MEDICINE & REHABILITATION

## 2022-10-20 PROCEDURE — 3044F HG A1C LEVEL LT 7.0%: CPT | Mod: CPTII,S$GLB,, | Performed by: PHYSICAL MEDICINE & REHABILITATION

## 2022-10-20 PROCEDURE — 4010F ACE/ARB THERAPY RXD/TAKEN: CPT | Mod: CPTII,S$GLB,, | Performed by: PHYSICAL MEDICINE & REHABILITATION

## 2022-10-20 PROCEDURE — 99499 NO LOS: ICD-10-PCS | Mod: S$GLB,,, | Performed by: PHYSICAL MEDICINE & REHABILITATION

## 2022-10-20 RX ORDER — METHYLPREDNISOLONE ACETATE 40 MG/ML
40 INJECTION, SUSPENSION INTRA-ARTICULAR; INTRALESIONAL; INTRAMUSCULAR; SOFT TISSUE
Status: DISCONTINUED | OUTPATIENT
Start: 2022-10-20 | End: 2022-10-20 | Stop reason: HOSPADM

## 2022-10-20 RX ORDER — LIDOCAINE HYDROCHLORIDE 10 MG/ML
2 INJECTION, SOLUTION EPIDURAL; INFILTRATION; INTRACAUDAL; PERINEURAL
Status: DISCONTINUED | OUTPATIENT
Start: 2022-10-20 | End: 2022-10-20 | Stop reason: HOSPADM

## 2022-10-20 RX ADMIN — LIDOCAINE HYDROCHLORIDE 2 ML: 10 INJECTION, SOLUTION EPIDURAL; INFILTRATION; INTRACAUDAL; PERINEURAL at 09:10

## 2022-10-20 RX ADMIN — METHYLPREDNISOLONE ACETATE 40 MG: 40 INJECTION, SUSPENSION INTRA-ARTICULAR; INTRALESIONAL; INTRAMUSCULAR; SOFT TISSUE at 09:10

## 2022-10-20 NOTE — H&P (VIEW-ONLY)
Pain Medicine      Chief Complaint:   Chief Complaint   Patient presents with    Shoulder Pain         History of Present Illness: Yulissa Hatfield is a 61 y.o. female referred by Dr. Edwin Tena for cervical radiculopathy.      Onset: 2018, no specific injury, insidious onset  Location: Bilateral anterior shoulders, worse on the left  Radiation: into neck and down both arms  Timing: constant  Quality: Aching and Deep  Exacerbating Factors: lifting  Alleviating Factors: medications and rest  Associated Symptoms: She feels weakness in both arms, numbness in both hands in C6/7 distribution. She denies night fever/night sweats, urinary incontinence, bowel incontinence and significant weight loss    Severity: Currently: 7/10   Typical Range: 4-10/10     Exacerbation: 10/10     Interval History (10/20/2022):  Yulissa Hatfield returns today for follow up.  At the last clinic visit, scheduled right subacromial bursa and left glenohumeral joint injections.    Currently, the shoulder pain is stable.  She denies any significant changes in pain since the last visit.  She denies any recent fevers.      Current Pain Scales:  Current: 5/10              Typical Range: 5-8/10        Pain Disability Index  Family/Home Responsibilities:: 5  Recreation:: 8  Social Activity:: 7  Occupation:: 7  Sexual Behavior:: 9  Self Care:: 5  Life-Support Activities:: 3  Pain Disability Index (PDI): 44    Previous Interventions:  - 8/16/22: Left subacromial bursa injection     Previous Therapies:  PT/OT: yes   Relevant Surgery: no   Previous Medications:   - NSAIDS: Ibuprofen 200 mg  - Muscle Relaxants:  Tizanidine > dry mouth, cough  - TCAs:   - SNRIs: Cymbalta  - Topicals:   - Anticonvulsants:    - Opioids: tramadol    Current Pain Medications:  Ibuprofen  Cymbalta 40 mg   Baclofen 5-10 mg qHS  Tylenol     Blood Thinners: None    Full Medication List:    Current Outpatient Medications:     acetaminophen (TYLENOL) 500 MG tablet, Take  1,000 mg by mouth every 6 (six) hours as needed for Pain., Disp: , Rfl:     baclofen (LIORESAL) 10 MG tablet, Take 0.5-1 tablets (5-10 mg total) by mouth nightly as needed (pain or spasm)., Disp: 30 tablet, Rfl: 2    DULoxetine 40 mg CpDR, Take 40 mg by mouth once daily., Disp: 30 capsule, Rfl: 11    EPICERAM Diogenes, USE AS A MOISTURIZING BARRIER AT LEAST TWICE DAILY, Disp: , Rfl: 6    ezetimibe (ZETIA) 10 mg tablet, Take 1 tablet (10 mg total) by mouth once daily., Disp: 90 tablet, Rfl: 1    hydroCHLOROthiazide (HYDRODIURIL) 25 MG tablet, Take 1 tablet (25 mg total) by mouth once daily., Disp: 90 tablet, Rfl: 1    ibuprofen (ADVIL,MOTRIN) 200 MG tablet, Take 600 mg by mouth every 6 (six) hours as needed for Pain., Disp: , Rfl:     levothyroxine (SYNTHROID) 112 MCG tablet, Take 1 tablet (112 mcg total) by mouth before breakfast., Disp: 90 tablet, Rfl: 2    pravastatin (PRAVACHOL) 10 MG tablet, Take 1 tablet (10 mg total) by mouth once daily., Disp: 90 tablet, Rfl: 1    SOOLANTRA 1 % Crea, , Disp: , Rfl:     telmisartan (MICARDIS) 80 MG Tab, Take 1 tablet (80 mg total) by mouth once daily., Disp: 90 tablet, Rfl: 1     Review of Systems:  ROS    Allergies:  Patient has no known allergies.     Medical History:   has a past medical history of Generalized anxiety disorder with panic attacks, HTN (hypertension), Hyperlipidemia, Hypothyroid, DANIELLE on CPAP, and Type 2 diabetes mellitus.    Surgical History:   has a past surgical history that includes Carpal tunnel release; Foot surgery; Knee surgery;  section, classic; Thyroid surgery; Endometrial ablation; Colonoscopy (N/A, 11/10/2021); and Esophagogastroduodenoscopy (N/A, 11/10/2021).    Family History:  family history includes Heart disease in her brother, father, and mother; No Known Problems in her daughter, daughter, daughter, sister, and son.    Social History:   reports that she has never smoked. She has never used smokeless tobacco. She reports that she does  "not drink alcohol and does not use drugs.    Physical Exam:  /84   Pulse 75   Resp 18   Ht 5' 5" (1.651 m)   Wt (!) 149.3 kg (329 lb 2.4 oz)   LMP 03/25/2006   BMI 54.77 kg/m²   GEN: No acute distress. Calm, comfortable  HENT: Normocephalic, atraumatic, moist mucous membranes  EYE: Anicteric sclera, non-injected.   CV: Non-diaphoretic.   RESP: Breathing comfortably. Chest expansion symmetric.  PSYCH: Pleasant mood and appropriate affect. Recent and remote memory intact.   Shoulder Exam:      Inspection: No erythema, bruising, surgical incisions      Palpation: TTP of glenohumeral joint and AC joint of bilateral shoulders.       ROM:  Limited in abduction, internal rotation bilaterally, worse on the left than the right   (+) Painful arc sign with pain between  degrees abduction      Provocative Maneuvers for Biceps Tendon:   (-) Speed's test bilaterally      Provocative Maneuvers for Impingement:  (+) Hawkin's bilaterally  (+) Neer's bilaterally      Provocative Maneuvers for RTC:       (+) Empty Can bilaterally  Neuro:  (-) Godwin bilaterally         Imaging:  -MRI Cervical Spine 06/20/2022:  MRI examination of the cervical spine dated June 20, 2022.  There is straightening of the cervical spine with degenerative change greatest at C4-C5 and C5-C6.  No evidence of congenital spinal stenosis.  No abnormal signal change involving the visualized portion of the spinal cord.  No evidence of an acute cervical spine fracture.  At the C2-C3 and C3-C4 levels there is no evidence of a focal disc abnormality.  No significant spinal canal or foraminal encroachment.  At the C4-C5 level there is a minimal posterior disc-osteophyte with mild effacement along the anterior margin of the subarachnoid space and spinal cord.  Mild spinal canal encroachment.  No foraminal encroachment.  At the C5-C6 level there is a broad-based and right paracentral disc-osteophyte.  There is effacement along the anterior margin of " the subarachnoid space with right greater than left foraminal encroachment.  At the C6-C7 and C7-T1 levels there is no evidence of a significant focal disc abnormality.  No significant spinal canal or foraminal encroachment.    - X-ray shoulder complete b/l 2/13/20:   Right shoulder: The alignment is normal.  The glenohumeral joint appears normal.  Minimal osteophyte at the inferior humeral head.  There is mild osteophyte at the acromioclavicular joint.  No fracture, no osseous lesions.  The right upper thoracic region appear normal.  Left shoulder: The alignment is normal.  There is mild glenohumeral joint space narrowing.  Subchondral cystic changes at the superior glenoid.  Prominent inferior osteophyte at the humeral head.  Osteophyte noted at the acromioclavicular joint.  No fracture, no osseous lesions.  The left upper thoracic region appear normal    Labs:  BMP  Lab Results   Component Value Date     03/30/2022    K 4.2 03/30/2022     03/30/2022    CO2 25 03/30/2022    BUN 14 03/30/2022    CREATININE 0.6 03/30/2022    CALCIUM 9.1 03/30/2022    ANIONGAP 10 03/30/2022    ESTGFRAFRICA >60 03/30/2022    EGFRNONAA >60 03/30/2022     Lab Results   Component Value Date    ALT 24 03/30/2022    AST 19 03/30/2022    ALKPHOS 69 03/30/2022    BILITOT 0.6 03/30/2022     Lab Results   Component Value Date     03/30/2022       Assessment:  Yulissa Hatfield is a 61 y.o. female with the following diagnoses based on history, exam, and imaging:    Problem List Items Addressed This Visit          Neuro    Cervical radiculopathy     Other Visit Diagnoses       Subacromial bursitis of both shoulders    -  Primary    Relevant Orders    Large Joint Aspiration/Injection: R subacromial bursa    Chronic pain of both shoulders        Osteoarthritis of bilateral glenohumeral joints        Neck pain                This is a pleasant 61 y.o. lady presenting with:     - Chronic bilateral shoulder pain: Right worse than  left. Imaging with OA in AC and GH joints. Impingement signs on exam b/l.   - Chronic neck pain and bilateral radicular arm pains. MRI with bilateral foraminal encroachment at C5-6.    - Comorbidities: BMI > 50, anxiety, hypertension, hypothyroidism, DM2     Treatment Plan:   - PT/OT/HEP: Referred to PT for strengthening and ROM exercises for bilateral shoulders. Continue HEP for shoulder pain as well. Discussed benefits of exercise for pain.   - Procedures: Performed right subacromial bursa CSI with US guidance.   - Schedule left glenohumeral CSI under fluoroscopy in 2 weeks .   - If no relief with steroid injections, will plan fro C7-T1 IL CESAR  - If no relief with above, will have patient follow back up with Ortho (Dr. Rosas).  - Medications:    - Cont compound topical medication with Diclofenac 1.5%, Lidocaine 2.5%, Prilocaine 2.5%, and Gabapentin 4%.  Apply 1-2 grams to painful area up to 3-4 times daily   - Cont cymbalta 40 mg daily. The 60 mg dose caused patient to have strange dreams and poor sleep.  - Cont baclofen 5-10 mg nightly.   - Imaging: Reviewed.   - Labs: Reviewed.     Follow Up: RTC 2 weeks after left GH joint injection       Sowmya Tellez M.D.  Interventional Pain Medicine / Physical Medicine & Rehabilitation    Disclaimer: This note was partly generated using dictation software which may occasionally result in transcription errors.

## 2022-10-20 NOTE — PROCEDURES
Large Joint Aspiration/Injection: R subacromial bursa    Date/Time: 10/20/2022 9:30 AM  Performed by: Sowmya Tellez MD  Authorized by: Sowmya Tellez MD     Consent Done?:  Yes (Written)  Indications:  Pain  Site marked: the procedure site was marked    Timeout: prior to procedure the correct patient, procedure, and site was verified    Prep: patient was prepped and draped in usual sterile fashion      Details:  Needle Size:  22 G  Ultrasonic Guidance for needle placement?: Yes    Images are saved and documented.  Approach:  Lateral  Location:  Shoulder  Site:  R subacromial bursa  Medications:  2 mL LIDOcaine (PF) 10 mg/ml (1%) 10 mg/mL (1 %); 40 mg methylPREDNISolone acetate 40 mg/mL  Patient tolerance:  Patient tolerated the procedure well with no immediate complications

## 2022-10-24 ENCOUNTER — PATIENT MESSAGE (OUTPATIENT)
Dept: ADMINISTRATIVE | Facility: HOSPITAL | Age: 62
End: 2022-10-24
Payer: COMMERCIAL

## 2022-11-01 PROBLEM — M75.51 BURSITIS OF RIGHT SHOULDER: Status: ACTIVE | Noted: 2022-11-01

## 2022-11-01 PROBLEM — M75.52 BURSITIS OF LEFT SHOULDER: Status: ACTIVE | Noted: 2022-11-01

## 2022-11-01 PROBLEM — G89.29 CHRONIC LEFT SHOULDER PAIN: Status: ACTIVE | Noted: 2022-11-01

## 2022-11-01 PROBLEM — M25.511 CHRONIC RIGHT SHOULDER PAIN: Status: ACTIVE | Noted: 2022-11-01

## 2022-11-01 PROBLEM — M54.2 CERVICALGIA: Status: ACTIVE | Noted: 2022-11-01

## 2022-11-01 PROBLEM — Z78.9 DECREASED INDEPENDENCE WITH ACTIVITIES OF DAILY LIVING: Status: ACTIVE | Noted: 2022-11-01

## 2022-11-01 PROBLEM — M25.512 CHRONIC LEFT SHOULDER PAIN: Status: ACTIVE | Noted: 2022-11-01

## 2022-11-01 PROBLEM — G89.29 CHRONIC RIGHT SHOULDER PAIN: Status: ACTIVE | Noted: 2022-11-01

## 2022-11-01 PROBLEM — M62.81 MUSCLE WEAKNESS (GENERALIZED): Status: ACTIVE | Noted: 2022-11-01

## 2022-11-08 ENCOUNTER — TELEPHONE (OUTPATIENT)
Dept: PAIN MEDICINE | Facility: HOSPITAL | Age: 62
End: 2022-11-08
Payer: COMMERCIAL

## 2022-11-08 ENCOUNTER — PATIENT MESSAGE (OUTPATIENT)
Dept: PAIN MEDICINE | Facility: CLINIC | Age: 62
End: 2022-11-08
Payer: COMMERCIAL

## 2022-11-08 NOTE — TELEPHONE ENCOUNTER
Patient notified of 7:15 am arrival time on 11/9/22 and the following:  Please remember:  Check in at the registration desk on the first floor of the hospital. [address] [place] [address]  Please DO NOT eat or drink 8 hours prior to your arrival time for the procedure, if diabetic 6 hours prior. If you are taking medications for blood pressure, heart medications, thyroid, cholesterol; you can take them with a small sip of water.  Refrain from drinking alcohol within 24 hours prior to your procedure  You will need someone to drive you home after procedure. You cannot take taxi, Uber, or Lyft.  If you start feeling sick (fever, chills, or coughing) or start on any antibiotics please contact us at [phone] to reschedule.     Unable to contact patient. Multiple attempts to all numbers listed.

## 2022-11-08 NOTE — TELEPHONE ENCOUNTER
Patient notified of 7:15 am arrival time on 11/9/22 and the following:  Please remember:  Check in at the registration desk on the first floor of the hospital. [address] [place] [address]  Please DO NOT eat or drink 8 hours prior to your arrival time for the procedure, if diabetic 6 hours prior. If you are taking medications for blood pressure, heart medications, thyroid, cholesterol; you can take them with a small sip of water.  Refrain from drinking alcohol within 24 hours prior to your procedure  You will need someone to drive you home after procedure. You cannot take taxi, Uber, or Lyft.  If you start feeling sick (fever, chills, or coughing) or start on any antibiotics please contact us at [phone] to reschedule.    Attempted to contact patient to inform them of the following instructions. No answer at number provided.

## 2022-11-09 ENCOUNTER — HOSPITAL ENCOUNTER (OUTPATIENT)
Facility: HOSPITAL | Age: 62
Discharge: HOME | End: 2022-11-09
Attending: PHYSICAL MEDICINE & REHABILITATION | Admitting: PHYSICAL MEDICINE & REHABILITATION
Payer: COMMERCIAL

## 2022-11-09 VITALS
WEIGHT: 293 LBS | SYSTOLIC BLOOD PRESSURE: 129 MMHG | HEART RATE: 70 BPM | OXYGEN SATURATION: 97 % | RESPIRATION RATE: 18 BRPM | DIASTOLIC BLOOD PRESSURE: 62 MMHG | BODY MASS INDEX: 48.82 KG/M2 | HEIGHT: 65 IN | TEMPERATURE: 98 F

## 2022-11-09 DIAGNOSIS — M19.012 OSTEOARTHRITIS OF BILATERAL GLENOHUMERAL JOINTS: Primary | ICD-10-CM

## 2022-11-09 DIAGNOSIS — R52 PAIN: ICD-10-CM

## 2022-11-09 DIAGNOSIS — M19.011 OSTEOARTHRITIS OF BILATERAL GLENOHUMERAL JOINTS: Primary | ICD-10-CM

## 2022-11-09 DIAGNOSIS — M19.012 OSTEOARTHRITIS OF LEFT GLENOHUMERAL JOINT: ICD-10-CM

## 2022-11-09 PROCEDURE — 77002 PR FLUOROSCOPIC GUIDANCE NEEDLE PLACEMENT: ICD-10-PCS | Mod: 26,,, | Performed by: PHYSICAL MEDICINE & REHABILITATION

## 2022-11-09 PROCEDURE — 20610 DRAIN/INJ JOINT/BURSA W/O US: CPT | Performed by: PHYSICAL MEDICINE & REHABILITATION

## 2022-11-09 PROCEDURE — 25000003 PHARM REV CODE 250: Performed by: PHYSICAL MEDICINE & REHABILITATION

## 2022-11-09 PROCEDURE — 25500020 PHARM REV CODE 255: Performed by: PHYSICAL MEDICINE & REHABILITATION

## 2022-11-09 PROCEDURE — 77002 NEEDLE LOCALIZATION BY XRAY: CPT | Mod: 26,,, | Performed by: PHYSICAL MEDICINE & REHABILITATION

## 2022-11-09 PROCEDURE — 20605 DRAIN/INJ JOINT/BURSA W/O US: CPT | Performed by: PHYSICAL MEDICINE & REHABILITATION

## 2022-11-09 PROCEDURE — 20610 PR DRAIN/INJECT LARGE JOINT/BURSA: ICD-10-PCS | Mod: RT,,, | Performed by: PHYSICAL MEDICINE & REHABILITATION

## 2022-11-09 PROCEDURE — 63600175 PHARM REV CODE 636 W HCPCS: Performed by: PHYSICAL MEDICINE & REHABILITATION

## 2022-11-09 PROCEDURE — 20610 DRAIN/INJ JOINT/BURSA W/O US: CPT | Mod: RT,,, | Performed by: PHYSICAL MEDICINE & REHABILITATION

## 2022-11-09 RX ORDER — LIDOCAINE HYDROCHLORIDE 10 MG/ML
INJECTION INFILTRATION; PERINEURAL
Status: DISCONTINUED | OUTPATIENT
Start: 2022-11-09 | End: 2022-11-09 | Stop reason: HOSPADM

## 2022-11-09 RX ORDER — BUPIVACAINE HYDROCHLORIDE 2.5 MG/ML
INJECTION, SOLUTION EPIDURAL; INFILTRATION; INTRACAUDAL
Status: DISCONTINUED | OUTPATIENT
Start: 2022-11-09 | End: 2022-11-09 | Stop reason: HOSPADM

## 2022-11-09 RX ORDER — METHYLPREDNISOLONE ACETATE 40 MG/ML
INJECTION, SUSPENSION INTRA-ARTICULAR; INTRALESIONAL; INTRAMUSCULAR; SOFT TISSUE
Status: DISCONTINUED | OUTPATIENT
Start: 2022-11-09 | End: 2022-11-09 | Stop reason: HOSPADM

## 2022-11-09 NOTE — DISCHARGE INSTRUCTIONS
Home Care Instructions Pain Management:    1.  DIET:    You may resume your normal diet today.    2.  BATHING:    You may shower with luke warm water.    3.  DRESSING:    You may remove your bandage today.    4.  ACTIVITY LEVEL:      You may resume your normal activities 24 hours after your procedure.    5.  MEDICATIONS:    You may resume your normal medications today.    6.  SPECIAL INSTRUCTIONS:    No heat to the injection site for 24 hours including bath or shower, heating pad, moist heat or hot tubs.    Use an ice pack to the injection site for any pain or discomfort.  Apply ice packs for 20 minute intervals as needed.    If you have received any sedatives by mouth today, you can not drive for 12 hours.    If you have received sedation through an IV, you can not drive for 24 hours.    PLEASE CALL YOUR DOCTOR FOR THE FOLLOWIN.  Redness or swelling around the injection site.  2.  Fever of 101 degrees.  3.  Drainage (pus) from the injection site.  4.  For any continuous bleeding (some dried blood over the incision is normal.)    FOR EMERGENCIES:    If any unusual problems or difficulties occur during clinic hours, call [phone] or dial [phone].    Follow up with with your physician in 2-3 weeks.

## 2022-11-09 NOTE — OP NOTE
"Glenohumeral Joint Injection Under Fluoroscopic Guidance:  I have reviewed the patient's medications, allergies and relevant histories prior to the procedure and no contraindications have been identified. The risks, benefits and alternatives to the procedure were discussed with the patient, and all questions regarding the procedure were answered to the patient's satisfaction. Yulissa's consent was obtained prior to the start of the procedure and the signed consent can be found in the patient's chart.                                                         Time-out was taken to identify patient, procedure, laterality, and allergies prior to starting the procedure.       Date of Service: 11/09/2022  Procedure: Left  glenohumeral joint injection under fluoroscopic guidance  Pre-Operative Diagnosis: Left  shoulder pain and osteoarthritis  Osteoarthritis of bilateral glenohumeral joints [M19.011, M19.012]  1. Osteoarthritis of left glenohumeral joint    2. Pain      Post-Operative Diagnosis: Same as above.    Physician: Sowmya Tellez M.D.  Assistants: None    Medications Injected:  Depo-Medrol 40 mg/mL and 3 mL of bupivacaine 0.25%   Local Anesthetic: Xylocaine 1% 10 mL   Sedation Medications: None    Procedural Technique:   Laying in a supine position, the patient was prepped and draped in the usual sterile fashion using ChloraPrep and fenestrated drape.  The left glenohumeral joint of interest was determined under fluoroscopic guidance.  Local anesthetic was utilized to anesthetize the skin and subcutaneous tissues in the area using a 1.5 inch 25-gauge needle.  A 25-gauge 3.5" spinal needle was introduced and advanced to the joint space over the humeral head under fluoroscopic guidance.  After negative aspiration, 0.5 mL of Omnipaque was injected under live fluoroscopy to confirm intraarticular spread.  The medication solution was then injected slowly. The needle was removed and bandage applied to the area.    Estimated " Blood Loss:  None.  Complications:  None.     Disposition: The patient tolerated the procedure well, and there were no apparent complications. Vital signs remained stable throughout the procedure. The patient was taken to the recovery area and monitored. The patient was supplied with written discharge instructions for the procedure. If helpful, we can repeat as needed. The patient was discharged in a stable condition.    Follow-Up: We will see the patient back 2-4 weeks or the patient may call to inform of status.

## 2022-11-09 NOTE — DISCHARGE SUMMARY
[facility]  Discharge Note  Short Stay     Admit Date: 11/9/2022    Discharge Date: 11/9/2022     Attending Physician: Sowmya Tellez M.D.    Diagnoses:  Active Hospital Problems    Diagnosis  POA    *Osteoarthritis of bilateral glenohumeral joints [M19.011, M19.012]  Yes      Resolved Hospital Problems   No resolved problems to display.       Discharged Condition: Good     Hospital Course: Patient was admitted for an outpatient interventional pain management procedure and tolerated the procedure well with no complications.     Final Diagnoses: Same as principal problem.     Disposition: Home or Self Care     Follow up/Patient Instructions:   Follow-up in 1-2 weeks unless otherwise instructed. May return sooner as needed.       Reconciled Medications:     Medication List        CONTINUE taking these medications      acetaminophen 500 MG tablet  Commonly known as: TYLENOL  Take 1,000 mg by mouth every 6 (six) hours as needed for Pain.     baclofen 10 MG tablet  Commonly known as: LIORESAL  Take 0.5-1 tablets (5-10 mg total) by mouth nightly as needed (pain or spasm).     DULoxetine 40 mg Cpdr  Take 40 mg by mouth once daily.     EPICERAM Kirsten  Generic drug: emollient combination no.32  USE AS A MOISTURIZING BARRIER AT LEAST TWICE DAILY     ezetimibe 10 mg tablet  Commonly known as: ZETIA  TAKE 1 TABLET BY MOUTH EVERY DAY     hydroCHLOROthiazide 25 MG tablet  Commonly known as: HYDRODIURIL  Take 1 tablet (25 mg total) by mouth once daily.     ibuprofen 200 MG tablet  Commonly known as: ADVIL,MOTRIN  Take 600 mg by mouth every 6 (six) hours as needed for Pain.     levothyroxine 112 MCG tablet  Commonly known as: SYNTHROID  Take 1 tablet (112 mcg total) by mouth before breakfast.     pravastatin 10 MG tablet  Commonly known as: PRAVACHOL  TAKE 1 TABLET BY MOUTH EVERY DAY     SOOLANTRA 1 % Crea  Generic drug: ivermectin     telmisartan 80 MG Tab  Commonly known as: MICARDIS  Take 1 tablet (80 mg total) by mouth  once daily.             Discharge Procedure Orders (must include Diet, Follow-up, Activity)   Ice to affected area   Order Comments: 20 minutes of ice or until area numb to the touch if area is sore 2-3 times per day as needed     No driving until:   Order Comments: Until following day     No dressing needed     Notify your health care provider if you experience any of the following:  temperature >100.4     Notify your health care provider if you experience any of the following:  persistent nausea and vomiting or diarrhea     Notify your health care provider if you experience any of the following:  severe uncontrolled pain     Notify your health care provider if you experience any of the following:  redness, tenderness, or signs of infection (pain, swelling, redness, odor or green/yellow discharge around incision site)     Notify your health care provider if you experience any of the following:  difficulty breathing or increased cough     Notify your health care provider if you experience any of the following:  severe persistent headache     Notify your health care provider if you experience any of the following:  worsening rash     Notify your health care provider if you experience any of the following:  persistent dizziness, light-headedness, or visual disturbances     Notify your health care provider if you experience any of the following:  increased confusion or weakness     Shower on day dressing removed (No bath)       Sowmya Tellez M.D.  Interventional Pain Medicine / Physical Medicine & Rehabilitation

## 2022-12-06 ENCOUNTER — OFFICE VISIT (OUTPATIENT)
Dept: PAIN MEDICINE | Facility: CLINIC | Age: 62
End: 2022-12-06
Payer: COMMERCIAL

## 2022-12-06 VITALS
SYSTOLIC BLOOD PRESSURE: 125 MMHG | BODY MASS INDEX: 54.08 KG/M2 | HEART RATE: 89 BPM | DIASTOLIC BLOOD PRESSURE: 85 MMHG | WEIGHT: 293 LBS

## 2022-12-06 DIAGNOSIS — G89.29 CHRONIC PAIN OF BOTH SHOULDERS: ICD-10-CM

## 2022-12-06 DIAGNOSIS — M25.511 CHRONIC PAIN OF BOTH SHOULDERS: ICD-10-CM

## 2022-12-06 DIAGNOSIS — M19.012 OSTEOARTHRITIS OF BILATERAL GLENOHUMERAL JOINTS: ICD-10-CM

## 2022-12-06 DIAGNOSIS — M54.2 NECK PAIN: ICD-10-CM

## 2022-12-06 DIAGNOSIS — M25.612 DECREASED RANGE OF MOTION OF LEFT SHOULDER: ICD-10-CM

## 2022-12-06 DIAGNOSIS — M75.52 SUBACROMIAL BURSITIS OF BOTH SHOULDERS: Primary | ICD-10-CM

## 2022-12-06 DIAGNOSIS — M75.51 SUBACROMIAL BURSITIS OF BOTH SHOULDERS: Primary | ICD-10-CM

## 2022-12-06 DIAGNOSIS — M25.512 CHRONIC PAIN OF BOTH SHOULDERS: ICD-10-CM

## 2022-12-06 DIAGNOSIS — M19.011 OSTEOARTHRITIS OF BILATERAL GLENOHUMERAL JOINTS: ICD-10-CM

## 2022-12-06 DIAGNOSIS — M19.012 OSTEOARTHRITIS OF LEFT GLENOHUMERAL JOINT: ICD-10-CM

## 2022-12-06 PROCEDURE — 99999 PR PBB SHADOW E&M-EST. PATIENT-LVL III: CPT | Mod: PBBFAC,,, | Performed by: NURSE PRACTITIONER

## 2022-12-06 PROCEDURE — 1159F PR MEDICATION LIST DOCUMENTED IN MEDICAL RECORD: ICD-10-PCS | Mod: CPTII,S$GLB,, | Performed by: NURSE PRACTITIONER

## 2022-12-06 PROCEDURE — 3044F PR MOST RECENT HEMOGLOBIN A1C LEVEL <7.0%: ICD-10-PCS | Mod: CPTII,S$GLB,, | Performed by: NURSE PRACTITIONER

## 2022-12-06 PROCEDURE — 1159F MED LIST DOCD IN RCRD: CPT | Mod: CPTII,S$GLB,, | Performed by: NURSE PRACTITIONER

## 2022-12-06 PROCEDURE — 3008F BODY MASS INDEX DOCD: CPT | Mod: CPTII,S$GLB,, | Performed by: NURSE PRACTITIONER

## 2022-12-06 PROCEDURE — 3079F PR MOST RECENT DIASTOLIC BLOOD PRESSURE 80-89 MM HG: ICD-10-PCS | Mod: CPTII,S$GLB,, | Performed by: NURSE PRACTITIONER

## 2022-12-06 PROCEDURE — 3044F HG A1C LEVEL LT 7.0%: CPT | Mod: CPTII,S$GLB,, | Performed by: NURSE PRACTITIONER

## 2022-12-06 PROCEDURE — 99214 PR OFFICE/OUTPT VISIT, EST, LEVL IV, 30-39 MIN: ICD-10-PCS | Mod: S$GLB,,, | Performed by: NURSE PRACTITIONER

## 2022-12-06 PROCEDURE — 4010F ACE/ARB THERAPY RXD/TAKEN: CPT | Mod: CPTII,S$GLB,, | Performed by: NURSE PRACTITIONER

## 2022-12-06 PROCEDURE — 3074F SYST BP LT 130 MM HG: CPT | Mod: CPTII,S$GLB,, | Performed by: NURSE PRACTITIONER

## 2022-12-06 PROCEDURE — 4010F PR ACE/ARB THEARPY RXD/TAKEN: ICD-10-PCS | Mod: CPTII,S$GLB,, | Performed by: NURSE PRACTITIONER

## 2022-12-06 PROCEDURE — 99999 PR PBB SHADOW E&M-EST. PATIENT-LVL III: ICD-10-PCS | Mod: PBBFAC,,, | Performed by: NURSE PRACTITIONER

## 2022-12-06 PROCEDURE — 3074F PR MOST RECENT SYSTOLIC BLOOD PRESSURE < 130 MM HG: ICD-10-PCS | Mod: CPTII,S$GLB,, | Performed by: NURSE PRACTITIONER

## 2022-12-06 PROCEDURE — 3008F PR BODY MASS INDEX (BMI) DOCUMENTED: ICD-10-PCS | Mod: CPTII,S$GLB,, | Performed by: NURSE PRACTITIONER

## 2022-12-06 PROCEDURE — 99214 OFFICE O/P EST MOD 30 MIN: CPT | Mod: S$GLB,,, | Performed by: NURSE PRACTITIONER

## 2022-12-06 PROCEDURE — 3079F DIAST BP 80-89 MM HG: CPT | Mod: CPTII,S$GLB,, | Performed by: NURSE PRACTITIONER

## 2022-12-06 NOTE — PROGRESS NOTES
Pain Medicine Established Clinic Visit       Chief Complaint:   Chief Complaint   Patient presents with    Shoulder Pain           History of Present Illness: Yulissa Hatfield is a 61 y.o. female referred by Dr. Edwin Tena for cervical radiculopathy.      Onset: 2018, no specific injury, insidious onset  Location: Bilateral anterior shoulders, worse on the left  Radiation: into neck and down both arms  Timing: constant  Quality: Aching and Deep  Exacerbating Factors: lifting  Alleviating Factors: medications and rest  Associated Symptoms: She feels weakness in both arms, numbness in both hands in C6/7 distribution. She denies night fever/night sweats, urinary incontinence, bowel incontinence and significant weight loss    Severity: Currently: 7/10   Typical Range: 4-10/10     Exacerbation: 10/10     Interval History (12/06/2022):  Yulissa Hatfield returns today for follow up.   She is status post a Left  glenohumeral joint injection Injection provided 60% relief.  She is currently in physical therapy which she states is helping, Currently, the shoulder pain is stable.      Current Pain Scales:  Current: 3/10              Typical Range: 2-3/10          Previous Interventions:  - 11/09/2022 Left  glenohumeral joint injection under fluoroscopic guidance-60%  - 8/16/22: Left subacromial bursa injection     Previous Therapies:  PT/OT: yes currently  Relevant Surgery: no   Previous Medications:   - NSAIDS: Ibuprofen 200 mg  - Muscle Relaxants:  Tizanidine > dry mouth, cough  - TCAs:   - SNRIs: Cymbalta  - Topicals:   - Anticonvulsants:    - Opioids: tramadol    Current Pain Medications:  Ibuprofen  Cymbalta 40 mg   Baclofen 5-10 mg qHS  Tylenol     Blood Thinners: None    Full Medication List:    Current Outpatient Medications:     acetaminophen (TYLENOL) 500 MG tablet, Take 1,000 mg by mouth every 6 (six) hours as needed for Pain., Disp: , Rfl:     baclofen (LIORESAL) 10 MG tablet, Take 0.5-1 tablets (5-10  mg total) by mouth nightly as needed (pain or spasm)., Disp: 30 tablet, Rfl: 2    DULoxetine 40 mg CpDR, Take 40 mg by mouth once daily., Disp: 30 capsule, Rfl: 11    EPICERAM Diogenes, USE AS A MOISTURIZING BARRIER AT LEAST TWICE DAILY, Disp: , Rfl: 6    ezetimibe (ZETIA) 10 mg tablet, TAKE 1 TABLET BY MOUTH EVERY DAY, Disp: 90 tablet, Rfl: 1    hydroCHLOROthiazide (HYDRODIURIL) 25 MG tablet, Take 1 tablet (25 mg total) by mouth once daily., Disp: 90 tablet, Rfl: 1    ibuprofen (ADVIL,MOTRIN) 200 MG tablet, Take 600 mg by mouth every 6 (six) hours as needed for Pain., Disp: , Rfl:     levothyroxine (SYNTHROID) 112 MCG tablet, Take 1 tablet (112 mcg total) by mouth before breakfast., Disp: 90 tablet, Rfl: 2    pravastatin (PRAVACHOL) 10 MG tablet, TAKE 1 TABLET BY MOUTH EVERY DAY, Disp: 90 tablet, Rfl: 1    SOOLANTRA 1 % Crea, , Disp: , Rfl:     telmisartan (MICARDIS) 80 MG Tab, TAKE 1 TABLET BY MOUTH EVERY DAY, Disp: 90 tablet, Rfl: 1     Review of Systems:  ROS    Allergies:  Patient has no known allergies.     Medical History:   has a past medical history of Generalized anxiety disorder with panic attacks, HTN (hypertension), Hyperlipidemia, Hypothyroid, DANIELLE on CPAP, and Type 2 diabetes mellitus.    Surgical History:   has a past surgical history that includes Carpal tunnel release; Foot surgery; Knee surgery;  section, classic; Thyroid surgery; Endometrial ablation; Colonoscopy (N/A, 11/10/2021); Esophagogastroduodenoscopy (N/A, 11/10/2021); and Injection of joint (Left, 2022).    Family History:  family history includes Heart disease in her brother, father, and mother; No Known Problems in her daughter, daughter, daughter, sister, and son.    Social History:   reports that she has never smoked. She has never used smokeless tobacco. She reports that she does not drink alcohol and does not use drugs.    Physical Exam:  LMP 2006   GEN: No acute distress. Calm, comfortable  HENT: Normocephalic,  atraumatic, moist mucous membranes  EYE: Anicteric sclera, non-injected.   CV: Non-diaphoretic.   RESP: Breathing comfortably. Chest expansion symmetric.  PSYCH: Pleasant mood and appropriate affect. Recent and remote memory intact.   Shoulder Exam:      Inspection: No erythema, bruising, surgical incisions      Palpation: TTP of glenohumeral joint and AC joint of bilateral shoulders.       ROM:  Limited in abduction, internal rotation bilaterally, worse on the left than the right   (+) Painful arc sign with pain between  degrees abduction      Provocative Maneuvers for Biceps Tendon:   (-) Speed's test bilaterally      Provocative Maneuvers for Impingement:  (+) Hawkin's bilaterally  (+) Neer's bilaterally      Provocative Maneuvers for RTC:       (+) Empty Can bilaterally  Neuro:  (-) Godwin bilaterally         Imaging:  -MRI Cervical Spine 06/20/2022:  MRI examination of the cervical spine dated June 20, 2022.  There is straightening of the cervical spine with degenerative change greatest at C4-C5 and C5-C6.  No evidence of congenital spinal stenosis.  No abnormal signal change involving the visualized portion of the spinal cord.  No evidence of an acute cervical spine fracture.  At the C2-C3 and C3-C4 levels there is no evidence of a focal disc abnormality.  No significant spinal canal or foraminal encroachment.  At the C4-C5 level there is a minimal posterior disc-osteophyte with mild effacement along the anterior margin of the subarachnoid space and spinal cord.  Mild spinal canal encroachment.  No foraminal encroachment.  At the C5-C6 level there is a broad-based and right paracentral disc-osteophyte.  There is effacement along the anterior margin of the subarachnoid space with right greater than left foraminal encroachment.  At the C6-C7 and C7-T1 levels there is no evidence of a significant focal disc abnormality.  No significant spinal canal or foraminal encroachment.    - X-ray shoulder complete  b/l 2/13/20:   Right shoulder: The alignment is normal.  The glenohumeral joint appears normal.  Minimal osteophyte at the inferior humeral head.  There is mild osteophyte at the acromioclavicular joint.  No fracture, no osseous lesions.  The right upper thoracic region appear normal.  Left shoulder: The alignment is normal.  There is mild glenohumeral joint space narrowing.  Subchondral cystic changes at the superior glenoid.  Prominent inferior osteophyte at the humeral head.  Osteophyte noted at the acromioclavicular joint.  No fracture, no osseous lesions.  The left upper thoracic region appear normal    Labs:  BMP  Lab Results   Component Value Date     03/30/2022    K 4.2 03/30/2022     03/30/2022    CO2 25 03/30/2022    BUN 14 03/30/2022    CREATININE 0.6 03/30/2022    CALCIUM 9.1 03/30/2022    ANIONGAP 10 03/30/2022    ESTGFRAFRICA >60 03/30/2022    EGFRNONAA >60 03/30/2022     Lab Results   Component Value Date    ALT 24 03/30/2022    AST 19 03/30/2022    ALKPHOS 69 03/30/2022    BILITOT 0.6 03/30/2022     Lab Results   Component Value Date     03/30/2022       Assessment:  Yulissa Hatfield is a 61 y.o. female with the following diagnoses based on history, exam, and imaging:    Problem List Items Addressed This Visit          Orthopedic    Osteoarthritis of bilateral glenohumeral joints     Other Visit Diagnoses       Subacromial bursitis of both shoulders    -  Primary    Chronic pain of both shoulders        Osteoarthritis of left glenohumeral joint        Decreased range of motion of left shoulder        Neck pain                This is a pleasant 61 y.o. lady presenting with:     - Chronic bilateral shoulder pain: Right worse than left. Imaging with OA in AC and GH joints. Impingement signs on exam b/l.   - Chronic neck pain and bilateral radicular arm pains. MRI with bilateral foraminal encroachment at C5-6.    - Comorbidities: BMI > 50, anxiety, hypertension, hypothyroidism, DM2      12/06/20229723-66-atad-old female with a history of chronic bilateral shoulder pain right greater than left she is status post a left glenohumeral corticosteroid injection under fluoroscopy with Dr. Tellez about her 60% relief.  She is currently in physical therapy which she does state is helping.  She discussed no benefit with Cymbalta so her and I discussed weaning off this medication, also recommended continuing a home exercise plan 3-4 days a week to help with strengthening of her shoulders bilaterally, would also recommend the baclofen only as needed to help with sleep her and I discussed this in detail.  She acknowledged that she is not a big medicine take so we had a long discussion regarding her medications and how she should take them and what makes her feel most comfortable.  See plan agreed upon below.    Treatment Plan:   - PT/OT/HEP:  Continue  PT for strengthening and ROM exercises for bilateral shoulders. Continue HEP for shoulder pain as well. Discussed benefits of exercise for pain.   - Procedures:  None at this time..   - If no relief with steroid injections, will plan fro C7-T1 IL CESAR  - If no relief with above, will have patient follow back up with Ortho (Dr. Rosas).  - Medications:    - Cont compound topical medication with Diclofenac 1.5%, Lidocaine 2.5%, Prilocaine 2.5%, and Gabapentin 4%.  Apply 1-2 grams to painful area up to 3-4 times daily   - slowly discontinue cymbalta 40 mg daily. The 60 mg dose caused patient to have strange dreams and poor sleep.  - Cont baclofen 5-10 mg nightly. PRN   - Imaging: Reviewed. Consider shoulder  MRI and referral to Ortho in the future.   - Labs: Reviewed.     Follow Up: my chart message in 2 weeks updating me on Cymbalta,6 week follow up following PT at that time consider shoulder MRI if pain isn't stable.     Bryson Bates, NP-C  Interventional Pain Management    Disclaimer: This note was partly generated using dictation software which may occasionally  result in transcription errors.

## 2022-12-14 ENCOUNTER — TELEPHONE (OUTPATIENT)
Dept: PODIATRY | Facility: CLINIC | Age: 62
End: 2022-12-14
Payer: COMMERCIAL

## 2022-12-14 NOTE — TELEPHONE ENCOUNTER
Called the patient to let her know that Dr. Stuart is totally booked out till the end of January. Offered her appointment to see Dr. Landaverde in [place], [facility]. Live in [place], she accept and I was able to schedule her for tomorrow morning

## 2022-12-29 ENCOUNTER — PATIENT MESSAGE (OUTPATIENT)
Dept: ADMINISTRATIVE | Facility: HOSPITAL | Age: 62
End: 2022-12-29
Payer: COMMERCIAL

## 2022-12-29 PROBLEM — M62.81 MUSCLE WEAKNESS (GENERALIZED): Status: RESOLVED | Noted: 2022-11-01 | Resolved: 2022-12-29

## 2022-12-29 PROBLEM — M25.512 CHRONIC LEFT SHOULDER PAIN: Status: RESOLVED | Noted: 2022-11-01 | Resolved: 2022-12-29

## 2022-12-29 PROBLEM — M75.51 BURSITIS OF RIGHT SHOULDER: Status: RESOLVED | Noted: 2022-11-01 | Resolved: 2022-12-29

## 2022-12-29 PROBLEM — M25.511 CHRONIC RIGHT SHOULDER PAIN: Status: RESOLVED | Noted: 2022-11-01 | Resolved: 2022-12-29

## 2022-12-29 PROBLEM — G89.29 CHRONIC RIGHT SHOULDER PAIN: Status: RESOLVED | Noted: 2022-11-01 | Resolved: 2022-12-29

## 2022-12-29 PROBLEM — Z78.9 DECREASED INDEPENDENCE WITH ACTIVITIES OF DAILY LIVING: Status: RESOLVED | Noted: 2022-11-01 | Resolved: 2022-12-29

## 2022-12-29 PROBLEM — M75.52 BURSITIS OF LEFT SHOULDER: Status: RESOLVED | Noted: 2022-11-01 | Resolved: 2022-12-29

## 2022-12-29 PROBLEM — M19.019 SHOULDER ARTHRITIS: Status: RESOLVED | Noted: 2020-02-13 | Resolved: 2022-12-29

## 2022-12-29 PROBLEM — G89.29 CHRONIC LEFT SHOULDER PAIN: Status: RESOLVED | Noted: 2022-11-01 | Resolved: 2022-12-29

## 2022-12-29 PROBLEM — M54.2 CERVICALGIA: Status: RESOLVED | Noted: 2022-11-01 | Resolved: 2022-12-29

## 2022-12-31 ENCOUNTER — HOSPITAL ENCOUNTER (EMERGENCY)
Facility: HOSPITAL | Age: 62
Discharge: HOME | End: 2022-12-31
Attending: EMERGENCY MEDICINE
Payer: COMMERCIAL

## 2022-12-31 VITALS
OXYGEN SATURATION: 96 % | HEIGHT: 65 IN | BODY MASS INDEX: 48.82 KG/M2 | DIASTOLIC BLOOD PRESSURE: 84 MMHG | TEMPERATURE: 98 F | SYSTOLIC BLOOD PRESSURE: 181 MMHG | WEIGHT: 293 LBS | HEART RATE: 87 BPM | RESPIRATION RATE: 20 BRPM

## 2022-12-31 DIAGNOSIS — M25.561 RIGHT KNEE PAIN: ICD-10-CM

## 2022-12-31 PROCEDURE — 99283 EMERGENCY DEPT VISIT LOW MDM: CPT

## 2022-12-31 RX ORDER — DICLOFENAC SODIUM 10 MG/G
2 GEL TOPICAL 4 TIMES DAILY
Qty: 100 G | Refills: 1 | Status: SHIPPED | OUTPATIENT
Start: 2022-12-31 | End: 2023-12-21

## 2022-12-31 NOTE — ED PROVIDER NOTES
Encounter Date: 2022    SCRIBE #1 NOTE: I, Halina Graham, am scribing for, and in the presence of,  Álvaro Gautam MD. I have scribed the following portions of the note - Other sections scribed: HPI, ROS, PE.     History     Chief Complaint   Patient presents with    Knee Pain     Right knee pain that started Wednesday. Today tingling and burning started behind her knee. Pt noticed swelling to the right knee yesterday. Pt is able to bear weight but is unable to ambulate for any distance.  Pulse present.      Yulissa Hatfield is a 62 y.o. female who  has a past medical history of Generalized anxiety disorder with panic attacks, HTN (hypertension), Hyperlipidemia, Hypothyroid, DANIELLE on CPAP, and Type 2 diabetes mellitus.    The patient presents to the ED due to right knee pain, onset Wednesday. No injury or fall noted. She reports cleaning her home when her symptoms onset. She has a history of arthritis and assumed that cleaning aggravated it. However, her symptoms worsened yesterday. She states she started to experience tingling and a burning sensation in her knee. She became concerned of a blood clot. She denies right calf pain, fever, chills, cough or nasal congestion.     The history is provided by the patient. No  was used.   Review of patient's allergies indicates:  No Known Allergies  Past Medical History:   Diagnosis Date    Generalized anxiety disorder with panic attacks     HTN (hypertension)     Hyperlipidemia     Hypothyroid     DANIELLE on CPAP     Type 2 diabetes mellitus      Past Surgical History:   Procedure Laterality Date    CARPAL TUNNEL RELEASE       SECTION, CLASSIC      x 3    COLONOSCOPY N/A 11/10/2021    Procedure: COLONOSCOPY;  Surgeon: Kayla Pope MD;  Location: [facility];  Service: Endoscopy;  Laterality: N/A;    ENDOMETRIAL ABLATION      ESOPHAGOGASTRODUODENOSCOPY N/A 11/10/2021    Procedure: EGD (ESOPHAGOGASTRODUODENOSCOPY);  Surgeon: Kayla  LENCHO Pope MD;  Location: [facility] ENDO;  Service: Endoscopy;  Laterality: N/A;    FOOT SURGERY      INJECTION OF JOINT Left 11/9/2022    Procedure: Injection, Joint, Shoulder, Hip, Or Knee;  Surgeon: Sowmya Tellez MD;  Location: [facility] PAIN [facility]GT;  Service: Pain Management;  Laterality: Left;  Left Glenohumeral Joint Injection    KNEE SURGERY      THYROID SURGERY       Family History   Problem Relation Age of Onset    Heart disease Mother     Heart disease Father     No Known Problems Sister     Heart disease Brother     No Known Problems Daughter     No Known Problems Son     No Known Problems Daughter     No Known Problems Daughter     Colon cancer Neg Hx     Esophageal cancer Neg Hx      Social History     Tobacco Use    Smoking status: Never    Smokeless tobacco: Never   Substance Use Topics    Alcohol use: No    Drug use: No     Review of Systems   Constitutional:  Negative for chills and fever.   HENT:  Negative for congestion and sore throat.    Respiratory:  Negative for cough and shortness of breath.    Cardiovascular:  Negative for chest pain.   Gastrointestinal:  Negative for nausea.   Genitourinary:  Negative for dysuria.   Musculoskeletal:  Negative for back pain.        Right knee pain   Skin:  Negative for rash.   Neurological:  Negative for weakness.   Hematological:  Does not bruise/bleed easily.     Physical Exam     Initial Vitals [12/31/22 1422]   BP Pulse Resp Temp SpO2   (!) 181/84 87 20 98 °F (36.7 °C) 96 %      MAP       --         Physical Exam    Nursing note and vitals reviewed.  Constitutional: She is cooperative.  Non-toxic appearance. No distress.   HENT:   Head: Normocephalic and atraumatic.   Eyes: EOM are normal. Pupils are equal, round, and reactive to light.   Neck: Neck supple. No thyromegaly present.   Normal range of motion.   Full passive range of motion without pain.     Cardiovascular:  Normal rate, regular rhythm, normal heart sounds and normal pulses.            Pulmonary/Chest: Effort normal and breath sounds normal. No respiratory distress.   Musculoskeletal:      Cervical back: Full passive range of motion without pain, normal range of motion and neck supple.      Right knee: No swelling. Decreased range of motion (slightly limited ROM due to pain). Tenderness present over the MCL.      Comments: No bruising or swelling of the right knee.  Neurovascularly intact.  Negative Homans right lower extremity     Neurological: She is alert and oriented to person, place, and time. No cranial nerve deficit or sensory deficit.   Skin: Skin is warm, dry and intact. No rash noted.   Psychiatric: She has a normal mood and affect. Her speech is normal. Thought content normal.     ED Course   Procedures  Labs Reviewed - No data to display       Imaging Results    None          Medications - No data to display  Medical Decision Making:   Initial Assessment:   62-year-old female with nontraumatic right knee pain.  Differential Diagnosis:   Knee sprain, fracture, ligament injury, gout, arthritis.  ED Management:  I have ordered an x-ray of the patient's right knee, although she refused to stay for this and said she would prefer to see her orthopedic doctor soon as able.  I will write her a prescription for diclofenac gel to use on her knee.  She may return if condition worsens.        Scribe Attestation:   Scribe #1: I performed the above scribed service and the documentation accurately describes the services I performed. I attest to the accuracy of the note.                 I, Dr. Álvaro Abbott, personally performed the services described in this documentation. All medical record entries made by the scribe were at my direction and in my presence. I have reviewed the chart and agree that the record reflects my personal performance and is accurate and complete. Álvaro Abbott MD.  4:43 PM 12/31/2022    Clinical Impression:   Final diagnoses:  [M25.561] Right knee pain        ED  Disposition Condition    Discharge Stable          ED Prescriptions       Medication Sig Dispense Start Date End Date Auth. Provider    diclofenac sodium (VOLTAREN) 1 % Gel Apply 2 g topically 4 (four) times daily. 100 g 12/31/2022 -- Álvaro Gautam MD          Follow-up Information       Follow up With Specialties Details Why Contact Info    Emilia Velasquez MD Internal Medicine, Wound Care Schedule an appointment as soon as possible for a visit   [address] [place] [address]  [address]  [place] [address]  [phone]      [facility] Emergency Dept Emergency Medicine  If symptoms worsen [address] [facility] [address] [phone]             Álvaro Gautam MD  12/31/22 7993

## 2023-01-05 ENCOUNTER — OFFICE VISIT (OUTPATIENT)
Dept: ORTHOPEDICS | Facility: CLINIC | Age: 63
End: 2023-01-05
Payer: COMMERCIAL

## 2023-01-05 ENCOUNTER — HOSPITAL ENCOUNTER (OUTPATIENT)
Dept: RADIOLOGY | Facility: HOSPITAL | Age: 63
Discharge: HOME | End: 2023-01-05
Attending: PHYSICIAN ASSISTANT
Payer: COMMERCIAL

## 2023-01-05 DIAGNOSIS — E66.01 MORBID OBESITY DUE TO EXCESS CALORIES: ICD-10-CM

## 2023-01-05 DIAGNOSIS — M25.561 ACUTE PAIN OF RIGHT KNEE: Primary | ICD-10-CM

## 2023-01-05 DIAGNOSIS — M17.11 PRIMARY OSTEOARTHRITIS OF RIGHT KNEE: Primary | ICD-10-CM

## 2023-01-05 DIAGNOSIS — M25.561 ACUTE PAIN OF RIGHT KNEE: ICD-10-CM

## 2023-01-05 PROCEDURE — 73564 X-RAY EXAM KNEE 4 OR MORE: CPT | Mod: 26,RT,, | Performed by: RADIOLOGY

## 2023-01-05 PROCEDURE — 99999 PR PBB SHADOW E&M-EST. PATIENT-LVL II: ICD-10-PCS | Mod: PBBFAC,,, | Performed by: PHYSICIAN ASSISTANT

## 2023-01-05 PROCEDURE — 99999 PR PBB SHADOW E&M-EST. PATIENT-LVL II: CPT | Mod: PBBFAC,,, | Performed by: PHYSICIAN ASSISTANT

## 2023-01-05 PROCEDURE — 20610 DRAIN/INJ JOINT/BURSA W/O US: CPT | Mod: RT,S$GLB,, | Performed by: PHYSICIAN ASSISTANT

## 2023-01-05 PROCEDURE — 99203 OFFICE O/P NEW LOW 30 MIN: CPT | Mod: 25,S$GLB,, | Performed by: PHYSICIAN ASSISTANT

## 2023-01-05 PROCEDURE — 1159F MED LIST DOCD IN RCRD: CPT | Mod: CPTII,S$GLB,, | Performed by: PHYSICIAN ASSISTANT

## 2023-01-05 PROCEDURE — 73564 XR KNEE COMP 4 OR MORE VIEWS RIGHT: ICD-10-PCS | Mod: 26,RT,, | Performed by: RADIOLOGY

## 2023-01-05 PROCEDURE — 73564 X-RAY EXAM KNEE 4 OR MORE: CPT | Mod: TC,PN,RT

## 2023-01-05 PROCEDURE — 1160F PR REVIEW ALL MEDS BY PRESCRIBER/CLIN PHARMACIST DOCUMENTED: ICD-10-PCS | Mod: CPTII,S$GLB,, | Performed by: PHYSICIAN ASSISTANT

## 2023-01-05 PROCEDURE — 20610 LARGE JOINT ASPIRATION/INJECTION: R KNEE: ICD-10-PCS | Mod: RT,S$GLB,, | Performed by: PHYSICIAN ASSISTANT

## 2023-01-05 PROCEDURE — 1160F RVW MEDS BY RX/DR IN RCRD: CPT | Mod: CPTII,S$GLB,, | Performed by: PHYSICIAN ASSISTANT

## 2023-01-05 PROCEDURE — 99203 PR OFFICE/OUTPT VISIT, NEW, LEVL III, 30-44 MIN: ICD-10-PCS | Mod: 25,S$GLB,, | Performed by: PHYSICIAN ASSISTANT

## 2023-01-05 PROCEDURE — 1159F PR MEDICATION LIST DOCUMENTED IN MEDICAL RECORD: ICD-10-PCS | Mod: CPTII,S$GLB,, | Performed by: PHYSICIAN ASSISTANT

## 2023-01-05 RX ORDER — METHYLPREDNISOLONE 4 MG/1
TABLET ORAL
Qty: 21 EACH | Refills: 0 | Status: SHIPPED | OUTPATIENT
Start: 2023-01-05 | End: 2023-01-23 | Stop reason: ALTCHOICE

## 2023-01-05 RX ORDER — KETOROLAC TROMETHAMINE 30 MG/ML
30 INJECTION, SOLUTION INTRAMUSCULAR; INTRAVENOUS
Status: DISCONTINUED | OUTPATIENT
Start: 2023-01-05 | End: 2023-01-05 | Stop reason: HOSPADM

## 2023-01-05 RX ADMIN — KETOROLAC TROMETHAMINE 30 MG: 30 INJECTION, SOLUTION INTRAMUSCULAR; INTRAVENOUS at 03:01

## 2023-01-05 NOTE — PROGRESS NOTES
Subjective:      Patient ID: Yulissa Hatfield is a 62 y.o. female.    Chief Complaint: Pain of the Right Knee      63yo morbidly obese female presents with one week history of right knee pain. States she has history of arthritis. Was cleaning around her house and doing more activity last week which she believes contributed to her symptoms. Her pain is medially. Worse with ambulation. Went to ED 12/31 and was given diclofenac gel. Went to another orthopedic provider two days ago. Was given an intraarticular steroid injection at that visit. States no improvement in symptoms. She was not happy with care given at that facility. She is currently taking tylenol. States NSAIDs cause GI upset. In a wheelchair today with her .       Review of Systems   Constitutional: Negative for chills and fever.   Cardiovascular:  Negative for chest pain.   Respiratory:  Negative for cough.    Hematologic/Lymphatic: Does not bruise/bleed easily.   Skin:  Negative for poor wound healing and rash.   Musculoskeletal:  Positive for arthritis, joint pain, myalgias and stiffness.   Gastrointestinal:  Negative for abdominal pain.   Genitourinary:  Negative for bladder incontinence.   Neurological:  Negative for dizziness, loss of balance and weakness.   Psychiatric/Behavioral:  Negative for altered mental status.      Review of patient's allergies indicates:  No Known Allergies     Current Outpatient Medications   Medication Sig Dispense Refill    acetaminophen (TYLENOL) 500 MG tablet Take 1,000 mg by mouth every 6 (six) hours as needed for Pain.      baclofen (LIORESAL) 10 MG tablet Take 0.5-1 tablets (5-10 mg total) by mouth nightly as needed (pain or spasm). 30 tablet 2    diclofenac sodium (VOLTAREN) 1 % Gel Apply 2 g topically 4 (four) times daily. 100 g 1    DULoxetine 40 mg CpDR Take 40 mg by mouth once daily. 30 capsule 11    EPICERAM Diogenes USE AS A MOISTURIZING BARRIER AT LEAST TWICE DAILY  6    ezetimibe (ZETIA) 10 mg tablet  TAKE 1 TABLET BY MOUTH EVERY DAY 90 tablet 1    hydroCHLOROthiazide (HYDRODIURIL) 25 MG tablet Take 1 tablet (25 mg total) by mouth once daily. 90 tablet 1    ibuprofen (ADVIL,MOTRIN) 200 MG tablet Take 600 mg by mouth every 6 (six) hours as needed for Pain.      levothyroxine (SYNTHROID) 112 MCG tablet Take 1 tablet (112 mcg total) by mouth before breakfast. 90 tablet 2    methylPREDNISolone (MEDROL DOSEPACK) 4 mg tablet use as directed 21 each 0    pravastatin (PRAVACHOL) 10 MG tablet TAKE 1 TABLET BY MOUTH EVERY DAY 90 tablet 1    SOOLANTRA 1 % Crea       telmisartan (MICARDIS) 80 MG Tab TAKE 1 TABLET BY MOUTH EVERY DAY 90 tablet 1     No current facility-administered medications for this visit.        The patient's relevant past medical, surgical, and social history was reviewed in Epic.       Objective:      VITAL SIGNS: LMP 03/25/2006     General    Nursing note and vitals reviewed.  Constitutional: She is oriented to person, place, and time. She appears well-developed.   Morbidly obese    Neurological: She is alert and oriented to person, place, and time.     General Musculoskeletal Exam   Gait: antalgic       Right Knee Exam     Inspection   Swelling: present    Tenderness   The patient is tender to palpation of the medial joint line.    Range of Motion   Extension:  10   Flexion:  110     Tests   Meniscus   Isaac:  Medial - negative   Ligament Examination   Lachman: normal (-1 to 2mm)   MCL - Valgus: normal (0 to 2mm)    Other   Sensation: normal    Muscle Strength   Right Lower Extremity   Quadriceps:  4/5   Left Lower Extremity   Quadriceps:  4/5      X-Ray Knee Complete 4 Or More Views Right  Narrative: EXAMINATION:  XR KNEE COMP 4 OR MORE VIEWS RIGHT    CLINICAL HISTORY:  Pain in right knee    TECHNIQUE:  Right knee radiograph series.    COMPARISON:  01/27/2014    FINDINGS:  Tricompartmental degenerative change with scattered marginal osteophytes.  Significant femorotibial joint space narrowing,  slightly more pronounced and advanced at the lateral compartment (most notable on flexion view).  No acute fracture, dislocation, or osseous destruction.  Impression: As above.    Electronically signed by: Roman Reilly  Date:    01/05/2023  Time:    15:37    I have reviewed the above radiograph and agree with the findings stated by the radiologist.         Assessment:       1. Primary osteoarthritis of right knee    2. Morbid obesity due to excess calories          Plan:         Yulissa was seen today for pain.    Diagnoses and all orders for this visit:    Primary osteoarthritis of right knee  -     Large Joint Aspiration/Injection: R knee  -     methylPREDNISolone (MEDROL DOSEPACK) 4 mg tablet; use as directed    Morbid obesity due to excess calories    Bone on bone right knee osteoarthritis. Explained the natural history of arthritis to the patient. Surprised she did not get any relief from corticosteroid injection. I will try an intraarticular toradol injection today to see if this gives more relief. Also suggested a medrol dose lizzy prescription if no relief from toradol injections. She will pick the rx up and use if needed.  Will hold off on PT at this time. She will message on the portal if she would like to try therapy in the future.   F/U as needed.     Diagnoses and plan discussed with the patient, as well as the expected course and duration of his symptoms.  All questions and concerns were addressed prior to the end of the visit.   Instructed patient to call office if they have any future questions/concerns or to schedule apt. Patient will return to see me if symptoms worsen or fail to improve    Note dictated with voice recognition software, please excuse any grammatical errors.        Gisell Zazueta PA-C   01/05/2023

## 2023-01-05 NOTE — PROCEDURES
Large Joint Aspiration/Injection: R knee    Date/Time: 1/5/2023 3:15 PM  Performed by: Gisell Zazueta PA-C  Authorized by: Gisell Zazueta PA-C     Consent Done?:  Yes (Verbal)  Indications:  Arthritis  Site marked: the procedure site was marked    Timeout: prior to procedure the correct patient, procedure, and site was verified    Prep: patient was prepped and draped in usual sterile fashion      Local anesthesia used?: Yes    Local anesthetic:  Topical anesthetic    Details:  Needle Size:  22 G  Ultrasonic Guidance for needle placement?: No    Approach:  Anterolateral  Location:  Knee  Site:  R knee  Medications:  30 mg ketorolac 30 mg/mL (1 mL)  Patient tolerance:  Patient tolerated the procedure well with no immediate complications

## 2023-01-12 ENCOUNTER — PATIENT MESSAGE (OUTPATIENT)
Dept: ORTHOPEDICS | Facility: CLINIC | Age: 63
End: 2023-01-12
Payer: COMMERCIAL

## 2023-01-12 ENCOUNTER — PATIENT MESSAGE (OUTPATIENT)
Dept: PAIN MEDICINE | Facility: CLINIC | Age: 63
End: 2023-01-12
Payer: COMMERCIAL

## 2023-01-20 ENCOUNTER — PATIENT MESSAGE (OUTPATIENT)
Dept: ADMINISTRATIVE | Facility: HOSPITAL | Age: 63
End: 2023-01-20
Payer: COMMERCIAL

## 2023-01-23 ENCOUNTER — OFFICE VISIT (OUTPATIENT)
Dept: CARDIOLOGY | Facility: CLINIC | Age: 63
End: 2023-01-23
Payer: COMMERCIAL

## 2023-01-23 VITALS
HEART RATE: 75 BPM | BODY MASS INDEX: 50.02 KG/M2 | HEIGHT: 64 IN | SYSTOLIC BLOOD PRESSURE: 126 MMHG | DIASTOLIC BLOOD PRESSURE: 80 MMHG | WEIGHT: 293 LBS | OXYGEN SATURATION: 97 %

## 2023-01-23 DIAGNOSIS — E78.2 MIXED HYPERLIPIDEMIA: ICD-10-CM

## 2023-01-23 DIAGNOSIS — E66.01 MORBID OBESITY WITH BMI OF 50.0-59.9, ADULT: ICD-10-CM

## 2023-01-23 DIAGNOSIS — I10 PRIMARY HYPERTENSION: ICD-10-CM

## 2023-01-23 DIAGNOSIS — G47.33 OSA ON CPAP: ICD-10-CM

## 2023-01-23 DIAGNOSIS — I87.2 CHRONIC VENOUS STASIS DERMATITIS OF BOTH LOWER EXTREMITIES: ICD-10-CM

## 2023-01-23 DIAGNOSIS — I83.813 VARICOSE VEINS OF BOTH LOWER EXTREMITIES WITH PAIN: ICD-10-CM

## 2023-01-23 DIAGNOSIS — E11.9 TYPE 2 DIABETES MELLITUS WITHOUT COMPLICATION, WITHOUT LONG-TERM CURRENT USE OF INSULIN: ICD-10-CM

## 2023-01-23 PROCEDURE — 1159F MED LIST DOCD IN RCRD: CPT | Mod: CPTII,S$GLB,, | Performed by: INTERNAL MEDICINE

## 2023-01-23 PROCEDURE — 3008F PR BODY MASS INDEX (BMI) DOCUMENTED: ICD-10-PCS | Mod: CPTII,S$GLB,, | Performed by: INTERNAL MEDICINE

## 2023-01-23 PROCEDURE — 3066F NEPHROPATHY DOC TX: CPT | Mod: CPTII,S$GLB,, | Performed by: INTERNAL MEDICINE

## 2023-01-23 PROCEDURE — 3079F PR MOST RECENT DIASTOLIC BLOOD PRESSURE 80-89 MM HG: ICD-10-PCS | Mod: CPTII,S$GLB,, | Performed by: INTERNAL MEDICINE

## 2023-01-23 PROCEDURE — 99999 PR PBB SHADOW E&M-EST. PATIENT-LVL IV: CPT | Mod: PBBFAC,,, | Performed by: INTERNAL MEDICINE

## 2023-01-23 PROCEDURE — 3061F NEG MICROALBUMINURIA REV: CPT | Mod: CPTII,S$GLB,, | Performed by: INTERNAL MEDICINE

## 2023-01-23 PROCEDURE — 1159F PR MEDICATION LIST DOCUMENTED IN MEDICAL RECORD: ICD-10-PCS | Mod: CPTII,S$GLB,, | Performed by: INTERNAL MEDICINE

## 2023-01-23 PROCEDURE — 3074F SYST BP LT 130 MM HG: CPT | Mod: CPTII,S$GLB,, | Performed by: INTERNAL MEDICINE

## 2023-01-23 PROCEDURE — 99204 OFFICE O/P NEW MOD 45 MIN: CPT | Mod: 25,S$GLB,, | Performed by: INTERNAL MEDICINE

## 2023-01-23 PROCEDURE — 99999 PR PBB SHADOW E&M-EST. PATIENT-LVL IV: ICD-10-PCS | Mod: PBBFAC,,, | Performed by: INTERNAL MEDICINE

## 2023-01-23 PROCEDURE — 3074F PR MOST RECENT SYSTOLIC BLOOD PRESSURE < 130 MM HG: ICD-10-PCS | Mod: CPTII,S$GLB,, | Performed by: INTERNAL MEDICINE

## 2023-01-23 PROCEDURE — 1160F PR REVIEW ALL MEDS BY PRESCRIBER/CLIN PHARMACIST DOCUMENTED: ICD-10-PCS | Mod: CPTII,S$GLB,, | Performed by: INTERNAL MEDICINE

## 2023-01-23 PROCEDURE — 3008F BODY MASS INDEX DOCD: CPT | Mod: CPTII,S$GLB,, | Performed by: INTERNAL MEDICINE

## 2023-01-23 PROCEDURE — 4010F ACE/ARB THERAPY RXD/TAKEN: CPT | Mod: CPTII,S$GLB,, | Performed by: INTERNAL MEDICINE

## 2023-01-23 PROCEDURE — 3079F DIAST BP 80-89 MM HG: CPT | Mod: CPTII,S$GLB,, | Performed by: INTERNAL MEDICINE

## 2023-01-23 PROCEDURE — 4010F PR ACE/ARB THEARPY RXD/TAKEN: ICD-10-PCS | Mod: CPTII,S$GLB,, | Performed by: INTERNAL MEDICINE

## 2023-01-23 PROCEDURE — 3066F PR DOCUMENTATION OF TREATMENT FOR NEPHROPATHY: ICD-10-PCS | Mod: CPTII,S$GLB,, | Performed by: INTERNAL MEDICINE

## 2023-01-23 PROCEDURE — 99204 PR OFFICE/OUTPT VISIT, NEW, LEVL IV, 45-59 MIN: ICD-10-PCS | Mod: 25,S$GLB,, | Performed by: INTERNAL MEDICINE

## 2023-01-23 PROCEDURE — 93000 EKG 12-LEAD: ICD-10-PCS | Mod: S$GLB,,, | Performed by: INTERNAL MEDICINE

## 2023-01-23 PROCEDURE — 3061F PR NEG MICROALBUMINURIA RESULT DOCUMENTED/REVIEW: ICD-10-PCS | Mod: CPTII,S$GLB,, | Performed by: INTERNAL MEDICINE

## 2023-01-23 PROCEDURE — 93000 ELECTROCARDIOGRAM COMPLETE: CPT | Mod: S$GLB,,, | Performed by: INTERNAL MEDICINE

## 2023-01-23 PROCEDURE — 1160F RVW MEDS BY RX/DR IN RCRD: CPT | Mod: CPTII,S$GLB,, | Performed by: INTERNAL MEDICINE

## 2023-01-23 RX ORDER — ROSUVASTATIN CALCIUM 20 MG/1
20 TABLET, COATED ORAL DAILY
Qty: 90 TABLET | Refills: 3 | Status: SHIPPED | OUTPATIENT
Start: 2023-01-23 | End: 2024-03-01 | Stop reason: SINTOL

## 2023-01-23 NOTE — ASSESSMENT & PLAN NOTE
Goal LDL < 100, last  3/2022.  Compliant w/ meds and h/o myalgias w/ atorvastatin.    - change pravastatin to rosuvastatin 20 mg daily and take w/ CoQ10  - risk factor and lifestyle modifications

## 2023-01-23 NOTE — ASSESSMENT & PLAN NOTE
Goal BP < 130/80.  Compliant w/ meds.    - continue medical therapy   - pt to monitor BP at home and record  - risk factor and lifestyle modifications

## 2023-01-23 NOTE — ASSESSMENT & PLAN NOTE
Encouraged lifestyle modifications (diet, exercise, and weight loss)  - bariatric medicine consult

## 2023-01-23 NOTE — PROGRESS NOTES
Subjective:    Patient ID:  Yulissa Hatfield is a 62 y.o. female who presents for evaluation of Consult (NP present for chronic venus stassis dermatitis)      PCP/Referring: Emilia Velasquez MD     HPI  Pt is a 61 yo F w/ PMH of HTN, HLD, DANIELLE on CPAP, MO w/ BMI 56.6, and DM2 who presents for evaluation of chronic venous stasis dermatitis.  She was followed by her PCP 2023 and was referred to cardiology.  Pt mentions that she has stasis dermatitis and BLE varicose veins x2 yrs.  She denies pain of her BLE however notes fatigue and heaviness.  She had been using compression stockings however had not been using them consistently and did not have thigh high compression stockings.  She had been followed by a vein clinic for this problem however was told to present to cardiology instead by her PCP.  She had a venous doppler which noted reflux in her R great saphenous and anterior accessory saphenous vein.  She also was noted to have symmetrical reflux on the L.  She denies cp, sob, orthopnea, PND, presyncope, LOC, palpitations, and claudication.  She notes compliance w/ meds and denies side effects.  She does not monitor her BP at home.  She does not currently exercise due to R knee pain.         Past Medical History:   Diagnosis Date    Generalized anxiety disorder with panic attacks     HTN (hypertension)     Hyperlipidemia     Hypothyroid     DANIELLE on CPAP     Type 2 diabetes mellitus      Past Surgical History:   Procedure Laterality Date    CARPAL TUNNEL RELEASE       SECTION, CLASSIC      x 3    COLONOSCOPY N/A 11/10/2021    Procedure: COLONOSCOPY;  Surgeon: Kayla Pope MD;  Location: [facility] ENDO;  Service: Endoscopy;  Laterality: N/A;    ENDOMETRIAL ABLATION      ESOPHAGOGASTRODUODENOSCOPY N/A 11/10/2021    Procedure: EGD (ESOPHAGOGASTRODUODENOSCOPY);  Surgeon: Kayla Pope MD;  Location: [facility] ENDO;  Service: Endoscopy;  Laterality: N/A;    FOOT SURGERY      INJECTION OF JOINT Left  11/9/2022    Procedure: Injection, Joint, Shoulder, Hip, Or Knee;  Surgeon: Sowmya Tellez MD;  Location: [facility] PAIN MGT;  Service: Pain Management;  Laterality: Left;  Left Glenohumeral Joint Injection    KNEE SURGERY      THYROID SURGERY       Social History     Socioeconomic History    Marital status:    Tobacco Use    Smoking status: Never    Smokeless tobacco: Never   Substance and Sexual Activity    Alcohol use: No    Drug use: No    Sexual activity: Not Currently     Family History   Problem Relation Age of Onset    Heart disease Mother     Heart disease Father     No Known Problems Sister     Heart disease Brother     No Known Problems Daughter     No Known Problems Son     No Known Problems Daughter     No Known Problems Daughter     Colon cancer Neg Hx     Esophageal cancer Neg Hx        Review of patient's allergies indicates:  No Known Allergies    Medication List with Changes/Refills   New Medications    ROSUVASTATIN (CRESTOR) 20 MG TABLET    Take 1 tablet (20 mg total) by mouth once daily.   Current Medications    ACETAMINOPHEN (TYLENOL) 500 MG TABLET    Take 1,000 mg by mouth every 6 (six) hours as needed for Pain.    DICLOFENAC SODIUM (VOLTAREN) 1 % GEL    Apply 2 g topically 4 (four) times daily.    DULOXETINE (CYMBALTA) 60 MG CAPSULE        EZETIMIBE (ZETIA) 10 MG TABLET    TAKE 1 TABLET BY MOUTH EVERY DAY    HYDROCHLOROTHIAZIDE (HYDRODIURIL) 25 MG TABLET    Take 1 tablet (25 mg total) by mouth once daily.    IBUPROFEN (ADVIL,MOTRIN) 200 MG TABLET    Take 600 mg by mouth every 6 (six) hours as needed for Pain.    LEVOTHYROXINE (SYNTHROID) 112 MCG TABLET    Take 1 tablet (112 mcg total) by mouth before breakfast.    SOOLANTRA 1 % CREA        TELMISARTAN (MICARDIS) 80 MG TAB    TAKE 1 TABLET BY MOUTH EVERY DAY   Discontinued Medications    BACLOFEN (LIORESAL) 10 MG TABLET    Take 0.5-1 tablets (5-10 mg total) by mouth nightly as needed (pain or spasm).    EPICERAM FLORIN    USE AS A MOISTURIZING  "BARRIER AT LEAST TWICE DAILY    METHYLPREDNISOLONE (MEDROL DOSEPACK) 4 MG TABLET    use as directed    PRAVASTATIN (PRAVACHOL) 10 MG TABLET    TAKE 1 TABLET BY MOUTH EVERY DAY       Review of Systems   Constitutional: Negative for diaphoresis and fever.   HENT:  Negative for congestion and hearing loss.    Eyes:  Negative for blurred vision and pain.   Cardiovascular:  Positive for leg swelling. Negative for chest pain, claudication, dyspnea on exertion, near-syncope, palpitations and syncope.   Respiratory:  Negative for shortness of breath and sleep disturbances due to breathing.    Hematologic/Lymphatic: Negative for bleeding problem. Does not bruise/bleed easily.   Skin:  Positive for color change. Negative for poor wound healing and rash.   Gastrointestinal:  Negative for abdominal pain and nausea.   Genitourinary:  Negative for bladder incontinence and flank pain.   Neurological:  Negative for focal weakness and light-headedness.      Objective:   /80 (BP Location: Left arm, Patient Position: Sitting, BP Method: Large (Manual))   Pulse 75   Ht 5' 4" (1.626 m)   Wt (!) 149.7 kg (330 lb)   LMP 03/25/2006   SpO2 97%   BMI 56.64 kg/m²    Physical Exam  Constitutional:       Appearance: She is well-developed. She is obese. She is not diaphoretic.   HENT:      Head: Normocephalic and atraumatic.   Eyes:      General: No scleral icterus.     Pupils: Pupils are equal, round, and reactive to light.   Neck:      Vascular: No JVD.   Cardiovascular:      Rate and Rhythm: Normal rate and regular rhythm.      Pulses: Intact distal pulses.      Heart sounds: S1 normal and S2 normal. No murmur heard.    No friction rub. No gallop.   Pulmonary:      Effort: Pulmonary effort is normal. No respiratory distress.      Breath sounds: Normal breath sounds. No wheezing or rales.   Chest:      Chest wall: No tenderness.   Abdominal:      General: Bowel sounds are normal. There is no distension.      Palpations: Abdomen is " soft. There is no mass.      Tenderness: There is no abdominal tenderness. There is no rebound.   Musculoskeletal:         General: Swelling present. No tenderness. Normal range of motion.      Cervical back: Normal range of motion and neck supple.      Comments: BLE varicose veins   Skin:     General: Skin is warm and dry.      Coloration: Skin is not pale.      Comments: Stasis dermatitis   Neurological:      Mental Status: She is alert and oriented to person, place, and time.      Coordination: Coordination normal.      Deep Tendon Reflexes: Reflexes normal.   Psychiatric:         Behavior: Behavior normal.         Judgment: Judgment normal.         DSE: 1/14/2021- reviewed.    Summary    There were no arrhythmias during stress.  Concentric remodeling and normal systolic function. The estimated ejection fraction is 55%  Normal left ventricular diastolic function.  The stress echo portion of this study is negative for myocardial ischemia.    Assessment:       1. Chronic venous stasis dermatitis of both lower extremities    2. Mixed hyperlipidemia    3. Morbid obesity with BMI of 50.0-59.9, adult    4. Primary hypertension    5. Varicose veins of both lower extremities with pain    6. Type 2 diabetes mellitus without complication, without long-term current use of insulin    7. DANIELLE on CPAP         Plan:         Chronic venous stasis dermatitis of both lower extremities  Pt noted to have BLE reflux on outside venous doppler and has comorbidity of MO.    - thigh high compression stockings for at least 3 months  - check echo and venous insufficiency study  - risk factor and lifestyle modifications     Morbid obesity with BMI of 50.0-59.9, adult  Encouraged lifestyle modifications (diet, exercise, and weight loss)  - bariatric medicine consult    Primary hypertension  Goal BP < 130/80.  Compliant w/ meds.    - continue medical therapy   - pt to monitor BP at home and record  - risk factor and lifestyle modifications      Mixed hyperlipidemia  Goal LDL < 100, last  3/2022.  Compliant w/ meds and h/o myalgias w/ atorvastatin.    - change pravastatin to rosuvastatin 20 mg daily and take w/ CoQ10  - risk factor and lifestyle modifications     Varicose veins of both lower extremities with pain  Plan as per stasis dermatitis.      Type 2 diabetes mellitus without complication, without long-term current use of insulin  Management per PCP.      DANIELLE on CPAP  Compliant w/ CPAP.        Total duration of face to face visit time 30 minutes.  Total time spent counseling greater than fifty percent of total visit time.  Counseling included discussion regarding imaging findings, diagnosis, possibilities, treatment options, risks and benefits.  The patient had many questions regarding the options and long-term effects      Sebastian Abbasi M.D.  Interventional Cardiology

## 2023-01-23 NOTE — ASSESSMENT & PLAN NOTE
Pt noted to have BLE reflux on outside venous doppler and has comorbidity of MO.    - thigh high compression stockings for at least 3 months  - check echo and venous insufficiency study  - risk factor and lifestyle modifications

## 2023-01-24 ENCOUNTER — OFFICE VISIT (OUTPATIENT)
Dept: ORTHOPEDICS | Facility: CLINIC | Age: 63
End: 2023-01-24
Payer: COMMERCIAL

## 2023-01-24 ENCOUNTER — TELEPHONE (OUTPATIENT)
Dept: BARIATRICS | Facility: CLINIC | Age: 63
End: 2023-01-24
Payer: COMMERCIAL

## 2023-01-24 VITALS
HEART RATE: 90 BPM | BODY MASS INDEX: 50.02 KG/M2 | SYSTOLIC BLOOD PRESSURE: 105 MMHG | DIASTOLIC BLOOD PRESSURE: 82 MMHG | WEIGHT: 293 LBS | HEIGHT: 64 IN

## 2023-01-24 DIAGNOSIS — G89.29 CHRONIC PAIN OF RIGHT KNEE: ICD-10-CM

## 2023-01-24 DIAGNOSIS — M17.11 PRIMARY OSTEOARTHRITIS OF RIGHT KNEE: ICD-10-CM

## 2023-01-24 DIAGNOSIS — E66.01 MORBID OBESITY DUE TO EXCESS CALORIES: Primary | ICD-10-CM

## 2023-01-24 DIAGNOSIS — M25.561 CHRONIC PAIN OF RIGHT KNEE: ICD-10-CM

## 2023-01-24 PROCEDURE — 3008F BODY MASS INDEX DOCD: CPT | Mod: CPTII,S$GLB,, | Performed by: ORTHOPAEDIC SURGERY

## 2023-01-24 PROCEDURE — 3079F PR MOST RECENT DIASTOLIC BLOOD PRESSURE 80-89 MM HG: ICD-10-PCS | Mod: CPTII,S$GLB,, | Performed by: ORTHOPAEDIC SURGERY

## 2023-01-24 PROCEDURE — 1159F PR MEDICATION LIST DOCUMENTED IN MEDICAL RECORD: ICD-10-PCS | Mod: CPTII,S$GLB,, | Performed by: ORTHOPAEDIC SURGERY

## 2023-01-24 PROCEDURE — 99999 PR PBB SHADOW E&M-EST. PATIENT-LVL IV: CPT | Mod: PBBFAC,,, | Performed by: ORTHOPAEDIC SURGERY

## 2023-01-24 PROCEDURE — 3008F PR BODY MASS INDEX (BMI) DOCUMENTED: ICD-10-PCS | Mod: CPTII,S$GLB,, | Performed by: ORTHOPAEDIC SURGERY

## 2023-01-24 PROCEDURE — 20610 LARGE JOINT ASPIRATION/INJECTION: R KNEE: ICD-10-PCS | Mod: RT,S$GLB,, | Performed by: ORTHOPAEDIC SURGERY

## 2023-01-24 PROCEDURE — 99214 OFFICE O/P EST MOD 30 MIN: CPT | Mod: 25,S$GLB,, | Performed by: ORTHOPAEDIC SURGERY

## 2023-01-24 PROCEDURE — 3079F DIAST BP 80-89 MM HG: CPT | Mod: CPTII,S$GLB,, | Performed by: ORTHOPAEDIC SURGERY

## 2023-01-24 PROCEDURE — 4010F ACE/ARB THERAPY RXD/TAKEN: CPT | Mod: CPTII,S$GLB,, | Performed by: ORTHOPAEDIC SURGERY

## 2023-01-24 PROCEDURE — 3061F PR NEG MICROALBUMINURIA RESULT DOCUMENTED/REVIEW: ICD-10-PCS | Mod: CPTII,S$GLB,, | Performed by: ORTHOPAEDIC SURGERY

## 2023-01-24 PROCEDURE — 3061F NEG MICROALBUMINURIA REV: CPT | Mod: CPTII,S$GLB,, | Performed by: ORTHOPAEDIC SURGERY

## 2023-01-24 PROCEDURE — 99214 PR OFFICE/OUTPT VISIT, EST, LEVL IV, 30-39 MIN: ICD-10-PCS | Mod: 25,S$GLB,, | Performed by: ORTHOPAEDIC SURGERY

## 2023-01-24 PROCEDURE — 3074F SYST BP LT 130 MM HG: CPT | Mod: CPTII,S$GLB,, | Performed by: ORTHOPAEDIC SURGERY

## 2023-01-24 PROCEDURE — 20610 DRAIN/INJ JOINT/BURSA W/O US: CPT | Mod: RT,S$GLB,, | Performed by: ORTHOPAEDIC SURGERY

## 2023-01-24 PROCEDURE — 3074F PR MOST RECENT SYSTOLIC BLOOD PRESSURE < 130 MM HG: ICD-10-PCS | Mod: CPTII,S$GLB,, | Performed by: ORTHOPAEDIC SURGERY

## 2023-01-24 PROCEDURE — 1159F MED LIST DOCD IN RCRD: CPT | Mod: CPTII,S$GLB,, | Performed by: ORTHOPAEDIC SURGERY

## 2023-01-24 PROCEDURE — 99999 PR PBB SHADOW E&M-EST. PATIENT-LVL IV: ICD-10-PCS | Mod: PBBFAC,,, | Performed by: ORTHOPAEDIC SURGERY

## 2023-01-24 PROCEDURE — 3066F PR DOCUMENTATION OF TREATMENT FOR NEPHROPATHY: ICD-10-PCS | Mod: CPTII,S$GLB,, | Performed by: ORTHOPAEDIC SURGERY

## 2023-01-24 PROCEDURE — 3066F NEPHROPATHY DOC TX: CPT | Mod: CPTII,S$GLB,, | Performed by: ORTHOPAEDIC SURGERY

## 2023-01-24 PROCEDURE — 4010F PR ACE/ARB THEARPY RXD/TAKEN: ICD-10-PCS | Mod: CPTII,S$GLB,, | Performed by: ORTHOPAEDIC SURGERY

## 2023-01-24 RX ORDER — LIDOCAINE HYDROCHLORIDE 10 MG/ML
4 INJECTION INFILTRATION; PERINEURAL
Status: DISCONTINUED | OUTPATIENT
Start: 2023-01-24 | End: 2023-01-24 | Stop reason: HOSPADM

## 2023-01-24 RX ORDER — DICLOFENAC SODIUM 75 MG/1
75 TABLET, DELAYED RELEASE ORAL 2 TIMES DAILY
Qty: 28 TABLET | Refills: 0 | Status: SHIPPED | OUTPATIENT
Start: 2023-01-24 | End: 2023-02-07

## 2023-01-24 RX ORDER — BUPIVACAINE HYDROCHLORIDE 5 MG/ML
5 INJECTION, SOLUTION PERINEURAL
Status: DISCONTINUED | OUTPATIENT
Start: 2023-01-24 | End: 2023-01-24 | Stop reason: HOSPADM

## 2023-01-24 RX ORDER — KETOROLAC TROMETHAMINE 30 MG/ML
30 INJECTION, SOLUTION INTRAMUSCULAR; INTRAVENOUS
Status: DISCONTINUED | OUTPATIENT
Start: 2023-01-24 | End: 2023-01-24 | Stop reason: HOSPADM

## 2023-01-24 RX ADMIN — KETOROLAC TROMETHAMINE 30 MG: 30 INJECTION, SOLUTION INTRAMUSCULAR; INTRAVENOUS at 03:01

## 2023-01-24 RX ADMIN — LIDOCAINE HYDROCHLORIDE 4 ML: 10 INJECTION INFILTRATION; PERINEURAL at 03:01

## 2023-01-24 RX ADMIN — BUPIVACAINE HYDROCHLORIDE 5 ML: 5 INJECTION, SOLUTION PERINEURAL at 03:01

## 2023-01-24 NOTE — PROCEDURES
Large Joint Aspiration/Injection: R knee    Date/Time: 1/24/2023 3:00 PM  Performed by: Ranulfo Omer MD  Authorized by: Ranulfo Omer MD     Consent Done?:  Yes (Verbal)  Indications:  Arthritis  Site marked: the procedure site was marked    Timeout: prior to procedure the correct patient, procedure, and site was verified    Prep: patient was prepped and draped in usual sterile fashion      Local anesthesia used?: Yes    Local anesthetic:  Topical anesthetic    Details:  Needle Size:  22 G  Ultrasonic Guidance for needle placement?: No    Approach:  Anterolateral  Location:  Knee  Site:  R knee  Medications:  30 mg ketorolac 30 mg/mL (1 mL); 5 mL BUPivacaine 0.5 % (5 mg/mL); 4 mL LIDOcaine HCL 10 mg/ml (1%) 10 mg/mL (1 %)  Patient tolerance:  Patient tolerated the procedure well with no immediate complications

## 2023-01-24 NOTE — PROGRESS NOTES
Subjective:      Patient ID: Yulissa Hatfield is a 62 y.o. female.    Chief Complaint: No chief complaint on file.      HPI:      Patient ID: Yulissa Hatfield is a 62 y.o. female.    Chief Complaint: No chief complaint on file.      KNEE PAIN: Complains of pain to the rightknee.   PAIN LOCATED: medial  ONSET: 1 month ago.  QUALITY:  Patient states the pain is worsening  HISTORY: Previous knee injury/surgery: no  Hx:   ASSOCIATED SYMPTOM AND TRIGGERS:Standing/Weightbearing, walking, trouble w stairs, stiffness, swelling, limping, stiffness w sitting   USES ASSISTIVE DEVICE: walker  RELIEVED BY:nothing  PATIENT DENIES: bruising, redness, deformity     Patient was cleaning the closet at the end of December when she had worsening pain in the right knee.  She presented to the ED and received a steroid injection and then presented to clinic for Toradol injection.  Neither of these worked.  She is been taking Tylenol and ibuprofen without relief.  She has a history of hypertension.    Past Medical History:   Diagnosis Date    Generalized anxiety disorder with panic attacks     HTN (hypertension)     Hyperlipidemia     Hypothyroid     DANIELLE on CPAP     Type 2 diabetes mellitus        Current Outpatient Medications:     acetaminophen (TYLENOL) 500 MG tablet, Take 1,000 mg by mouth every 6 (six) hours as needed for Pain., Disp: , Rfl:     diclofenac sodium (VOLTAREN) 1 % Gel, Apply 2 g topically 4 (four) times daily., Disp: 100 g, Rfl: 1    DULoxetine (CYMBALTA) 60 MG capsule, , Disp: , Rfl:     ezetimibe (ZETIA) 10 mg tablet, TAKE 1 TABLET BY MOUTH EVERY DAY, Disp: 90 tablet, Rfl: 1    hydroCHLOROthiazide (HYDRODIURIL) 25 MG tablet, Take 1 tablet (25 mg total) by mouth once daily., Disp: 90 tablet, Rfl: 1    ibuprofen (ADVIL,MOTRIN) 200 MG tablet, Take 600 mg by mouth every 6 (six) hours as needed for Pain., Disp: , Rfl:     levothyroxine (SYNTHROID) 112 MCG tablet, Take 1 tablet (112 mcg total) by mouth before  "breakfast., Disp: 90 tablet, Rfl: 2    rosuvastatin (CRESTOR) 20 MG tablet, Take 1 tablet (20 mg total) by mouth once daily., Disp: 90 tablet, Rfl: 3    SOOLANTRA 1 % Crea, , Disp: , Rfl:     telmisartan (MICARDIS) 80 MG Tab, TAKE 1 TABLET BY MOUTH EVERY DAY, Disp: 90 tablet, Rfl: 1    diclofenac (VOLTAREN) 75 MG EC tablet, Take 1 tablet (75 mg total) by mouth 2 (two) times daily. for 14 days, Disp: 28 tablet, Rfl: 0  Review of patient's allergies indicates:  No Known Allergies    /82 (BP Location: Left arm, Patient Position: Sitting, BP Method: Large (Automatic))   Pulse 90   Ht 5' 4" (1.626 m)   Wt (!) 149.7 kg (330 lb)   LMP 03/25/2006   BMI 56.64 kg/m²     Review of Systems   Constitutional: Negative.   HENT: Negative.     Eyes: Negative.    Cardiovascular: Negative.    Respiratory: Negative.     Endocrine: Negative.    Hematologic/Lymphatic: Negative.    Musculoskeletal:  Positive for arthritis, joint pain and joint swelling.       Objective:    Right Knee Exam     Tenderness   The patient is experiencing tenderness in the medial joint line.    Range of Motion   Extension:  10   Flexion:  100     Tests   Varus: negative Valgus: negative  Patellar apprehension: negative    Other   Sensation: normal             Assessment:     Imaging:  Previous imaging of the right knee reviewed and demonstrates osteoarthritis with joint space narrowing and osteophyte formation        1. Morbid obesity due to excess calories    2. Primary osteoarthritis of right knee    3. Chronic pain of right knee          Plan:       we injected the right knee w 1cc of ketorolac, marcaine and lidocaine under sterile conditions with the patient's informed consent for severe bone on bone knee oa.   I discussed a new treatment therapy called Iovera, which is cryotherapy, to provide symptomatic relief along the sensory distribution of the infrapatellar tendon branch of the saphenous nerve, AFCN, and LFCN.  The patient elected to move " forward with this.  We did discuss the fact that this is a fairly novel procedure and the is very limited scientific data around this; however, it is FDA approved.  In a few patients there can be continued pain along the treated nerve but the exact risk has not been quantified but seems to be rare. The patient was given patient information and literature to review prior to the procedure as well. Based on this, the patient feels the benefits outweigh the risks.   I had a lengthy discussion with the patient regarding various sources of knee pain.  The patient is receiving minimal relief with NSAIDs, injections, and have failed physical therapy/home exercise. At this point, given the acuity of the symptoms, mechanical in nature, physical examination and degenerative findings on radiographs, I think an MRI is warranted to evaluate for insufficiency fracture, bone marrow edema. We will try to help arrange this. I will see the patient back once the MRI is complete.   Will also provide diclofenac PO for pain relief. Patient will follow up for Iovera/MRI review.

## 2023-01-25 ENCOUNTER — PATIENT MESSAGE (OUTPATIENT)
Dept: ORTHOPEDICS | Facility: CLINIC | Age: 63
End: 2023-01-25
Payer: COMMERCIAL

## 2023-01-25 ENCOUNTER — TELEPHONE (OUTPATIENT)
Dept: ADMINISTRATIVE | Facility: OTHER | Age: 63
End: 2023-01-25
Payer: COMMERCIAL

## 2023-01-26 ENCOUNTER — TELEPHONE (OUTPATIENT)
Dept: ADMINISTRATIVE | Facility: OTHER | Age: 63
End: 2023-01-26
Payer: COMMERCIAL

## 2023-01-30 ENCOUNTER — PATIENT MESSAGE (OUTPATIENT)
Dept: CARDIOLOGY | Facility: CLINIC | Age: 63
End: 2023-01-30
Payer: COMMERCIAL

## 2023-01-31 ENCOUNTER — PATIENT MESSAGE (OUTPATIENT)
Dept: CARDIOLOGY | Facility: CLINIC | Age: 63
End: 2023-01-31
Payer: COMMERCIAL

## 2023-02-02 ENCOUNTER — HOSPITAL ENCOUNTER (OUTPATIENT)
Dept: CARDIOLOGY | Facility: HOSPITAL | Age: 63
Discharge: HOME | End: 2023-02-02
Attending: INTERNAL MEDICINE
Payer: COMMERCIAL

## 2023-02-02 VITALS — HEIGHT: 64 IN | WEIGHT: 293 LBS | BODY MASS INDEX: 50.02 KG/M2

## 2023-02-02 DIAGNOSIS — I83.813 VARICOSE VEINS OF BOTH LOWER EXTREMITIES WITH PAIN: ICD-10-CM

## 2023-02-02 DIAGNOSIS — I87.2 CHRONIC VENOUS STASIS DERMATITIS OF BOTH LOWER EXTREMITIES: ICD-10-CM

## 2023-02-02 DIAGNOSIS — I10 PRIMARY HYPERTENSION: ICD-10-CM

## 2023-02-02 LAB
AORTIC ROOT ANNULUS: 2.71 CM
AORTIC VALVE CUSP SEPERATION: 2.09 CM
AV INDEX (PROSTH): 0.85
AV MEAN GRADIENT: 3 MMHG
AV PEAK GRADIENT: 6 MMHG
AV VALVE AREA: 3.07 CM2
AV VELOCITY RATIO: 0.78
BSA FOR ECHO PROCEDURE: 2.6 M2
CV ECHO LV RWT: 0.45 CM
DOP CALC AO PEAK VEL: 1.19 M/S
DOP CALC AO VTI: 25.3 CM
DOP CALC LVOT AREA: 3.6 CM2
DOP CALC LVOT DIAMETER: 2.14 CM
DOP CALC LVOT PEAK VEL: 0.93 M/S
DOP CALC LVOT STROKE VOLUME: 77.65 CM3
DOP CALC MV VTI: 30.7 CM
DOP CALCLVOT PEAK VEL VTI: 21.6 CM
E WAVE DECELERATION TIME: 264.92 MSEC
E/A RATIO: 0.82
E/E' RATIO: 9.57 M/S
ECHO LV POSTERIOR WALL: 1.13 CM (ref 0.6–1.1)
EJECTION FRACTION: 65 %
FRACTIONAL SHORTENING: 27 % (ref 28–44)
INTERVENTRICULAR SEPTUM: 1.03 CM (ref 0.6–1.1)
IVRT: 82.78 MSEC
LA MAJOR: 6.05 CM
LA MINOR: 5.22 CM
LA WIDTH: 3.6 CM
LEFT ATRIUM SIZE: 4.3 CM
LEFT ATRIUM VOLUME INDEX MOD: 18.9 ML/M2
LEFT ATRIUM VOLUME INDEX: 30.5 ML/M2
LEFT ATRIUM VOLUME MOD: 45.74 CM3
LEFT ATRIUM VOLUME: 73.74 CM3
LEFT INTERNAL DIMENSION IN SYSTOLE: 3.66 CM (ref 2.1–4)
LEFT VENTRICLE DIASTOLIC VOLUME INDEX: 49.61 ML/M2
LEFT VENTRICLE DIASTOLIC VOLUME: 120.06 ML
LEFT VENTRICLE MASS INDEX: 84 G/M2
LEFT VENTRICLE SYSTOLIC VOLUME INDEX: 23.5 ML/M2
LEFT VENTRICLE SYSTOLIC VOLUME: 56.76 ML
LEFT VENTRICULAR INTERNAL DIMENSION IN DIASTOLE: 5.03 CM (ref 3.5–6)
LEFT VENTRICULAR MASS: 203.97 G
LV LATERAL E/E' RATIO: 8.38 M/S
LV SEPTAL E/E' RATIO: 11.17 M/S
LVOT MG: 1.53 MMHG
LVOT MV: 0.57 CM/S
MV MEAN GRADIENT: 1 MMHG
MV PEAK A VEL: 0.82 M/S
MV PEAK E VEL: 0.67 M/S
MV PEAK GRADIENT: 3 MMHG
MV STENOSIS PRESSURE HALF TIME: 76.83 MS
MV VALVE AREA BY CONTINUITY EQUATION: 2.53 CM2
MV VALVE AREA P 1/2 METHOD: 2.86 CM2
PISA TR MAX VEL: 1.14 M/S
PULM VEIN S/D RATIO: 1.27
PV MV: 0.58 M/S
PV PEAK D VEL: 0.3 M/S
PV PEAK S VEL: 0.38 M/S
PV PEAK VELOCITY: 0.83 CM/S
RA MAJOR: 4.94 CM
RA PRESSURE: 3 MMHG
RA WIDTH: 3.57 CM
RIGHT VENTRICULAR END-DIASTOLIC DIMENSION: 3 CM
TDI LATERAL: 0.08 M/S
TDI SEPTAL: 0.06 M/S
TDI: 0.07 M/S
TR MAX PG: 5 MMHG
TRICUSPID ANNULAR PLANE SYSTOLIC EXCURSION: 2.53 CM
TV REST PULMONARY ARTERY PRESSURE: 8 MMHG

## 2023-02-02 PROCEDURE — 93306 TTE W/DOPPLER COMPLETE: CPT

## 2023-02-02 PROCEDURE — 93306 ECHO (CUPID ONLY): ICD-10-PCS | Mod: 26,,, | Performed by: INTERNAL MEDICINE

## 2023-02-02 PROCEDURE — 93306 TTE W/DOPPLER COMPLETE: CPT | Mod: 26,,, | Performed by: INTERNAL MEDICINE

## 2023-02-06 ENCOUNTER — HOSPITAL ENCOUNTER (OUTPATIENT)
Dept: RADIOLOGY | Facility: HOSPITAL | Age: 63
Discharge: HOME | End: 2023-02-06
Attending: ORTHOPAEDIC SURGERY
Payer: COMMERCIAL

## 2023-02-06 DIAGNOSIS — M25.561 CHRONIC PAIN OF RIGHT KNEE: ICD-10-CM

## 2023-02-06 DIAGNOSIS — E66.01 MORBID OBESITY DUE TO EXCESS CALORIES: ICD-10-CM

## 2023-02-06 DIAGNOSIS — G89.29 CHRONIC PAIN OF RIGHT KNEE: ICD-10-CM

## 2023-02-06 PROCEDURE — 73721 MRI KNEE WITHOUT CONTRAST RIGHT: ICD-10-PCS | Mod: 26,RT,, | Performed by: RADIOLOGY

## 2023-02-06 PROCEDURE — 73721 MRI JNT OF LWR EXTRE W/O DYE: CPT | Mod: 26,RT,, | Performed by: RADIOLOGY

## 2023-02-06 PROCEDURE — 73721 MRI JNT OF LWR EXTRE W/O DYE: CPT | Mod: TC,RT

## 2023-02-07 ENCOUNTER — PROCEDURE VISIT (OUTPATIENT)
Dept: ORTHOPEDICS | Facility: CLINIC | Age: 63
End: 2023-02-07
Payer: COMMERCIAL

## 2023-02-07 ENCOUNTER — RESEARCH ENCOUNTER (OUTPATIENT)
Dept: RESEARCH | Facility: HOSPITAL | Age: 63
End: 2023-02-07
Payer: COMMERCIAL

## 2023-02-07 ENCOUNTER — OFFICE VISIT (OUTPATIENT)
Dept: ORTHOPEDICS | Facility: CLINIC | Age: 63
End: 2023-02-07
Payer: COMMERCIAL

## 2023-02-07 VITALS
WEIGHT: 293 LBS | BODY MASS INDEX: 50.02 KG/M2 | HEIGHT: 64 IN | HEART RATE: 85 BPM | HEIGHT: 64 IN | DIASTOLIC BLOOD PRESSURE: 72 MMHG | SYSTOLIC BLOOD PRESSURE: 129 MMHG | BODY MASS INDEX: 50.02 KG/M2 | WEIGHT: 293 LBS | SYSTOLIC BLOOD PRESSURE: 129 MMHG | HEART RATE: 85 BPM | DIASTOLIC BLOOD PRESSURE: 72 MMHG

## 2023-02-07 DIAGNOSIS — M84.40XA INSUFFICIENCY FRACTURE: Primary | ICD-10-CM

## 2023-02-07 DIAGNOSIS — M17.11 PRIMARY OSTEOARTHRITIS OF RIGHT KNEE: ICD-10-CM

## 2023-02-07 DIAGNOSIS — E66.01 MORBID OBESITY DUE TO EXCESS CALORIES: ICD-10-CM

## 2023-02-07 DIAGNOSIS — G89.29 CHRONIC PAIN OF RIGHT KNEE: ICD-10-CM

## 2023-02-07 DIAGNOSIS — M25.561 CHRONIC PAIN OF RIGHT KNEE: ICD-10-CM

## 2023-02-07 DIAGNOSIS — R93.7 BONE MARROW EDEMA: ICD-10-CM

## 2023-02-07 PROCEDURE — 3044F HG A1C LEVEL LT 7.0%: CPT | Mod: CPTII,S$GLB,, | Performed by: ORTHOPAEDIC SURGERY

## 2023-02-07 PROCEDURE — 1159F PR MEDICATION LIST DOCUMENTED IN MEDICAL RECORD: ICD-10-PCS | Mod: CPTII,S$GLB,, | Performed by: ORTHOPAEDIC SURGERY

## 2023-02-07 PROCEDURE — 3008F PR BODY MASS INDEX (BMI) DOCUMENTED: ICD-10-PCS | Mod: CPTII,S$GLB,, | Performed by: ORTHOPAEDIC SURGERY

## 2023-02-07 PROCEDURE — 99215 OFFICE O/P EST HI 40 MIN: CPT | Mod: S$GLB,,, | Performed by: ORTHOPAEDIC SURGERY

## 2023-02-07 PROCEDURE — 3074F SYST BP LT 130 MM HG: CPT | Mod: CPTII,S$GLB,, | Performed by: ORTHOPAEDIC SURGERY

## 2023-02-07 PROCEDURE — 1159F MED LIST DOCD IN RCRD: CPT | Mod: CPTII,S$GLB,, | Performed by: ORTHOPAEDIC SURGERY

## 2023-02-07 PROCEDURE — 3044F PR MOST RECENT HEMOGLOBIN A1C LEVEL <7.0%: ICD-10-PCS | Mod: CPTII,S$GLB,, | Performed by: ORTHOPAEDIC SURGERY

## 2023-02-07 PROCEDURE — 99499 NO LOS: ICD-10-PCS | Mod: S$GLB,,, | Performed by: ORTHOPAEDIC SURGERY

## 2023-02-07 PROCEDURE — 64640 PR DESTRUCT BY NEURO AGENT; OTHER PERIPH NERVE: ICD-10-PCS | Mod: RT,S$GLB,, | Performed by: ORTHOPAEDIC SURGERY

## 2023-02-07 PROCEDURE — 99999 PR PBB SHADOW E&M-EST. PATIENT-LVL V: CPT | Mod: PBBFAC,,, | Performed by: ORTHOPAEDIC SURGERY

## 2023-02-07 PROCEDURE — 3078F DIAST BP <80 MM HG: CPT | Mod: CPTII,S$GLB,, | Performed by: ORTHOPAEDIC SURGERY

## 2023-02-07 PROCEDURE — 3008F BODY MASS INDEX DOCD: CPT | Mod: CPTII,S$GLB,, | Performed by: ORTHOPAEDIC SURGERY

## 2023-02-07 PROCEDURE — 3074F PR MOST RECENT SYSTOLIC BLOOD PRESSURE < 130 MM HG: ICD-10-PCS | Mod: CPTII,S$GLB,, | Performed by: ORTHOPAEDIC SURGERY

## 2023-02-07 PROCEDURE — 4010F ACE/ARB THERAPY RXD/TAKEN: CPT | Mod: CPTII,S$GLB,, | Performed by: ORTHOPAEDIC SURGERY

## 2023-02-07 PROCEDURE — 64640 INJECTION TREATMENT OF NERVE: CPT | Mod: RT,S$GLB,, | Performed by: ORTHOPAEDIC SURGERY

## 2023-02-07 PROCEDURE — 3078F PR MOST RECENT DIASTOLIC BLOOD PRESSURE < 80 MM HG: ICD-10-PCS | Mod: CPTII,S$GLB,, | Performed by: ORTHOPAEDIC SURGERY

## 2023-02-07 PROCEDURE — 99215 PR OFFICE/OUTPT VISIT, EST, LEVL V, 40-54 MIN: ICD-10-PCS | Mod: S$GLB,,, | Performed by: ORTHOPAEDIC SURGERY

## 2023-02-07 PROCEDURE — 99999 PR PBB SHADOW E&M-EST. PATIENT-LVL V: ICD-10-PCS | Mod: PBBFAC,,, | Performed by: ORTHOPAEDIC SURGERY

## 2023-02-07 PROCEDURE — 3066F NEPHROPATHY DOC TX: CPT | Mod: CPTII,S$GLB,, | Performed by: ORTHOPAEDIC SURGERY

## 2023-02-07 PROCEDURE — 99499 UNLISTED E&M SERVICE: CPT | Mod: S$GLB,,, | Performed by: ORTHOPAEDIC SURGERY

## 2023-02-07 PROCEDURE — 3061F PR NEG MICROALBUMINURIA RESULT DOCUMENTED/REVIEW: ICD-10-PCS | Mod: CPTII,S$GLB,, | Performed by: ORTHOPAEDIC SURGERY

## 2023-02-07 PROCEDURE — 4010F PR ACE/ARB THEARPY RXD/TAKEN: ICD-10-PCS | Mod: CPTII,S$GLB,, | Performed by: ORTHOPAEDIC SURGERY

## 2023-02-07 PROCEDURE — 3061F NEG MICROALBUMINURIA REV: CPT | Mod: CPTII,S$GLB,, | Performed by: ORTHOPAEDIC SURGERY

## 2023-02-07 PROCEDURE — 3066F PR DOCUMENTATION OF TREATMENT FOR NEPHROPATHY: ICD-10-PCS | Mod: CPTII,S$GLB,, | Performed by: ORTHOPAEDIC SURGERY

## 2023-02-07 NOTE — PROGRESS NOTES
Protocol: innovations in Genicular Outcomes Registry (Luisa)  Protocol#: Luisa  IRB#: 2021.156  Version Date: 18-Mar-2022  PI: Ranulfo Omer MD  Patient Initials: KARISHMA   (Study ID#: [id number])    Screening Note:    Patient reports the following information during screening visit.    Demographics:  female  Black or -American  Non-    Social Hx:  Marital Status:   Never smoker  Alcohol Abuse or dependency: no  Opioid abuse hx: no  Illicit drug abuse: no    Economic hx:  Work Status: Employed  Type of physical activity at work is light.   # of days missed due to OA affected knee: 6   Status: no  Level of physical activity during last 12 months: Light  Highest Level of Education Obtained:  Master's Degree    Medical Hx:  allergies and past medical history  Active Ambulatory Problems     Diagnosis Date Noted    Primary localized osteoarthrosis, lower leg 02/13/2014    Obstructive sleep apnea on CPAP     Hypertension 09/06/2016    Varicose veins of both lower extremities with pain 09/06/2016    Hypothyroidism 09/07/2017    Chronic non-seasonal allergic rhinitis 09/07/2017    Morbid obesity due to excess calories 01/21/2020    Mixed hyperlipidemia 01/21/2020    VIEIRA (dyspnea on exertion)     Primary hypertension     Generalized anxiety disorder with panic attacks     DANIELLE on CPAP     Type 2 diabetes mellitus without complication, without long-term current use of insulin     DDD (degenerative disc disease), lumbosacral 01/13/2021    Chronic venous stasis dermatitis of both lower extremities 01/13/2021    Anxiety 10/11/2021    Cervical radiculopathy 04/24/2022    Osteoarthritis of bilateral glenohumeral joints 11/09/2022    Primary osteoarthritis of right knee 01/24/2023     Resolved Ambulatory Problems     Diagnosis Date Noted    Chest pressure 02/07/2019    Dizziness 02/07/2019    Shoulder arthritis 02/13/2020    Decreased independence with activities of daily living 11/01/2022    Muscle weakness  (generalized) 2022    Chronic right shoulder pain 2022    Chronic left shoulder pain 2022    Bursitis of right shoulder 2022    Bursitis of left shoulder 2022    Cervicalgia 2022     Past Medical History:   Diagnosis Date    HTN (hypertension)     Hyperlipidemia     Hypothyroid     Type 2 diabetes mellitus        Surgical Hx:  Past Surgical History:   Procedure Laterality Date    CARPAL TUNNEL RELEASE  2004     SECTION, CLASSIC  ; ; 1986    x 3    COLONOSCOPY N/A 11/10/2021    Procedure: COLONOSCOPY;  Surgeon: Kayla Pope MD;  Location: [facility] ENDO;  Service: Endoscopy;  Laterality: N/A;    ENDOMETRIAL ABLATION      ESOPHAGOGASTRODUODENOSCOPY N/A 11/10/2021    Procedure: EGD (ESOPHAGOGASTRODUODENOSCOPY);  Surgeon: Kayla Pope MD;  Location: [facility] ENDO;  Service: Endoscopy;  Laterality: N/A;    FOOT SURGERY Right     INJECTION OF JOINT Left 2022    Procedure: Injection, Joint, Shoulder, Hip, Or Knee;  Surgeon: Sowmya Tellez MD;  Location: [facility] PAIN [facility]GT;  Service: Pain Management;  Laterality: Left;  Left Glenohumeral Joint Injection    KNEE SURGERY right     THYROID SURGERY           OA Treatment Hx:  Did pt receive steroid injections (intra-articular) for knee OA within last 12 months?  no (Received Toradol to right knee on 2023).  Did the patient receive viscosupplementation within last 12 months? no  Is there a hx of cryoanalgesia or radio-frequency ablation tx for the affected knee within last 12 months? no  Herbal therapy or supplements within last 12 months for OA knee pain? yes  Medical marijuana use within last 12 months for OA pain? no  Hx of acupuncture within last 12 months for OA pain? no  Knee brace use within last 12 months for OA pain? no  Physical therapy within last 12 months for OA knee pain? no    OA Medications/ Any Analgesic Medications used in the last three months prior to enrollment: (Per study  sponsor list)  Patient states the following medications are current:    Baclofen (Pain management for knee OA)  Voltaren gel (Pain management for knee OA)  Voltaren tablets (Pain management for knee OA)  Tylenol (Pain management for knee OA)  Ibuprofen (Pain management for knee OA)  Advil (Pain management for knee OA)  hydroCHLOROthiazide (HYDRODIURIL) (for high blood pressure)  rosuvastatin (CRESTOR) (for cholesterol)  telmisartan (MICARDIS) (for high blood pressure)  ezetimibe (ZETIA) (for cholesterol)  levothyroxine (SYNTHROID) (for thyroid hormone deficiency)

## 2023-02-07 NOTE — PROGRESS NOTES
Protocol: innovations in Genicular Outcomes Registry (Luisa)  Protocol#: Luisa  IRB#: 2021.156  Version Date: 18-Mar-2022  PI: Ranulfo Omer MD  Patient Initials: S.W.     Eligibility Note:      - Inclusion Criteria  1. Planned to receive treatment for knee OA pain including, but not limited to, knee injections, nerve blocking  procedures, or knee arthroplasty within 60 days of screening yes, scheduled for Iovera on 2-7-2023.  2. Able to understand the informed consent and the assessment questionnaires and have the ability to complete  them in the opinion of the investigator  yes  3. Have access to a smartphone or internet access with a computer/tablet to complete the questionnaires using  the registry application yes  7.4 Exclusion Criteria  1. Actively enrolled in an investigational trial that would preclude patients from receiving the sites standard  of care for knee OA pain or knee arthroplasty recovery protocol no   2. Planning to have a surgery other than on the target knee no

## 2023-02-07 NOTE — PROGRESS NOTES
Protocol: innovations in Genicular Outcomes Registry (Luisa)  Protocol#: Luisa  IRB#: 2021.156  Version Date: 18-Mar-2022  PI: Ranulfo Omer MD  Patient Initials: S.W.     Informed Consent Process:     Research coordinator met with patient, in private clinic room, to discuss above mentioned protocol. Patient is alert and oriented x 3. Mood and affect appropriate to the situation. Patient states that she is not participating in any other research studies. Patient states understanding about the diagnosis of osteoarthritis of the knee and of the procedure to being done on that knee.  Purpose of study reviewed with the patient.  Patient states understanding of this information.   Length of study and number of participants reviewed with patient; patient states understanding of the length of the study.   Study procedure discussed in detail with patient. Patient states understanding of this information.  Potential benefits of study along with costs and/or payment reimbursement per insurance discussed with patient; Patient states understanding that insurance will cover cost of all standard of care medications and procedures. Patient aware of $20 payment for qualifying visits with completed questionnaires.  Alternative treatment methods discussed with patient; Patient states understanding of this information.   Study related questions/compensation for injury, and whom to contact regarding rights as a research subject all reviewed with patient; Patient verbalizes understanding of this information.   Voluntary participation and withdrawal from research stressed to patient; patient states understanding that she may withdraw consent at any time without compromise to care.   Confidentiality and HIPAA discussed with patient. Patient verbalizes understanding of this information.      Patient states her willingness to participate in study; [employer] eligibility confirmed and [employer] link send to patient's e-mail address. Patient  instructed to read informed consent in its entirety, initial at the bottom of each page, then sign the last page via their smart device or computer and submit. Throughout consenting process, patient given several opportunities to ask questions; all questions addressed and answered to patient's satisfaction.      Patient signed eConsent and research team completed the staff portion. Patient completed all baseline/screening questionnaires prior to any procedures.    Patient given [employer]. Patient aware it may take 24-48 hrs for the funds to appear on the card balance.

## 2023-02-07 NOTE — PROCEDURES
Procedures  Procedure Note Iovera:    DATE OF PROCEDURE:  02/07/2023    PREOPERATIVE DIAGNOSIS: right knee pain.     POSTOPERATIVE DIAGNOSIS: right knee pain.     PROCEDURE: Iovera treatment of anterior femoral cutaneous nerve, Lateral femoral cut nerve, and both branches of infrapatellar saphenous nerve using at least 4 different punctures to treat all 4 nerves. (cpt 64640 x4)    ATTENDING SURGEON: Ranulfo Omer M.D.   ASSISTANT: [place]    COMPLICATIONS: None.     IMPLANTS:  None    ESTIMATED BLOOD LOSS:  < 5cc    SPECIMENS REMOVED:  None    ANESTHESIA: Local lidocaine    INDICATIONS FOR PROCEDURE: This is a 62 y.o. female with longstanding knee pain. They have failed non operative management including injections.I discussed a new treatment therapy called Iovera, which is cryotherapy, to provide symptomatic relief along the sensory distribution of the infrapatellar tendon branch of the saphenous nerve  and AFCN. The patient elected to move forward with this  We did discuss the fact that this is a fairly novel procedure and there is very limited scientific data around this.  However, it FDA approved.  The patient was given patient information and literature to review prior to the procedure as well.  Based on this, the patient agreed to move forward with doing the procedure.      PROCEDURE:    The patient was placed supine on the exam table and the proximal medial aspect of the right tibia and anterior aspect of distal femur was prepped with sterile Betadine and alcohol.  A line was drawn extending approximately 5 cm medial to inferior pole of the patella distally to a point approximately 5 cm medial to the tibial tubercle.  A second line was drawn in a medial to lateral direction the width of the patella approximately 7 cm proximal to the patella. We then infiltrated the skin with lidocaine along both lines using a 25g needle. We then introduced the Iovera device along these lines and this device penetrated the skin,  creating cryotherapy to both branches of the infrapatellar saphenous nerve, a third treatment to the anterior femoral cutaneous nerve, and fourth LFCN. 7 punctures of the skin were made to treat the 2 branches of the ISN and another 7 punctures were made to treat the AFCN and LFCN. There were a total of 4 nerves treated with iovera. The patient tolerated the procedure well with no problems.

## 2023-02-07 NOTE — PROGRESS NOTES
Protocol: Innovations in Genicular Outcomes Registry (Luisa)  Protocol#:Luisa  IRB# 2021.156  Version Date: 18-Mar-2022  PI: Ranulfo Omer MD  Patient Initials: S.W.  (Study ID# [id number])       Treatment Visit     Research staff approached patient in private clinic room prior to OA injection. Patient awake, alert. Mood and affect appropriate to situation. Patient stated willingness to continue participation in above study. Patient states pain for target knee is 3 on pain scale. Danielle entered the room to perform Iovera treatment to target knee (right) with Dr. Omer oversight. Patient tolerated procedure well with no immediate complications.     Research staff discussed the daily questionnaires that start tomorrow. Patient stated understanding and said she will complete the questionnaires as soon as the e-mail reminder is received. Patient reports post procedure pain of target knee as a 1 on pain scale. Patient instructed to call Dr. Omer or research team with any questions or concerns.

## 2023-02-07 NOTE — PROGRESS NOTES
Subjective:      Patient ID: Yulissa Hatfield is a 62 y.o. female.    Chief Complaint: Pain of the Right Knee    Mri shows edema/insufficiency fracture prox med tibia  C/o cont pain  Bmi too high for tka      Social History     Occupational History    Not on file   Tobacco Use    Smoking status: Never    Smokeless tobacco: Never   Substance and Sexual Activity    Alcohol use: No    Drug use: No    Sexual activity: Not Currently      ROS      Objective:    Ortho/SPM Exam       Assessment:       1. Insufficiency fracture    2. Bone marrow edema    3. Chronic pain of right knee          Plan:       MRI shows bone marrow edema. we discussed a relatively new procedure where we inject calcium into the area of bony edema to provide more structural support in the hope of alleviating the edema mediated bone pain. this procedure is significantlycan be painful because of drilling the hole in the bone. the dilemma is understanding how much pain is coming from the intraarticular pathology vs the bony pathology. The patient has elected to move forward with the injection of calcium into the bone. Major risks of the subchondroplasty given the limited clinical science include fracture, infection, continued pain, and extravasation of the calcium into the joint. we discussed the benefits vs risks and patient feels the benefits outweigh the risks.

## 2023-02-09 ENCOUNTER — PATIENT MESSAGE (OUTPATIENT)
Dept: CARDIOLOGY | Facility: CLINIC | Age: 63
End: 2023-02-09
Payer: COMMERCIAL

## 2023-02-11 ENCOUNTER — PATIENT MESSAGE (OUTPATIENT)
Dept: ORTHOPEDICS | Facility: CLINIC | Age: 63
End: 2023-02-11
Payer: COMMERCIAL

## 2023-02-11 ENCOUNTER — PATIENT MESSAGE (OUTPATIENT)
Dept: CARDIOLOGY | Facility: CLINIC | Age: 63
End: 2023-02-11
Payer: COMMERCIAL

## 2023-02-11 ENCOUNTER — PATIENT MESSAGE (OUTPATIENT)
Dept: SURGERY | Facility: HOSPITAL | Age: 63
End: 2023-02-11
Payer: COMMERCIAL

## 2023-02-12 ENCOUNTER — PATIENT MESSAGE (OUTPATIENT)
Dept: SURGERY | Facility: HOSPITAL | Age: 63
End: 2023-02-12
Payer: COMMERCIAL

## 2023-02-12 ENCOUNTER — PATIENT MESSAGE (OUTPATIENT)
Dept: CARDIOLOGY | Facility: CLINIC | Age: 63
End: 2023-02-12
Payer: COMMERCIAL

## 2023-02-13 ENCOUNTER — PATIENT MESSAGE (OUTPATIENT)
Dept: CARDIOLOGY | Facility: CLINIC | Age: 63
End: 2023-02-13
Payer: COMMERCIAL

## 2023-02-13 ENCOUNTER — PATIENT MESSAGE (OUTPATIENT)
Dept: ADMINISTRATIVE | Facility: HOSPITAL | Age: 63
End: 2023-02-13
Payer: COMMERCIAL

## 2023-02-14 ENCOUNTER — PATIENT MESSAGE (OUTPATIENT)
Dept: CARDIOLOGY | Facility: CLINIC | Age: 63
End: 2023-02-14
Payer: COMMERCIAL

## 2023-02-14 ENCOUNTER — TELEPHONE (OUTPATIENT)
Dept: CARDIOLOGY | Facility: CLINIC | Age: 63
End: 2023-02-14
Payer: COMMERCIAL

## 2023-02-14 NOTE — TELEPHONE ENCOUNTER
----- Message from Alber Cochran sent at 2/14/2023 10:12 AM CST -----  Contact: [phone]  Pt is calling to speak with you in ref to an appt -- please give her a call back [phone]

## 2023-02-25 ENCOUNTER — PATIENT MESSAGE (OUTPATIENT)
Dept: CARDIOLOGY | Facility: CLINIC | Age: 63
End: 2023-02-25
Payer: COMMERCIAL

## 2023-02-27 NOTE — TELEPHONE ENCOUNTER
Called patient regarding message sent for clarification      Patient stated that she is waiting to hear back from the bariatric clinic but has not received a call back. Informed patient that I would send a message out to them to let them know the patient is trying to reach them     Patient expressed understanding

## 2023-03-03 ENCOUNTER — HOSPITAL ENCOUNTER (OUTPATIENT)
Dept: CARDIOLOGY | Facility: HOSPITAL | Age: 63
Discharge: HOME | End: 2023-03-03
Attending: INTERNAL MEDICINE
Payer: COMMERCIAL

## 2023-03-03 DIAGNOSIS — I87.2 CHRONIC VENOUS STASIS DERMATITIS OF BOTH LOWER EXTREMITIES: ICD-10-CM

## 2023-03-03 DIAGNOSIS — I83.813 VARICOSE VEINS OF BOTH LOWER EXTREMITIES WITH PAIN: ICD-10-CM

## 2023-03-03 PROCEDURE — 93970 EXTREMITY STUDY: CPT | Mod: TC

## 2023-03-03 PROCEDURE — 93970 EXTREMITY STUDY: CPT | Mod: 26,,, | Performed by: INTERNAL MEDICINE

## 2023-03-03 PROCEDURE — 93970 CV US LOWER VENOUS INSUFFICIENCY BILATERAL (CUPID ONLY): ICD-10-PCS | Mod: 26,,, | Performed by: INTERNAL MEDICINE

## 2023-03-08 LAB
LEFT GREAT SAPHENOUS DISTAL THIGH DIA: 0.46 CM
LEFT GREAT SAPHENOUS JUNCTION DIA: 0.61 CM
LEFT GREAT SAPHENOUS KNEE DIA: 0.33 CM
LEFT GREAT SAPHENOUS MIDDLE THIGH DIA: 0.39 CM
LEFT GREAT SAPHENOUS PROXIMAL CALF DIA: 0.62 CM
LEFT GREAT SAPHENOUS PROXIMAL CALF REFLUX: 2084 MS
LEFT SMALL SAPHENOUS KNEE DIA: 0.25 CM
RIGHT GREAT SAPHENOUS DISTAL THIGH DIA: 0.46 CM
RIGHT GREAT SAPHENOUS JUNCTION DIA: 0.63 CM
RIGHT GREAT SAPHENOUS KNEE DIA: 0.3 CM
RIGHT GREAT SAPHENOUS MIDDLE THIGH DIA: 0.65 CM
RIGHT GREAT SAPHENOUS PROXIMAL CALF DIA: 0.46 CM
RIGHT SMALL SAPHENOUS KNEE DIA: 0.17 CM

## 2023-03-09 ENCOUNTER — TELEPHONE (OUTPATIENT)
Dept: BARIATRICS | Facility: CLINIC | Age: 63
End: 2023-03-09
Payer: COMMERCIAL

## 2023-03-13 ENCOUNTER — TELEPHONE (OUTPATIENT)
Dept: BARIATRICS | Facility: CLINIC | Age: 63
End: 2023-03-13
Payer: COMMERCIAL

## 2023-03-14 ENCOUNTER — RESEARCH ENCOUNTER (OUTPATIENT)
Dept: RESEARCH | Facility: HOSPITAL | Age: 63
End: 2023-03-14
Payer: COMMERCIAL

## 2023-03-14 ENCOUNTER — OFFICE VISIT (OUTPATIENT)
Dept: ORTHOPEDICS | Facility: CLINIC | Age: 63
End: 2023-03-14
Payer: COMMERCIAL

## 2023-03-14 VITALS
BODY MASS INDEX: 50.02 KG/M2 | HEIGHT: 64 IN | HEART RATE: 84 BPM | DIASTOLIC BLOOD PRESSURE: 57 MMHG | SYSTOLIC BLOOD PRESSURE: 121 MMHG | WEIGHT: 293 LBS

## 2023-03-14 DIAGNOSIS — M25.562 CHRONIC PAIN OF LEFT KNEE: Primary | ICD-10-CM

## 2023-03-14 DIAGNOSIS — G89.29 CHRONIC PAIN OF LEFT KNEE: Primary | ICD-10-CM

## 2023-03-14 DIAGNOSIS — M17.12 PRIMARY OSTEOARTHRITIS OF LEFT KNEE: ICD-10-CM

## 2023-03-14 PROCEDURE — 3074F PR MOST RECENT SYSTOLIC BLOOD PRESSURE < 130 MM HG: ICD-10-PCS | Mod: CPTII,S$GLB,, | Performed by: ORTHOPAEDIC SURGERY

## 2023-03-14 PROCEDURE — 4010F PR ACE/ARB THEARPY RXD/TAKEN: ICD-10-PCS | Mod: CPTII,S$GLB,, | Performed by: ORTHOPAEDIC SURGERY

## 2023-03-14 PROCEDURE — 3061F PR NEG MICROALBUMINURIA RESULT DOCUMENTED/REVIEW: ICD-10-PCS | Mod: CPTII,S$GLB,, | Performed by: ORTHOPAEDIC SURGERY

## 2023-03-14 PROCEDURE — 1159F PR MEDICATION LIST DOCUMENTED IN MEDICAL RECORD: ICD-10-PCS | Mod: CPTII,S$GLB,, | Performed by: ORTHOPAEDIC SURGERY

## 2023-03-14 PROCEDURE — 3074F SYST BP LT 130 MM HG: CPT | Mod: CPTII,S$GLB,, | Performed by: ORTHOPAEDIC SURGERY

## 2023-03-14 PROCEDURE — 99999 PR PBB SHADOW E&M-EST. PATIENT-LVL III: CPT | Mod: PBBFAC,,, | Performed by: ORTHOPAEDIC SURGERY

## 2023-03-14 PROCEDURE — 3008F PR BODY MASS INDEX (BMI) DOCUMENTED: ICD-10-PCS | Mod: CPTII,S$GLB,, | Performed by: ORTHOPAEDIC SURGERY

## 2023-03-14 PROCEDURE — 3078F PR MOST RECENT DIASTOLIC BLOOD PRESSURE < 80 MM HG: ICD-10-PCS | Mod: CPTII,S$GLB,, | Performed by: ORTHOPAEDIC SURGERY

## 2023-03-14 PROCEDURE — 3044F HG A1C LEVEL LT 7.0%: CPT | Mod: CPTII,S$GLB,, | Performed by: ORTHOPAEDIC SURGERY

## 2023-03-14 PROCEDURE — 3078F DIAST BP <80 MM HG: CPT | Mod: CPTII,S$GLB,, | Performed by: ORTHOPAEDIC SURGERY

## 2023-03-14 PROCEDURE — 99213 OFFICE O/P EST LOW 20 MIN: CPT | Mod: S$GLB,,, | Performed by: ORTHOPAEDIC SURGERY

## 2023-03-14 PROCEDURE — 99213 PR OFFICE/OUTPT VISIT, EST, LEVL III, 20-29 MIN: ICD-10-PCS | Mod: S$GLB,,, | Performed by: ORTHOPAEDIC SURGERY

## 2023-03-14 PROCEDURE — 1159F MED LIST DOCD IN RCRD: CPT | Mod: CPTII,S$GLB,, | Performed by: ORTHOPAEDIC SURGERY

## 2023-03-14 PROCEDURE — 99999 PR PBB SHADOW E&M-EST. PATIENT-LVL III: ICD-10-PCS | Mod: PBBFAC,,, | Performed by: ORTHOPAEDIC SURGERY

## 2023-03-14 PROCEDURE — 3008F BODY MASS INDEX DOCD: CPT | Mod: CPTII,S$GLB,, | Performed by: ORTHOPAEDIC SURGERY

## 2023-03-14 PROCEDURE — 4010F ACE/ARB THERAPY RXD/TAKEN: CPT | Mod: CPTII,S$GLB,, | Performed by: ORTHOPAEDIC SURGERY

## 2023-03-14 PROCEDURE — 3066F PR DOCUMENTATION OF TREATMENT FOR NEPHROPATHY: ICD-10-PCS | Mod: CPTII,S$GLB,, | Performed by: ORTHOPAEDIC SURGERY

## 2023-03-14 PROCEDURE — 3044F PR MOST RECENT HEMOGLOBIN A1C LEVEL <7.0%: ICD-10-PCS | Mod: CPTII,S$GLB,, | Performed by: ORTHOPAEDIC SURGERY

## 2023-03-14 PROCEDURE — 3061F NEG MICROALBUMINURIA REV: CPT | Mod: CPTII,S$GLB,, | Performed by: ORTHOPAEDIC SURGERY

## 2023-03-14 PROCEDURE — 3066F NEPHROPATHY DOC TX: CPT | Mod: CPTII,S$GLB,, | Performed by: ORTHOPAEDIC SURGERY

## 2023-03-14 NOTE — PROGRESS NOTES
Subjective:      Patient ID: Yulissa Hatfield is a 62 y.o. female.    Chief Complaint: Post-op Evaluation of the Right Knee    Patient was scheduled to protect the set on 2023 but canceled because she had further questions.  She did have Iovera done on  that has been working and her pain is improved overall.    Social History     Occupational History    Not on file   Tobacco Use    Smoking status: Never    Smokeless tobacco: Never   Substance and Sexual Activity    Alcohol use: No    Drug use: No    Sexual activity: Not Currently      ROS      Objective:              Right Knee Exam     Inspection   Scars: present  Swelling: present    Tenderness   The patient is tender to palpation of the medial joint line and patella.    Range of Motion   Extension:  5   Flexion:  100     Tests   Patella   Passive Patellar Tilt: neutral  Patellar Tracking: normal    Muscle Strength   Right Lower Extremity   Quadriceps:  4/5   Hamstrin/5        Assessment:       No diagnosis found.        Plan:       Patient having relief from Iovera. We will continue to monitor for now.  We will see her back in 2 months for re-evaluation to discuss repeat iovera vs tactoset. Left knee xrays on the way out today.

## 2023-03-14 NOTE — PROGRESS NOTES
Protocol: Innovations in Genicular Outcomes Registry (Luisa)  Protocol#:Luisa  IRB# 2021.156  Version Date: 10-Jeyson-2023  PI: Ranulfo Omer MD  Patient Initials: S.W. (Study ID# [id number])      Clinic Visit     Patient was met in clinic by research personnel and orthopedics provider. Patient previously had Iovera to target knee (right). No injections or other interventions were given at this appointment. Patient's pain score was a 2. Patient cancelled planned Tactoset procedure due to wanting to learn more about it. Patient stated that Iovera treatment has been doing well for her so far but she does have some on and off again pain that is relieved with OTC (i.e. Tylenol and Ibuprofen). Dr. Omer would like to continue monitoring patient and will follow up in 2 months for re-evaluation.       Patient instructed to continue study questionnaires and to call research personnel if they have any questions.  She has no issues with [employer] login or other study issues.

## 2023-03-20 ENCOUNTER — TELEPHONE (OUTPATIENT)
Dept: BARIATRICS | Facility: CLINIC | Age: 63
End: 2023-03-20
Payer: COMMERCIAL

## 2023-03-31 ENCOUNTER — OFFICE VISIT (OUTPATIENT)
Dept: PODIATRY | Facility: CLINIC | Age: 63
End: 2023-03-31
Payer: COMMERCIAL

## 2023-03-31 VITALS
HEIGHT: 64 IN | HEART RATE: 77 BPM | BODY MASS INDEX: 58.28 KG/M2 | DIASTOLIC BLOOD PRESSURE: 91 MMHG | SYSTOLIC BLOOD PRESSURE: 157 MMHG

## 2023-03-31 DIAGNOSIS — I87.2 VENOUS INSUFFICIENCY OF BOTH LOWER EXTREMITIES: ICD-10-CM

## 2023-03-31 DIAGNOSIS — E11.9 TYPE 2 DIABETES MELLITUS WITHOUT COMPLICATION, WITHOUT LONG-TERM CURRENT USE OF INSULIN: Primary | ICD-10-CM

## 2023-03-31 DIAGNOSIS — B07.0 PLANTAR WART OF RIGHT FOOT: ICD-10-CM

## 2023-03-31 PROCEDURE — 17000 PR DESTRUCTION(LASER SURGERY,CRYOSURGERY,CHEMOSURGERY),PREMALIGNANT LESIONS,FIRST LESION: ICD-10-PCS | Mod: S$GLB,,, | Performed by: PODIATRIST

## 2023-03-31 PROCEDURE — 1160F PR REVIEW ALL MEDS BY PRESCRIBER/CLIN PHARMACIST DOCUMENTED: ICD-10-PCS | Mod: CPTII,S$GLB,, | Performed by: PODIATRIST

## 2023-03-31 PROCEDURE — 3008F PR BODY MASS INDEX (BMI) DOCUMENTED: ICD-10-PCS | Mod: CPTII,S$GLB,, | Performed by: PODIATRIST

## 2023-03-31 PROCEDURE — 3061F PR NEG MICROALBUMINURIA RESULT DOCUMENTED/REVIEW: ICD-10-PCS | Mod: CPTII,S$GLB,, | Performed by: PODIATRIST

## 2023-03-31 PROCEDURE — 4010F ACE/ARB THERAPY RXD/TAKEN: CPT | Mod: CPTII,S$GLB,, | Performed by: PODIATRIST

## 2023-03-31 PROCEDURE — 3077F PR MOST RECENT SYSTOLIC BLOOD PRESSURE >= 140 MM HG: ICD-10-PCS | Mod: CPTII,S$GLB,, | Performed by: PODIATRIST

## 2023-03-31 PROCEDURE — 1159F PR MEDICATION LIST DOCUMENTED IN MEDICAL RECORD: ICD-10-PCS | Mod: CPTII,S$GLB,, | Performed by: PODIATRIST

## 2023-03-31 PROCEDURE — 4010F PR ACE/ARB THEARPY RXD/TAKEN: ICD-10-PCS | Mod: CPTII,S$GLB,, | Performed by: PODIATRIST

## 2023-03-31 PROCEDURE — 17000 DESTRUCT PREMALG LESION: CPT | Mod: S$GLB,,, | Performed by: PODIATRIST

## 2023-03-31 PROCEDURE — 3044F PR MOST RECENT HEMOGLOBIN A1C LEVEL <7.0%: ICD-10-PCS | Mod: CPTII,S$GLB,, | Performed by: PODIATRIST

## 2023-03-31 PROCEDURE — 3066F NEPHROPATHY DOC TX: CPT | Mod: CPTII,S$GLB,, | Performed by: PODIATRIST

## 2023-03-31 PROCEDURE — 99999 PR PBB SHADOW E&M-EST. PATIENT-LVL III: CPT | Mod: PBBFAC,,, | Performed by: PODIATRIST

## 2023-03-31 PROCEDURE — 3061F NEG MICROALBUMINURIA REV: CPT | Mod: CPTII,S$GLB,, | Performed by: PODIATRIST

## 2023-03-31 PROCEDURE — 3044F HG A1C LEVEL LT 7.0%: CPT | Mod: CPTII,S$GLB,, | Performed by: PODIATRIST

## 2023-03-31 PROCEDURE — 99214 PR OFFICE/OUTPT VISIT, EST, LEVL IV, 30-39 MIN: ICD-10-PCS | Mod: 25,S$GLB,, | Performed by: PODIATRIST

## 2023-03-31 PROCEDURE — 3077F SYST BP >= 140 MM HG: CPT | Mod: CPTII,S$GLB,, | Performed by: PODIATRIST

## 2023-03-31 PROCEDURE — 1159F MED LIST DOCD IN RCRD: CPT | Mod: CPTII,S$GLB,, | Performed by: PODIATRIST

## 2023-03-31 PROCEDURE — 99999 PR PBB SHADOW E&M-EST. PATIENT-LVL III: ICD-10-PCS | Mod: PBBFAC,,, | Performed by: PODIATRIST

## 2023-03-31 PROCEDURE — 3008F BODY MASS INDEX DOCD: CPT | Mod: CPTII,S$GLB,, | Performed by: PODIATRIST

## 2023-03-31 PROCEDURE — 1160F RVW MEDS BY RX/DR IN RCRD: CPT | Mod: CPTII,S$GLB,, | Performed by: PODIATRIST

## 2023-03-31 PROCEDURE — 99214 OFFICE O/P EST MOD 30 MIN: CPT | Mod: 25,S$GLB,, | Performed by: PODIATRIST

## 2023-03-31 PROCEDURE — 3066F PR DOCUMENTATION OF TREATMENT FOR NEPHROPATHY: ICD-10-PCS | Mod: CPTII,S$GLB,, | Performed by: PODIATRIST

## 2023-03-31 PROCEDURE — 3080F PR MOST RECENT DIASTOLIC BLOOD PRESSURE >= 90 MM HG: ICD-10-PCS | Mod: CPTII,S$GLB,, | Performed by: PODIATRIST

## 2023-03-31 PROCEDURE — 3080F DIAST BP >= 90 MM HG: CPT | Mod: CPTII,S$GLB,, | Performed by: PODIATRIST

## 2023-03-31 NOTE — PROGRESS NOTES
"Subjective:     Patient ID: Yulissa Hatfield is a 62 y.o. female.    Chief Complaint: Follow-up    Yulissa is a 62 y.o. female who presents for evaluation and treatment of diabetic feet. Yulissa has a past medical history of Generalized anxiety disorder with panic attacks, HTN (hypertension), Hyperlipidemia, Hypothyroid, DANIELLE on CPAP, and Type 2 diabetes mellitus. Patient relates no major problem with feet. Only complaints today consist of under bottom of the right heel.  History of previous treatment for plantar wart.    PCP: Emilia Velasquez MD    Date Last Seen by PCP:     Current shoe gear: Flip-flops    Hemoglobin A1C   Date Value Ref Range Status   01/24/2023 6.3 (H) 4.0 - 5.6 % Final     Comment:     ADA Screening Guidelines:  5.7-6.4%  Consistent with prediabetes  >or=6.5%  Consistent with diabetes    High levels of fetal hemoglobin interfere with the HbA1C  assay. Heterozygous hemoglobin variants (HbS, HgC, etc)do  not significantly interfere with this assay.   However, presence of multiple variants may affect accuracy.     03/30/2022 6.3 (H) 4.0 - 5.6 % Final     Comment:     ADA Screening Guidelines:  5.7-6.4%  Consistent with prediabetes  >or=6.5%  Consistent with diabetes    High levels of fetal hemoglobin interfere with the HbA1C  assay. Heterozygous hemoglobin variants (HbS, HgC, etc)do  not significantly interfere with this assay.   However, presence of multiple variants may affect accuracy.     08/17/2021 6.4 (H) 4.0 - 5.6 % Final     Comment:     ADA Screening Guidelines:  5.7-6.4%  Consistent with prediabetes  >or=6.5%  Consistent with diabetes    High levels of fetal hemoglobin interfere with the HbA1C  assay. Heterozygous hemoglobin variants (HbS, HgC, etc)do  not significantly interfere with this assay.   However, presence of multiple variants may affect accuracy.       Vitals:    03/31/23 1326   BP: (!) 157/91   Pulse: 77   Height: 5' 4" (1.626 m)   PainSc:   4   PainLoc: Foot      Past Medical " History:   Diagnosis Date    Generalized anxiety disorder with panic attacks     HTN (hypertension)     Hyperlipidemia     Hypothyroid     DANIELLE on CPAP     Type 2 diabetes mellitus        Past Surgical History:   Procedure Laterality Date    CARPAL TUNNEL RELEASE       SECTION, CLASSIC      x 3    COLONOSCOPY N/A 11/10/2021    Procedure: COLONOSCOPY;  Surgeon: Kayla Pope MD;  Location: [facility] ENDO;  Service: Endoscopy;  Laterality: N/A;    ENDOMETRIAL ABLATION      ESOPHAGOGASTRODUODENOSCOPY N/A 11/10/2021    Procedure: EGD (ESOPHAGOGASTRODUODENOSCOPY);  Surgeon: Kayla Pope MD;  Location: [facility] ENDO;  Service: Endoscopy;  Laterality: N/A;    FOOT SURGERY      INJECTION OF JOINT Left 2022    Procedure: Injection, Joint, Shoulder, Hip, Or Knee;  Surgeon: Sowmya Tellez MD;  Location: [facility] PAIN MGT;  Service: Pain Management;  Laterality: Left;  Left Glenohumeral Joint Injection    KNEE SURGERY      THYROID SURGERY         Family History   Problem Relation Age of Onset    Heart disease Mother     Heart disease Father     No Known Problems Sister     Heart disease Brother     No Known Problems Daughter     No Known Problems Son     No Known Problems Daughter     No Known Problems Daughter     Colon cancer Neg Hx     Esophageal cancer Neg Hx        Social History     Socioeconomic History    Marital status:    Tobacco Use    Smoking status: Never    Smokeless tobacco: Never   Substance and Sexual Activity    Alcohol use: No    Drug use: No    Sexual activity: Not Currently       Current Outpatient Medications   Medication Sig Dispense Refill    acetaminophen (TYLENOL) 500 MG tablet Take 1,000 mg by mouth every 6 (six) hours as needed for Pain.      diclofenac sodium (VOLTAREN) 1 % Gel Apply 2 g topically 4 (four) times daily. 100 g 1    DULoxetine (CYMBALTA) 60 MG capsule       ezetimibe (ZETIA) 10 mg tablet TAKE 1 TABLET BY MOUTH EVERY DAY 90 tablet 1    hydroCHLOROthiazide  (HYDRODIURIL) 25 MG tablet Take 1 tablet (25 mg total) by mouth once daily. 90 tablet 1    ibuprofen (ADVIL,MOTRIN) 200 MG tablet Take 600 mg by mouth every 6 (six) hours as needed for Pain.      levothyroxine (SYNTHROID) 112 MCG tablet Take 1 tablet (112 mcg total) by mouth before breakfast. 90 tablet 2    rosuvastatin (CRESTOR) 20 MG tablet Take 1 tablet (20 mg total) by mouth once daily. 90 tablet 3    SOOLANTRA 1 % Crea       telmisartan (MICARDIS) 80 MG Tab TAKE 1 TABLET BY MOUTH EVERY DAY 90 tablet 1     No current facility-administered medications for this visit.       Review of patient's allergies indicates:  No Known Allergies      Review of Systems   Constitutional: Negative for chills, fever and malaise/fatigue.   HENT:  Negative for congestion and hearing loss.    Cardiovascular:  Positive for leg swelling. Negative for chest pain and claudication.   Respiratory:  Negative for cough and shortness of breath.    Skin:  Negative for color change, itching and poor wound healing.   Musculoskeletal:  Negative for back pain, joint pain, muscle cramps and muscle weakness.   Gastrointestinal:  Negative for nausea and vomiting.   Neurological:  Negative for numbness, paresthesias and weakness.   Psychiatric/Behavioral:  Negative for altered mental status.       Objective:     Physical Exam  Constitutional:       General: She is not in acute distress.     Appearance: She is obese. She is not ill-appearing.   Cardiovascular:      Pulses:           Dorsalis pedis pulses are 2+ on the right side and 2+ on the left side.        Posterior tibial pulses are detected w/ Doppler on the right side and detected w/ Doppler on the left side.      Comments: Triphasic PT bilateral.  Mild to moderate nonpitting edema to lower extremity bilateral with multiple varicosities and telangiectasias.  Skin temp is warm to foot bilateral.  No rubor on dependency bilateral foot.  No hair growth bilateral lower extremity.  Musculoskeletal:       Right foot: No deformity, bunion, Charcot foot, foot drop or prominent metatarsal heads.      Left foot: No deformity, bunion, Charcot foot, foot drop or prominent metatarsal heads.      Comments: Mild semi rigid pes planus foot structure bilateral.  No pain range motion or manual muscle strength testing bilateral foot and ankle.  No significant digital deformity noted.   Feet:      Right foot:      Protective Sensation: 10 sites tested.  10 sites sensed.      Skin integrity: Callus present.      Left foot:      Protective Sensation: 10 sites tested.  10 sites sensed.   Skin:     General: Skin is warm.      Capillary Refill: Capillary refill takes less than 2 seconds.      Findings: No ecchymosis or erythema.      Nails: There is no clubbing.      Comments: Raised hyperkeratotic skin plantar right heel with spongy core noting disappearance of the resting skin tension lines and pain during squeezing of the lesion.   Neurological:      Mental Status: She is alert and oriented to person, place, and time.      Sensory: Sensation is intact.      Motor: Motor function is intact.         Assessment:      Encounter Diagnoses   Name Primary?    Type 2 diabetes mellitus without complication, without long-term current use of insulin Yes    Venous insufficiency of both lower extremities     Plantar wart of right foot      Plan:     Yulissa was seen today for follow-up.    Diagnoses and all orders for this visit:    Type 2 diabetes mellitus without complication, without long-term current use of insulin    Venous insufficiency of both lower extremities    Plantar wart of right foot      I counseled the patient on her conditions, their implications and medical management.    Shoe inspection. Diabetic Foot Education. Patient reminded of the importance of good nutrition and blood sugar control to help prevent podiatric complications of diabetes. Patient instructed on proper foot hygeine. We discussed wearing proper shoe gear,  daily foot inspections, never walking without protective shoe gear, never putting sharp instruments to feet.      With the patient's consent a sterile #15 scalpel was used to trim the lesion plantar right heel. Discussed treatment options in detail. The patient elected for treatment with application of Cantharidin gel applied to the lesion and covered with a mepilex border which was further secured with tape. The patient tolerated the procedure well without pain or complication. No blood loss. Instructed to keep intact x 2 days then may remove and shower. Keep covered with bandaid until follow up.    Comprehensive annual diabetic foot exam completed today.  Patient found to be low risk for diabetic foot complication.      RTC within 1 year for annual foot exam within 4-6 weeks for plantar wart check right foot.     Assisted per Jefe Tuttle DPM PGY 2    A portion of this note was generated by voice recognition software and may contain spelling and grammar errors.

## 2023-03-31 NOTE — PATIENT INSTRUCTIONS
Keep dressing intact x 2 days then remove and wash. Pat dry and keep covered with band aid daily if needed.

## 2023-04-04 ENCOUNTER — TELEPHONE (OUTPATIENT)
Dept: BARIATRICS | Facility: CLINIC | Age: 63
End: 2023-04-04
Payer: COMMERCIAL

## 2023-04-04 NOTE — TELEPHONE ENCOUNTER
----- Message from Samina Freeman sent at 4/4/2023  9:11 AM CDT -----  Regarding: Pt advice / Apt  Contact: Pt [phone]  Pt is calling and would like to schedule an apt with the clinic. No Dates In Epic. Please Call

## 2023-04-12 ENCOUNTER — PATIENT MESSAGE (OUTPATIENT)
Dept: ADMINISTRATIVE | Facility: HOSPITAL | Age: 63
End: 2023-04-12
Payer: COMMERCIAL

## 2023-04-18 ENCOUNTER — PATIENT MESSAGE (OUTPATIENT)
Dept: CARDIOLOGY | Facility: CLINIC | Age: 63
End: 2023-04-18
Payer: COMMERCIAL

## 2023-04-24 ENCOUNTER — PATIENT OUTREACH (OUTPATIENT)
Dept: ADMINISTRATIVE | Facility: HOSPITAL | Age: 63
End: 2023-04-24
Payer: COMMERCIAL

## 2023-04-24 DIAGNOSIS — E11.9 TYPE 2 DIABETES MELLITUS WITHOUT COMPLICATION, UNSPECIFIED WHETHER LONG TERM INSULIN USE: ICD-10-CM

## 2023-04-24 NOTE — PROGRESS NOTES
Health Maintenance Due   Topic Date Due    Pneumococcal Vaccines (Age 0-64) (2 - PCV) 02/19/2022    Eye Exam  04/22/2023   Chart review done. HM updated. Immunizations reviewed & updated. Care Everywhere updated.  The patient states that she have an appointment scheduled.

## 2023-04-27 ENCOUNTER — PATIENT MESSAGE (OUTPATIENT)
Dept: ADMINISTRATIVE | Facility: HOSPITAL | Age: 63
End: 2023-04-27
Payer: COMMERCIAL

## 2023-04-28 ENCOUNTER — PATIENT MESSAGE (OUTPATIENT)
Dept: CARDIOLOGY | Facility: CLINIC | Age: 63
End: 2023-04-28
Payer: COMMERCIAL

## 2023-05-01 ENCOUNTER — OFFICE VISIT (OUTPATIENT)
Dept: FAMILY MEDICINE | Facility: CLINIC | Age: 63
End: 2023-05-01
Payer: COMMERCIAL

## 2023-05-01 DIAGNOSIS — I10 PRIMARY HYPERTENSION: ICD-10-CM

## 2023-05-01 DIAGNOSIS — E11.9 TYPE 2 DIABETES MELLITUS WITHOUT COMPLICATION, WITHOUT LONG-TERM CURRENT USE OF INSULIN: Primary | ICD-10-CM

## 2023-05-01 PROCEDURE — 4010F PR ACE/ARB THEARPY RXD/TAKEN: ICD-10-PCS | Mod: CPTII,95,, | Performed by: INTERNAL MEDICINE

## 2023-05-01 PROCEDURE — 3061F NEG MICROALBUMINURIA REV: CPT | Mod: CPTII,95,, | Performed by: INTERNAL MEDICINE

## 2023-05-01 PROCEDURE — 3061F PR NEG MICROALBUMINURIA RESULT DOCUMENTED/REVIEW: ICD-10-PCS | Mod: CPTII,95,, | Performed by: INTERNAL MEDICINE

## 2023-05-01 PROCEDURE — 99214 PR OFFICE/OUTPT VISIT, EST, LEVL IV, 30-39 MIN: ICD-10-PCS | Mod: 95,,, | Performed by: INTERNAL MEDICINE

## 2023-05-01 PROCEDURE — 3066F PR DOCUMENTATION OF TREATMENT FOR NEPHROPATHY: ICD-10-PCS | Mod: CPTII,95,, | Performed by: INTERNAL MEDICINE

## 2023-05-01 PROCEDURE — 1160F RVW MEDS BY RX/DR IN RCRD: CPT | Mod: CPTII,95,, | Performed by: INTERNAL MEDICINE

## 2023-05-01 PROCEDURE — 1159F MED LIST DOCD IN RCRD: CPT | Mod: CPTII,95,, | Performed by: INTERNAL MEDICINE

## 2023-05-01 PROCEDURE — 1159F PR MEDICATION LIST DOCUMENTED IN MEDICAL RECORD: ICD-10-PCS | Mod: CPTII,95,, | Performed by: INTERNAL MEDICINE

## 2023-05-01 PROCEDURE — 4010F ACE/ARB THERAPY RXD/TAKEN: CPT | Mod: CPTII,95,, | Performed by: INTERNAL MEDICINE

## 2023-05-01 PROCEDURE — 3044F PR MOST RECENT HEMOGLOBIN A1C LEVEL <7.0%: ICD-10-PCS | Mod: CPTII,95,, | Performed by: INTERNAL MEDICINE

## 2023-05-01 PROCEDURE — 1160F PR REVIEW ALL MEDS BY PRESCRIBER/CLIN PHARMACIST DOCUMENTED: ICD-10-PCS | Mod: CPTII,95,, | Performed by: INTERNAL MEDICINE

## 2023-05-01 PROCEDURE — 99214 OFFICE O/P EST MOD 30 MIN: CPT | Mod: 95,,, | Performed by: INTERNAL MEDICINE

## 2023-05-01 PROCEDURE — 3044F HG A1C LEVEL LT 7.0%: CPT | Mod: CPTII,95,, | Performed by: INTERNAL MEDICINE

## 2023-05-01 PROCEDURE — 3066F NEPHROPATHY DOC TX: CPT | Mod: CPTII,95,, | Performed by: INTERNAL MEDICINE

## 2023-05-01 RX ORDER — CHLORTHALIDONE 50 MG/1
50 TABLET ORAL DAILY
Qty: 90 TABLET | Refills: 0 | Status: SHIPPED | OUTPATIENT
Start: 2023-05-01 | End: 2023-08-01

## 2023-05-01 NOTE — PROGRESS NOTES
SUBJECTIVE     No chief complaint on file.      HPI  Yulissa Hatfield is a 62 y.o. female with multiple medical diagnoses as listed in the medical history and problem list that presents for follow-up for DM2. Pt has been doing okay since her last visit. She has been trying to adhere to an ADA diet, but she has her ups and downs. Pt is also exercising at a minimum of 40 min per day. She just joined [employer] to start water aerobics.Otherwise, pt questions her HCTZ as her  was changed to Chlorthalidone. She'd like to know if the latter is stronger, because her BP has been elevated with SBP at 140s at home.     PAST MEDICAL HISTORY:  Past Medical History:   Diagnosis Date    Generalized anxiety disorder with panic attacks     HTN (hypertension)     Hyperlipidemia     Hypothyroid     DANIELLE on CPAP     Type 2 diabetes mellitus        PAST SURGICAL HISTORY:  Past Surgical History:   Procedure Laterality Date    CARPAL TUNNEL RELEASE       SECTION, CLASSIC      x 3    COLONOSCOPY N/A 11/10/2021    Procedure: COLONOSCOPY;  Surgeon: Kayla Pope MD;  Location: [facility] ENDO;  Service: Endoscopy;  Laterality: N/A;    ENDOMETRIAL ABLATION      ESOPHAGOGASTRODUODENOSCOPY N/A 11/10/2021    Procedure: EGD (ESOPHAGOGASTRODUODENOSCOPY);  Surgeon: Kayla Pope MD;  Location: [facility] ENDO;  Service: Endoscopy;  Laterality: N/A;    FOOT SURGERY      INJECTION OF JOINT Left 2022    Procedure: Injection, Joint, Shoulder, Hip, Or Knee;  Surgeon: Sowmya Tellez MD;  Location: [facility] PAIN MGT;  Service: Pain Management;  Laterality: Left;  Left Glenohumeral Joint Injection    KNEE SURGERY      THYROID SURGERY         SOCIAL HISTORY:  Social History     Socioeconomic History    Marital status:    Tobacco Use    Smoking status: Never    Smokeless tobacco: Never   Substance and Sexual Activity    Alcohol use: No    Drug use: No    Sexual activity: Not Currently       FAMILY HISTORY:  Family History    Problem Relation Age of Onset    Heart disease Mother     Heart disease Father     No Known Problems Sister     Heart disease Brother     No Known Problems Daughter     No Known Problems Son     No Known Problems Daughter     No Known Problems Daughter     Colon cancer Neg Hx     Esophageal cancer Neg Hx        ALLERGIES AND MEDICATIONS: updated and reviewed.  Review of patient's allergies indicates:  No Known Allergies  Current Outpatient Medications   Medication Sig Dispense Refill    acetaminophen (TYLENOL) 500 MG tablet Take 1,000 mg by mouth every 6 (six) hours as needed for Pain.      chlorthalidone (HYGROTEN) 50 MG Tab Take 1 tablet (50 mg total) by mouth once daily. 90 tablet 0    diclofenac sodium (VOLTAREN) 1 % Gel Apply 2 g topically 4 (four) times daily. 100 g 1    ezetimibe (ZETIA) 10 mg tablet TAKE 1 TABLET BY MOUTH EVERY DAY 90 tablet 1    ibuprofen (ADVIL,MOTRIN) 200 MG tablet Take 600 mg by mouth every 6 (six) hours as needed for Pain.      levothyroxine (SYNTHROID) 112 MCG tablet Take 1 tablet (112 mcg total) by mouth before breakfast. 90 tablet 2    rosuvastatin (CRESTOR) 20 MG tablet Take 1 tablet (20 mg total) by mouth once daily. 90 tablet 3    SOOLANTRA 1 % Crea       telmisartan (MICARDIS) 80 MG Tab TAKE 1 TABLET BY MOUTH EVERY DAY 90 tablet 1    tirzepatide 2.5 mg/0.5 mL PnIj Inject 2.5 mg into the skin every 7 days. 4 pen 0     No current facility-administered medications for this visit.       ROS  Review of Systems   Constitutional:  Positive for activity change. Negative for unexpected weight change.   HENT:  Negative for hearing loss, rhinorrhea and trouble swallowing.    Eyes:  Negative for discharge and visual disturbance.   Respiratory:  Negative for chest tightness and wheezing.    Cardiovascular:  Negative for chest pain and palpitations.   Gastrointestinal:  Negative for blood in stool, constipation, diarrhea and vomiting.   Endocrine: Negative for polydipsia and polyuria.    Genitourinary:  Negative for difficulty urinating, dysuria, hematuria and menstrual problem.   Musculoskeletal:  Positive for arthralgias, joint swelling and neck pain.   Skin:  Negative for rash and wound.   Neurological:  Positive for weakness and headaches.   Psychiatric/Behavioral:  Positive for dysphoric mood. Negative for confusion.        OBJECTIVE     Physical Exam  There were no vitals filed for this visit. There is no height or weight on file to calculate BMI.            Physical Exam  Constitutional:       General: She is not in acute distress.     Appearance: She is well-developed.   HENT:      Head: Normocephalic and atraumatic.      Right Ear: External ear normal.      Left Ear: External ear normal.      Nose: Nose normal.   Eyes:      General: No scleral icterus.        Right eye: No discharge.         Left eye: No discharge.      Conjunctiva/sclera: Conjunctivae normal.   Neck:      Vascular: No JVD.      Trachea: No tracheal deviation.   Pulmonary:      Effort: Pulmonary effort is normal. No respiratory distress.   Musculoskeletal:         General: No deformity. Normal range of motion.      Cervical back: Normal range of motion and neck supple.   Skin:     General: Skin is dry.      Findings: No erythema or rash.   Neurological:      Mental Status: She is alert and oriented to person, place, and time.      Motor: No abnormal muscle tone.      Coordination: Coordination normal.   Psychiatric:         Behavior: Behavior normal.         Thought Content: Thought content normal.         Judgment: Judgment normal.         Health Maintenance         Date Due Completion Date    Pneumococcal Vaccines (Age 0-64) (2 - PCV) 02/19/2022 2/19/2021    Eye Exam 04/22/2023 4/22/2022    HIV Screening 01/13/2027 (Originally 12/17/1975) ---    Hemoglobin A1c 07/24/2023 1/24/2023    Diabetes Urine Screening 01/13/2024 1/13/2023    Mammogram 01/17/2024 1/17/2023    Lipid Panel 01/24/2024 1/24/2023    Foot Exam  03/31/2024 3/31/2023    Cervical Cancer Screening 01/12/2027 1/12/2022    TETANUS VACCINE 10/05/2028 10/5/2018    Colorectal Cancer Screening 11/10/2031 11/10/2021              ASSESSMENT     62 y.o. female with     1. Type 2 diabetes mellitus without complication, without long-term current use of insulin    2. Primary hypertension        PLAN:     1. Type 2 diabetes mellitus without complication, without long-term current use of insulin  - Start Mounjaro  - tirzepatide 2.5 mg/0.5 mL PnIj; Inject 2.5 mg into the skin every 7 days.  Dispense: 4 pen; Refill: 0    2. Primary hypertension  - BP elevated above goal of <140/90 per home readings  - Discontinue HCTZ and replace with Chlorthalidone  -  Advised to maintain a low Na diet(<2g/day), exercise, and keep BP log to present to next visit  - chlorthalidone (HYGROTEN) 50 MG Tab; Take 1 tablet (50 mg total) by mouth once daily.  Dispense: 90 tablet; Refill: 0        RTC in 3 months; pt to message home BP readings in [employer] in 2-4 weeks and voiced understanding     The patient location is: [place]  The chief complaint leading to consultation is: DM2 and HTN    Visit type: audiovisual    Face to Face time with patient: 15 min  16 minutes of total time spent on the encounter, which includes face to face time and non-face to face time preparing to see the patient (eg, review of tests), Obtaining and/or reviewing separately obtained history, Documenting clinical information in the electronic or other health record, Independently interpreting results (not separately reported) and communicating results to the patient/family/caregiver, or Care coordination (not separately reported).         Each patient to whom he or she provides medical services by telemedicine is:  (1) informed of the relationship between the physician and patient and the respective role of any other health care provider with respect to management of the patient; and (2) notified that he or she may decline to  receive medical services by telemedicine and may withdraw from such care at any time.    Notes:       Emilia Velasquez MD  05/01/2023 3:17 PM        No follow-ups on file.

## 2023-05-02 ENCOUNTER — PATIENT MESSAGE (OUTPATIENT)
Dept: FAMILY MEDICINE | Facility: CLINIC | Age: 63
End: 2023-05-02
Payer: COMMERCIAL

## 2023-05-02 DIAGNOSIS — I10 PRIMARY HYPERTENSION: Primary | ICD-10-CM

## 2023-05-03 ENCOUNTER — PATIENT MESSAGE (OUTPATIENT)
Dept: FAMILY MEDICINE | Facility: CLINIC | Age: 63
End: 2023-05-03
Payer: COMMERCIAL

## 2023-05-03 ENCOUNTER — PATIENT MESSAGE (OUTPATIENT)
Dept: ADMINISTRATIVE | Facility: OTHER | Age: 63
End: 2023-05-03
Payer: COMMERCIAL

## 2023-05-03 VITALS — SYSTOLIC BLOOD PRESSURE: 146 MMHG | DIASTOLIC BLOOD PRESSURE: 100 MMHG

## 2023-05-03 VITALS — SYSTOLIC BLOOD PRESSURE: 135 MMHG | DIASTOLIC BLOOD PRESSURE: 83 MMHG

## 2023-05-05 ENCOUNTER — OFFICE VISIT (OUTPATIENT)
Dept: PAIN MEDICINE | Facility: CLINIC | Age: 63
End: 2023-05-05
Payer: COMMERCIAL

## 2023-05-05 VITALS
BODY MASS INDEX: 50.02 KG/M2 | HEART RATE: 97 BPM | SYSTOLIC BLOOD PRESSURE: 163 MMHG | HEIGHT: 64 IN | DIASTOLIC BLOOD PRESSURE: 76 MMHG | WEIGHT: 293 LBS

## 2023-05-05 DIAGNOSIS — M19.011 OSTEOARTHRITIS OF BILATERAL GLENOHUMERAL JOINTS: ICD-10-CM

## 2023-05-05 DIAGNOSIS — M25.512 CHRONIC PAIN OF BOTH SHOULDERS: Primary | ICD-10-CM

## 2023-05-05 DIAGNOSIS — R52 PAIN: ICD-10-CM

## 2023-05-05 DIAGNOSIS — M25.612 DECREASED RANGE OF MOTION OF LEFT SHOULDER: ICD-10-CM

## 2023-05-05 DIAGNOSIS — M25.511 CHRONIC PAIN OF BOTH SHOULDERS: Primary | ICD-10-CM

## 2023-05-05 DIAGNOSIS — G89.29 CHRONIC PAIN OF BOTH SHOULDERS: Primary | ICD-10-CM

## 2023-05-05 DIAGNOSIS — M19.019 AC JOINT ARTHROPATHY: ICD-10-CM

## 2023-05-05 DIAGNOSIS — M19.012 OSTEOARTHRITIS OF LEFT GLENOHUMERAL JOINT: ICD-10-CM

## 2023-05-05 DIAGNOSIS — M19.012 OSTEOARTHRITIS OF BILATERAL GLENOHUMERAL JOINTS: ICD-10-CM

## 2023-05-05 PROCEDURE — 3008F PR BODY MASS INDEX (BMI) DOCUMENTED: ICD-10-PCS | Mod: CPTII,S$GLB,, | Performed by: NURSE PRACTITIONER

## 2023-05-05 PROCEDURE — 1160F PR REVIEW ALL MEDS BY PRESCRIBER/CLIN PHARMACIST DOCUMENTED: ICD-10-PCS | Mod: CPTII,S$GLB,, | Performed by: NURSE PRACTITIONER

## 2023-05-05 PROCEDURE — 3066F NEPHROPATHY DOC TX: CPT | Mod: CPTII,S$GLB,, | Performed by: NURSE PRACTITIONER

## 2023-05-05 PROCEDURE — 1160F RVW MEDS BY RX/DR IN RCRD: CPT | Mod: CPTII,S$GLB,, | Performed by: NURSE PRACTITIONER

## 2023-05-05 PROCEDURE — 99214 PR OFFICE/OUTPT VISIT, EST, LEVL IV, 30-39 MIN: ICD-10-PCS | Mod: S$GLB,,, | Performed by: NURSE PRACTITIONER

## 2023-05-05 PROCEDURE — 3077F PR MOST RECENT SYSTOLIC BLOOD PRESSURE >= 140 MM HG: ICD-10-PCS | Mod: CPTII,S$GLB,, | Performed by: NURSE PRACTITIONER

## 2023-05-05 PROCEDURE — 4010F ACE/ARB THERAPY RXD/TAKEN: CPT | Mod: CPTII,S$GLB,, | Performed by: NURSE PRACTITIONER

## 2023-05-05 PROCEDURE — 3061F PR NEG MICROALBUMINURIA RESULT DOCUMENTED/REVIEW: ICD-10-PCS | Mod: CPTII,S$GLB,, | Performed by: NURSE PRACTITIONER

## 2023-05-05 PROCEDURE — 3044F PR MOST RECENT HEMOGLOBIN A1C LEVEL <7.0%: ICD-10-PCS | Mod: CPTII,S$GLB,, | Performed by: NURSE PRACTITIONER

## 2023-05-05 PROCEDURE — 3061F NEG MICROALBUMINURIA REV: CPT | Mod: CPTII,S$GLB,, | Performed by: NURSE PRACTITIONER

## 2023-05-05 PROCEDURE — 3008F BODY MASS INDEX DOCD: CPT | Mod: CPTII,S$GLB,, | Performed by: NURSE PRACTITIONER

## 2023-05-05 PROCEDURE — 3078F DIAST BP <80 MM HG: CPT | Mod: CPTII,S$GLB,, | Performed by: NURSE PRACTITIONER

## 2023-05-05 PROCEDURE — 3044F HG A1C LEVEL LT 7.0%: CPT | Mod: CPTII,S$GLB,, | Performed by: NURSE PRACTITIONER

## 2023-05-05 PROCEDURE — 3077F SYST BP >= 140 MM HG: CPT | Mod: CPTII,S$GLB,, | Performed by: NURSE PRACTITIONER

## 2023-05-05 PROCEDURE — 99999 PR PBB SHADOW E&M-EST. PATIENT-LVL III: ICD-10-PCS | Mod: PBBFAC,,, | Performed by: NURSE PRACTITIONER

## 2023-05-05 PROCEDURE — 3078F PR MOST RECENT DIASTOLIC BLOOD PRESSURE < 80 MM HG: ICD-10-PCS | Mod: CPTII,S$GLB,, | Performed by: NURSE PRACTITIONER

## 2023-05-05 PROCEDURE — 4010F PR ACE/ARB THEARPY RXD/TAKEN: ICD-10-PCS | Mod: CPTII,S$GLB,, | Performed by: NURSE PRACTITIONER

## 2023-05-05 PROCEDURE — 99214 OFFICE O/P EST MOD 30 MIN: CPT | Mod: S$GLB,,, | Performed by: NURSE PRACTITIONER

## 2023-05-05 PROCEDURE — 99999 PR PBB SHADOW E&M-EST. PATIENT-LVL III: CPT | Mod: PBBFAC,,, | Performed by: NURSE PRACTITIONER

## 2023-05-05 PROCEDURE — 1159F MED LIST DOCD IN RCRD: CPT | Mod: CPTII,S$GLB,, | Performed by: NURSE PRACTITIONER

## 2023-05-05 PROCEDURE — 1159F PR MEDICATION LIST DOCUMENTED IN MEDICAL RECORD: ICD-10-PCS | Mod: CPTII,S$GLB,, | Performed by: NURSE PRACTITIONER

## 2023-05-05 PROCEDURE — 3066F PR DOCUMENTATION OF TREATMENT FOR NEPHROPATHY: ICD-10-PCS | Mod: CPTII,S$GLB,, | Performed by: NURSE PRACTITIONER

## 2023-05-05 RX ORDER — CYCLOBENZAPRINE HCL 10 MG
10 TABLET ORAL NIGHTLY
Qty: 30 TABLET | Refills: 0 | Status: SHIPPED | OUTPATIENT
Start: 2023-05-05 | End: 2023-06-04

## 2023-05-05 NOTE — PROGRESS NOTES
Pain Medicine Established Clinic Visit       Chief Complaint:   Chief Complaint   Patient presents with    Shoulder Pain     Bilateral shoulder pain           History of Present Illness: Yulissa Hatfield is a 62 y.o. female referred by Dr. Edwin Tena for cervical radiculopathy.      Onset: 2018, no specific injury, insidious onset  Location: Bilateral anterior shoulders, worse on the left  Radiation: into neck and down both arms  Timing: constant  Quality: Aching and Deep  Exacerbating Factors: lifting  Alleviating Factors: medications and rest  Associated Symptoms: She feels weakness in both arms, numbness in both hands in C6/7 distribution. She denies night fever/night sweats, urinary incontinence, bowel incontinence and significant weight loss    Severity: Currently: 7/10   Typical Range: 4-10/10     Exacerbation: 10/10     Interval History   05/05/2023- 62-year-old female presents today with complaints of bilateral shoulder pain, she is established with our clinic and was previously provided a left glenohumeral joint injection that provided 60% relief for a few weeks and then her pain returned.  She reports not being able to sleep at night due to the increased pain, she is scheduled to see Dr. Rosas/orthopedics in the next few weeks for further evaluation and possible bilateral shoulder MRI.  Today she is requesting something help her sleep as well as something to control her bilateral shoulder pain.    Interval Updates-  (12/06/2022):  Yulissa Hatfield returns today for follow up.   She is status post a Left  glenohumeral joint injection Injection provided 60% relief.  She is currently in physical therapy which she states is helping, Currently, the shoulder pain is stable.      Current Pain Scales:  Current: 3/10              Typical Range: 2-3/10          Previous Interventions:  - 11/09/2022 Left  glenohumeral joint injection under fluoroscopic guidance-60%  - 8/16/22: Left subacromial bursa  injection     Previous Therapies:  PT/OT: yes currently  Relevant Surgery: no   Previous Medications:   - NSAIDS: Ibuprofen 200 mg  - Muscle Relaxants:  Tizanidine > dry mouth, cough  - TCAs:   - SNRIs: Cymbalta  - Topicals:   - Anticonvulsants:    - Opioids: tramadol    Current Pain Medications:  Ibuprofen  Cymbalta 40 mg   Baclofen 5-10 mg qHS  Tylenol     Blood Thinners: None    Full Medication List:    Current Outpatient Medications:     acetaminophen (TYLENOL) 500 MG tablet, Take 1,000 mg by mouth every 6 (six) hours as needed for Pain., Disp: , Rfl:     chlorthalidone (HYGROTEN) 50 MG Tab, Take 1 tablet (50 mg total) by mouth once daily., Disp: 90 tablet, Rfl: 0    diclofenac sodium (VOLTAREN) 1 % Gel, Apply 2 g topically 4 (four) times daily., Disp: 100 g, Rfl: 1    ezetimibe (ZETIA) 10 mg tablet, TAKE 1 TABLET BY MOUTH EVERY DAY, Disp: 90 tablet, Rfl: 1    ibuprofen (ADVIL,MOTRIN) 200 MG tablet, Take 600 mg by mouth every 6 (six) hours as needed for Pain., Disp: , Rfl:     levothyroxine (SYNTHROID) 112 MCG tablet, Take 1 tablet (112 mcg total) by mouth before breakfast., Disp: 90 tablet, Rfl: 2    rosuvastatin (CRESTOR) 20 MG tablet, Take 1 tablet (20 mg total) by mouth once daily., Disp: 90 tablet, Rfl: 3    SOOLANTRA 1 % Crea, , Disp: , Rfl:     telmisartan (MICARDIS) 80 MG Tab, TAKE 1 TABLET BY MOUTH EVERY DAY, Disp: 90 tablet, Rfl: 1    tirzepatide 2.5 mg/0.5 mL PnIj, Inject 2.5 mg into the skin every 7 days., Disp: 4 pen, Rfl: 0     Review of Systems:  Review of Systems   Musculoskeletal:  Positive for joint pain.     Allergies:  Patient has no known allergies.     Medical History:   has a past medical history of Generalized anxiety disorder with panic attacks, HTN (hypertension), Hyperlipidemia, Hypothyroid, DANIELLE on CPAP, and Type 2 diabetes mellitus.    Surgical History:   has a past surgical history that includes Carpal tunnel release; Foot surgery; Knee surgery;  section, classic; Thyroid  "surgery; Endometrial ablation; Colonoscopy (N/A, 11/10/2021); Esophagogastroduodenoscopy (N/A, 11/10/2021); and Injection of joint (Left, 11/9/2022).    Family History:  family history includes Heart disease in her brother, father, and mother; No Known Problems in her daughter, daughter, daughter, sister, and son.    Social History:   reports that she has never smoked. She has never used smokeless tobacco. She reports that she does not drink alcohol and does not use drugs.    Physical Exam:  BP (!) 163/76   Pulse 97   Ht 5' 4.02" (1.626 m)   Wt (!) 154 kg (339 lb 8.1 oz)   LMP 03/25/2006   BMI 58.25 kg/m²   GEN: No acute distress. Calm, comfortable  HENT: Normocephalic, atraumatic, moist mucous membranes  EYE: Anicteric sclera, non-injected.   CV: Non-diaphoretic.   RESP: Breathing comfortably. Chest expansion symmetric.  PSYCH: Pleasant mood and appropriate affect. Recent and remote memory intact.   Shoulder Exam:      Inspection: No erythema, bruising, surgical incisions      Palpation: TTP of glenohumeral joint and AC joint of bilateral shoulders.       ROM:  Limited in abduction, internal rotation bilaterally, worse on the left than the right   (+) Painful arc sign with pain between  degrees abduction      Provocative Maneuvers for Biceps Tendon:   (-) Speed's test bilaterally      Provocative Maneuvers for Impingement:  (+) Hawkin's bilaterally  (+) Neer's bilaterally      Provocative Maneuvers for RTC:       (+) Empty Can bilaterally  Neuro:  (-) Godwin bilaterally         Imaging:  -MRI Cervical Spine 06/20/2022:  MRI examination of the cervical spine dated June 20, 2022.  There is straightening of the cervical spine with degenerative change greatest at C4-C5 and C5-C6.  No evidence of congenital spinal stenosis.  No abnormal signal change involving the visualized portion of the spinal cord.  No evidence of an acute cervical spine fracture.  At the C2-C3 and C3-C4 levels there is no evidence of a " focal disc abnormality.  No significant spinal canal or foraminal encroachment.  At the C4-C5 level there is a minimal posterior disc-osteophyte with mild effacement along the anterior margin of the subarachnoid space and spinal cord.  Mild spinal canal encroachment.  No foraminal encroachment.  At the C5-C6 level there is a broad-based and right paracentral disc-osteophyte.  There is effacement along the anterior margin of the subarachnoid space with right greater than left foraminal encroachment.  At the C6-C7 and C7-T1 levels there is no evidence of a significant focal disc abnormality.  No significant spinal canal or foraminal encroachment.    - X-ray shoulder complete b/l 2/13/20:   Right shoulder: The alignment is normal.  The glenohumeral joint appears normal.  Minimal osteophyte at the inferior humeral head.  There is mild osteophyte at the acromioclavicular joint.  No fracture, no osseous lesions.  The right upper thoracic region appear normal.  Left shoulder: The alignment is normal.  There is mild glenohumeral joint space narrowing.  Subchondral cystic changes at the superior glenoid.  Prominent inferior osteophyte at the humeral head.  Osteophyte noted at the acromioclavicular joint.  No fracture, no osseous lesions.  The left upper thoracic region appear normal    Labs:  BMP  Lab Results   Component Value Date     01/24/2023    K 4.2 01/24/2023     01/24/2023    CO2 28 01/24/2023    BUN 20 (H) 01/24/2023    CREATININE 0.54 01/24/2023    CALCIUM 8.8 01/24/2023    ANIONGAP 11 01/24/2023    ESTGFRAFRICA >60 03/30/2022    EGFRNONAA >60 03/30/2022     Lab Results   Component Value Date    ALT 21 01/24/2023    AST 20 01/24/2023    ALKPHOS 77 01/24/2023    BILITOT 0.6 01/24/2023     Lab Results   Component Value Date     01/24/2023       Assessment:  Yulissa Hatfield is a 62 y.o. female with the following diagnoses based on history, exam, and imaging:    Problem List Items Addressed This  Visit          Orthopedic    Osteoarthritis of bilateral glenohumeral joints     Other Visit Diagnoses       Chronic pain of both shoulders    -  Primary    Decreased range of motion of left shoulder        Pain        AC joint arthropathy        Osteoarthritis of left glenohumeral joint                  This is a pleasant 62 y.o. lady presenting with:     - Chronic bilateral shoulder pain: Right worse than left. Imaging with OA in AC and GH joints. Impingement signs on exam b/l.   - Chronic neck pain and bilateral radicular arm pains. MRI with bilateral foraminal encroachment at C5-6.    - Comorbidities: BMI > 50, anxiety, hypertension, hypothyroidism, DM2     12/[id number]/[id number]-old female with a history of chronic bilateral shoulder pain right greater than left she is status post a left glenohumeral corticosteroid injection under fluoroscopy with Dr. Tellez about her 60% relief.  She is currently in physical therapy which she does state is helping.  She discussed no benefit with Cymbalta so her and I discussed weaning off this medication, also recommended continuing a home exercise plan 3-4 days a week to help with strengthening of her shoulders bilaterally, would also recommend the baclofen only as needed to help with sleep her and I discussed this in detail.  She acknowledged that she is not a big medicine take so we had a long discussion regarding her medications and how she should take them and what makes her feel most comfortable.  See plan agreed upon below.    05/05/2023 that 62-year-old pleasant female with history of chronic bilateral shoulder pain right greater than left.  Based on history and exam pain is related to osteoarthritis in the AC and glenohumeral joints.  She is established with our office and has been previously provided a left glenohumeral joint injection with steroids per Dr. Tellez which helped but was not sustainable.  She is scheduled to see orthopedics in the next few weeks however she  is having difficulty sleeping her and I discussed I will prescribe Flexeril 10 mg q.h.s. to help with sleep and reduce her symptoms.  Also recommended Tylenol a 1000 mg t.i.d..    Treatment Plan:   - PT/OT/HEP:  Continue  PT for strengthening and ROM exercises for bilateral shoulders. Continue HEP for shoulder pain as well. Discussed benefits of exercise for pain.   - Procedures:  None at this time.    - Medications:    - Cont compound topical medication with Diclofenac 1.5%, Lidocaine 2.5%, Prilocaine 2.5%, and Gabapentin 4%.  Apply 1-2 grams to painful area up to 3-4 times daily   - start Flexeril 10 mg q.h.s.             - start Tylenol extra-strength a 1000 mg t.i.d. p.r.n. instructed patient not to exceed 3000 mg in 24 hours.  - Imaging: Reviewed. Consider shoulder  MRI and referral to Ortho in the future.   - Labs: Reviewed.   -follow up with orthopedics/Dr. Rosas for further evaluation    Follow Up:  2 weeks via MyChart or virtually.    MARICRUZ Faith  Interventional Pain Management    Disclaimer: This note was partly generated using dictation software which may occasionally result in transcription errors.

## 2023-05-11 ENCOUNTER — OFFICE VISIT (OUTPATIENT)
Dept: CARDIOLOGY | Facility: CLINIC | Age: 63
End: 2023-05-11
Payer: COMMERCIAL

## 2023-05-11 VITALS
BODY MASS INDEX: 50.02 KG/M2 | OXYGEN SATURATION: 98 % | HEART RATE: 90 BPM | HEIGHT: 64 IN | WEIGHT: 293 LBS | SYSTOLIC BLOOD PRESSURE: 138 MMHG | DIASTOLIC BLOOD PRESSURE: 76 MMHG

## 2023-05-11 DIAGNOSIS — G47.33 OSA ON CPAP: ICD-10-CM

## 2023-05-11 DIAGNOSIS — I87.2 CHRONIC VENOUS STASIS DERMATITIS OF BOTH LOWER EXTREMITIES: ICD-10-CM

## 2023-05-11 DIAGNOSIS — E78.5 HYPERLIPIDEMIA ASSOCIATED WITH TYPE 2 DIABETES MELLITUS: ICD-10-CM

## 2023-05-11 DIAGNOSIS — E11.9 TYPE 2 DIABETES MELLITUS WITHOUT COMPLICATION, WITHOUT LONG-TERM CURRENT USE OF INSULIN: ICD-10-CM

## 2023-05-11 DIAGNOSIS — I15.2 HYPERTENSION ASSOCIATED WITH DIABETES: ICD-10-CM

## 2023-05-11 DIAGNOSIS — E11.69 HYPERLIPIDEMIA ASSOCIATED WITH TYPE 2 DIABETES MELLITUS: ICD-10-CM

## 2023-05-11 DIAGNOSIS — E66.01 CLASS 3 SEVERE OBESITY DUE TO EXCESS CALORIES WITH SERIOUS COMORBIDITY AND BODY MASS INDEX (BMI) OF 50.0 TO 59.9 IN ADULT: ICD-10-CM

## 2023-05-11 DIAGNOSIS — I83.813 VARICOSE VEINS OF BOTH LOWER EXTREMITIES WITH PAIN: ICD-10-CM

## 2023-05-11 DIAGNOSIS — E11.59 HYPERTENSION ASSOCIATED WITH DIABETES: ICD-10-CM

## 2023-05-11 PROBLEM — E66.813 CLASS 3 SEVERE OBESITY DUE TO EXCESS CALORIES WITH SERIOUS COMORBIDITY AND BODY MASS INDEX (BMI) OF 50.0 TO 59.9 IN ADULT: Status: ACTIVE | Noted: 2020-01-21

## 2023-05-11 PROCEDURE — 3044F HG A1C LEVEL LT 7.0%: CPT | Mod: CPTII,S$GLB,, | Performed by: INTERNAL MEDICINE

## 2023-05-11 PROCEDURE — 3078F PR MOST RECENT DIASTOLIC BLOOD PRESSURE < 80 MM HG: ICD-10-PCS | Mod: CPTII,S$GLB,, | Performed by: INTERNAL MEDICINE

## 2023-05-11 PROCEDURE — 99999 PR PBB SHADOW E&M-EST. PATIENT-LVL IV: CPT | Mod: PBBFAC,,, | Performed by: INTERNAL MEDICINE

## 2023-05-11 PROCEDURE — 3075F SYST BP GE 130 - 139MM HG: CPT | Mod: CPTII,S$GLB,, | Performed by: INTERNAL MEDICINE

## 2023-05-11 PROCEDURE — 3044F PR MOST RECENT HEMOGLOBIN A1C LEVEL <7.0%: ICD-10-PCS | Mod: CPTII,S$GLB,, | Performed by: INTERNAL MEDICINE

## 2023-05-11 PROCEDURE — 4010F PR ACE/ARB THEARPY RXD/TAKEN: ICD-10-PCS | Mod: CPTII,S$GLB,, | Performed by: INTERNAL MEDICINE

## 2023-05-11 PROCEDURE — 1159F MED LIST DOCD IN RCRD: CPT | Mod: CPTII,S$GLB,, | Performed by: INTERNAL MEDICINE

## 2023-05-11 PROCEDURE — 3075F PR MOST RECENT SYSTOLIC BLOOD PRESS GE 130-139MM HG: ICD-10-PCS | Mod: CPTII,S$GLB,, | Performed by: INTERNAL MEDICINE

## 2023-05-11 PROCEDURE — 3066F PR DOCUMENTATION OF TREATMENT FOR NEPHROPATHY: ICD-10-PCS | Mod: CPTII,S$GLB,, | Performed by: INTERNAL MEDICINE

## 2023-05-11 PROCEDURE — 3061F PR NEG MICROALBUMINURIA RESULT DOCUMENTED/REVIEW: ICD-10-PCS | Mod: CPTII,S$GLB,, | Performed by: INTERNAL MEDICINE

## 2023-05-11 PROCEDURE — 4010F ACE/ARB THERAPY RXD/TAKEN: CPT | Mod: CPTII,S$GLB,, | Performed by: INTERNAL MEDICINE

## 2023-05-11 PROCEDURE — 3066F NEPHROPATHY DOC TX: CPT | Mod: CPTII,S$GLB,, | Performed by: INTERNAL MEDICINE

## 2023-05-11 PROCEDURE — 99214 PR OFFICE/OUTPT VISIT, EST, LEVL IV, 30-39 MIN: ICD-10-PCS | Mod: S$GLB,,, | Performed by: INTERNAL MEDICINE

## 2023-05-11 PROCEDURE — 3008F BODY MASS INDEX DOCD: CPT | Mod: CPTII,S$GLB,, | Performed by: INTERNAL MEDICINE

## 2023-05-11 PROCEDURE — 1159F PR MEDICATION LIST DOCUMENTED IN MEDICAL RECORD: ICD-10-PCS | Mod: CPTII,S$GLB,, | Performed by: INTERNAL MEDICINE

## 2023-05-11 PROCEDURE — 3061F NEG MICROALBUMINURIA REV: CPT | Mod: CPTII,S$GLB,, | Performed by: INTERNAL MEDICINE

## 2023-05-11 PROCEDURE — 99999 PR PBB SHADOW E&M-EST. PATIENT-LVL IV: ICD-10-PCS | Mod: PBBFAC,,, | Performed by: INTERNAL MEDICINE

## 2023-05-11 PROCEDURE — 3078F DIAST BP <80 MM HG: CPT | Mod: CPTII,S$GLB,, | Performed by: INTERNAL MEDICINE

## 2023-05-11 PROCEDURE — 1160F PR REVIEW ALL MEDS BY PRESCRIBER/CLIN PHARMACIST DOCUMENTED: ICD-10-PCS | Mod: CPTII,S$GLB,, | Performed by: INTERNAL MEDICINE

## 2023-05-11 PROCEDURE — 99214 OFFICE O/P EST MOD 30 MIN: CPT | Mod: S$GLB,,, | Performed by: INTERNAL MEDICINE

## 2023-05-11 PROCEDURE — 1160F RVW MEDS BY RX/DR IN RCRD: CPT | Mod: CPTII,S$GLB,, | Performed by: INTERNAL MEDICINE

## 2023-05-11 PROCEDURE — 3008F PR BODY MASS INDEX (BMI) DOCUMENTED: ICD-10-PCS | Mod: CPTII,S$GLB,, | Performed by: INTERNAL MEDICINE

## 2023-05-11 NOTE — ASSESSMENT & PLAN NOTE
Goal BP < 130/80.  Compliant w/ meds.    - continue medical therapy   - pt to monitor BP at home and record  - enrolling in dig med prgm  - risk factor and lifestyle modifications

## 2023-05-11 NOTE — ASSESSMENT & PLAN NOTE
Pt noted to have BLE reflux on outside venous doppler and has comorbidity of MO.    - knee high compression stockings for at least 3 months and w/ weight loss will trial thigh high  - see echo and venous insufficiency study results above  - risk factor and lifestyle modifications

## 2023-05-11 NOTE — ASSESSMENT & PLAN NOTE
Goal LDL < 100, last  3/2022.  Compliant w/ meds and h/o myalgias w/ atorvastatin.  - check FLP     - continue w/ rosuvastatin 20 mg daily and take w/ CoQ10  - risk factor and lifestyle modifications

## 2023-05-11 NOTE — PROGRESS NOTES
Subjective:    Patient ID:  Yulissa Hatfield is a 62 y.o. female who presents for follow-up of Follow-up (3 month f/u/)      PCP/Referring: Emilia Velasquez MD     HPI  Pt is a 61 yo F w/ PMH of HTN, HLD, DANIELLE on CPAP, MO w/ BMI 56.6, and DM2 who presents for f/u of chronic venous stasis dermatitis.  She was last seen 2023 as she was followed by her PCP 2023 and was referred to cardiology for chronic venous stasis dermatitis.  Pt mentions that she has stasis dermatitis and BLE varicose veins x2 yrs and had a venous doppler which noted L saphenous vein insufficiency.  She mentions that she has been doing well and plans to get knee high compression stockings for her venous insufficiency.  She denies pain of her BLE however notes fatigue and heaviness.  She had a venous doppler which noted reflux in her R great saphenous and anterior accessory saphenous vein noted by an outside facility.  She denies cp, orthopnea, PND, presyncope, LOC, palpitations, and claudication.  She notes compliance w/ meds and denies side effects.  She does not monitor her BP at home.  She has started at the [employer] [place] doing water aerobics etc.         Past Medical History:   Diagnosis Date    Generalized anxiety disorder with panic attacks     HTN (hypertension)     Hyperlipidemia     Hypothyroid     DANIELLE on CPAP     Type 2 diabetes mellitus      Past Surgical History:   Procedure Laterality Date    CARPAL TUNNEL RELEASE       SECTION, CLASSIC      x 3    COLONOSCOPY N/A 11/10/2021    Procedure: COLONOSCOPY;  Surgeon: Kayla Pope MD;  Location: [facility] ENDO;  Service: Endoscopy;  Laterality: N/A;    ENDOMETRIAL ABLATION      ESOPHAGOGASTRODUODENOSCOPY N/A 11/10/2021    Procedure: EGD (ESOPHAGOGASTRODUODENOSCOPY);  Surgeon: Kayla Pope MD;  Location: [facility] ENDO;  Service: Endoscopy;  Laterality: N/A;    FOOT SURGERY      INJECTION OF JOINT Left 2022    Procedure: Injection, Joint, Shoulder,  Hip, Or Knee;  Surgeon: Sowmya Tellez MD;  Location: [facility] PAIN MGT;  Service: Pain Management;  Laterality: Left;  Left Glenohumeral Joint Injection    KNEE SURGERY      THYROID SURGERY       Social History     Socioeconomic History    Marital status:    Tobacco Use    Smoking status: Never    Smokeless tobacco: Never   Substance and Sexual Activity    Alcohol use: No    Drug use: No    Sexual activity: Not Currently     Family History   Problem Relation Age of Onset    Heart disease Mother     Heart disease Father     No Known Problems Sister     Heart disease Brother     No Known Problems Daughter     No Known Problems Son     No Known Problems Daughter     No Known Problems Daughter     Colon cancer Neg Hx     Esophageal cancer Neg Hx        Review of patient's allergies indicates:  No Known Allergies    Medication List with Changes/Refills   Current Medications    ACETAMINOPHEN (TYLENOL) 500 MG TABLET    Take 1,000 mg by mouth every 6 (six) hours as needed for Pain.    CHLORTHALIDONE (HYGROTEN) 50 MG TAB    Take 1 tablet (50 mg total) by mouth once daily.    CYCLOBENZAPRINE (FLEXERIL) 10 MG TABLET    Take 1 tablet (10 mg total) by mouth every evening.    DICLOFENAC SODIUM (VOLTAREN) 1 % GEL    Apply 2 g topically 4 (four) times daily.    EZETIMIBE (ZETIA) 10 MG TABLET    TAKE 1 TABLET BY MOUTH EVERY DAY    IBUPROFEN (ADVIL,MOTRIN) 200 MG TABLET    Take 600 mg by mouth every 6 (six) hours as needed for Pain.    LEVOTHYROXINE (SYNTHROID) 112 MCG TABLET    Take 1 tablet (112 mcg total) by mouth before breakfast.    ROSUVASTATIN (CRESTOR) 20 MG TABLET    Take 1 tablet (20 mg total) by mouth once daily.    SOOLANTRA 1 % CREA        TELMISARTAN (MICARDIS) 80 MG TAB    TAKE 1 TABLET BY MOUTH EVERY DAY    TIRZEPATIDE 2.5 MG/0.5 ML PNIJ    Inject 2.5 mg into the skin every 7 days.       Review of Systems   Constitutional: Negative for diaphoresis and fever.   HENT:  Negative for congestion and hearing loss.   "  Eyes:  Negative for blurred vision and pain.   Cardiovascular:  Positive for dyspnea on exertion and leg swelling. Negative for chest pain, claudication, near-syncope, palpitations and syncope.   Respiratory:  Negative for shortness of breath and sleep disturbances due to breathing.    Hematologic/Lymphatic: Negative for bleeding problem. Does not bruise/bleed easily.   Skin:  Positive for color change. Negative for poor wound healing and rash.   Gastrointestinal:  Negative for abdominal pain and nausea.   Genitourinary:  Negative for bladder incontinence and flank pain.   Neurological:  Negative for focal weakness and light-headedness.      Objective:   /76 (BP Location: Left arm, Patient Position: Sitting, BP Method: Large (Manual))   Pulse 90   Ht 5' 4" (1.626 m)   Wt (!) 152.9 kg (337 lb)   LMP 03/25/2006   SpO2 98%   BMI 57.85 kg/m²    Physical Exam  Constitutional:       Appearance: She is well-developed. She is obese. She is not diaphoretic.   HENT:      Head: Normocephalic and atraumatic.   Eyes:      General: No scleral icterus.     Pupils: Pupils are equal, round, and reactive to light.   Neck:      Vascular: No JVD.   Cardiovascular:      Rate and Rhythm: Normal rate and regular rhythm.      Pulses: Intact distal pulses.      Heart sounds: S1 normal and S2 normal. No murmur heard.    No friction rub. No gallop.   Pulmonary:      Effort: Pulmonary effort is normal. No respiratory distress.      Breath sounds: Normal breath sounds. No wheezing or rales.   Chest:      Chest wall: No tenderness.   Abdominal:      General: Bowel sounds are normal. There is no distension.      Palpations: Abdomen is soft. There is no mass.      Tenderness: There is no abdominal tenderness. There is no rebound.   Musculoskeletal:         General: Swelling present. No tenderness. Normal range of motion.      Cervical back: Normal range of motion and neck supple.      Right lower leg: No edema.      Left lower leg: " No edema.      Comments: BLE varicose veins   Skin:     General: Skin is warm and dry.      Coloration: Skin is not pale.      Comments: Stasis dermatitis   Neurological:      Mental Status: She is alert and oriented to person, place, and time.      Coordination: Coordination normal.      Deep Tendon Reflexes: Reflexes normal.   Psychiatric:         Behavior: Behavior normal.         Judgment: Judgment normal.         Echo: 2/2/2023- reviewed.    Summary    The left ventricle is normal in size with concentric remodeling and normal systolic function.  The estimated ejection fraction is 65%.  Grade I left ventricular diastolic dysfunction.  Mild right ventricular enlargement with normal right ventricular systolic function.  Mild left atrial enlargement.  Mild right atrial enlargement.  Normal central venous pressure (3 mmHg).  The estimated PA systolic pressure is 8 mmHg.    DSE: 1/14/2021- reviewed.    Summary    There were no arrhythmias during stress.  Concentric remodeling and normal systolic function. The estimated ejection fraction is 55%  Normal left ventricular diastolic function.  The stress echo portion of this study is negative for myocardial ischemia.    Venous Doppler: 3/3/2023- reviewed.    Conclusion    There is no evidence of a right lower extremity DVT.  There is no evidence of a left lower extremity DVT.  The left greater saphenous vein has reflux around the calf  This was a technically difficult study    Assessment:       1. Varicose veins of both lower extremities with pain    2. Chronic venous stasis dermatitis of both lower extremities    3. Hyperlipidemia associated with type 2 diabetes mellitus    4. Hypertension associated with diabetes    5. Type 2 diabetes mellitus without complication, without long-term current use of insulin    6. Class 3 severe obesity due to excess calories with serious comorbidity and body mass index (BMI) of 50.0 to 59.9 in adult    7. DANIELLE on CPAP         Plan:          Varicose veins of both lower extremities with pain  Plan as per stasis dermatitis.      Chronic venous stasis dermatitis of both lower extremities  Pt noted to have BLE reflux on outside venous doppler and has comorbidity of MO.    - knee high compression stockings for at least 3 months and w/ weight loss will trial thigh high  - see echo and venous insufficiency study results above  - risk factor and lifestyle modifications     Hyperlipidemia associated with type 2 diabetes mellitus  Goal LDL < 100, last  3/2022.  Compliant w/ meds and h/o myalgias w/ atorvastatin.  - check FLP     - continue w/ rosuvastatin 20 mg daily and take w/ CoQ10  - risk factor and lifestyle modifications     Hypertension associated with diabetes  Goal BP < 130/80.  Compliant w/ meds.    - continue medical therapy   - pt to monitor BP at home and record  - enrolling in dig med prgm  - risk factor and lifestyle modifications     Type 2 diabetes mellitus without complication, without long-term current use of insulin  Management per PCP.  Continue current therapy.      Class 3 severe obesity due to excess calories with serious comorbidity and body mass index (BMI) of 50.0 to 59.9 in adult  Encouraged lifestyle modifications (diet, exercise, and weight loss)  - bariatric medicine consult pending    DANIELLE on CPAP  Compliant w/ CPAP.        Total duration of face to face visit time 30 minutes.  Total time spent counseling greater than fifty percent of total visit time.  Counseling included discussion regarding imaging findings, diagnosis, possibilities, treatment options, risks and benefits.  The patient had many questions regarding the options and long-term effects      Sebastian Abbasi M.D.  Interventional Cardiology

## 2023-05-11 NOTE — ASSESSMENT & PLAN NOTE
Encouraged lifestyle modifications (diet, exercise, and weight loss)  - bariatric medicine consult pending

## 2023-05-13 ENCOUNTER — PATIENT MESSAGE (OUTPATIENT)
Dept: CARDIOLOGY | Facility: CLINIC | Age: 63
End: 2023-05-13
Payer: COMMERCIAL

## 2023-05-15 ENCOUNTER — PATIENT MESSAGE (OUTPATIENT)
Dept: FAMILY MEDICINE | Facility: CLINIC | Age: 63
End: 2023-05-15
Payer: COMMERCIAL

## 2023-05-15 ENCOUNTER — PATIENT MESSAGE (OUTPATIENT)
Dept: SLEEP MEDICINE | Facility: CLINIC | Age: 63
End: 2023-05-15
Payer: COMMERCIAL

## 2023-05-16 ENCOUNTER — TELEPHONE (OUTPATIENT)
Dept: ORTHOPEDICS | Facility: CLINIC | Age: 63
End: 2023-05-16
Payer: COMMERCIAL

## 2023-05-22 ENCOUNTER — PATIENT MESSAGE (OUTPATIENT)
Dept: FAMILY MEDICINE | Facility: CLINIC | Age: 63
End: 2023-05-22
Payer: COMMERCIAL

## 2023-05-22 ENCOUNTER — PATIENT MESSAGE (OUTPATIENT)
Dept: SLEEP MEDICINE | Facility: CLINIC | Age: 63
End: 2023-05-22
Payer: COMMERCIAL

## 2023-05-22 DIAGNOSIS — E11.9 TYPE 2 DIABETES MELLITUS WITHOUT COMPLICATION, WITHOUT LONG-TERM CURRENT USE OF INSULIN: Primary | ICD-10-CM

## 2023-05-23 ENCOUNTER — TELEPHONE (OUTPATIENT)
Dept: ADMINISTRATIVE | Facility: HOSPITAL | Age: 63
End: 2023-05-23
Payer: COMMERCIAL

## 2023-05-23 DIAGNOSIS — E11.9 TYPE 2 DIABETES MELLITUS WITHOUT COMPLICATION, WITHOUT LONG-TERM CURRENT USE OF INSULIN: Primary | ICD-10-CM

## 2023-05-23 NOTE — TELEPHONE ENCOUNTER
Please change patient's referral order from Nutrition to Diabetes Education.  Nutrition department does not see patients for the diagnosis of Diabetes. Thanks

## 2023-05-23 NOTE — TELEPHONE ENCOUNTER
Referral order changed. Only messaging you back so this doesn't slip through the cracks, since I'm logged into Epic under [facility] right now. Thanks!

## 2023-05-29 ENCOUNTER — PATIENT MESSAGE (OUTPATIENT)
Dept: PAIN MEDICINE | Facility: CLINIC | Age: 63
End: 2023-05-29
Payer: COMMERCIAL

## 2023-05-31 ENCOUNTER — TELEPHONE (OUTPATIENT)
Dept: ORTHOPEDICS | Facility: CLINIC | Age: 63
End: 2023-05-31
Payer: COMMERCIAL

## 2023-05-31 DIAGNOSIS — M25.511 BILATERAL SHOULDER PAIN, UNSPECIFIED CHRONICITY: Primary | ICD-10-CM

## 2023-05-31 DIAGNOSIS — M25.512 BILATERAL SHOULDER PAIN, UNSPECIFIED CHRONICITY: Primary | ICD-10-CM

## 2023-06-01 ENCOUNTER — OFFICE VISIT (OUTPATIENT)
Dept: ORTHOPEDICS | Facility: CLINIC | Age: 63
End: 2023-06-01
Payer: COMMERCIAL

## 2023-06-01 ENCOUNTER — HOSPITAL ENCOUNTER (OUTPATIENT)
Dept: RADIOLOGY | Facility: HOSPITAL | Age: 63
Discharge: HOME | End: 2023-06-01
Attending: ORTHOPAEDIC SURGERY
Payer: COMMERCIAL

## 2023-06-01 VITALS — WEIGHT: 293 LBS | HEIGHT: 64 IN | BODY MASS INDEX: 50.02 KG/M2

## 2023-06-01 DIAGNOSIS — M25.612 DECREASED RANGE OF MOTION OF LEFT SHOULDER: ICD-10-CM

## 2023-06-01 DIAGNOSIS — G89.29 CHRONIC PAIN OF BOTH SHOULDERS: ICD-10-CM

## 2023-06-01 DIAGNOSIS — M54.12 CERVICAL RADICULOPATHY: ICD-10-CM

## 2023-06-01 DIAGNOSIS — M19.011 OSTEOARTHRITIS OF BILATERAL GLENOHUMERAL JOINTS: ICD-10-CM

## 2023-06-01 DIAGNOSIS — M25.512 BILATERAL SHOULDER PAIN, UNSPECIFIED CHRONICITY: ICD-10-CM

## 2023-06-01 DIAGNOSIS — M25.511 BILATERAL SHOULDER PAIN, UNSPECIFIED CHRONICITY: ICD-10-CM

## 2023-06-01 DIAGNOSIS — M19.012 OSTEOARTHRITIS OF BILATERAL GLENOHUMERAL JOINTS: ICD-10-CM

## 2023-06-01 DIAGNOSIS — M25.511 CHRONIC PAIN OF BOTH SHOULDERS: ICD-10-CM

## 2023-06-01 DIAGNOSIS — M75.51 SUBACROMIAL BURSITIS OF BOTH SHOULDERS: ICD-10-CM

## 2023-06-01 DIAGNOSIS — M75.52 SUBACROMIAL BURSITIS OF BOTH SHOULDERS: ICD-10-CM

## 2023-06-01 DIAGNOSIS — M25.512 CHRONIC PAIN OF BOTH SHOULDERS: ICD-10-CM

## 2023-06-01 DIAGNOSIS — G89.29 CHRONIC PAIN OF BOTH SHOULDERS: Primary | ICD-10-CM

## 2023-06-01 DIAGNOSIS — M25.512 CHRONIC PAIN OF BOTH SHOULDERS: Primary | ICD-10-CM

## 2023-06-01 DIAGNOSIS — M25.511 CHRONIC PAIN OF BOTH SHOULDERS: Primary | ICD-10-CM

## 2023-06-01 DIAGNOSIS — M54.2 NECK PAIN: ICD-10-CM

## 2023-06-01 PROCEDURE — 3044F PR MOST RECENT HEMOGLOBIN A1C LEVEL <7.0%: ICD-10-PCS | Mod: CPTII,S$GLB,, | Performed by: ORTHOPAEDIC SURGERY

## 2023-06-01 PROCEDURE — 3008F PR BODY MASS INDEX (BMI) DOCUMENTED: ICD-10-PCS | Mod: CPTII,S$GLB,, | Performed by: ORTHOPAEDIC SURGERY

## 2023-06-01 PROCEDURE — 4010F PR ACE/ARB THEARPY RXD/TAKEN: ICD-10-PCS | Mod: CPTII,S$GLB,, | Performed by: ORTHOPAEDIC SURGERY

## 2023-06-01 PROCEDURE — 99214 PR OFFICE/OUTPT VISIT, EST, LEVL IV, 30-39 MIN: ICD-10-PCS | Mod: S$GLB,,, | Performed by: ORTHOPAEDIC SURGERY

## 2023-06-01 PROCEDURE — 3061F PR NEG MICROALBUMINURIA RESULT DOCUMENTED/REVIEW: ICD-10-PCS | Mod: CPTII,S$GLB,, | Performed by: ORTHOPAEDIC SURGERY

## 2023-06-01 PROCEDURE — 3061F NEG MICROALBUMINURIA REV: CPT | Mod: CPTII,S$GLB,, | Performed by: ORTHOPAEDIC SURGERY

## 2023-06-01 PROCEDURE — 3066F PR DOCUMENTATION OF TREATMENT FOR NEPHROPATHY: ICD-10-PCS | Mod: CPTII,S$GLB,, | Performed by: ORTHOPAEDIC SURGERY

## 2023-06-01 PROCEDURE — 73030 XR SHOULDER COMPLETE 2 OR MORE VIEWS BILATERAL: ICD-10-PCS | Mod: 26,50,, | Performed by: RADIOLOGY

## 2023-06-01 PROCEDURE — 3008F BODY MASS INDEX DOCD: CPT | Mod: CPTII,S$GLB,, | Performed by: ORTHOPAEDIC SURGERY

## 2023-06-01 PROCEDURE — 73030 X-RAY EXAM OF SHOULDER: CPT | Mod: TC,50,PN

## 2023-06-01 PROCEDURE — 99999 PR PBB SHADOW E&M-EST. PATIENT-LVL IV: ICD-10-PCS | Mod: PBBFAC,,, | Performed by: ORTHOPAEDIC SURGERY

## 2023-06-01 PROCEDURE — 4010F ACE/ARB THERAPY RXD/TAKEN: CPT | Mod: CPTII,S$GLB,, | Performed by: ORTHOPAEDIC SURGERY

## 2023-06-01 PROCEDURE — 1159F PR MEDICATION LIST DOCUMENTED IN MEDICAL RECORD: ICD-10-PCS | Mod: CPTII,S$GLB,, | Performed by: ORTHOPAEDIC SURGERY

## 2023-06-01 PROCEDURE — 1159F MED LIST DOCD IN RCRD: CPT | Mod: CPTII,S$GLB,, | Performed by: ORTHOPAEDIC SURGERY

## 2023-06-01 PROCEDURE — 99214 OFFICE O/P EST MOD 30 MIN: CPT | Mod: S$GLB,,, | Performed by: ORTHOPAEDIC SURGERY

## 2023-06-01 PROCEDURE — 73030 X-RAY EXAM OF SHOULDER: CPT | Mod: 26,50,, | Performed by: RADIOLOGY

## 2023-06-01 PROCEDURE — 99999 PR PBB SHADOW E&M-EST. PATIENT-LVL IV: CPT | Mod: PBBFAC,,, | Performed by: ORTHOPAEDIC SURGERY

## 2023-06-01 PROCEDURE — 3066F NEPHROPATHY DOC TX: CPT | Mod: CPTII,S$GLB,, | Performed by: ORTHOPAEDIC SURGERY

## 2023-06-01 PROCEDURE — 3044F HG A1C LEVEL LT 7.0%: CPT | Mod: CPTII,S$GLB,, | Performed by: ORTHOPAEDIC SURGERY

## 2023-06-01 RX ORDER — BACLOFEN 10 MG/1
5-10 TABLET ORAL NIGHTLY PRN
Qty: 30 TABLET | Refills: 2 | Status: SHIPPED | OUTPATIENT
Start: 2023-06-01 | End: 2023-08-21

## 2023-06-01 NOTE — PROGRESS NOTES
Subjective:      Patient ID: Yulissa Hatfield is a 62 y.o. female.    Chief Complaint: Follow-up of the Right Shoulder and Follow-up of the Left Shoulder      HPI  Yulissa Hatfield is a  62 y.o. female presenting today for bilateral shoulder pain.  There was not a history of trauma.  Onset of symptoms began several years ago   She has been treated for cervical problems as well and been followed by pain management   Recently symptoms in the shoulder gotten worse she is having weakness in both shoulders  Previously we had ordered MRI scans to check the rotator cuff but this was never followed up on.      Review of patient's allergies indicates:  No Known Allergies      Current Outpatient Medications   Medication Sig Dispense Refill    acetaminophen (TYLENOL) 500 MG tablet Take 1,000 mg by mouth every 6 (six) hours as needed for Pain.      baclofen (LIORESAL) 10 MG tablet Take 0.5-1 tablets (5-10 mg total) by mouth nightly as needed (pain or spasm). 30 tablet 2    chlorthalidone (HYGROTEN) 50 MG Tab Take 1 tablet (50 mg total) by mouth once daily. 90 tablet 0    diclofenac sodium (VOLTAREN) 1 % Gel Apply 2 g topically 4 (four) times daily. 100 g 1    ezetimibe (ZETIA) 10 mg tablet TAKE 1 TABLET BY MOUTH EVERY DAY 90 tablet 1    ibuprofen (ADVIL,MOTRIN) 200 MG tablet Take 600 mg by mouth every 6 (six) hours as needed for Pain.      levothyroxine (SYNTHROID) 112 MCG tablet Take 1 tablet (112 mcg total) by mouth before breakfast. 90 tablet 2    rosuvastatin (CRESTOR) 20 MG tablet Take 1 tablet (20 mg total) by mouth once daily. 90 tablet 3    SOOLANTRA 1 % Crea       telmisartan (MICARDIS) 80 MG Tab TAKE 1 TABLET BY MOUTH EVERY DAY 90 tablet 1    tirzepatide 2.5 mg/0.5 mL PnIj Inject 2.5 mg into the skin every 7 days. 4 pen 0    cyclobenzaprine (FLEXERIL) 10 MG tablet Take 1 tablet (10 mg total) by mouth every evening. 30 tablet 0     No current facility-administered medications for this visit.       Past Medical  "History:   Diagnosis Date    Generalized anxiety disorder with panic attacks     HTN (hypertension)     Hyperlipidemia     Hypothyroid     DANIELLE on CPAP     Type 2 diabetes mellitus        Past Surgical History:   Procedure Laterality Date    CARPAL TUNNEL RELEASE       SECTION, CLASSIC      x 3    COLONOSCOPY N/A 11/10/2021    Procedure: COLONOSCOPY;  Surgeon: Kayla Pope MD;  Location: [facility];  Service: Endoscopy;  Laterality: N/A;    ENDOMETRIAL ABLATION      ESOPHAGOGASTRODUODENOSCOPY N/A 11/10/2021    Procedure: EGD (ESOPHAGOGASTRODUODENOSCOPY);  Surgeon: Kayla Pope MD;  Location: [facility];  Service: Endoscopy;  Laterality: N/A;    FOOT SURGERY      INJECTION OF JOINT Left 2022    Procedure: Injection, Joint, Shoulder, Hip, Or Knee;  Surgeon: Sowmya Tellez MD;  Location: [facility];  Service: Pain Management;  Laterality: Left;  Left Glenohumeral Joint Injection    KNEE SURGERY      THYROID SURGERY         Review of Systems:  ROS    OBJECTIVE:     PHYSICAL EXAM:  Height: 5' 4" (162.6 cm) Weight: (!) 152.9 kg (337 lb)  Vitals:    23 1459   Weight: (!) 152.9 kg (337 lb)   Height: 5' 4" (1.626 m)   PainSc:   8     Well developed, well nourished female in no acute distress  Alert and oriented x 3  HEENT- Normal exam  Lungs- Clear to auscultation  Heart- Regular rate and rhythm  Abdomen- Soft nontender  Extremity exam- examination of the shoulders demonstrate limited range of motion bilaterally   There is stiffness especially on the left   There is weakness of the rotator cuff bilateral shoulders   Neurologic exam intact    RADIOGRAPHS:  AP lateral x-rays bilateral shoulders demonstrate degenerative changes bilaterally  Comments: I have personally reviewed the imaging and I agree with the above radiologist's report.    ASSESSMENT/PLAN:     IMPRESSION:  1. Bilateral shoulder arthritis.      2. Possible rotator cuff tears chronic bilateral shoulders    PLAN:  Patient " does have quite a bit of weakness in both shoulders  She is never had the MRIs of the shoulder so I have recommended that we get MRI scans of both shoulders to check the integrity of the rotator cuff  In the future she may require surgery for shoulder replacement  In the meantime continue current medications  Follow up after the MRIs are complete       - We talked at length about the anatomy and pathophysiology of   Encounter Diagnoses   Name Primary?    Chronic pain of both shoulders Yes    Bilateral shoulder pain, unspecified chronicity            Disclaimer: This note has been generated using voice-recognition software. There may be typographical errors that have been missed during proof-reading.

## 2023-06-02 ENCOUNTER — TELEPHONE (OUTPATIENT)
Dept: DIABETES | Facility: CLINIC | Age: 63
End: 2023-06-02
Payer: COMMERCIAL

## 2023-06-05 ENCOUNTER — PATIENT MESSAGE (OUTPATIENT)
Dept: FAMILY MEDICINE | Facility: CLINIC | Age: 63
End: 2023-06-05
Payer: COMMERCIAL

## 2023-06-05 DIAGNOSIS — E11.9 TYPE 2 DIABETES MELLITUS WITHOUT COMPLICATION, WITHOUT LONG-TERM CURRENT USE OF INSULIN: ICD-10-CM

## 2023-06-05 RX ORDER — TIRZEPATIDE 5 MG/.5ML
5 INJECTION, SOLUTION SUBCUTANEOUS
Qty: 4 PEN | Refills: 0 | Status: SHIPPED | OUTPATIENT
Start: 2023-06-05 | End: 2023-07-06

## 2023-06-07 ENCOUNTER — PATIENT MESSAGE (OUTPATIENT)
Dept: CARDIOLOGY | Facility: CLINIC | Age: 63
End: 2023-06-07
Payer: COMMERCIAL

## 2023-06-07 ENCOUNTER — PATIENT MESSAGE (OUTPATIENT)
Dept: PAIN MEDICINE | Facility: CLINIC | Age: 63
End: 2023-06-07
Payer: COMMERCIAL

## 2023-06-12 ENCOUNTER — CLINICAL SUPPORT (OUTPATIENT)
Dept: REHABILITATION | Facility: HOSPITAL | Age: 63
End: 2023-06-12
Payer: COMMERCIAL

## 2023-06-12 DIAGNOSIS — G89.29 CHRONIC BILATERAL LOW BACK PAIN, UNSPECIFIED WHETHER SCIATICA PRESENT: Primary | ICD-10-CM

## 2023-06-12 DIAGNOSIS — G89.29 CHRONIC PAIN OF BOTH SHOULDERS: ICD-10-CM

## 2023-06-12 DIAGNOSIS — M25.512 CHRONIC PAIN OF BOTH SHOULDERS: ICD-10-CM

## 2023-06-12 DIAGNOSIS — M54.50 CHRONIC BILATERAL LOW BACK PAIN, UNSPECIFIED WHETHER SCIATICA PRESENT: Primary | ICD-10-CM

## 2023-06-12 DIAGNOSIS — M25.511 CHRONIC PAIN OF BOTH SHOULDERS: ICD-10-CM

## 2023-06-12 PROCEDURE — 97810 ACUP 1/> WO ESTIM 1ST 15 MIN: CPT

## 2023-06-12 NOTE — PROGRESS NOTES
Acupuncture Evaluation Note     Name: Yulissa SANCHEZ [facility] Number: [id number]    Traditional Chinese Medicine (TCM) Diagnosis: Qi Stagnation and Blood Stasis  Medical Diagnosis: chronic low back pain     Evaluation Date: 6/12/2023    Visit #/Visits authorized: 1/ 0     Precautions: Diabetes (borderline, taking medication)    Subjective     Chief Concern: Chronic Low back pain; Shoulder pain  Low back pain for about 5 years - thinks back pain is due to weight. Pain comes and goes through the day. Been diagnosed with spinal stenosis, and degenerative disc disease. Pain is worse with longer periods of one position . By the end of the day her whole body is tired and painful.    Pain in shoulders. Will have a MRI on the 23rd, been told it is arthritis, has had the pain for 5 years. Both shoulders. The pain is causing her stress, she is not sleeping well due to pain. Arm, can not move to the back, can not raise above shoulder. Started in her right shoulder, now in both shoulders. Has done physical therapy. Pain is in shoulder and radiates to back of neck and down upper arm.    Has to change positions every hour or hour and a half at night.   Pain is a stabbing pain. Pain is constant, worse at night, worse with certain movements.    Takes tylelonol and baclafin for muscle pain.     Had acupuncture once before.    On a wellness plan, will start at gymn with swimming.    Medical necessity is demonstrated by the following IMPAIRMENTS: Medical Necessity: Decreased mobility limits day to day activities, social, and emergent situations and Decreased quality of life      HEENT:  not assessed    Chest:  not assessed    Digestion:     Taste/Appetite: not assessed     Sleep:   disturbed by pain    Energy Levels:  low    Psychological Symptoms:  not assessed   for a non-profit.    Other Symptoms:    - Knee pain    GYN Symptoms:   post menopausal    Objective     Observation: good ulloa    Tongue:  not  assessed    Pulse:  not assessed    Treatment     Treatment Principles:  Move Qi and Blood; stop pain    Needle Set 1 -     Acupuncture points used:     Bilateral:  Ear (C. Gyrus, sympathetic, omega2, ulloa men, pt zero)    Needles In: 10  Needles Out: 10  Needles W/O STIM placed:   1:35  Needles W/O STIM removed:   1:55    Other Traditional Chinese Medicine Modalities -  Take home -  press tacks, right ear (BFA points) , retain for 3-5 days    Assessment     After treatment, patient felt  less pain at her shoulders, better range of motion, could lift both arms to shoulder level.     Patient prognosis is Fair.     Patient will continue to benefit from acupuncture treatment to address the deficits listed in the problem list box on initial evaluation, provide patient family education and to maximize pt's level of independence in the home and community environment.     Patient's spiritual, cultural and educational needs considered and pt agreeable to plan of care and goals.       Anticipated barriers to treatment:   none    Plan     Recommend 1 /week for 6 sessions then re-assess.      Recommendations:  observe severity, duration and location of pain and report next session    Education:  Patient is aware of cumulative effect of acupuncture

## 2023-06-13 ENCOUNTER — PATIENT MESSAGE (OUTPATIENT)
Dept: PAIN MEDICINE | Facility: CLINIC | Age: 63
End: 2023-06-13
Payer: COMMERCIAL

## 2023-06-19 ENCOUNTER — CLINICAL SUPPORT (OUTPATIENT)
Dept: REHABILITATION | Facility: HOSPITAL | Age: 63
End: 2023-06-19
Payer: COMMERCIAL

## 2023-06-19 DIAGNOSIS — G89.29 CHRONIC BILATERAL LOW BACK PAIN, UNSPECIFIED WHETHER SCIATICA PRESENT: Primary | ICD-10-CM

## 2023-06-19 DIAGNOSIS — M54.50 CHRONIC BILATERAL LOW BACK PAIN, UNSPECIFIED WHETHER SCIATICA PRESENT: Primary | ICD-10-CM

## 2023-06-19 DIAGNOSIS — M25.511 CHRONIC PAIN OF BOTH SHOULDERS: ICD-10-CM

## 2023-06-19 DIAGNOSIS — G89.29 CHRONIC PAIN OF BOTH SHOULDERS: ICD-10-CM

## 2023-06-19 DIAGNOSIS — M25.512 CHRONIC PAIN OF BOTH SHOULDERS: ICD-10-CM

## 2023-06-19 PROCEDURE — 97811 ACUP 1/> W/O ESTIM EA ADD 15: CPT

## 2023-06-19 PROCEDURE — 97810 ACUP 1/> WO ESTIM 1ST 15 MIN: CPT

## 2023-07-03 ENCOUNTER — CLINICAL SUPPORT (OUTPATIENT)
Dept: REHABILITATION | Facility: HOSPITAL | Age: 63
End: 2023-07-03
Payer: COMMERCIAL

## 2023-07-03 DIAGNOSIS — I83.813 VARICOSE VEINS OF BOTH LOWER EXTREMITIES WITH PAIN: ICD-10-CM

## 2023-07-03 PROCEDURE — 97165 OT EVAL LOW COMPLEX 30 MIN: CPT

## 2023-07-03 PROCEDURE — 97530 THERAPEUTIC ACTIVITIES: CPT | Mod: 97

## 2023-07-05 NOTE — PLAN OF CARE
[facility]  Occupational Therapy Initial Evaluation  Lymphedema      Name: Yulissa SANCHEZ [facility] Number: [id number]    Therapy Diagnosis:   Encounter Diagnosis   Name Primary?    Varicose veins of both lower extremities with pain      Physician: Sebastian Abbasi III*    Physician Orders: Eval and Treat  Medical Diagnosis: I83.813 (ICD-10-CM) - Varicose veins of both lower extremities with   pain  Evaluation Date: 7/3/2023  Insurance Authorization Period Expiration: 6/6/2024  Plan of Care Certification Period: 9/25/2023  Visit # / Visits authorized: 1 / 1  FOTO: see media, 1 / 3    Precautions:  Standard and Diabetes    Time In: 8:03  Time Out: 9:10  Total Appointment Time (timed & untimed codes): 67 minutes    Subjective      Date of onset: 10+ years ago   History of current condition - Yulissa reports: she has experienced cramping, tightness, pain, and edema in BLE for 10 years and was told these symptoms were due to obesity. She recently learned of lymphedema from  attending lymphedema therapy. She has tried knee high 15-20 mm hg and 20-30 mm hg stockings that she purchased online and through [employer], however, all pairs rolled down or were too tight. She was told at [employer] that she would require a custom made garment due to leg size. She is not currently wearing compression.       Previous Lymphedema Therapy: No  Wears Compression: no     Imaging:     CV US Lower Extremity Veins Bilateral Insufficiency   Conclusion  There is no evidence of a right lower extremity DVT.  There is no evidence of a left lower extremity DVT.  The left greater saphenous vein has reflux around the calf  This was a technically difficult study      Pain:  Functional Pain Scale Rating 0-10:   4/10 on average  2/10 at best  10/10 at worst  Location: knees, lower legs  Description: cramping, spasm, aching  Aggravating Factors: Night Time and Morning  Easing Factors: pain medication,  "tylenol, baclofen PM     Occupation:    Working presently: employed      Functional Limitations/Social History:    Previous functional status includes: Independent with all ADLs.   Recent falls: no    Current Functional Status   Home/Living environment: lives with their spouse          Limitation of Functional Status as follows:   ADLs/IADLs:     - Feeding: none    - Bathing: none    - Dressing/Grooming: April- tying shoes, fastening bra.    - Home Management: none    - Driving: none      Patient's Goals for Therapy: "Chad my legs to feel better. Get some of the swelling down even if it is just for periods of time."     Past Medical History/Physical Systems Review:   Yulissa Hatfield  has a past medical history of Generalized anxiety disorder with panic attacks, HTN (hypertension), Hyperlipidemia, Hypothyroid, DANIELLE on CPAP, and Type 2 diabetes mellitus.    Yulissa Hatfield  has a past surgical history that includes Carpal tunnel release; Foot surgery; Knee surgery;  section, classic; Thyroid surgery; Endometrial ablation; Colonoscopy (N/A, 11/10/2021); Esophagogastroduodenoscopy (N/A, 11/10/2021); and Injection of joint (Left, 2022).    Yulissa has a current medication list which includes the following prescription(s): acetaminophen, baclofen, chlorthalidone, diclofenac sodium, ezetimibe, ibuprofen, levothyroxine, rosuvastatin, soolantra, telmisartan, and mounjaro.    Review of patient's allergies indicates:  No Known Allergies     Pt denies: CHF,CKD, DVT, CA, infection, severe PAD  Pt admits medically managed/treated:    Objective      Patient received from waiting room.   Mental status: alert, oriented to person, place, and time  Appearance: Well groomed  Behavior:  calm and cooperative  Attention Span and Concentration:  Normal    Affected Areas: RLE and LLE  Refill: Day   Stemmer Sign: negative  Shape: unremarkable  Tissue Texture: soft, pliable, pitting  Skin Integrity: varicose " veins,  Sensation: intact  Circulation: intact      LE Girth Measurements: taken in supine  LANDMARK RIGHT LE  7/3 LEFT LE  7/3   SBP + 10  80.0 cm 75.0 cm   SBP 74.0 cm 71.0 cm   10 below SBP 57.0 cm 56.0 cm   20 below SBP 51.0 cm 46.5 cm   30 below SBP 38.0 cm 32.0 cm   35 below SBP 33.5 cm 30.5 cm   Ankle 33.5 cm 32.5 cm   Forefoot 25.0 cm 25.5 cm       Picture of BLE in supine taken via [employer] on therapist's cell phone with verbal consent from patient:          Limitation/Restriction for FOTO Lower Quadrant Edema Survey    Therapist reviewed FOTO scores for Yulissa Hatfield on 7/3/2023.   FOTO documents entered into [employer] - see Media section.    Limitation Score: 49%         Treatment      Total Treatment time (time-based codes) separate from Evaluation: 15 minutes    Yulissa received the treatments listed below:      therapeutic activities to improve functional performance for 15  minutes, including:    Pt was educated in potential compression needs.  Demo of products including socks, garments, and Inelastic Velcro wraps.   Discussed cost/coverage and authorization per insurance with Durable Medical Equipment(DME) provider.  Compression require orders from referring provider and coverage or purchase of products from DME or self order.      Discussed wear schedule, don/doff, wash and management of products.  Size and compression class and AM/PM needs.    Consideration for Edema Wear as form of temporary compression use until securing compression needs.   Consideration for shorts/tights or capris style compression to compliment lower leg compression to support size/shape of LE.   Product information provided.   Vendor list provided.    Informed insurance coverage of compression is per DME provider and typically Medicare and Medicare group plans may not cover cost beyond pair of standard sized knee high garments.   Commitment to attendance as well as commitment to securing compression needs is critical to  edema management.     Patient Education and Home Exercises      Education provided:   1. Educated on definition of lymphedema.  2. Explained the Complete Decongestive Therapy protocol in depth  3. Educated on Phase 1 and 2 of protocol.  4. Reviewed treatment frequency and likely duration of weeks  5.Contraindications for treatment.  6. Plan of care and goals.  7. Educated on home management protocols.   8. Role of OT, goals for OT, scheduling/cancellations, insurance limitations.  9. Provided lymphedema educational pamphlet          Assessment      Yulissa is a 62 y.o. female referred to outpatient Occupational Therapy with a medical diagnosis of varicose veins of both lower extremities with pain. This patient presents with stage 1 lymphedema due to CVI.  Patient's deficits include swelling of the BLE, increased pain, increased stiffness, as well as difficulty performing lower body dressing , and placing the pt at higher risk of infection.  Therapist's recommendation includes elevation, exercise, manual lymphatic drainage, pneumatic compression pump, multi-layer bandaging, and compression garments for 6 weeks to reduce swelling. Yulissa would benefit from complete decongestive therapy to reduce size of BLE, reduce risk of wounds and poor skin integrity as well as education to self-maintain reduction with use of compression garments.    Anticipated barriers to occupational therapy: none    Plan of care discussed with patient: Yes  Patient's spiritual, cultural and educational needs considered and patient is agreeable to the plan of care and goals as stated below:     Medical Necessity is demonstrated by the following  Occupational Profile/History  Co-morbidities and personal factors that may impact the plan of care [x] LOW: Brief chart review  [] MODERATE: Expanded chart review   [] HIGH: Extensive chart review    Moderate / High Support Documentation: N/A     Examination  Performance deficits relating to physical,  cognitive or psychosocial skills that result in activity limitations and/or participation restrictions  [x] LOW: addressing 1-3 Performance deficits  [] MODERATE: 3-5 Performance deficits  [] HIGH: 5+ Performance deficits (please support below)    Moderate / High Support Documentation:    Physical:  Edema    Cognitive:  No Deficits    Psychosocial:    No Deficits     Treatment Options [] LOW: Limited options  [x] MODERATE: Several options  [] HIGH: Multiple options      Decision Making/ Complexity Score: low       The following goals were discussed with the patient and patient is in agreement with them as to be addressed in the treatment plan.     Goals:     Short Term Goals: (3 weeks)  Goals: Progressing / MET   1. Patient will demonstrate 100% knowledge of lymphedema precautions and signs of infection to allow for reduced lymphedema risk, infection risk, and/or exacerbation of condition.  PROGRESSING   2. Patient will tolerate daily activities wearing multilayered bandaging to allow for lymphatic drainage support, skin elasticity, and reduction in shape and size of limb.  PROGRESSING   3. Patient and/or caregiver will order/obtain appropriate compression garments to maintain lymphatic and venous support.  PROGRESSING   4. Patient will show decreased total girth measurement in BLE by up to 1 cm to allow for lower extremity symmetry, shoe and clothing choice, and ability to apply needed compression. PROGRESSING     Long Term Goals: (6 weeks)  Goals: Progressing / MET   1. Patient and/or caregiver to kassandra/doff compression garment independently and with daily compliance to assist in lymphedema management, skin elasticity, and tissue density PROGRESSING   2. Patient and/or caregiver will be independent in use of pneumatic compression pump or self manual lymphatic drainage techniques to areas within reach to enhance lymphatic drainage and skin condition.  PROGRESSING   3. Patient to be independent and compliant with  home exercise program to allow for increased function in affected limb. PROGRESSING   4. Patient will show decreased total girth measurement in BLE by up to 2 cm  to allow for lower extremity symmetry, shoe and clothing choice, and ability to apply needed compression daily. PROGRESSING        Plan     Plan of Care Certification: 7/3/2023 to 9/25/2023.     Therapy Track: COMPLETE DECONGESTIVE THERAPY  Interventions: manual lymphatic drainage, multi-layer bandaging, compression garments, therapeutic exercise, self bandaging and manual lymphatic drainage training, home exercise programming.      Outpatient Occupational Therapy 2 times weekly for 6 weeks to include the following interventions: Manual therapy/joint mobilizations, Therapeutic exercises/activities., and Edema Control.    Bailey Fritz, OT, CLWT      I CERTIFY THE NEED FOR THESE SERVICES FURNISHED UNDER THIS PLAN OF TREATMENT AND WHILE UNDER MY CARE   Physician's comments:     Physician's Signature: ___________________________________________________

## 2023-07-06 DIAGNOSIS — E11.9 TYPE 2 DIABETES MELLITUS WITHOUT COMPLICATION, WITHOUT LONG-TERM CURRENT USE OF INSULIN: ICD-10-CM

## 2023-07-06 RX ORDER — TIRZEPATIDE 5 MG/.5ML
INJECTION, SOLUTION SUBCUTANEOUS
OUTPATIENT
Start: 2023-07-06

## 2023-07-06 NOTE — PROGRESS NOTES
Acupuncture Treatment Note     Name: Yulissa SANCHEZ [facility] Number: [id number]    Traditional Chinese Medicine Diagnosis: Qi stagnation and Blood stasis  Physician: No ref. provider found    Date of Service: 6/19/2023     Medical Diagnosis: chronic low back pain  Evaluation Date: 6/12/2023    Visit #/Visits authorized: 2/ 0     Precautions: Diabetes (borderline, taking medication)    Subjective     Chief Complaint:    Chronic Low back pain; Shoulder pain  Low back pain for about 5 years - thinks back pain is due to weight. Pain comes and goes through the day. Been diagnosed with spinal stenosis, and degenerative disc disease. Pain is worse with longer periods of one position . By the end of the day her whole body is tired and painful.     Pain in shoulders. Will have a MRI on the 23rd, been told it is arthritis, has had the pain for 5 years. Both shoulders. The pain is causing her stress, she is not sleeping well due to pain. Arm, can not move to the back, can not raise above shoulder. Started in her right shoulder, now in both shoulders. Has done physical therapy. Pain is in shoulder and radiates to back of neck and down upper arm.     Has to change positions every hour or hour and a half at night.   Pain is a stabbing pain. Pain is constant, worse at night, worse with certain movements.     Takes tylelonol and baclafin for muscle pain.     Had acupuncture once before.     On a wellness plan, will start at gymn with swimming.     Medical necessity is demonstrated by the following IMPAIRMENTS: Medical Necessity: Decreased mobility limits day to day activities, social, and emergent situations and Decreased quality of life     Response to Previous Treatment:    no change     Quality of Symptoms (Better/Worse):  no change    Other Condition/Symptoms:    - Sleep:   disturbed by pain     - Energy Levels:  low     - Knee pain        Objective      New Findings:    none    Pulse:    not assessed  Tongue:    not  assessed    Treatment      Treatment Principles:    Move Qi & Blood; stop pain    Needle Set 1 -     Acupuncture Points:    Face down -   Bilateral: BL60, LI15, TW14, SI11, BL23, Britton Miguel, BL13  Right:  GB30, yvrose lateral border sacrum +3  Left:  Centerline:  GV14    NEEDLES W/O STIM  AT:    1:15  NEEDLES W/O STIM REMOVED AT:    1:45   #NEEDLES IN:   19  #NEEDLES OUT:   19    Other Traditional Chinese Medicine Modalities:   none    Recommendations:    Observe severity, location and duration of pain and report next session    Education:  Patient is aware of cumulative effect of acupuncture      Assessment      Analysis of Treatment:    Patient relaxed but stiff after lying down    Pt prognosis is Fair.     Patient will continue to benefit from acupuncture treatment to address the deficits listed in the problem list box on initial evaluation, provide patient family education and to maximize pt's level of independence in the home and community environment.     Patient's spiritual, cultural and educational needs considered and pt agreeable to plan of care and goals.     Anticipated barriers to treatment:   none    Plan     Recommend    continue with care plan.

## 2023-07-06 NOTE — TELEPHONE ENCOUNTER
No care due was identified.  [facility] Embedded Care Due Messages. Reference number: [id number].   7/06/2023 12:13:57 AM CDT

## 2023-07-06 NOTE — TELEPHONE ENCOUNTER
Refill Routing Note   Medication(s) are not appropriate for processing by [facility] for the following reason(s):      New or recently adjusted medication    [facility] action(s):  Defer None identified     Medication Therapy Plan: New start (6/5/23); Defer      Appointments  past 12m or future 3m with PCP    Date Provider   Last Visit   5/1/2023 Emilia Velasquez MD   Next Visit   Visit date not found Emilia Velasquez MD   ED visits in past 90 days: 0        Note composed:9:21 AM 07/06/2023

## 2023-07-09 ENCOUNTER — PATIENT MESSAGE (OUTPATIENT)
Dept: ORTHOPEDICS | Facility: CLINIC | Age: 63
End: 2023-07-09
Payer: COMMERCIAL

## 2023-07-10 ENCOUNTER — PATIENT MESSAGE (OUTPATIENT)
Dept: FAMILY MEDICINE | Facility: CLINIC | Age: 63
End: 2023-07-10
Payer: COMMERCIAL

## 2023-07-10 ENCOUNTER — CLINICAL SUPPORT (OUTPATIENT)
Dept: REHABILITATION | Facility: HOSPITAL | Age: 63
End: 2023-07-10
Payer: COMMERCIAL

## 2023-07-10 DIAGNOSIS — I83.813 VARICOSE VEINS OF BOTH LOWER EXTREMITIES WITH PAIN: Primary | ICD-10-CM

## 2023-07-10 PROCEDURE — 97110 THERAPEUTIC EXERCISES: CPT | Mod: 97

## 2023-07-10 PROCEDURE — 97140 MANUAL THERAPY 1/> REGIONS: CPT

## 2023-07-10 PROCEDURE — 97016 VASOPNEUMATIC DEVICE THERAPY: CPT

## 2023-07-10 NOTE — PROGRESS NOTES
[facility]  Occupational Therapy Treatment Note  Lymphedema    Date: 7/10/2023  Name: Yulissa SANCHEZ [place]  Clinic Number: [id number]    Therapy Diagnosis:   Encounter Diagnosis   Name Primary?    Varicose veins of both lower extremities with pain Yes     Physician: Sebastian Abbasi III*     Physician Orders: Eval and Treat  Medical Diagnosis: I83.813 (ICD-10-CM) - Varicose veins of both lower extremities with   pain  Evaluation Date: 7/3/2023  Insurance Authorization Period Expiration: 6/6/2024  Plan of Care Certification Period: 9/25/2023  Visit # / Visits authorized: 1 / 20  FOTO: see media, 1 / 3     Precautions:  Standard and Diabetes    Time In: 4:28  Time Out: 5:33  Total Billable Time: 65 minutes    SUBJECTIVE     Pt reports: tolerated bandages x 13 hours, did not wear the Edema wear stockings due to misplacing them. Bioflect sofie's arrived but she has not yet tried them on. Compression stockings were shipped but have not yet arrived.   Yulissa reported wearing compression outside of therapy visits: No  Response to previous treatment: reports decreased swelling and leg cramping after wearing bandages.   Functional change: none    Pain: 3/10  Location: knees, BLE, shoulders      OBJECTIVE     Pt arrived out of compression in NAD.    Compression garment status: Bifolect capris delivered, 20-30 mm hg stockings en route   Compression Rx status: none  Pneumatic pump status: sent to [facility]    Objective Measures updated at progress report unless specified.    Treatment     Yulissa received the treatments listed below:     Manual therapy techniques were applied for 55 minutes, including:    manual therapy techniques: Complete Decongestive Therapy was applied to the: BLE for 55 minutes, including: manual lymphatic drainage and short stretch compression bandaging     MANUAL LYMPHATIC DRAINAGE: LLE   Supine with lower extremities elevated:  Deep abdominal techniques  Leg treatment  (anterior/lateral thigh, femoral vein vasa-vasorum, knee, lower leg, ankle, dorsum of foot)  Rework: leg, inguinal lymph nodes,abdominal techniques    SEQUENTIAL COMPRESSION PUMP:   Full leg sleeve applied to RLE  Lymphapress system with distal pressures starting at 45mmHg entire LE   -Simultaneous with Manual Lymphatic Drainage of LLE    MULTILAYERED BANDAGING:    Issued supplies and bandaged BLE   Use of Cavilon moisturizer for dry skin  Cotton stockinet: size 9  Cellona padding from dorsum of foot to knee,   2-6cm 2-8cm and 2-10cm Rosidal K rolls foot to distal knee      Pt to leave intact 48 hrs as tolerated, discontinue with any problems, return rolled bandages next session. Wash and wear schedules confirmed.     Therapeutic exercise was completed for 10 minutes, including:      Ankle pumps 3 x 10  Calf raises 3 x 10  Knee extension with 1' hold, 3 x 10  Ball squeezes 3 x 10    Educated on benefit of completing exercise in compression to maximize lymphatic return.      Patient Education and Home Exercises      Education provided:   - garment schedule: wear bioflect capris with knee high stockings or edema wear stockings  - Progress towards goals     Written Home Exercises Provided: yes.  Exercises were reviewed and Yulissa was able to demonstrate them prior to the end of the session.  Yulissa demonstrated good  understanding of the HEP provided. See EMR under Patient Instructions for exercises provided during therapy sessions.       Assessment     Tolerated bandages x 13 hours. Discussed importance of wearing compression to maximize reductions achieved with bandages.    Pt has stage 2 secondary lymphedema due to CVI. I recommend they elevate, exercise, try compression garments for a month to reduce swelling.     Pt would continue to benefit from skilled OT.      Yulissa is progressing well towards her goals and there are no updates to goals at this time. Pt prognosis is Good.     Pt will continue to benefit from  skilled outpatient occupational therapy to address the deficits listed in the problem list on initial evaluation provide pt/family education and to maximize pt's level of independence in the home and community environment.     Pt's spiritual, cultural and educational needs considered and pt agreeable to plan of care and goals.    Anticipated barriers to occupational therapy: none    Goals:    Short Term Goals: (3 weeks)  Goals: Progressing / MET   1. Patient will demonstrate 100% knowledge of lymphedema precautions and signs of infection to allow for reduced lymphedema risk, infection risk, and/or exacerbation of condition.  PROGRESSING   2. Patient will tolerate daily activities wearing multilayered bandaging to allow for lymphatic drainage support, skin elasticity, and reduction in shape and size of limb.  PROGRESSING   3. Patient and/or caregiver will order/obtain appropriate compression garments to maintain lymphatic and venous support.  PROGRESSING   4. Patient will show decreased total girth measurement in BLE by up to 1 cm to allow for lower extremity symmetry, shoe and clothing choice, and ability to apply needed compression. PROGRESSING      Long Term Goals: (6 weeks)  Goals: Progressing / MET   1. Patient and/or caregiver to kassandra/doff compression garment independently and with daily compliance to assist in lymphedema management, skin elasticity, and tissue density PROGRESSING   2. Patient and/or caregiver will be independent in use of pneumatic compression pump or self manual lymphatic drainage techniques to areas within reach to enhance lymphatic drainage and skin condition.  PROGRESSING   3. Patient to be independent and compliant with home exercise program to allow for increased function in affected limb. PROGRESSING   4. Patient will show decreased total girth measurement in BLE by up to 2 cm  to allow for lower extremity symmetry, shoe and clothing choice, and ability to apply needed compression  daily. PROGRESSING          PLAN     Updates/Grading for next session: none    Bailey Fritz, OT, CLWT

## 2023-07-10 NOTE — TELEPHONE ENCOUNTER
Please schedule OV with DELPHINE for dizziness     A1C already ordered by Dr. Velasquez needs to be scheduled    Please let her know these medications are being investigated for a cause of suicidal thoughts but it is not confirmed      Courtney Hodges MD

## 2023-07-12 ENCOUNTER — TELEPHONE (OUTPATIENT)
Dept: SPORTS MEDICINE | Facility: CLINIC | Age: 63
End: 2023-07-12
Payer: COMMERCIAL

## 2023-07-12 NOTE — TELEPHONE ENCOUNTER
Spoke with pt and let her know Kade would like to see her in person to do the results since he has never seen her before. She was fine with that because she was not able to do the virtual on Friday. Rescheduled pt on 8/11

## 2023-07-13 ENCOUNTER — CLINICAL SUPPORT (OUTPATIENT)
Dept: REHABILITATION | Facility: HOSPITAL | Age: 63
End: 2023-07-13
Payer: COMMERCIAL

## 2023-07-13 DIAGNOSIS — I83.813 VARICOSE VEINS OF BOTH LOWER EXTREMITIES WITH PAIN: ICD-10-CM

## 2023-07-13 DIAGNOSIS — I87.2 CHRONIC VENOUS STASIS DERMATITIS OF BOTH LOWER EXTREMITIES: Primary | ICD-10-CM

## 2023-07-13 PROCEDURE — 97110 THERAPEUTIC EXERCISES: CPT

## 2023-07-13 PROCEDURE — 97140 MANUAL THERAPY 1/> REGIONS: CPT | Mod: 97

## 2023-07-13 NOTE — PROGRESS NOTES
[facility]  Occupational Therapy Treatment Note  Lymphedema    Date: 7/13/2023  Name: Yulissa Hatfield  Clinic Number: [id number]    Therapy Diagnosis:   Encounter Diagnoses   Name Primary?    Chronic venous stasis dermatitis of both lower extremities Yes    Varicose veins of both lower extremities with pain      Physician: Sebastian Abbasi III*     Physician Orders: Eval and Treat  Medical Diagnosis: I83.813 (ICD-10-CM) - Varicose veins of both lower extremities with   pain  Evaluation Date: 7/3/2023  Insurance Authorization Period Expiration: 6/6/2024  Plan of Care Certification Period: 9/25/2023  Visit # / Visits authorized: 2 / 20  FOTO: see media, 1 / 3     Precautions:  Standard and Diabetes    Time In: 3:30  Time Out: 4:27  Total Billable Time: 57 minutes    SUBJECTIVE     Pt reports: tolerated bandages x 20 hours. Bioflect sofie's arrived and she will exchange for next size up. Compression stockings rolling down and too tight at calf.  Yulissa reported wearing compression outside of therapy visits: Yes, knee high stockings  Response to previous treatment: no change this session  Functional change: none    Pain: 3/10  Location: knees, BLE, shoulders      OBJECTIVE     Pt arrived wearing knee high compression stockings in NAD.    Compression garment status: Bifolect capris too small, exchanging for next size up. Mediven pluz size 7 20-30 mm hg stockings too small. Product information provided for mojo 20-30 mm hg size 4XL.  Compression Rx status: none  Pneumatic pump status: sent to [facility]. Progress note due 7/31/2023.    Objective Measures updated at progress report unless specified.    Treatment     Yulissa received the treatments listed below:     Manual therapy techniques were applied for 47 minutes, including:    manual therapy techniques: Complete Decongestive Therapy was applied to the: BLE for 55 minutes, including: manual lymphatic drainage and short stretch  compression bandaging     MANUAL LYMPHATIC DRAINAGE: BLE   Supine with lower extremities elevated:  Deep abdominal techniques  Leg treatment (anterior/lateral thigh, femoral vein vasa-vasorum, knee, lower leg, ankle, dorsum of foot)  Rework: leg, inguinal lymph nodes,abdominal techniques      MULTILAYERED BANDAGING:    Issued supplies and bandaged BLE   Use of Cavilon moisturizer for dry skin  Cotton stockinet: size 9  Cellona padding from dorsum of foot to knee,   2-6cm 2-8cm and 2-10cm Rosidal K rolls foot to distal knee      Pt to leave intact 48 hrs as tolerated, discontinue with any problems, return rolled bandages next session. Wash and wear schedules confirmed.     Therapeutic exercise was completed for 10 minutes, including:      Ankle pumps 3 x 10  Calf raises 3 x 10  Knee extension with 1' hold, 3 x 10  Ball squeezes 3 x 10    Educated on benefit of completing exercise in compression to maximize lymphatic return.      Patient Education and Home Exercises      Education provided:   - garment schedule: wear bioflect capris with knee high stockings or edema wear stockings  - Progress towards goals     Written Home Exercises Provided: yes.  Exercises were reviewed and Yulissa was able to demonstrate them prior to the end of the session.  Yulissa demonstrated good  understanding of the HEP provided. See EMR under Patient Instructions for exercises provided during therapy sessions.       Assessment     Mediven plus size 7 stockings too small at calf. Product information provided for new garment. No other changes.     Pt would continue to benefit from skilled OT.      Yulissa is progressing well towards her goals and there are no updates to goals at this time. Pt prognosis is Good.     Pt will continue to benefit from skilled outpatient occupational therapy to address the deficits listed in the problem list on initial evaluation provide pt/family education and to maximize pt's level of independence in the home and  community environment.     Pt's spiritual, cultural and educational needs considered and pt agreeable to plan of care and goals.    Anticipated barriers to occupational therapy: none    Goals:    Short Term Goals: (3 weeks)  Goals: Progressing / MET   1. Patient will demonstrate 100% knowledge of lymphedema precautions and signs of infection to allow for reduced lymphedema risk, infection risk, and/or exacerbation of condition.  PROGRESSING   2. Patient will tolerate daily activities wearing multilayered bandaging to allow for lymphatic drainage support, skin elasticity, and reduction in shape and size of limb.  PROGRESSING   3. Patient and/or caregiver will order/obtain appropriate compression garments to maintain lymphatic and venous support.  PROGRESSING   4. Patient will show decreased total girth measurement in BLE by up to 1 cm to allow for lower extremity symmetry, shoe and clothing choice, and ability to apply needed compression. PROGRESSING      Long Term Goals: (6 weeks)  Goals: Progressing / MET   1. Patient and/or caregiver to kassandra/doff compression garment independently and with daily compliance to assist in lymphedema management, skin elasticity, and tissue density PROGRESSING   2. Patient and/or caregiver will be independent in use of pneumatic compression pump or self manual lymphatic drainage techniques to areas within reach to enhance lymphatic drainage and skin condition.  PROGRESSING   3. Patient to be independent and compliant with home exercise program to allow for increased function in affected limb. PROGRESSING   4. Patient will show decreased total girth measurement in BLE by up to 2 cm  to allow for lower extremity symmetry, shoe and clothing choice, and ability to apply needed compression daily. PROGRESSING          PLAN     Updates/Grading for next session: none    Bailey Fritz, OT, CLWT

## 2023-07-14 DIAGNOSIS — E78.2 MIXED HYPERLIPIDEMIA: ICD-10-CM

## 2023-07-14 DIAGNOSIS — I10 ESSENTIAL HYPERTENSION: ICD-10-CM

## 2023-07-14 RX ORDER — TELMISARTAN 80 MG/1
TABLET ORAL
Qty: 90 TABLET | Refills: 3 | Status: SHIPPED | OUTPATIENT
Start: 2023-07-14

## 2023-07-14 RX ORDER — EZETIMIBE 10 MG/1
TABLET ORAL
Qty: 90 TABLET | Refills: 3 | Status: SHIPPED | OUTPATIENT
Start: 2023-07-14

## 2023-07-14 NOTE — TELEPHONE ENCOUNTER
No care due was identified.  [facility] Embedded Care Due Messages. Reference number: [id number].   7/14/2023 12:31:15 AM CDT

## 2023-07-14 NOTE — TELEPHONE ENCOUNTER
Refill Decision Note   Yulissa Washington  is requesting a refill authorization.  Brief Assessment and Rationale for Refill:  Approve     Medication Therapy Plan:       Medication Reconciliation Completed: No   Comments:     No Care Gaps recommended.     Note composed:8:05 AM 07/14/2023

## 2023-07-18 ENCOUNTER — CLINICAL SUPPORT (OUTPATIENT)
Dept: REHABILITATION | Facility: HOSPITAL | Age: 63
End: 2023-07-18
Payer: COMMERCIAL

## 2023-07-18 DIAGNOSIS — I83.813 VARICOSE VEINS OF BOTH LOWER EXTREMITIES WITH PAIN: ICD-10-CM

## 2023-07-18 DIAGNOSIS — I87.2 CHRONIC VENOUS STASIS DERMATITIS OF BOTH LOWER EXTREMITIES: Primary | ICD-10-CM

## 2023-07-18 PROCEDURE — 97110 THERAPEUTIC EXERCISES: CPT

## 2023-07-18 PROCEDURE — 97140 MANUAL THERAPY 1/> REGIONS: CPT | Mod: 97

## 2023-07-18 NOTE — PROGRESS NOTES
[facility]  Occupational Therapy Treatment Note  Lymphedema    Date: 7/18/2023  Name: Yulissa SANCHEZ [place]  Clinic Number: [id number]    Therapy Diagnosis:   Encounter Diagnoses   Name Primary?    Chronic venous stasis dermatitis of both lower extremities Yes    Varicose veins of both lower extremities with pain        Physician: Sebastian Abbasi III*     Physician Orders: Eval and Treat  Medical Diagnosis: I83.813 (ICD-10-CM) - Varicose veins of both lower extremities with   pain  Evaluation Date: 7/3/2023  Insurance Authorization Period Expiration: 6/6/2024  Plan of Care Certification Period: 9/25/2023  Visit # / Visits authorized: 3 / 20  FOTO: see media, 1 / 3     Precautions:  Standard and Diabetes    Time In: 9:02  Time Out: 10:00  Total Billable Time: 58 minutes    SUBJECTIVE     Pt reports: tolerated bandages x 26 hours. New bioflect sofie's arrived but she will exchange for leggings. New compression stockings rolling down and too tight at calf.  Yulissa reported wearing compression outside of therapy visits: Yes, knee high stockings and bioflect capris,   Response to previous treatment: no change this session  Functional change: none    Pain: 5/10  Location: knees, BLE, shoulders      OBJECTIVE     Pt arrived wearing knee high compression stockings in NAD.    Compression garment status: Bifolect sofie- exchanging for legging. Mojo 20-30 mm hg size 4XL bunching at ankles.  Compression Rx status: none  Pneumatic pump status: sent to [facility]. Progress note due 7/31/2023.    Objective Measures updated at progress report unless specified.    Treatment     Yulissa received the treatments listed below:     Manual therapy techniques were applied for 50 minutes, including:    manual therapy techniques: Complete Decongestive Therapy was applied to the: BLE for 55 minutes, including: manual lymphatic drainage and short stretch compression bandaging     MANUAL LYMPHATIC DRAINAGE: BLE    Supine with lower extremities elevated:  Deep abdominal techniques  Leg treatment (anterior/lateral thigh, femoral vein vasa-vasorum, knee, lower leg, ankle, dorsum of foot)  Rework: leg, inguinal lymph nodes,abdominal techniques      MULTILAYERED BANDAGING:    Issued supplies and bandaged BLE   Use of Cavilon moisturizer for dry skin  Cotton stockinet: size 9  Cellona padding from dorsum of foot to knee,   2-6cm 2-8cm and 2-10cm Rosidal K rolls foot to distal knee      Pt to leave intact 48 hrs as tolerated, discontinue with any problems, return rolled bandages next session. Wash and wear schedules confirmed.     Therapeutic exercise was completed for 8 minutes, including:      Ankle pumps 3 x 10  Calf raises 3 x 10  Knee extension with 1' hold, 3 x 10  Ball squeezes 3 x 10    Educated on benefit of completing exercise in compression to maximize lymphatic return.      Patient Education and Home Exercises      Education provided:   - Progress towards goals     Written Home Exercises Provided: yes.  Exercises were reviewed and Yulissa was able to demonstrate them prior to the end of the session.  Yulissa demonstrated good  understanding of the HEP provided. See EMR under Patient Instructions for exercises provided during therapy sessions.       Assessment     Mojo 4XL stockings bunching at ankles. Discussed potential to wear bioflect leggings with edema wear over feet if unable to locate comfortable 20-30 mm hg stocking.     Pt would continue to benefit from skilled OT.      Yulissa is progressing well towards her goals and there are no updates to goals at this time. Pt prognosis is Good.     Pt will continue to benefit from skilled outpatient occupational therapy to address the deficits listed in the problem list on initial evaluation provide pt/family education and to maximize pt's level of independence in the home and community environment.     Pt's spiritual, cultural and educational needs considered and pt  agreeable to plan of care and goals.    Anticipated barriers to occupational therapy: none    Goals:    Short Term Goals: (3 weeks)  Goals: Progressing / MET   1. Patient will demonstrate 100% knowledge of lymphedema precautions and signs of infection to allow for reduced lymphedema risk, infection risk, and/or exacerbation of condition.  PROGRESSING   2. Patient will tolerate daily activities wearing multilayered bandaging to allow for lymphatic drainage support, skin elasticity, and reduction in shape and size of limb.  PROGRESSING   3. Patient and/or caregiver will order/obtain appropriate compression garments to maintain lymphatic and venous support.  PROGRESSING   4. Patient will show decreased total girth measurement in BLE by up to 1 cm to allow for lower extremity symmetry, shoe and clothing choice, and ability to apply needed compression. PROGRESSING      Long Term Goals: (6 weeks)  Goals: Progressing / MET   1. Patient and/or caregiver to kassandra/doff compression garment independently and with daily compliance to assist in lymphedema management, skin elasticity, and tissue density PROGRESSING   2. Patient and/or caregiver will be independent in use of pneumatic compression pump or self manual lymphatic drainage techniques to areas within reach to enhance lymphatic drainage and skin condition.  PROGRESSING   3. Patient to be independent and compliant with home exercise program to allow for increased function in affected limb. PROGRESSING   4. Patient will show decreased total girth measurement in BLE by up to 2 cm  to allow for lower extremity symmetry, shoe and clothing choice, and ability to apply needed compression daily. PROGRESSING          PLAN     Updates/Grading for next session: none    Bailey Fritz OT, CLWT

## 2023-07-21 ENCOUNTER — PATIENT MESSAGE (OUTPATIENT)
Dept: RESEARCH | Facility: HOSPITAL | Age: 63
End: 2023-07-21
Payer: COMMERCIAL

## 2023-07-24 ENCOUNTER — CLINICAL SUPPORT (OUTPATIENT)
Dept: REHABILITATION | Facility: HOSPITAL | Age: 63
End: 2023-07-24
Payer: COMMERCIAL

## 2023-07-24 DIAGNOSIS — G89.29 CHRONIC BILATERAL LOW BACK PAIN, UNSPECIFIED WHETHER SCIATICA PRESENT: Primary | ICD-10-CM

## 2023-07-24 DIAGNOSIS — M54.50 CHRONIC BILATERAL LOW BACK PAIN, UNSPECIFIED WHETHER SCIATICA PRESENT: Primary | ICD-10-CM

## 2023-07-24 DIAGNOSIS — I83.813 VARICOSE VEINS OF BOTH LOWER EXTREMITIES WITH PAIN: Primary | ICD-10-CM

## 2023-07-24 PROCEDURE — 97110 THERAPEUTIC EXERCISES: CPT | Mod: 97

## 2023-07-24 PROCEDURE — 97140 MANUAL THERAPY 1/> REGIONS: CPT | Mod: 97

## 2023-07-24 PROCEDURE — 97810 ACUP 1/> WO ESTIM 1ST 15 MIN: CPT

## 2023-07-24 PROCEDURE — 97811 ACUP 1/> W/O ESTIM EA ADD 15: CPT

## 2023-07-24 NOTE — PROGRESS NOTES
Acupuncture Treatment Note     Name: Yulissa SANCHEZ [facility] Number: [id number]    Traditional Chinese Medicine Diagnosis: Qi stagnation and Blood stasis  Physician: No ref. provider found    Date of Service: 7/24/2023     Medical Diagnosis: chronic low back pain  Evaluation Date: 6/12/2023    Visit #/Visits authorized: 3 / 0 (self pay)    Precautions: Diabetes (borderline, taking medication)    Subjective     Chief Complaint:    Chronic Low back pain; Shoulder pain  Low back pain for about 5 years - thinks back pain is due to weight. Pain comes and goes through the day. Been diagnosed with spinal stenosis, and degenerative disc disease. Pain is worse with longer periods of one position . By the end of the day her whole body is tired and painful.     Pain in shoulders. Will have a MRI on the 23rd, been told it is arthritis, has had the pain for 5 years. Both shoulders. The pain is causing her stress, she is not sleeping well due to pain. Arm, can not move to the back, can not raise above shoulder. Started in her right shoulder, now in both shoulders. Has done physical therapy. Pain is in shoulder and radiates to back of neck and down upper arm.     Has to change positions every hour or hour and a half at night.   Pain is a stabbing pain. Pain is constant, worse at night, worse with certain movements.     Takes tylelonol and baclafin for muscle pain.     Had acupuncture once before.     On a wellness plan, will start at gymn with swimming.     Medical necessity is demonstrated by the following IMPAIRMENTS: Medical Necessity: Decreased mobility limits day to day activities, social, and emergent situations and Decreased quality of life     Response to Previous Treatment:   shoulder pain is lessening at times, doing exercises more consistently. Having pain on her lower right back. Wakes at night every 3 hours now to change positions.Shoulder pain is no longer constant and not as severe.  Yulissa would like to come  every other week, we will try since she has had sustained benefit without a treatment in a month.    Quality of Symptoms (Better/Worse):  no change    Other Condition/Symptoms:    - Sleep:   disturbed by pain     - Energy Levels:  low     - Knee pain        Objective      New Findings:    none    Pulse:    not assessed  Tongue:    not assessed    Treatment      Treatment Principles:    Move Qi & Blood; stop pain    Needle Set 1 -     Acupuncture Points:    Face down - fascia method  Bilateral: LI15, TW14,  BL23, yvrose upper arm (+3)  Right:   yvrose lateral upper border sacrum +8  Left:  Centerline:  GV14    NEEDLES W/O STIM  AT:    1:15  NEEDLES W/O STIM REMOVED AT:    1:50   #NEEDLES IN:   21  #NEEDLES OUT:   21    Other Traditional Chinese Medicine Modalities:   none    Recommendations:    Observe severity, location and duration of pain and report next session    Education:  Patient is aware of cumulative effect of acupuncture      Assessment      Analysis of Treatment:    Patient relaxed after session, shoulders felt light    Pt prognosis is Fair.     Patient will continue to benefit from acupuncture treatment to address the deficits listed in the problem list box on initial evaluation, provide patient family education and to maximize pt's level of independence in the home and community environment.     Patient's spiritual, cultural and educational needs considered and pt agreeable to plan of care and goals.     Anticipated barriers to treatment:   none    Plan     Recommend    continue with care plan.

## 2023-07-24 NOTE — PROGRESS NOTES
[facility]  Occupational Therapy Treatment Note  Lymphedema    Date: 7/24/2023  Name: Yulissa SANCHEZ Washington  Clinic Number: [id number]    Therapy Diagnosis:   Encounter Diagnosis   Name Primary?    Varicose veins of both lower extremities with pain Yes       Physician: Sebastian Abbasi III*     Physician Orders: Eval and Treat  Medical Diagnosis: I83.813 (ICD-10-CM) - Varicose veins of both lower extremities with   pain  Evaluation Date: 7/3/2023  Insurance Authorization Period Expiration: 6/6/2024  Plan of Care Certification Period: 9/25/2023  Visit # / Visits authorized: 4 / 20  FOTO: see media, 1 / 3     Precautions:  Standard and Diabetes    Time In: 3:18  Time Out: 4:14  Total Billable Time: 54 minutes    SUBJECTIVE     Pt reports: tolerated bandages x 16 hours. Exchanged sofie for legging.   Yulissa reported wearing compression outside of therapy visits: Yes, knee high stockings and bioflect capris,   Response to previous treatment: no change this session  Functional change: none    Pain: 5/10  Location: knees, BLE, shoulders      OBJECTIVE     Pt arrived wearing knee high compression stockings in NAD.    Compression garment status: Bifolect legging. Returned Mojo 20-30 mm hg size 4XL due to bunching at ankles.  Compression Rx status: none  Pneumatic pump status: sent to [facility]. Progress note due 7/31/2023.    LE Girth Measurements: taken in supine  LANDMARK RIGHT LE  7/3 LEFT LE  7/3 RIGHT LE  7/24 LEFT LE  7/24   SBP + 10  80.0 cm 75.0 cm - -   SBP 74.0 cm 71.0 cm - -   10 below SBP 57.0 cm 56.0 cm 57.5 cm 56.0 cm   20 below SBP 51.0 cm 46.5 cm 50.5 cm 47.0 cm   30 below SBP 38.0 cm 32.0 cm 38.0 cm 35.5 cm   35 below SBP 33.5 cm 30.5 cm 33.5 cm 32.5 cm   Ankle 33.5 cm 32.5 cm 33.0 cm 32.0 cm   Forefoot 25.0 cm 25.5 cm 24.5 cm 25.0 cm           Treatment     Yulissa received the treatments listed below:     Manual therapy techniques were applied for 45 minutes,  including:    manual therapy techniques: Complete Decongestive Therapy was applied to the: BLE for 55 minutes, including: manual lymphatic drainage and short stretch compression bandaging     MANUAL LYMPHATIC DRAINAGE: BLE   Supine with lower extremities elevated:  Deep abdominal techniques  Leg treatment (anterior/lateral thigh, femoral vein vasa-vasorum, knee, lower leg, ankle, dorsum of foot)  Rework: leg, inguinal lymph nodes,abdominal techniques      MULTILAYERED BANDAGING:    Issued supplies and bandaged BLE   Use of Cavilon moisturizer for dry skin  Cotton stockinet: size 9  Cellona padding from dorsum of foot to knee,   2-6cm 2-8cm and 2-10cm Rosidal K rolls foot to distal knee      Pt to leave intact 48 hrs as tolerated, discontinue with any problems, return rolled bandages next session. Wash and wear schedules confirmed.     Therapeutic exercise was completed for 9 minutes, including:      Ankle pumps 3 x 10  Calf raises 3 x 10  Knee extension with 1' hold, 3 x 10  Ball squeezes 3 x 10    Educated on benefit of completing exercise in compression to maximize lymphatic return.      Patient Education and Home Exercises      Education provided:   - Progress towards goals     Written Home Exercises Provided: yes.  Exercises were reviewed and Yulissa was able to demonstrate them prior to the end of the session.  Yulissa demonstrated good  understanding of the HEP provided. See EMR under Patient Instructions for exercises provided during therapy sessions.       Assessment     Returned Mojo compression stockings. Exchanged bioflect sofie for legging.     Pt would continue to benefit from skilled OT.      Yulissa is progressing well towards her goals and there are no updates to goals at this time. Pt prognosis is Good.     Pt will continue to benefit from skilled outpatient occupational therapy to address the deficits listed in the problem list on initial evaluation provide pt/family education and to maximize pt's  level of independence in the home and community environment.     Pt's spiritual, cultural and educational needs considered and pt agreeable to plan of care and goals.    Anticipated barriers to occupational therapy: none    Goals:    Short Term Goals: (3 weeks)  Goals: Progressing / MET   1. Patient will demonstrate 100% knowledge of lymphedema precautions and signs of infection to allow for reduced lymphedema risk, infection risk, and/or exacerbation of condition.  PROGRESSING   2. Patient will tolerate daily activities wearing multilayered bandaging to allow for lymphatic drainage support, skin elasticity, and reduction in shape and size of limb.  PROGRESSING   3. Patient and/or caregiver will order/obtain appropriate compression garments to maintain lymphatic and venous support.  PROGRESSING   4. Patient will show decreased total girth measurement in BLE by up to 1 cm to allow for lower extremity symmetry, shoe and clothing choice, and ability to apply needed compression. PROGRESSING      Long Term Goals: (6 weeks)  Goals: Progressing / MET   1. Patient and/or caregiver to kassandra/doff compression garment independently and with daily compliance to assist in lymphedema management, skin elasticity, and tissue density PROGRESSING   2. Patient and/or caregiver will be independent in use of pneumatic compression pump or self manual lymphatic drainage techniques to areas within reach to enhance lymphatic drainage and skin condition.  PROGRESSING   3. Patient to be independent and compliant with home exercise program to allow for increased function in affected limb. PROGRESSING   4. Patient will show decreased total girth measurement in BLE by up to 2 cm  to allow for lower extremity symmetry, shoe and clothing choice, and ability to apply needed compression daily. PROGRESSING          PLAN     Updates/Grading for next session: none    Bailey Fritz, OT, CLWT

## 2023-07-30 DIAGNOSIS — E03.9 HYPOTHYROIDISM, UNSPECIFIED TYPE: Chronic | ICD-10-CM

## 2023-07-30 RX ORDER — LEVOTHYROXINE SODIUM 112 UG/1
112 TABLET ORAL
Qty: 90 TABLET | Refills: 1 | Status: SHIPPED | OUTPATIENT
Start: 2023-07-30 | End: 2023-12-21 | Stop reason: SDUPTHER

## 2023-07-30 NOTE — TELEPHONE ENCOUNTER
No care due was identified.  [facility] Embedded Care Due Messages. Reference number: [id number].   7/30/2023 12:49:40 AM CDT

## 2023-07-30 NOTE — TELEPHONE ENCOUNTER
Refill Authorization Note     Refill Decision Note   Yulissa Hatfield  is requesting a refill authorization.  Brief Assessment and Rationale for Refill:        Medication Therapy Plan:       Medication Reconciliation Completed: No   Comments:     No Care Gaps recommended.     Note composed:12:04 PM 07/30/2023

## 2023-07-31 ENCOUNTER — CLINICAL SUPPORT (OUTPATIENT)
Dept: REHABILITATION | Facility: HOSPITAL | Age: 63
End: 2023-07-31
Payer: COMMERCIAL

## 2023-07-31 ENCOUNTER — TELEPHONE (OUTPATIENT)
Dept: SLEEP MEDICINE | Facility: CLINIC | Age: 63
End: 2023-07-31
Payer: COMMERCIAL

## 2023-07-31 DIAGNOSIS — I83.813 VARICOSE VEINS OF BOTH LOWER EXTREMITIES WITH PAIN: ICD-10-CM

## 2023-07-31 DIAGNOSIS — I10 PRIMARY HYPERTENSION: ICD-10-CM

## 2023-07-31 DIAGNOSIS — I87.2 CHRONIC VENOUS STASIS DERMATITIS OF BOTH LOWER EXTREMITIES: Primary | ICD-10-CM

## 2023-07-31 PROCEDURE — 97140 MANUAL THERAPY 1/> REGIONS: CPT

## 2023-07-31 PROCEDURE — 97110 THERAPEUTIC EXERCISES: CPT | Mod: 97

## 2023-07-31 NOTE — TELEPHONE ENCOUNTER
No care due was identified.  [facility] Embedded Care Due Messages. Reference number: [id number].   7/31/2023 12:10:51 AM CDT

## 2023-07-31 NOTE — TELEPHONE ENCOUNTER
----- Message from Francine Puente sent at 7/31/2023  8:08 AM CDT -----  Type:  Sooner Apoointment Request    Caller is requesting a sooner appointment.  Caller declined first available appointment listed below.  Caller will not accept being placed on the waitlist and is requesting a message be sent to doctor.  Name of Caller:pt   When is the first available appointment?none   Symptoms:check up  Would the patient rather a call back or a response via [facility]? call  Best Call Back Number:[phone]  Additional Information: none

## 2023-07-31 NOTE — PROGRESS NOTES
[facility]  Occupational Therapy Treatment Note  Lymphedema    Date: 7/31/2023  Name: Yulissa SANCHEZ Washington  Clinic Number: [id number]    Therapy Diagnosis:   Encounter Diagnoses   Name Primary?    Chronic venous stasis dermatitis of both lower extremities Yes    Varicose veins of both lower extremities with pain          Physician: Sebastian Abbasi III*     Physician Orders: Eval and Treat  Medical Diagnosis: I83.813 (ICD-10-CM) - Varicose veins of both lower extremities with   pain  Evaluation Date: 7/3/2023  Insurance Authorization Period Expiration: 6/6/2024  Plan of Care Certification Period: 9/25/2023  Visit # / Visits authorized: 5 / 20  FOTO: see media, 1 / 3     Precautions:  Standard and Diabetes    Time In: 1:43  Time Out: 2:30  Total Billable Time: 47 minutes    SUBJECTIVE     Pt reports: tolerated bandages x 16 hours. Exchanged sofie for legging.   Yluissa reported wearing compression outside of therapy visits: Yes, knee high stockings and bioflect capris,   Response to previous treatment: no change this session  Functional change: none    Pain: 5/10  Location: knees, BLE, shoulders      OBJECTIVE     Pt arrived wearing knee high compression stockings in NAD.    Compression garment status: Bifolect leggings arrived. Returned Mojo 20-30 mm hg size 4XL due to bunching at ankles.  Compression Rx status: none  Pneumatic pump status: sent to [facility]. Progress note due 7/31/2023.      Treatment     Yulissa received the treatments listed below:     Manual therapy techniques were applied for 37 minutes, including:    manual therapy techniques: Complete Decongestive Therapy was applied to the: BLE for 55 minutes, including: manual lymphatic drainage and short stretch compression bandaging     MANUAL LYMPHATIC DRAINAGE: BLE   Supine with lower extremities elevated:  Deep abdominal techniques  Leg treatment (anterior/lateral thigh, femoral vein vasa-vasorum, knee, lower leg, ankle,  dorsum of foot)  Rework: leg, inguinal lymph nodes,abdominal techniques      MULTILAYERED BANDAGING:    Issued supplies and bandaged BLE   Use of Cavilon moisturizer for dry skin  Cotton stockinet: size 9  Cellona padding from dorsum of foot to knee,   2-6cm 2-8cm and 2-10cm Rosidal K rolls foot to distal knee      Pt to leave intact 48 hrs as tolerated, discontinue with any problems, return rolled bandages next session. Wash and wear schedules confirmed.     Therapeutic exercise was completed for 10 minutes, including:      Ankle pumps 3 x 10  Calf raises 3 x 10  Knee extension with 1' hold, 3 x 10  Ball squeezes 3 x 10    Educated on benefit of completing exercise in compression to maximize lymphatic return.      Patient Education and Home Exercises      Education provided:   - Progress towards goals     Written Home Exercises Provided: yes.  Exercises were reviewed and Yulissa was able to demonstrate them prior to the end of the session.  Yulissa demonstrated good  understanding of the HEP provided. See EMR under Patient Instructions for exercises provided during therapy sessions.       Assessment     No changes. Continue POC as above.     Pt has tried elevation, exercise and compression for a month and the swelling still persists. Home pump is recommended.     Pt would continue to benefit from skilled OT.      Yulissa is progressing well towards her goals and there are no updates to goals at this time. Pt prognosis is Good.     Pt will continue to benefit from skilled outpatient occupational therapy to address the deficits listed in the problem list on initial evaluation provide pt/family education and to maximize pt's level of independence in the home and community environment.     Pt's spiritual, cultural and educational needs considered and pt agreeable to plan of care and goals.    Anticipated barriers to occupational therapy: none    Goals:    Short Term Goals: (3 weeks)  Goals: Progressing / MET   1. Patient  will demonstrate 100% knowledge of lymphedema precautions and signs of infection to allow for reduced lymphedema risk, infection risk, and/or exacerbation of condition.  PROGRESSING   2. Patient will tolerate daily activities wearing multilayered bandaging to allow for lymphatic drainage support, skin elasticity, and reduction in shape and size of limb.  PROGRESSING   3. Patient and/or caregiver will order/obtain appropriate compression garments to maintain lymphatic and venous support.  PROGRESSING   4. Patient will show decreased total girth measurement in BLE by up to 1 cm to allow for lower extremity symmetry, shoe and clothing choice, and ability to apply needed compression. PROGRESSING      Long Term Goals: (6 weeks)  Goals: Progressing / MET   1. Patient and/or caregiver to kassandra/doff compression garment independently and with daily compliance to assist in lymphedema management, skin elasticity, and tissue density PROGRESSING   2. Patient and/or caregiver will be independent in use of pneumatic compression pump or self manual lymphatic drainage techniques to areas within reach to enhance lymphatic drainage and skin condition.  PROGRESSING   3. Patient to be independent and compliant with home exercise program to allow for increased function in affected limb. PROGRESSING   4. Patient will show decreased total girth measurement in BLE by up to 2 cm  to allow for lower extremity symmetry, shoe and clothing choice, and ability to apply needed compression daily. PROGRESSING          PLAN     Updates/Grading for next session: none    Baliey Fritz, OT, CLWT

## 2023-08-01 RX ORDER — CHLORTHALIDONE 50 MG/1
50 TABLET ORAL
Qty: 90 TABLET | Refills: 0 | Status: SHIPPED | OUTPATIENT
Start: 2023-08-01 | End: 2023-08-25 | Stop reason: SDUPTHER

## 2023-08-02 ENCOUNTER — TELEPHONE (OUTPATIENT)
Dept: CARDIOLOGY | Facility: CLINIC | Age: 63
End: 2023-08-02
Payer: COMMERCIAL

## 2023-08-02 NOTE — TELEPHONE ENCOUNTER
----- Message from Elizabeth Reynoso sent at 8/2/2023  4:19 PM CDT -----      Type:  Orders    Who Called:Mayda/ [facility]  Does the patient know what this is regarding?:Orders  Would the patient rather a call back or a response via [facility]? call  Best Call Back Number:[phone]  Additional Information: Mayda stated orders was faxed for Dr Abbasi to sign for a prescription

## 2023-08-03 ENCOUNTER — CLINICAL SUPPORT (OUTPATIENT)
Dept: REHABILITATION | Facility: HOSPITAL | Age: 63
End: 2023-08-03
Payer: COMMERCIAL

## 2023-08-03 DIAGNOSIS — I83.813 VARICOSE VEINS OF BOTH LOWER EXTREMITIES WITH PAIN: Primary | ICD-10-CM

## 2023-08-03 DIAGNOSIS — I87.2 CHRONIC VENOUS STASIS DERMATITIS OF BOTH LOWER EXTREMITIES: ICD-10-CM

## 2023-08-03 PROCEDURE — 97140 MANUAL THERAPY 1/> REGIONS: CPT | Mod: 97

## 2023-08-03 PROCEDURE — 97110 THERAPEUTIC EXERCISES: CPT | Mod: 97

## 2023-08-03 NOTE — PROGRESS NOTES
[facility]  Occupational Therapy Treatment Note  Lymphedema    Date: 8/3/2023  Name: Yulissa SANCHEZ Washington  Clinic Number: [id number]    Therapy Diagnosis:   Encounter Diagnoses   Name Primary?    Varicose veins of both lower extremities with pain Yes    Chronic venous stasis dermatitis of both lower extremities          Physician: Sebastian Abbasi III*     Physician Orders: Eval and Treat  Medical Diagnosis: I83.813 (ICD-10-CM) - Varicose veins of both lower extremities with   pain  Evaluation Date: 7/3/2023  Insurance Authorization Period Expiration: 6/6/2024  Plan of Care Certification Period: 9/25/2023  Visit # / Visits authorized: 6 / 20  FOTO: see media, 1 / 3     Precautions:  Standard and Diabetes    Time In: 5:29  Time Out: 6:23  Total Billable Time: 54 minutes    SUBJECTIVE     Pt reports: bioflect leggings arrived and with comfortable fit.   Yulissa reported wearing compression outside of therapy visits: Yes, bioflect leggings   Response to previous treatment: no change this session  Functional change: none    Pain: 5/10  Location: knees, BLE, shoulders      OBJECTIVE     Pt arrived wearing knee high compression stockings in NAD.    Compression garment status: Bifolect leggings arrived. Returned Mojo 20-30 mm hg size 4XL due to bunching at ankles.  Compression Rx status: none  Pneumatic pump status: sent to [facility]. Progress note due 7/31/2023.      Treatment     Yulissa received the treatments listed below:     Manual therapy techniques were applied for 44 minutes, including:    manual therapy techniques: Complete Decongestive Therapy was applied to the: BLE for 55 minutes, including: manual lymphatic drainage and short stretch compression bandaging     MANUAL LYMPHATIC DRAINAGE: BLE   Supine with lower extremities elevated:  Deep abdominal techniques  Leg treatment (anterior/lateral thigh, femoral vein vasa-vasorum, knee, lower leg, ankle, dorsum of foot)  Rework: leg,  inguinal lymph nodes,abdominal techniques      MULTILAYERED BANDAGING:    Issued supplies and bandaged BLE   Use of Cavilon moisturizer for dry skin  Cotton stockinet: size 9  Cellona padding from dorsum of foot to knee,   2-6cm 2-8cm and 2-10cm Rosidal K rolls foot to distal knee      Pt to leave intact 48 hrs as tolerated, discontinue with any problems, return rolled bandages next session. Wash and wear schedules confirmed.     Therapeutic exercise was completed for 10 minutes, including:      Ankle pumps 3 x 10  Heel raises 3 x 10  Seated marching 3 x 10  Long arc quad with 1' hold, 3 x 10      Educated on benefit of completing exercise in compression to maximize lymphatic return.      Patient Education and Home Exercises      Education provided:   - Progress towards goals     Written Home Exercises Provided: yes.  Exercises were reviewed and Yulissa was able to demonstrate them prior to the end of the session.  Yulissa demonstrated good  understanding of the HEP provided. See EMR under Patient Instructions for exercises provided during therapy sessions.       Assessment     Bioflect leggings with proper fit. New HEP included today.     Pt has tried elevation, exercise and compression for a month and the swelling still persists. Home pump is recommended.     Pt would continue to benefit from skilled OT.      Yulissa is progressing well towards her goals and there are no updates to goals at this time. Pt prognosis is Good.     Pt will continue to benefit from skilled outpatient occupational therapy to address the deficits listed in the problem list on initial evaluation provide pt/family education and to maximize pt's level of independence in the home and community environment.     Pt's spiritual, cultural and educational needs considered and pt agreeable to plan of care and goals.    Anticipated barriers to occupational therapy: none    Goals:    Short Term Goals: (3 weeks)  Goals: Progressing / MET   1. Patient  will demonstrate 100% knowledge of lymphedema precautions and signs of infection to allow for reduced lymphedema risk, infection risk, and/or exacerbation of condition.  PROGRESSING   2. Patient will tolerate daily activities wearing multilayered bandaging to allow for lymphatic drainage support, skin elasticity, and reduction in shape and size of limb.  PROGRESSING   3. Patient and/or caregiver will order/obtain appropriate compression garments to maintain lymphatic and venous support.  PROGRESSING   4. Patient will show decreased total girth measurement in BLE by up to 1 cm to allow for lower extremity symmetry, shoe and clothing choice, and ability to apply needed compression. PROGRESSING      Long Term Goals: (6 weeks)  Goals: Progressing / MET   1. Patient and/or caregiver to kassandra/doff compression garment independently and with daily compliance to assist in lymphedema management, skin elasticity, and tissue density PROGRESSING   2. Patient and/or caregiver will be independent in use of pneumatic compression pump or self manual lymphatic drainage techniques to areas within reach to enhance lymphatic drainage and skin condition.  PROGRESSING   3. Patient to be independent and compliant with home exercise program to allow for increased function in affected limb. PROGRESSING   4. Patient will show decreased total girth measurement in BLE by up to 2 cm  to allow for lower extremity symmetry, shoe and clothing choice, and ability to apply needed compression daily. PROGRESSING          PLAN     Updates/Grading for next session: none    Bailey Fritz, OT, CLWT

## 2023-08-04 DIAGNOSIS — E11.9 TYPE 2 DIABETES MELLITUS WITHOUT COMPLICATION, WITHOUT LONG-TERM CURRENT USE OF INSULIN: ICD-10-CM

## 2023-08-04 RX ORDER — TIRZEPATIDE 7.5 MG/.5ML
INJECTION, SOLUTION SUBCUTANEOUS
Qty: 12 PEN | Refills: 1 | OUTPATIENT
Start: 2023-08-04

## 2023-08-04 NOTE — TELEPHONE ENCOUNTER
No care due was identified.  [facility] Embedded Care Due Messages. Reference number: [id number].   8/04/2023 12:14:29 AM CDT

## 2023-08-04 NOTE — TELEPHONE ENCOUNTER
Refill Routing Note   Medication(s) are not appropriate for processing by [facility] for the following reason(s):      New or recently adjusted medication    [facility] action(s):  Defer Care Due:  None identified            Appointments  past 12m or future 3m with PCP    Date Provider   Last Visit   5/1/2023 Emilia Velasquez MD   Next Visit   Visit date not found Emilia Velasquez MD   ED visits in past 90 days: 0        Note composed:10:17 AM 08/04/2023

## 2023-08-07 ENCOUNTER — CLINICAL SUPPORT (OUTPATIENT)
Dept: REHABILITATION | Facility: HOSPITAL | Age: 63
End: 2023-08-07
Payer: COMMERCIAL

## 2023-08-07 DIAGNOSIS — I83.813 VARICOSE VEINS OF BOTH LOWER EXTREMITIES WITH PAIN: Primary | ICD-10-CM

## 2023-08-07 PROCEDURE — 97530 THERAPEUTIC ACTIVITIES: CPT | Mod: 97

## 2023-08-07 PROCEDURE — 97140 MANUAL THERAPY 1/> REGIONS: CPT | Mod: 97

## 2023-08-07 NOTE — PROGRESS NOTES
[facility]  Occupational Therapy Discharge Note  Lymphedema    Date: 8/7/2023  Name: Yulissa SANCHEZ [facility] Number: [id number]    Therapy Diagnosis:   Encounter Diagnosis   Name Primary?    Varicose veins of both lower extremities with pain Yes         Physician: Sebastian Abbasi III*     Physician Orders: Eval and Treat  Medical Diagnosis: I83.813 (ICD-10-CM) - Varicose veins of both lower extremities with   pain  Evaluation Date: 7/3/2023  Insurance Authorization Period Expiration: 6/6/2024  Plan of Care Certification Period: 9/25/2023  Visit # / Visits authorized: 7 / 20  FOTO: see media, 3 / 3     Precautions:  Standard and Diabetes    Time In: 12:00  Time Out: 12:45  Total Billable Time: 45 minutes    SUBJECTIVE     Pt reports: no concerns. Amenable to discharge.   Yulissa reported wearing compression outside of therapy visits: Yes, bioflect leggings   Response to previous treatment: no change this session  Functional change: none    Pain: 5/10  Location: knees, BLE, shoulders      OBJECTIVE     Pt arrived wearing knee high compression stockings in NAD.    Compression garment status: Bifolect leggings arrived. Returned Mojo 20-30 mm hg size 4XL due to bunching at ankles.  Compression Rx status: none  Pneumatic pump status: sent to [facility]. Progress note sent.     LE Girth Measurements: taken in supine  LANDMARK RIGHT LE  7/3 LEFT LE  7/3 RIGHT LE  7/24 LEFT LE  7/24 RIGHT LE  8/7 LEFT LE  8/7   SBP + 10  80.0 cm 75.0 cm - - - -   SBP 74.0 cm 71.0 cm - - - -   10 below SBP 57.0 cm 56.0 cm 57.5 cm 56.0 cm 59.0 cm 55.0 cm   20 below SBP 51.0 cm 46.5 cm 50.5 cm 47.0 cm 54.5 cm 48.0 cm   30 below SBP 38.0 cm 32.0 cm 38.0 cm 35.5 cm 41.5 cm 36.0 cm   35 below SBP 33.5 cm 30.5 cm 33.5 cm 32.5 cm 36.5 cm 32.0 cm   Ankle 33.5 cm 32.5 cm 33.0 cm 32.0 cm 33.5 cm 32.0 cm   Forefoot 25.0 cm 25.5 cm 24.5 cm 25.0 cm 25.0 cm 25.0 cm         Treatment     Yulissa received the treatments listed  below:     Manual therapy techniques were applied for 20 minutes, including:      MANUAL LYMPHATIC DRAINAGE: BLE   Supine with lower extremities elevated:  Deep abdominal techniques  Leg treatment (anterior/lateral thigh, femoral vein vasa-vasorum, knee, lower leg, ankle, dorsum of foot)  Rework: leg, inguinal lymph nodes,abdominal techniques       Therapeutic activity techniques were completed for 25 minutes, including:      Educated on home management plan- wear Bioflect leggings with edema wear on feet or edema wear stockings to knee high height.  Remove before bed, wear daily or as close to daily as able.  Use pneumatic pump and complete HEP daily.   Risks of progression of condition if non-complaint with compression.  Seek new referral if edema status worsens in the future.       Patient Education and Home Exercises      Education provided:   - Progress towards goals     Written Home Exercises Provided: yes.  Exercises were reviewed and Yulissa was able to demonstrate them prior to the end of the session.  Yulissa demonstrated good  understanding of the HEP provided. See EMR under Patient Instructions for exercises provided during therapy sessions.       Assessment     No significant reductions since starting complete decongestive therapy. Tissue is nonpitting at time of exam. Pt is ready for discharge to home management to prevent progression of CVI / secondary lymphedema. Pt to seek new referral if edema status worsens in the future.     Anticipated barriers to occupational therapy: none    Goals:    Short Term Goals: (3 weeks)  Goals: Progressing / MET   1. Patient will demonstrate 100% knowledge of lymphedema precautions and signs of infection to allow for reduced lymphedema risk, infection risk, and/or exacerbation of condition.  MET   2. Patient will tolerate daily activities wearing multilayered bandaging to allow for lymphatic drainage support, skin elasticity, and reduction in shape and size of limb.   MET   3. Patient and/or caregiver will order/obtain appropriate compression garments to maintain lymphatic and venous support.  MET   4. Patient will show decreased total girth measurement in BLE by up to 1 cm to allow for lower extremity symmetry, shoe and clothing choice, and ability to apply needed compression. PARTIALLY MET      Long Term Goals: (6 weeks)  Goals: Progressing / MET   1. Patient and/or caregiver to kassandra/doff compression garment independently and with daily compliance to assist in lymphedema management, skin elasticity, and tissue density MET   2. Patient and/or caregiver will be independent in use of pneumatic compression pump or self manual lymphatic drainage techniques to areas within reach to enhance lymphatic drainage and skin condition.  PARTIALLY MET   3. Patient to be independent and compliant with home exercise program to allow for increased function in affected limb. MET   4. Patient will show decreased total girth measurement in BLE by up to 2 cm  to allow for lower extremity symmetry, shoe and clothing choice, and ability to apply needed compression daily. NOT MET          PLAN     Discharge.     Bailey Fritz, OT, CLWT

## 2023-08-11 ENCOUNTER — OFFICE VISIT (OUTPATIENT)
Dept: ORTHOPEDICS | Facility: CLINIC | Age: 63
End: 2023-08-11
Payer: COMMERCIAL

## 2023-08-11 VITALS — WEIGHT: 293 LBS | HEIGHT: 64 IN | BODY MASS INDEX: 50.02 KG/M2

## 2023-08-11 DIAGNOSIS — M19.011 ARTHRITIS OF BOTH GLENOHUMERAL JOINTS: Primary | ICD-10-CM

## 2023-08-11 DIAGNOSIS — G89.29 CHRONIC LEFT SHOULDER PAIN: ICD-10-CM

## 2023-08-11 DIAGNOSIS — M25.512 CHRONIC LEFT SHOULDER PAIN: ICD-10-CM

## 2023-08-11 DIAGNOSIS — M19.012 ARTHRITIS OF BOTH GLENOHUMERAL JOINTS: Primary | ICD-10-CM

## 2023-08-11 PROCEDURE — 99999 PR PBB SHADOW E&M-EST. PATIENT-LVL III: CPT | Mod: PBBFAC,,, | Performed by: ORTHOPAEDIC SURGERY

## 2023-08-11 PROCEDURE — 3008F BODY MASS INDEX DOCD: CPT | Mod: CPTII,S$GLB,, | Performed by: ORTHOPAEDIC SURGERY

## 2023-08-11 PROCEDURE — 99999 PR PBB SHADOW E&M-EST. PATIENT-LVL III: ICD-10-PCS | Mod: PBBFAC,,, | Performed by: ORTHOPAEDIC SURGERY

## 2023-08-11 PROCEDURE — 3044F HG A1C LEVEL LT 7.0%: CPT | Mod: CPTII,S$GLB,, | Performed by: ORTHOPAEDIC SURGERY

## 2023-08-11 PROCEDURE — 3061F NEG MICROALBUMINURIA REV: CPT | Mod: CPTII,S$GLB,, | Performed by: ORTHOPAEDIC SURGERY

## 2023-08-11 PROCEDURE — 1159F PR MEDICATION LIST DOCUMENTED IN MEDICAL RECORD: ICD-10-PCS | Mod: CPTII,S$GLB,, | Performed by: ORTHOPAEDIC SURGERY

## 2023-08-11 PROCEDURE — 4010F PR ACE/ARB THEARPY RXD/TAKEN: ICD-10-PCS | Mod: CPTII,S$GLB,, | Performed by: ORTHOPAEDIC SURGERY

## 2023-08-11 PROCEDURE — 3044F PR MOST RECENT HEMOGLOBIN A1C LEVEL <7.0%: ICD-10-PCS | Mod: CPTII,S$GLB,, | Performed by: ORTHOPAEDIC SURGERY

## 2023-08-11 PROCEDURE — 3061F PR NEG MICROALBUMINURIA RESULT DOCUMENTED/REVIEW: ICD-10-PCS | Mod: CPTII,S$GLB,, | Performed by: ORTHOPAEDIC SURGERY

## 2023-08-11 PROCEDURE — 99214 PR OFFICE/OUTPT VISIT, EST, LEVL IV, 30-39 MIN: ICD-10-PCS | Mod: S$GLB,,, | Performed by: ORTHOPAEDIC SURGERY

## 2023-08-11 PROCEDURE — 3066F NEPHROPATHY DOC TX: CPT | Mod: CPTII,S$GLB,, | Performed by: ORTHOPAEDIC SURGERY

## 2023-08-11 PROCEDURE — 99214 OFFICE O/P EST MOD 30 MIN: CPT | Mod: S$GLB,,, | Performed by: ORTHOPAEDIC SURGERY

## 2023-08-11 PROCEDURE — 3066F PR DOCUMENTATION OF TREATMENT FOR NEPHROPATHY: ICD-10-PCS | Mod: CPTII,S$GLB,, | Performed by: ORTHOPAEDIC SURGERY

## 2023-08-11 PROCEDURE — 1160F RVW MEDS BY RX/DR IN RCRD: CPT | Mod: CPTII,S$GLB,, | Performed by: ORTHOPAEDIC SURGERY

## 2023-08-11 PROCEDURE — 4010F ACE/ARB THERAPY RXD/TAKEN: CPT | Mod: CPTII,S$GLB,, | Performed by: ORTHOPAEDIC SURGERY

## 2023-08-11 PROCEDURE — 3008F PR BODY MASS INDEX (BMI) DOCUMENTED: ICD-10-PCS | Mod: CPTII,S$GLB,, | Performed by: ORTHOPAEDIC SURGERY

## 2023-08-11 PROCEDURE — 1159F MED LIST DOCD IN RCRD: CPT | Mod: CPTII,S$GLB,, | Performed by: ORTHOPAEDIC SURGERY

## 2023-08-11 PROCEDURE — 1160F PR REVIEW ALL MEDS BY PRESCRIBER/CLIN PHARMACIST DOCUMENTED: ICD-10-PCS | Mod: CPTII,S$GLB,, | Performed by: ORTHOPAEDIC SURGERY

## 2023-08-11 RX ORDER — CELECOXIB 200 MG/1
200 CAPSULE ORAL 2 TIMES DAILY WITH MEALS
Qty: 60 CAPSULE | Refills: 1 | Status: SHIPPED | OUTPATIENT
Start: 2023-08-11 | End: 2024-03-04

## 2023-08-11 NOTE — PROGRESS NOTES
Subjective:      Patient ID: Yulissa Hatfield is a 62 y.o. female.    Chief Complaint: Pain of the Right Shoulder (Patient presents today for the results of her MRI shoulders. ) and Pain of the Left Shoulder      HPI    Patient presents today for follow-up of chronic bilateral shoulder pain.  Pain is worse in left and can increase to 10/10 at night.  Patient has a known history of bilateral glenohumeral arthritis with left being worse than right.  She feels she has acceptable ROM in both shoulders, but does have significant difficulty reaching behind her back and performing ADLs such as bathing and putting on clothes.  She is already gone multiple nonoperative treatment options to include CSI and formal physical therapy.  She is here today to discuss bilateral MRI results.    Robb Rosas Jr, MD (06/01/2023):  Yulissa Hatfield is a  62 y.o. female presenting today for bilateral shoulder pain.  There was not a history of trauma.  Onset of symptoms began several years ago   She has been treated for cervical problems as well and been followed by pain management   Recently symptoms in the shoulder gotten worse she is having weakness in both shoulders  Previously we had ordered MRI scans to check the rotator cuff but this was never followed up on.      Review of patient's allergies indicates:  No Known Allergies      Current Outpatient Medications   Medication Sig Dispense Refill    acetaminophen (TYLENOL) 500 MG tablet Take 1,000 mg by mouth every 6 (six) hours as needed for Pain.      chlorthalidone (HYGROTEN) 50 MG Tab TAKE 1 TABLET BY MOUTH EVERY DAY 90 tablet 0    ezetimibe (ZETIA) 10 mg tablet TAKE 1 TABLET BY MOUTH EVERY DAY 90 tablet 3    ibuprofen (ADVIL,MOTRIN) 200 MG tablet Take 600 mg by mouth every 6 (six) hours as needed for Pain.      levothyroxine (SYNTHROID) 112 MCG tablet TAKE 1 TABLET BY MOUTH BEFORE BREAKFAST. 90 tablet 1    rosuvastatin (CRESTOR) 20 MG tablet Take 1 tablet (20 mg total) by  "mouth once daily. 90 tablet 3    SOOLANTRA 1 % Crea       telmisartan (MICARDIS) 80 MG Tab TAKE 1 TABLET BY MOUTH EVERY DAY 90 tablet 3    tirzepatide 10 mg/0.5 mL PnIj Inject 10 mg into the skin every 7 days. 4 pen 0    baclofen (LIORESAL) 10 MG tablet Take 0.5-1 tablets (5-10 mg total) by mouth nightly as needed (pain or spasm). 30 tablet 2    celecoxib (CELEBREX) 200 MG capsule Take 1 capsule (200 mg total) by mouth 2 (two) times daily with meals. 60 capsule 1    diclofenac sodium (VOLTAREN) 1 % Gel Apply 2 g topically 4 (four) times daily. (Patient not taking: Reported on 2023) 100 g 1     No current facility-administered medications for this visit.       Past Medical History:   Diagnosis Date    Generalized anxiety disorder with panic attacks     HTN (hypertension)     Hyperlipidemia     Hypothyroid     DANIELLE on CPAP     Type 2 diabetes mellitus        Past Surgical History:   Procedure Laterality Date    CARPAL TUNNEL RELEASE       SECTION, CLASSIC      x 3    COLONOSCOPY N/A 11/10/2021    Procedure: COLONOSCOPY;  Surgeon: Kayla Pope MD;  Location: [facility] ENDO;  Service: Endoscopy;  Laterality: N/A;    ENDOMETRIAL ABLATION      ESOPHAGOGASTRODUODENOSCOPY N/A 11/10/2021    Procedure: EGD (ESOPHAGOGASTRODUODENOSCOPY);  Surgeon: Kayla Pope MD;  Location: [facility] ENDO;  Service: Endoscopy;  Laterality: N/A;    FOOT SURGERY      INJECTION OF JOINT Left 2022    Procedure: Injection, Joint, Shoulder, Hip, Or Knee;  Surgeon: Sowmya Tellez MD;  Location: [facility] PAIN T;  Service: Pain Management;  Laterality: Left;  Left Glenohumeral Joint Injection    KNEE SURGERY      THYROID SURGERY         Review of Systems:  ROS    OBJECTIVE:     PHYSICAL EXAM:  Height: 5' 4" (162.6 cm) Weight: (!) 143.3 kg (316 lb)  Vitals:    23 0805   Weight: (!) 143.3 kg (316 lb)   Height: 5' 4" (1.626 m)   PainSc:   6   PainLoc: Shoulder     Well developed, well nourished female in no acute " distress  Alert and oriented x 3  HEENT- Normal exam  Lungs- Clear to auscultation  Heart- Regular rate and rhythm  Abdomen- Soft nontender  Extremity exam- examination of the shoulders demonstrate limited range of motion bilaterally   There is stiffness especially on the left   There is weakness of the rotator cuff bilateral shoulders   Neurologic exam intact    RADIOGRAPHS:  AP lateral x-rays bilateral shoulders demonstrate degenerative changes bilaterally  Comments: I have personally reviewed the imaging and I agree with the above radiologist's report.      MRI bilateral shoulders:     My interpretation:    Tendinosis seen within the right supraspinatus tendon without any signs of significant tear; glenohumeral arthritis also seen    Tendinosis of the left supraspinatus/infraspinatus tendon with some fraying no signs of significant tear; glenohumeral arthritis seen      ASSESSMENT/PLAN:     IMPRESSION:  1. Bilateral shoulder arthritis.      2. Possible rotator cuff tears chronic bilateral shoulders    PLAN:    We discussed multiple treatment options for shoulder pain that include both nonoperative and operative.  Nonoperative would consist subacromial CSI as well as formal physical therapy.  Given the fact she is already attempted these without relief, we discussed surgery in more detail.  I explained that this would include anatomical total shoulder arthroplasty.  The risks, benefits, rehab protocols were discussed with the patient in detail and all of her questions were answered.  At this time, patient states she is not ready for surgery due to her overall health, and would like to repeat physical therapy to go over more exercises in detail that she can perform at home.    Ambulatory referral for formal physical therapy at [facility] with focus on Rotator cuff and Periscapular strengthening       RTC to see Kade lyons.  Patient will contact our clinic in the future when she is ready to  discuss possible left TSA.      All of the patient's questions were answered. Patient was advised to call the clinic or contact me through the patient portal for any questions or concerns.       Medical Dictation software was used during the dictation of portions or the entirety of this medical record.  Phonetic or grammatic errors may exist due to the use of this software. For clarification, refer to the author of the document.      - We talked at length about the anatomy and pathophysiology of   Encounter Diagnoses   Name Primary?    Arthritis of both glenohumeral joints Yes    Chronic left shoulder pain            Disclaimer: This note has been generated using voice-recognition software. There may be typographical errors that have been missed during proof-reading.

## 2023-08-19 DIAGNOSIS — M25.512 CHRONIC PAIN OF BOTH SHOULDERS: ICD-10-CM

## 2023-08-19 DIAGNOSIS — G89.29 CHRONIC PAIN OF BOTH SHOULDERS: ICD-10-CM

## 2023-08-19 DIAGNOSIS — M25.612 DECREASED RANGE OF MOTION OF LEFT SHOULDER: ICD-10-CM

## 2023-08-19 DIAGNOSIS — M75.51 SUBACROMIAL BURSITIS OF BOTH SHOULDERS: ICD-10-CM

## 2023-08-19 DIAGNOSIS — M75.52 SUBACROMIAL BURSITIS OF BOTH SHOULDERS: ICD-10-CM

## 2023-08-19 DIAGNOSIS — M54.12 CERVICAL RADICULOPATHY: ICD-10-CM

## 2023-08-19 DIAGNOSIS — M25.511 CHRONIC PAIN OF BOTH SHOULDERS: ICD-10-CM

## 2023-08-19 DIAGNOSIS — M19.011 OSTEOARTHRITIS OF BILATERAL GLENOHUMERAL JOINTS: ICD-10-CM

## 2023-08-19 DIAGNOSIS — M19.012 OSTEOARTHRITIS OF BILATERAL GLENOHUMERAL JOINTS: ICD-10-CM

## 2023-08-19 DIAGNOSIS — M54.2 NECK PAIN: ICD-10-CM

## 2023-08-21 ENCOUNTER — TELEPHONE (OUTPATIENT)
Dept: RESEARCH | Facility: HOSPITAL | Age: 63
End: 2023-08-21
Payer: COMMERCIAL

## 2023-08-21 RX ORDER — BACLOFEN 10 MG/1
TABLET ORAL
Qty: 90 TABLET | Refills: 2 | Status: SHIPPED | OUTPATIENT
Start: 2023-08-21 | End: 2023-12-21

## 2023-08-21 NOTE — TELEPHONE ENCOUNTER
Luisa Study  Study ID#[id number]    Called to remind patient to finish her Month 6 questionnaires. Patient asked for instructions on how to reset password. Issue is now resolved and patient will finish Month 6.

## 2023-08-24 ENCOUNTER — TELEPHONE (OUTPATIENT)
Dept: CARDIOLOGY | Facility: CLINIC | Age: 63
End: 2023-08-24
Payer: COMMERCIAL

## 2023-08-24 NOTE — TELEPHONE ENCOUNTER
----- Message from Gila Liang sent at 8/24/2023 12:22 PM CDT -----  Needs advice from nurse:      Who Called:MaydaNivia Me  Regarding:states she is still waiting for prescription to be signed and faxed back, states fax has been sent to your office multiple times  Would the patient rather a call back or [facility]IA [facility]?  Best Call Back number: fax   Additional Info:

## 2023-08-29 ENCOUNTER — TELEPHONE (OUTPATIENT)
Dept: CARDIOLOGY | Facility: CLINIC | Age: 63
End: 2023-08-29
Payer: COMMERCIAL

## 2023-08-29 NOTE — TELEPHONE ENCOUNTER
----- Message from Ave Monique sent at 8/29/2023 10:03 AM CDT -----  .Type:  Needs Medical Advice    Who Called: STAR WITH STILL ME  Symptoms (please be specific):    How long has patient had these symptoms:    Pharmacy name and phone #:    Would the patient rather a call back or a response via [facility]?   Best Call Back Number:  [phone]  Additional Information:  needs medical necessities clinical for the lymphedema bump fax [phone]

## 2023-09-07 ENCOUNTER — PATIENT MESSAGE (OUTPATIENT)
Dept: CARDIOLOGY | Facility: CLINIC | Age: 63
End: 2023-09-07
Payer: COMMERCIAL

## 2023-10-17 ENCOUNTER — TELEPHONE (OUTPATIENT)
Dept: SLEEP MEDICINE | Facility: CLINIC | Age: 63
End: 2023-10-17
Payer: COMMERCIAL

## 2023-10-24 ENCOUNTER — PATIENT MESSAGE (OUTPATIENT)
Dept: OTHER | Facility: OTHER | Age: 63
End: 2023-10-24
Payer: COMMERCIAL

## 2023-12-03 ENCOUNTER — PATIENT MESSAGE (OUTPATIENT)
Dept: ADMINISTRATIVE | Facility: OTHER | Age: 63
End: 2023-12-03
Payer: COMMERCIAL

## 2023-12-03 ENCOUNTER — PATIENT MESSAGE (OUTPATIENT)
Dept: ADMINISTRATIVE | Facility: HOSPITAL | Age: 63
End: 2023-12-03
Payer: COMMERCIAL

## 2023-12-06 ENCOUNTER — PATIENT OUTREACH (OUTPATIENT)
Dept: ADMINISTRATIVE | Facility: HOSPITAL | Age: 63
End: 2023-12-06
Payer: COMMERCIAL

## 2023-12-06 NOTE — PROGRESS NOTES
Health Maintenance Due   Topic Date Due    Pneumococcal Vaccines (Age 0-64) (2 - PCV) 02/19/2022    RSV Vaccine (Age 60+ and Pregnant patients) (1 - 1-dose 60+ series) Never done    Eye Exam  04/22/2023    Influenza Vaccine (1) 09/01/2023    COVID-19 Vaccine (7 - 2023-24 season) 09/01/2023    Mammogram  01/17/2024     Chart review done. HM updated. Immunizations reviewed & updated. Care Everywhere updated.

## 2023-12-07 ENCOUNTER — TELEPHONE (OUTPATIENT)
Dept: FAMILY MEDICINE | Facility: CLINIC | Age: 63
End: 2023-12-07
Payer: COMMERCIAL

## 2023-12-07 NOTE — TELEPHONE ENCOUNTER
----- Message from Maddi Graham sent at 5/11/2017  9:49 AM CDT -----  Contact: [phone] Pt requesting a nurse call back. Please advise.   pulse oximetry

## 2023-12-07 NOTE — TELEPHONE ENCOUNTER
----- Message from Kiesha Morris sent at 12/7/2023  9:17 AM CST -----  Regarding: Self/  [phone]  Type: Patient Call Back    Who called:  Patient    What is the request in detail:  Patient is needing a call from staff, she tested positive for Covid today and would like to know if something can be called in for her.  Thank you    Would the patient rather a call back or a response via [facility]?   Call back    Best call back number:  [phone]         Thank you

## 2023-12-21 ENCOUNTER — OFFICE VISIT (OUTPATIENT)
Dept: FAMILY MEDICINE | Facility: CLINIC | Age: 63
End: 2023-12-21
Payer: COMMERCIAL

## 2023-12-21 DIAGNOSIS — E03.9 HYPOTHYROIDISM, UNSPECIFIED TYPE: Chronic | ICD-10-CM

## 2023-12-21 DIAGNOSIS — E11.9 TYPE 2 DIABETES MELLITUS WITHOUT COMPLICATION, WITHOUT LONG-TERM CURRENT USE OF INSULIN: ICD-10-CM

## 2023-12-21 DIAGNOSIS — Z86.16 HISTORY OF COVID-19: Primary | ICD-10-CM

## 2023-12-21 PROCEDURE — 3066F NEPHROPATHY DOC TX: CPT | Mod: CPTII,95,, | Performed by: INTERNAL MEDICINE

## 2023-12-21 PROCEDURE — 3044F HG A1C LEVEL LT 7.0%: CPT | Mod: CPTII,95,, | Performed by: INTERNAL MEDICINE

## 2023-12-21 PROCEDURE — 4010F ACE/ARB THERAPY RXD/TAKEN: CPT | Mod: CPTII,95,, | Performed by: INTERNAL MEDICINE

## 2023-12-21 PROCEDURE — 1159F MED LIST DOCD IN RCRD: CPT | Mod: CPTII,95,, | Performed by: INTERNAL MEDICINE

## 2023-12-21 PROCEDURE — 99214 OFFICE O/P EST MOD 30 MIN: CPT | Mod: 95,,, | Performed by: INTERNAL MEDICINE

## 2023-12-21 PROCEDURE — 1160F RVW MEDS BY RX/DR IN RCRD: CPT | Mod: CPTII,95,, | Performed by: INTERNAL MEDICINE

## 2023-12-21 PROCEDURE — 4010F PR ACE/ARB THEARPY RXD/TAKEN: ICD-10-PCS | Mod: CPTII,95,, | Performed by: INTERNAL MEDICINE

## 2023-12-21 PROCEDURE — 1159F PR MEDICATION LIST DOCUMENTED IN MEDICAL RECORD: ICD-10-PCS | Mod: CPTII,95,, | Performed by: INTERNAL MEDICINE

## 2023-12-21 PROCEDURE — 99214 PR OFFICE/OUTPT VISIT, EST, LEVL IV, 30-39 MIN: ICD-10-PCS | Mod: 95,,, | Performed by: INTERNAL MEDICINE

## 2023-12-21 PROCEDURE — 3061F PR NEG MICROALBUMINURIA RESULT DOCUMENTED/REVIEW: ICD-10-PCS | Mod: CPTII,95,, | Performed by: INTERNAL MEDICINE

## 2023-12-21 PROCEDURE — 3061F NEG MICROALBUMINURIA REV: CPT | Mod: CPTII,95,, | Performed by: INTERNAL MEDICINE

## 2023-12-21 PROCEDURE — 3044F PR MOST RECENT HEMOGLOBIN A1C LEVEL <7.0%: ICD-10-PCS | Mod: CPTII,95,, | Performed by: INTERNAL MEDICINE

## 2023-12-21 PROCEDURE — 1160F PR REVIEW ALL MEDS BY PRESCRIBER/CLIN PHARMACIST DOCUMENTED: ICD-10-PCS | Mod: CPTII,95,, | Performed by: INTERNAL MEDICINE

## 2023-12-21 PROCEDURE — 3066F PR DOCUMENTATION OF TREATMENT FOR NEPHROPATHY: ICD-10-PCS | Mod: CPTII,95,, | Performed by: INTERNAL MEDICINE

## 2023-12-21 RX ORDER — LEVOTHYROXINE SODIUM 112 UG/1
112 TABLET ORAL
Qty: 90 TABLET | Refills: 1 | Status: SHIPPED | OUTPATIENT
Start: 2023-12-21

## 2023-12-21 NOTE — PROGRESS NOTES
SUBJECTIVE     No chief complaint on file.      HPI  Yulissa Hatfield is a 63 y.o. female with multiple medical diagnoses as listed in the medical history and problem list that presents for evaluation of COVID19 diagnosed on . Pt reported burning in her throat, sinus infection, and cough. Denied any fever, chills, or night sweats. Pt completed Paxlovid with improvement of symptoms. Denied any sick contacts, but did go to a  the week prior. Denied any recent travel. Pt's symptoms have completely resolved. Otherwise, pt is okay with DM2. Her blood sugar was 82 this morning. She is fully compliant with meds and denies any adverse side effects.     PAST MEDICAL HISTORY:  Past Medical History:   Diagnosis Date    Generalized anxiety disorder with panic attacks     HTN (hypertension)     Hyperlipidemia     Hypothyroid     DANIELLE on CPAP     Type 2 diabetes mellitus        PAST SURGICAL HISTORY:  Past Surgical History:   Procedure Laterality Date    CARPAL TUNNEL RELEASE       SECTION, CLASSIC      x 3    COLONOSCOPY N/A 11/10/2021    Procedure: COLONOSCOPY;  Surgeon: Kayla Pope MD;  Location: [facility];  Service: Endoscopy;  Laterality: N/A;    ENDOMETRIAL ABLATION      ESOPHAGOGASTRODUODENOSCOPY N/A 11/10/2021    Procedure: EGD (ESOPHAGOGASTRODUODENOSCOPY);  Surgeon: Kayla Pope MD;  Location: [facility] ENDO;  Service: Endoscopy;  Laterality: N/A;    FOOT SURGERY      INJECTION OF JOINT Left 2022    Procedure: Injection, Joint, Shoulder, Hip, Or Knee;  Surgeon: Sowmya Tellez MD;  Location: [facility] PAIN MGT;  Service: Pain Management;  Laterality: Left;  Left Glenohumeral Joint Injection    KNEE SURGERY      THYROID SURGERY         SOCIAL HISTORY:  Social History     Socioeconomic History    Marital status:    Tobacco Use    Smoking status: Never    Smokeless tobacco: Never   Substance and Sexual Activity    Alcohol use: No    Drug use: No    Sexual activity: Not Currently      Social Determinants of Health     Financial Resource Strain: Low Risk  (12/21/2023)    Overall Financial Resource Strain (CARDIA)     Difficulty of Paying Living Expenses: Not hard at all   Food Insecurity: No Food Insecurity (12/21/2023)    Hunger Vital Sign     Worried About Running Out of Food in the Last Year: Never true     Ran Out of Food in the Last Year: Never true   Transportation Needs: No Transportation Needs (12/21/2023)    PRAPARE - Transportation     Lack of Transportation (Medical): No     Lack of Transportation (Non-Medical): No   Physical Activity: Insufficiently Active (12/21/2023)    Exercise Vital Sign     Days of Exercise per Week: 3 days     Minutes of Exercise per Session: 40 min   Stress: Stress Concern Present (12/21/2023)    [employer] - Occupational Stress Questionnaire     Feeling of Stress : To some extent   Social Connections: Unknown (12/21/2023)    Social Connection and Isolation Panel [NHANES]     Frequency of Communication with Friends and Family: More than three times a week     Frequency of Social Gatherings with Friends and Family: Once a week     Active Member of Clubs or Organizations: Yes     Attends Club or Organization Meetings: More than 4 times per year     Marital Status:    Housing Stability: Low Risk  (12/21/2023)    Housing Stability Vital Sign     Unable to Pay for Housing in the Last Year: No     Number of Places Lived in the Last Year: 1     Unstable Housing in the Last Year: No       FAMILY HISTORY:  Family History   Problem Relation Age of Onset    Heart disease Mother     Heart disease Father     No Known Problems Sister     Heart disease Brother     No Known Problems Daughter     No Known Problems Son     No Known Problems Daughter     No Known Problems Daughter     Colon cancer Neg Hx     Esophageal cancer Neg Hx        ALLERGIES AND MEDICATIONS: updated and reviewed.  Review of patient's allergies indicates:  No Known  Allergies  Current Outpatient Medications   Medication Sig Dispense Refill    acetaminophen (TYLENOL) 500 MG tablet Take 1,000 mg by mouth every 6 (six) hours as needed for Pain.      celecoxib (CELEBREX) 200 MG capsule Take 1 capsule (200 mg total) by mouth 2 (two) times daily with meals. 60 capsule 1    ezetimibe (ZETIA) 10 mg tablet TAKE 1 TABLET BY MOUTH EVERY DAY 90 tablet 3    levothyroxine (SYNTHROID) 112 MCG tablet Take 1 tablet (112 mcg total) by mouth before breakfast. 90 tablet 1    rosuvastatin (CRESTOR) 20 MG tablet Take 1 tablet (20 mg total) by mouth once daily. 90 tablet 3    SOOLANTRA 1 % Crea       telmisartan (MICARDIS) 80 MG Tab TAKE 1 TABLET BY MOUTH EVERY DAY 90 tablet 3    tirzepatide 15 mg/0.5 mL PnIj Inject 15 mg into the skin every 7 days. 12 pen 1     No current facility-administered medications for this visit.       ROS  Review of Systems   Constitutional:  Negative for chills and fever.   HENT:  Negative for hearing loss and sore throat.    Eyes:  Negative for visual disturbance.   Respiratory:  Negative for cough and shortness of breath.    Cardiovascular:  Negative for chest pain, palpitations and leg swelling.   Gastrointestinal:  Negative for abdominal pain, constipation, diarrhea, nausea and vomiting.   Genitourinary:  Negative for dysuria, frequency and urgency.   Musculoskeletal:  Negative for arthralgias, joint swelling and myalgias.   Skin:  Negative for rash and wound.   Neurological:  Negative for headaches.   Psychiatric/Behavioral:  Negative for agitation and confusion. The patient is not nervous/anxious.          OBJECTIVE     Physical Exam  There were no vitals filed for this visit. There is no height or weight on file to calculate BMI.            Physical Exam  Constitutional:       General: She is not in acute distress.     Appearance: She is well-developed.   HENT:      Head: Normocephalic and atraumatic.      Right Ear: External ear normal.      Left Ear: External ear  normal.      Nose: Nose normal.   Eyes:      General: No scleral icterus.        Right eye: No discharge.         Left eye: No discharge.      Conjunctiva/sclera: Conjunctivae normal.   Neck:      Vascular: No JVD.      Trachea: No tracheal deviation.   Pulmonary:      Effort: Pulmonary effort is normal. No respiratory distress.   Musculoskeletal:         General: No deformity. Normal range of motion.      Cervical back: Normal range of motion and neck supple.   Skin:     General: Skin is dry.      Findings: No erythema or rash.   Neurological:      Mental Status: She is alert and oriented to person, place, and time.      Motor: No abnormal muscle tone.      Coordination: Coordination normal.   Psychiatric:         Behavior: Behavior normal.         Thought Content: Thought content normal.         Judgment: Judgment normal.           Health Maintenance         Date Due Completion Date    Pneumococcal Vaccines (Age 0-64) (2 - PCV) 02/19/2022 2/19/2021    RSV Vaccine (Age 60+ and Pregnant patients) (1 - 1-dose 60+ series) Never done ---    Eye Exam 04/22/2023 4/22/2022    Influenza Vaccine (1) 09/01/2023 11/28/2022    Override on 11/1/2020: Done    COVID-19 Vaccine (7 - 2023-24 season) 09/01/2023 11/28/2022    Mammogram 01/17/2024 1/17/2023    Hemoglobin A1c 01/19/2024 7/19/2023    HIV Screening 01/13/2027 (Originally 12/17/1975) ---    Foot Exam 03/31/2024 3/31/2023    Lipid Panel 05/12/2024 5/12/2023    Diabetes Urine Screening 07/19/2024 7/19/2023    Low Dose Statin 08/25/2024 8/25/2023    Cervical Cancer Screening 01/12/2027 1/12/2022    TETANUS VACCINE 10/05/2028 10/5/2018    Colorectal Cancer Screening 11/10/2031 11/10/2021              ASSESSMENT     63 y.o. female with     1. History of COVID-19    2. Type 2 diabetes mellitus without complication, without long-term current use of insulin    3. Hypothyroidism, unspecified type        PLAN:     1. History of COVID-19  - Resolved    2. Type 2 diabetes mellitus  without complication, without long-term current use of insulin  - Check labs  - tirzepatide 15 mg/0.5 mL PnIj; Inject 15 mg into the skin every 7 days.  Dispense: 12 pen ; Refill: 1  - CBC Auto Differential; Future  - Comprehensive Metabolic Panel; Future  - Hemoglobin A1C; Future    3. Hypothyroidism, unspecified type  - Check labs  - levothyroxine (SYNTHROID) 112 MCG tablet; Take 1 tablet (112 mcg total) by mouth before breakfast.  Dispense: 90 tablet; Refill: 1  - TSH; Future  - T4, Free; Future        RTC in 6 months    The patient location is: [place]  The chief complaint leading to consultation is:  f/u after COVID19    Visit type: audiovisual    Face to Face time with patient: 8 min  8 minutes of total time spent on the encounter, which includes face to face time and non-face to face time preparing to see the patient (eg, review of tests), Obtaining and/or reviewing separately obtained history, Documenting clinical information in the electronic or other health record, Independently interpreting results (not separately reported) and communicating results to the patient/family/caregiver, or Care coordination (not separately reported).         Each patient to whom he or she provides medical services by telemedicine is:  (1) informed of the relationship between the physician and patient and the respective role of any other health care provider with respect to management of the patient; and (2) notified that he or she may decline to receive medical services by telemedicine and may withdraw from such care at any time.    Notes:        Emilia Velasquez MD  12/21/2023 11:12 AM        No follow-ups on file.

## 2023-12-27 ENCOUNTER — PATIENT MESSAGE (OUTPATIENT)
Dept: PULMONOLOGY | Facility: CLINIC | Age: 63
End: 2023-12-27
Payer: COMMERCIAL

## 2023-12-28 ENCOUNTER — PATIENT MESSAGE (OUTPATIENT)
Dept: OTHER | Facility: OTHER | Age: 63
End: 2023-12-28
Payer: COMMERCIAL

## 2024-01-25 ENCOUNTER — TELEPHONE (OUTPATIENT)
Dept: SLEEP MEDICINE | Facility: CLINIC | Age: 64
End: 2024-01-25
Payer: COMMERCIAL

## 2024-01-25 ENCOUNTER — PATIENT MESSAGE (OUTPATIENT)
Dept: SLEEP MEDICINE | Facility: CLINIC | Age: 64
End: 2024-01-25
Payer: COMMERCIAL

## 2024-01-25 DIAGNOSIS — G47.33 OSA (OBSTRUCTIVE SLEEP APNEA): Primary | ICD-10-CM

## 2024-01-25 NOTE — TELEPHONE ENCOUNTER
----- Message from Yahir Reddy sent at 1/25/2024  2:46 PM CST -----  Name of Who is Calling: ANDREW HURD [[id number]]            What is the request in detail: Patient is requesting call back to get order for new cpap bc hers is not working spoke with supply dept they need an order              Can the clinic reply by [facility]: no              What Number to Call Back if not in [facility]: [phone]          For more information:    Quit Now [place]  1-800-QUIT-NOW  https://[place].quitlogix.org/en-[place]/  Steps to Quit Smoking  Smoking tobacco can be harmful to your health and can affect almost every organ in your body. Smoking puts you, and those around you, at risk for developing many serious chronic diseases. Quitting smoking is difficult, but it is one of the best things that you can do for your health. It is never too late to quit.  What are the benefits of quitting smoking?  When you quit smoking, you lower your risk of developing serious diseases and conditions, such as:  · Lung cancer or lung disease, such as COPD.  · Heart disease.  · Stroke.  · Heart attack.  · Infertility.  · Osteoporosis and bone fractures.  Additionally, symptoms such as coughing, wheezing, and shortness of breath may get better when you quit. You may also find that you get sick less often because your body is stronger at fighting off colds and infections. If you are pregnant, quitting smoking can help to reduce your chances of having a baby of low birth weight.  How do I get ready to quit?  When you decide to quit smoking, create a plan to make sure that you are successful. Before you quit:  · Pick a date to quit. Set a date within the next two weeks to give you time to prepare.  · Write down the reasons why you are quitting. Keep this list in places where you will see it often, such as on your bathroom mirror or in your car or wallet.  · Identify the people, places, things, and activities that make you want to smoke (triggers) and avoid them. Make sure to take these actions:  ¨ Throw away all cigarettes at home, at work, and in your car.  ¨ Throw away smoking accessories, such as ashtrays and lighters.  ¨ Clean your car and make sure to empty the ashtray.  ¨ Clean your home, including curtains and carpets.  · Tell your family, friends, and coworkers that you are quitting. Support from your loved ones can make quitting easier.  · Talk with  your health care provider about your options for quitting smoking.  · Find out what treatment options are covered by your health insurance.  What strategies can I use to quit smoking?  Talk with your healthcare provider about different strategies to quit smoking. Some strategies include:  · Quitting smoking altogether instead of gradually lessening how much you smoke over a period of time. Research shows that quitting “cold turkey” is more successful than gradually quitting.  · Attending in-person counseling to help you build problem-solving skills. You are more likely to have success in quitting if you attend several counseling sessions. Even short sessions of 10 minutes can be effective.  · Finding resources and support systems that can help you to quit smoking and remain smoke-free after you quit. These resources are most helpful when you use them often. They can include:  ¨ Online chats with a counselor.  ¨ Telephone quitlines.  ¨ Printed self-help materials.  ¨ Support groups or group counseling.  ¨ Text messaging programs.  ¨ Mobile phone applications.  · Taking medicines to help you quit smoking. (If you are pregnant or breastfeeding, talk with your health care provider first.) Some medicines contain nicotine and some do not. Both types of medicines help with cravings, but the medicines that include nicotine help to relieve withdrawal symptoms. Your health care provider may recommend:  ¨ Nicotine patches, gum, or lozenges.  ¨ Nicotine inhalers or sprays.  ¨ Non-nicotine medicine that is taken by mouth.  Talk with your health care provider about combining strategies, such as taking medicines while you are also receiving in-person counseling. Using these two strategies together makes you more likely to succeed in quitting than if you used either strategy on its own.  If you are pregnant or breastfeeding, talk with your health care provider about finding counseling or other support strategies to quit smoking. Do  not take medicine to help you quit smoking unless told to do so by your health care provider.  What things can I do to make it easier to quit?  Quitting smoking might feel overwhelming at first, but there is a lot that you can do to make it easier. Take these important actions:  · Reach out to your family and friends and ask that they support and encourage you during this time. Call telephone quitlines, reach out to support groups, or work with a counselor for support.  · Ask people who smoke to avoid smoking around you.  · Avoid places that trigger you to smoke, such as bars, parties, or smoke-break areas at work.  · Spend time around people who do not smoke.  · Lessen stress in your life, because stress can be a smoking trigger for some people. To lessen stress, try:  ¨ Exercising regularly.  ¨ Deep-breathing exercises.  ¨ Yoga.  ¨ Meditating.  ¨ Performing a body scan. This involves closing your eyes, scanning your body from head to toe, and noticing which parts of your body are particularly tense. Purposefully relax the muscles in those areas.  · Download or purchase mobile phone or tablet apps (applications) that can help you stick to your quit plan by providing reminders, tips, and encouragement. There are many free apps, such as QuitGuide from the CDC (Centers for Disease Control and Prevention). You can find other support for quitting smoking (smoking cessation) through smokefree.gov and other websites.  How will I feel when I quit smoking?  Within the first 24 hours of quitting smoking, you may start to feel some withdrawal symptoms. These symptoms are usually most noticeable 2-3 days after quitting, but they usually do not last beyond 2-3 weeks. Changes or symptoms that you might experience include:  · Mood swings.  · Restlessness, anxiety, or irritation.  · Difficulty concentrating.  · Dizziness.  · Strong cravings for sugary foods in addition to nicotine.  · Mild weight  gain.  · Constipation.  · Nausea.  · Coughing or a sore throat.  · Changes in how your medicines work in your body.  · A depressed mood.  · Difficulty sleeping (insomnia).  After the first 2-3 weeks of quitting, you may start to notice more positive results, such as:  · Improved sense of smell and taste.  · Decreased coughing and sore throat.  · Slower heart rate.  · Lower blood pressure.  · Clearer skin.  · The ability to breathe more easily.  · Fewer sick days.  Quitting smoking is very challenging for most people. Do not get discouraged if you are not successful the first time. Some people need to make many attempts to quit before they achieve long-term success. Do your best to stick to your quit plan, and talk with your health care provider if you have any questions or concerns.  This information is not intended to replace advice given to you by your health care provider. Make sure you discuss any questions you have with your health care provider.  Document Released: 12/12/2002 Document Revised: 08/15/2017 Document Reviewed: 05/03/2016  Elsevier Interactive Patient Education © 2017 Elsevier Inc.

## 2024-02-08 ENCOUNTER — OFFICE VISIT (OUTPATIENT)
Dept: SLEEP MEDICINE | Facility: CLINIC | Age: 64
End: 2024-02-08
Attending: PSYCHIATRY & NEUROLOGY
Payer: COMMERCIAL

## 2024-02-08 ENCOUNTER — TELEPHONE (OUTPATIENT)
Dept: SLEEP MEDICINE | Facility: CLINIC | Age: 64
End: 2024-02-08
Payer: COMMERCIAL

## 2024-02-08 VITALS
SYSTOLIC BLOOD PRESSURE: 144 MMHG | WEIGHT: 292.13 LBS | HEIGHT: 64 IN | DIASTOLIC BLOOD PRESSURE: 82 MMHG | BODY MASS INDEX: 49.87 KG/M2 | HEART RATE: 76 BPM

## 2024-02-08 DIAGNOSIS — E11.9 TYPE 2 DIABETES MELLITUS WITHOUT COMPLICATION, WITHOUT LONG-TERM CURRENT USE OF INSULIN: ICD-10-CM

## 2024-02-08 DIAGNOSIS — R52 PAIN: ICD-10-CM

## 2024-02-08 DIAGNOSIS — G47.00 INSOMNIA, UNSPECIFIED TYPE: Primary | ICD-10-CM

## 2024-02-08 DIAGNOSIS — G47.33 OSA (OBSTRUCTIVE SLEEP APNEA): ICD-10-CM

## 2024-02-08 DIAGNOSIS — G47.33 OSA ON CPAP: ICD-10-CM

## 2024-02-08 PROCEDURE — 4010F ACE/ARB THERAPY RXD/TAKEN: CPT | Mod: CPTII,S$GLB,, | Performed by: NURSE PRACTITIONER

## 2024-02-08 PROCEDURE — 3079F DIAST BP 80-89 MM HG: CPT | Mod: CPTII,S$GLB,, | Performed by: NURSE PRACTITIONER

## 2024-02-08 PROCEDURE — 99214 OFFICE O/P EST MOD 30 MIN: CPT | Mod: S$GLB,,, | Performed by: NURSE PRACTITIONER

## 2024-02-08 PROCEDURE — 3008F BODY MASS INDEX DOCD: CPT | Mod: CPTII,S$GLB,, | Performed by: NURSE PRACTITIONER

## 2024-02-08 PROCEDURE — 99999 PR PBB SHADOW E&M-EST. PATIENT-LVL II: CPT | Mod: PBBFAC,,, | Performed by: NURSE PRACTITIONER

## 2024-02-08 PROCEDURE — 3044F HG A1C LEVEL LT 7.0%: CPT | Mod: CPTII,S$GLB,, | Performed by: NURSE PRACTITIONER

## 2024-02-08 PROCEDURE — 3077F SYST BP >= 140 MM HG: CPT | Mod: CPTII,S$GLB,, | Performed by: NURSE PRACTITIONER

## 2024-02-08 RX ORDER — GABAPENTIN 300 MG/1
300 CAPSULE ORAL 3 TIMES DAILY PRN
Qty: 90 CAPSULE | Refills: 1 | Status: SHIPPED | OUTPATIENT
Start: 2024-02-08 | End: 2025-02-07

## 2024-02-08 NOTE — PROGRESS NOTES
"Cc: DANIELLE/insomnia, new to me    Overall improved sleep continuity and daytime sleepiness on aPAP 10-18cm. ESS=2. Her machine is turning off and on and malfunctioning, losing pressure during the night. Using dreamwear mask w/o chin strap. Even with tylenol pm or melatonin she awakens after 3-4hrs. Arthritic pain can arouse her also. Tried several sleep meds in past.  Denies break through snoring. Lost ~20% wgt since study/wonders if she still needs machine    Interrogation 30dag 9h/n AHI 2.0, 90% tile 11.5cm    BP (!) 144/82 (BP Location: Left arm, Patient Position: Sitting, BP Method: Large (Automatic))   Pulse 76   Ht 5' 4" (1.626 m)   Wt 132.5 kg (292 lb 1.6 oz)   LMP 03/25/2006   BMI 50.14 kg/m²     PSG 9/17/15: Significant Obstructive sleep apnea (DANIELLE) with AHI (apnea hypopnea Index) of 52 and SaO2 of 81 (weight  336 lbs)  SH: JOAN,  mentorship  [facility](lunesta, restoril, belsomra)      ASSESSMENT:  1. DANIELLE severe  Remains adherent on CPAP, AHI<5. Benefits . Machine though is not working/malfunctioning and turns off and on . Eligible new machine   2. Insomnia       PLAN:  Continue APAP 10-18 cm H2O, GET  new machine. DME [facility]. Rtc b/t 31-90d after setup  Begin Gabapentin 300mg bedtime (notify me status)  Obtain PSG given wgt loss re-assess ahi    "

## 2024-02-08 NOTE — TELEPHONE ENCOUNTER
----- Message from Gila Bass sent at 2/8/2024  9:56 AM CST -----  Type:  Needs Medical Advice    Who Called: pt  Symptoms (please be specific): pt states that she needs to schedule a face to face appt, her equipment has been malfunctioning since mid January and is having problems sleeping at night please call right back to resolve    Would the patient rather a call back or a response via [facility]? call  Best Call Back Number: [phone]  Additional Information:

## 2024-02-22 DIAGNOSIS — E11.9 TYPE 2 DIABETES MELLITUS WITHOUT COMPLICATION, WITHOUT LONG-TERM CURRENT USE OF INSULIN: ICD-10-CM

## 2024-02-22 RX ORDER — TIRZEPATIDE 2.5 MG/.5ML
INJECTION, SOLUTION SUBCUTANEOUS
Qty: 12 PEN | Refills: 1 | OUTPATIENT
Start: 2024-02-22

## 2024-02-22 NOTE — TELEPHONE ENCOUNTER
Care Due:                  Date            Visit Type   Department     Provider  --------------------------------------------------------------------------------                                ESTABLISHED   [facility]                              PATIENT -    MEDICINE/INTERN  Last Visit: 12-      [facility]         Emilia Velasquez  Next Visit: None Scheduled  None         None Found                                                            Last  Test          Frequency    Reason                     Performed    Due Date  --------------------------------------------------------------------------------    Lipid Panel.  12 months..  ezetimibe................  05- 05-    [facility] Embedded Care Due Messages. Reference number: [id number].   2/22/2024 9:46:58 AM CST

## 2024-02-22 NOTE — TELEPHONE ENCOUNTER
Provider Staff:  Action required for this patient    Requires labs      Please see care gap opportunities below in Care Due Message.    Thanks!  [facility]     Appointments      Date Provider   Last Visit   12/21/2023 Emiila Velasquez MD   Next Visit   2/23/2024 Emilia Velasquez MD     Refill Decision Note   Yulissa Hatfield  is requesting a refill authorization.  Brief Assessment and Rationale for Refill:  Quick Discontinue     Medication Therapy Plan:  PT IS TAKING tirzepatide 15 mg/0.5 mL PnIj      Comments:     Note composed:9:51 AM 02/22/2024

## 2024-03-01 DIAGNOSIS — E78.2 MIXED HYPERLIPIDEMIA: ICD-10-CM

## 2024-03-01 RX ORDER — PRAVASTATIN SODIUM 40 MG/1
40 TABLET ORAL DAILY
Qty: 30 TABLET | Refills: 0 | Status: SHIPPED | OUTPATIENT
Start: 2024-03-01 | End: 2024-03-28

## 2024-03-04 ENCOUNTER — OFFICE VISIT (OUTPATIENT)
Dept: PODIATRY | Facility: CLINIC | Age: 64
End: 2024-03-04
Payer: COMMERCIAL

## 2024-03-04 ENCOUNTER — TELEPHONE (OUTPATIENT)
Dept: RESEARCH | Facility: HOSPITAL | Age: 64
End: 2024-03-04
Payer: COMMERCIAL

## 2024-03-04 VITALS
HEART RATE: 75 BPM | WEIGHT: 293 LBS | BODY MASS INDEX: 50.02 KG/M2 | SYSTOLIC BLOOD PRESSURE: 164 MMHG | DIASTOLIC BLOOD PRESSURE: 89 MMHG | HEIGHT: 64 IN

## 2024-03-04 DIAGNOSIS — I87.2 VENOUS INSUFFICIENCY OF BOTH LOWER EXTREMITIES: ICD-10-CM

## 2024-03-04 DIAGNOSIS — B07.0 PLANTAR WART OF RIGHT FOOT: ICD-10-CM

## 2024-03-04 DIAGNOSIS — E11.9 TYPE 2 DIABETES MELLITUS WITHOUT COMPLICATION, WITHOUT LONG-TERM CURRENT USE OF INSULIN: Primary | ICD-10-CM

## 2024-03-04 PROCEDURE — 3079F DIAST BP 80-89 MM HG: CPT | Mod: CPTII,S$GLB,, | Performed by: PODIATRIST

## 2024-03-04 PROCEDURE — 3077F SYST BP >= 140 MM HG: CPT | Mod: CPTII,S$GLB,, | Performed by: PODIATRIST

## 2024-03-04 PROCEDURE — 1159F MED LIST DOCD IN RCRD: CPT | Mod: CPTII,S$GLB,, | Performed by: PODIATRIST

## 2024-03-04 PROCEDURE — 4010F ACE/ARB THERAPY RXD/TAKEN: CPT | Mod: CPTII,S$GLB,, | Performed by: PODIATRIST

## 2024-03-04 PROCEDURE — 3008F BODY MASS INDEX DOCD: CPT | Mod: CPTII,S$GLB,, | Performed by: PODIATRIST

## 2024-03-04 PROCEDURE — 1160F RVW MEDS BY RX/DR IN RCRD: CPT | Mod: CPTII,S$GLB,, | Performed by: PODIATRIST

## 2024-03-04 PROCEDURE — 99213 OFFICE O/P EST LOW 20 MIN: CPT | Mod: 25,S$GLB,, | Performed by: PODIATRIST

## 2024-03-04 PROCEDURE — 3044F HG A1C LEVEL LT 7.0%: CPT | Mod: CPTII,S$GLB,, | Performed by: PODIATRIST

## 2024-03-04 PROCEDURE — 17000 DESTRUCT PREMALG LESION: CPT | Mod: S$GLB,,, | Performed by: PODIATRIST

## 2024-03-04 PROCEDURE — 99999 PR PBB SHADOW E&M-EST. PATIENT-LVL IV: CPT | Mod: PBBFAC,,, | Performed by: PODIATRIST

## 2024-03-04 NOTE — PATIENT INSTRUCTIONS
Keep dressing intact x 2 days then remove and wash. Pat dry and keep covered with band aid daily.

## 2024-03-04 NOTE — PROGRESS NOTES
Subjective:     Patient ID: Yulissa Hatfield is a 63 y.o. female.    Chief Complaint: Foot Pain and Plantar Warts (Right heel/)    Yulissa is a 63 y.o. female who presents for evaluation and treatment of diabetic feet. Yulissa has a past medical history of Generalized anxiety disorder with panic attacks, HTN (hypertension), Hyperlipidemia, Hypothyroid, DANIELLE on CPAP, and Type 2 diabetes mellitus. Patient relates no major problem with feet. Only complaints today consist of under bottom of the right heel.  History of previous treatment for plantar wart.    03/04/2024:  Returns for annual foot exam.  States that she began to develop pain from a possible plantar wart in November 2023.  No new concerns.    PCP: Emilia Velasquez MD    Date Last Seen by PCP:  02/23/2024    Current shoe gear: Flip-flops    Hemoglobin A1C   Date Value Ref Range Status   01/29/2024 5.4 4.0 - 5.6 % Final     Comment:     ADA Screening Guidelines:  5.7-6.4%  Consistent with prediabetes  >or=6.5%  Consistent with diabetes    High levels of fetal hemoglobin interfere with the HbA1C  assay. Heterozygous hemoglobin variants (HbS, HgC, etc)do  not significantly interfere with this assay.   However, presence of multiple variants may affect accuracy.     07/19/2023 5.9 (H) 4.0 - 5.6 % Final     Comment:     ADA Screening Guidelines:  5.7-6.4%  Consistent with prediabetes  >or=6.5%  Consistent with diabetes    High levels of fetal hemoglobin interfere with the HbA1C  assay. Heterozygous hemoglobin variants (HbS, HgC, etc)do  not significantly interfere with this assay.   However, presence of multiple variants may affect accuracy.     01/24/2023 6.3 (H) 4.0 - 5.6 % Final     Comment:     ADA Screening Guidelines:  5.7-6.4%  Consistent with prediabetes  >or=6.5%  Consistent with diabetes    High levels of fetal hemoglobin interfere with the HbA1C  assay. Heterozygous hemoglobin variants (HbS, HgC, etc)do  not significantly interfere with this assay.  "  However, presence of multiple variants may affect accuracy.       Vitals:    24 1616   BP: (!) 164/89   Pulse: 75   Weight: 133.4 kg (294 lb)   Height: 5' 4" (1.626 m)   PainSc:   2      Past Medical History:   Diagnosis Date    Generalized anxiety disorder with panic attacks     HTN (hypertension)     Hyperlipidemia     Hypothyroid     DANIELLE on CPAP     Type 2 diabetes mellitus        Past Surgical History:   Procedure Laterality Date    CARPAL TUNNEL RELEASE       SECTION, CLASSIC      x 3    COLONOSCOPY N/A 11/10/2021    Procedure: COLONOSCOPY;  Surgeon: Kayla Pope MD;  Location: [facility] ENDO;  Service: Endoscopy;  Laterality: N/A;    ENDOMETRIAL ABLATION      ESOPHAGOGASTRODUODENOSCOPY N/A 11/10/2021    Procedure: EGD (ESOPHAGOGASTRODUODENOSCOPY);  Surgeon: Kayla Pope MD;  Location: [facility] ENDO;  Service: Endoscopy;  Laterality: N/A;    FOOT SURGERY      INJECTION OF JOINT Left 2022    Procedure: Injection, Joint, Shoulder, Hip, Or Knee;  Surgeon: Sowmya Tellez MD;  Location: [facility] PAIN MGT;  Service: Pain Management;  Laterality: Left;  Left Glenohumeral Joint Injection    KNEE SURGERY      THYROID SURGERY         Family History   Problem Relation Age of Onset    Heart disease Mother     Heart disease Father     No Known Problems Sister     Heart disease Brother     No Known Problems Daughter     No Known Problems Son     No Known Problems Daughter     No Known Problems Daughter     Colon cancer Neg Hx     Esophageal cancer Neg Hx        Social History     Socioeconomic History    Marital status:    Tobacco Use    Smoking status: Never    Smokeless tobacco: Never   Substance and Sexual Activity    Alcohol use: No    Drug use: No    Sexual activity: Not Currently     Social Determinants of Health     Financial Resource Strain: Low Risk  (2023)    Overall Financial Resource Strain (CARDIA)     Difficulty of Paying Living Expenses: Not hard at all   Food " Insecurity: No Food Insecurity (12/21/2023)    Hunger Vital Sign     Worried About Running Out of Food in the Last Year: Never true     Ran Out of Food in the Last Year: Never true   Transportation Needs: No Transportation Needs (12/21/2023)    PRAPARE - Transportation     Lack of Transportation (Medical): No     Lack of Transportation (Non-Medical): No   Physical Activity: Insufficiently Active (12/21/2023)    Exercise Vital Sign     Days of Exercise per Week: 3 days     Minutes of Exercise per Session: 40 min   Stress: Stress Concern Present (12/21/2023)    [place] [employer] - Occupational Stress Questionnaire     Feeling of Stress : To some extent   Social Connections: Unknown (12/21/2023)    Social Connection and Isolation Panel [NHANES]     Frequency of Communication with Friends and Family: More than three times a week     Frequency of Social Gatherings with Friends and Family: Once a week     Active Member of Clubs or Organizations: Yes     Attends Club or Organization Meetings: More than 4 times per year     Marital Status:    Housing Stability: Low Risk  (12/21/2023)    Housing Stability Vital Sign     Unable to Pay for Housing in the Last Year: No     Number of Places Lived in the Last Year: 1     Unstable Housing in the Last Year: No       Current Outpatient Medications   Medication Sig Dispense Refill    acetaminophen (TYLENOL) 500 MG tablet Take 1,000 mg by mouth every 6 (six) hours as needed for Pain.      clobetasoL (TEMOVATE) 0.05 % cream APPLY 1 APPLICATION TOPICALLY TO AFFECTED AREA TWICE A DAY      desonide (DESOWEN) 0.05 % lotion       ezetimibe (ZETIA) 10 mg tablet TAKE 1 TABLET BY MOUTH EVERY DAY 90 tablet 3    gabapentin (NEURONTIN) 300 MG capsule Take 1 capsule (300 mg total) by mouth 3 (three) times daily as needed. 90 capsule 1    hydrocortisone 2.5 % cream       levothyroxine (SYNTHROID) 112 MCG tablet Take 1 tablet (112 mcg total) by mouth before breakfast.  90 tablet 1    pravastatin (PRAVACHOL) 40 MG tablet Take 1 tablet (40 mg total) by mouth once daily. 30 tablet 0    SOOLANTRA 1 % Crea       telmisartan (MICARDIS) 80 MG Tab TAKE 1 TABLET BY MOUTH EVERY DAY 90 tablet 3    tirzepatide 15 mg/0.5 mL PnIj Inject 15 mg into the skin every 7 days. 12 pen 1     No current facility-administered medications for this visit.       Review of patient's allergies indicates:  No Known Allergies      Review of Systems   Constitutional: Negative for chills, fever and malaise/fatigue.   HENT:  Negative for congestion and hearing loss.    Cardiovascular:  Positive for leg swelling. Negative for chest pain and claudication.   Respiratory:  Negative for cough and shortness of breath.    Skin:  Negative for color change, itching and poor wound healing.   Musculoskeletal:  Negative for back pain, joint pain, muscle cramps and muscle weakness.   Gastrointestinal:  Negative for nausea and vomiting.   Neurological:  Negative for numbness, paresthesias and weakness.   Psychiatric/Behavioral:  Negative for altered mental status.         Objective:     Physical Exam  Constitutional:       General: She is not in acute distress.     Appearance: She is obese. She is not ill-appearing.   Cardiovascular:      Pulses:           Dorsalis pedis pulses are 2+ on the right side and 2+ on the left side.        Posterior tibial pulses are detected w/ Doppler on the right side and detected w/ Doppler on the left side.      Comments: Biphasic PT bilateral with Doppler.  Mild to moderate nonpitting edema to lower extremity bilateral with multiple varicosities and telangiectasias.  Skin temp is warm to foot bilateral.  No rubor on dependency bilateral foot.  No hair growth bilateral lower extremity.  Musculoskeletal:      Right foot: No deformity, bunion, Charcot foot, foot drop or prominent metatarsal heads.      Left foot: No deformity, bunion, Charcot foot, foot drop or prominent metatarsal heads.       Comments: Mild semi rigid pes planus foot structure bilateral.  No pain range motion or manual muscle strength testing bilateral foot and ankle.  No significant digital deformity noted.   Feet:      Right foot:      Protective Sensation: 10 sites tested.  10 sites sensed.      Skin integrity: Callus present.      Left foot:      Protective Sensation: 10 sites tested.  10 sites sensed.   Skin:     General: Skin is warm.      Capillary Refill: Capillary refill takes less than 2 seconds.      Findings: No ecchymosis or erythema.      Nails: There is no clubbing.      Comments: Raised hyperkeratotic skin plantar right heel with spongy core noting disappearance of the resting skin tension lines and pain during squeezing of the lesion.   Neurological:      Mental Status: She is alert and oriented to person, place, and time.      Sensory: Sensation is intact.      Motor: Motor function is intact.           Assessment:      Encounter Diagnoses   Name Primary?    Type 2 diabetes mellitus without complication, without long-term current use of insulin Yes    Venous insufficiency of both lower extremities     Plantar wart of right foot      Plan:     Yulissa was seen today for foot pain and plantar warts.    Diagnoses and all orders for this visit:    Type 2 diabetes mellitus without complication, without long-term current use of insulin    Venous insufficiency of both lower extremities    Plantar wart of right foot      I counseled the patient on her conditions, their implications and medical management.    Shoe inspection. Diabetic Foot Education. Patient reminded of the importance of good nutrition and blood sugar control to help prevent podiatric complications of diabetes. Patient instructed on proper foot hygeine. We discussed wearing proper shoe gear, daily foot inspections, never walking without protective shoe gear, never putting sharp instruments to feet.      With the patient's consent a sterile #15 scalpel was used to  trim the lesion plantar right heel. Discussed treatment options in detail. The patient elected for treatment with application of Cantharidin gel applied to the lesion and covered with a mepilex border which was further secured with tape. The patient tolerated the procedure well without pain or complication. No blood loss. Instructed to keep intact x 2 days then may remove and shower. Keep covered with bandaid until follow up.    Comprehensive annual diabetic foot exam completed today.  Patient found to be low risk for diabetic foot complication.      RTC within 1 year for annual foot exam and within 4-6 weeks for plantar wart check right foot.     Assisted by Mekhi Fair DPM PGY 2    A portion of this note was generated by voice recognition software and may contain spelling and grammar errors.

## 2024-03-04 NOTE — TELEPHONE ENCOUNTER
Luisa Study   Study ID#[id number]    Left voicemail reminding patient to complete Month 12 questionnaires by this Friday. Also sent email reminder to patient.

## 2024-03-08 ENCOUNTER — TELEPHONE (OUTPATIENT)
Dept: RESEARCH | Facility: HOSPITAL | Age: 64
End: 2024-03-08
Payer: COMMERCIAL

## 2024-03-08 NOTE — TELEPHONE ENCOUNTER
Luisa Study   Study ID#[id number]    Was not unable to leave a voicemail with patient. Also sent email reminder to patient.

## 2024-03-18 ENCOUNTER — TELEPHONE (OUTPATIENT)
Dept: BARIATRICS | Facility: CLINIC | Age: 64
End: 2024-03-18
Payer: COMMERCIAL

## 2024-03-18 ENCOUNTER — TELEPHONE (OUTPATIENT)
Dept: PAIN MEDICINE | Facility: CLINIC | Age: 64
End: 2024-03-18
Payer: COMMERCIAL

## 2024-03-21 ENCOUNTER — TELEPHONE (OUTPATIENT)
Dept: BARIATRICS | Facility: CLINIC | Age: 64
End: 2024-03-21
Payer: COMMERCIAL

## 2024-03-21 NOTE — TELEPHONE ENCOUNTER
----- Message from Rhiannon Bishop sent at 3/21/2024  9:49 AM CDT -----  Regarding: appt  Contact: [phone]  Pt requesting consultation for medical evaluation type appt. Pt has already had the financial type appt. Pls call to discuss.

## 2024-03-26 DIAGNOSIS — E78.2 MIXED HYPERLIPIDEMIA: ICD-10-CM

## 2024-03-26 NOTE — TELEPHONE ENCOUNTER
No care due was identified.  [facility] Embedded Care Due Messages. Reference number: [id number].   3/26/2024 7:31:25 AM CDT

## 2024-03-27 NOTE — TELEPHONE ENCOUNTER
Refill Routing Note   Medication(s) are not appropriate for processing by [facility] for the following reason(s):        New or recently adjusted medication    [facility] action(s):  Defer               Appointments  past 12m or future 3m with PCP    Date Provider   Last Visit   2/23/2024 Emilia Velasquez MD   Next Visit   Visit date not found Emilia Velasquez MD   ED visits in past 90 days: 0        Note composed:7:04 AM 03/27/2024

## 2024-03-28 RX ORDER — PRAVASTATIN SODIUM 40 MG/1
40 TABLET ORAL
Qty: 90 TABLET | Refills: 3 | Status: SHIPPED | OUTPATIENT
Start: 2024-03-28

## 2024-04-10 DIAGNOSIS — E11.9 TYPE 2 DIABETES MELLITUS WITHOUT COMPLICATION, WITHOUT LONG-TERM CURRENT USE OF INSULIN: ICD-10-CM

## 2024-04-10 NOTE — TELEPHONE ENCOUNTER
No care due was identified.  [facility] Embedded Care Due Messages. Reference number: [id number].   4/10/2024 2:08:27 PM CDT

## 2024-04-11 ENCOUNTER — PATIENT MESSAGE (OUTPATIENT)
Dept: SPORTS MEDICINE | Facility: CLINIC | Age: 64
End: 2024-04-11
Payer: COMMERCIAL

## 2024-04-11 DIAGNOSIS — E11.9 TYPE 2 DIABETES MELLITUS WITHOUT COMPLICATION, WITHOUT LONG-TERM CURRENT USE OF INSULIN: ICD-10-CM

## 2024-04-11 RX ORDER — TIRZEPATIDE 2.5 MG/.5ML
INJECTION, SOLUTION SUBCUTANEOUS
OUTPATIENT
Start: 2024-04-11

## 2024-04-12 DIAGNOSIS — Z12.31 ENCOUNTER FOR SCREENING MAMMOGRAM FOR BREAST CANCER: Primary | ICD-10-CM

## 2024-04-12 NOTE — TELEPHONE ENCOUNTER
Refill Decision Note   Yulissa Hatfield  is requesting a refill authorization.  Brief Assessment and Rationale for Refill:  Quick Discontinue     Medication Therapy Plan:  Mounjaro was d/cd on 6/5/23 by PCP; Patient is taking 15mg dose. Pharmacy is requesting new scripts for the following medications without required information, (sig/ frequency/qty/etc)      Medication Reconciliation Completed: No     Comments: Pharmacies have been requesting medications for patients without required information, (sig, frequency, qty, etc.). In addition, requests are sent for medication(s) pt. are currently not taking, and medications patients have never taken.    We have spoken to the pharmacies about these request types and advised their teams previously that we are unable to assess these New Script requests and require all details for these requests. This is a known issue and has been reported.     Note composed:10:04 PM 04/11/2024

## 2024-04-23 ENCOUNTER — TELEPHONE (OUTPATIENT)
Dept: BARIATRICS | Facility: CLINIC | Age: 64
End: 2024-04-23
Payer: COMMERCIAL

## 2024-04-23 ENCOUNTER — OFFICE VISIT (OUTPATIENT)
Dept: OPTOMETRY | Facility: CLINIC | Age: 64
End: 2024-04-23
Payer: COMMERCIAL

## 2024-04-23 ENCOUNTER — PATIENT MESSAGE (OUTPATIENT)
Dept: FAMILY MEDICINE | Facility: CLINIC | Age: 64
End: 2024-04-23
Payer: COMMERCIAL

## 2024-04-23 DIAGNOSIS — E11.9 TYPE 2 DIABETES MELLITUS WITHOUT COMPLICATION, WITHOUT LONG-TERM CURRENT USE OF INSULIN: Primary | ICD-10-CM

## 2024-04-23 DIAGNOSIS — H25.13 NUCLEAR SCLEROSIS OF BOTH EYES: ICD-10-CM

## 2024-04-23 PROCEDURE — 1159F MED LIST DOCD IN RCRD: CPT | Mod: CPTII,S$GLB,, | Performed by: OPTOMETRIST

## 2024-04-23 PROCEDURE — 4010F ACE/ARB THERAPY RXD/TAKEN: CPT | Mod: CPTII,S$GLB,, | Performed by: OPTOMETRIST

## 2024-04-23 PROCEDURE — 3044F HG A1C LEVEL LT 7.0%: CPT | Mod: CPTII,S$GLB,, | Performed by: OPTOMETRIST

## 2024-04-23 PROCEDURE — 2023F DILAT RTA XM W/O RTNOPTHY: CPT | Mod: CPTII,S$GLB,, | Performed by: OPTOMETRIST

## 2024-04-23 PROCEDURE — 92004 COMPRE OPH EXAM NEW PT 1/>: CPT | Mod: S$GLB,,, | Performed by: OPTOMETRIST

## 2024-04-23 PROCEDURE — 99999 PR PBB SHADOW E&M-EST. PATIENT-LVL III: CPT | Mod: PBBFAC,,, | Performed by: OPTOMETRIST

## 2024-04-23 NOTE — TELEPHONE ENCOUNTER
----- Message from Padma Graham sent at 4/23/2024  8:56 AM CDT -----  Regarding: appt access  Contact: pt [phone]  Pt calling to schedule appt for Bariatric. Pt has already had financial counseling visit. Pls call

## 2024-04-23 NOTE — PROGRESS NOTES
HPI    Pt is here today for routine eye exam. Patient denies pain/discomfort. Pt   currently uses +1.75 readers.  DLS: Years and a half ago  (-)Flashes   (+)Floaters   (-)Diplopia   (-)Headaches   (-)Itching   (-)Tearing  (-)Burning  (-)Dryness   (-)Photophobia  (-)Glare   (-)Blurred [facility]  Past Eye Sx: (-)  Eye Meds: (-)   Hemoglobin A1C       Date                     Value               Ref Range             Status                01/29/2024               5.4                 4.0 - 5.6 %           Final            Last edited by Torie Cornejo, OD on 4/23/2024  2:03 PM.            Assessment /Plan     For exam results, see Encounter Report.    Type 2 diabetes mellitus without complication, without long-term current use of insulin    Nuclear sclerosis of both eyes      1. No retinopathy noted today.  Continued control with primary care physician and annual comprehensive eye exam.     2. Educated pt on findings. Not visually significant. No need for removal at this time. Monitor yearly.    Continue use of OTC reading glasses prn. Monitor yearly.      RTC in 1 year for annual eye exam unless changes noted sooner.

## 2024-04-27 ENCOUNTER — PATIENT MESSAGE (OUTPATIENT)
Dept: FAMILY MEDICINE | Facility: CLINIC | Age: 64
End: 2024-04-27
Payer: COMMERCIAL

## 2024-04-29 DIAGNOSIS — E11.9 TYPE 2 DIABETES MELLITUS WITHOUT COMPLICATION, WITHOUT LONG-TERM CURRENT USE OF INSULIN: Primary | ICD-10-CM

## 2024-05-16 ENCOUNTER — TELEPHONE (OUTPATIENT)
Dept: SLEEP MEDICINE | Facility: CLINIC | Age: 64
End: 2024-05-16
Payer: COMMERCIAL

## 2024-05-16 NOTE — TELEPHONE ENCOUNTER
Patient has a 06/03 appointment that has been moved to 07/08@2:30 due to the provider being out of the office.

## 2024-05-20 ENCOUNTER — PATIENT MESSAGE (OUTPATIENT)
Dept: ADMINISTRATIVE | Facility: OTHER | Age: 64
End: 2024-05-20
Payer: COMMERCIAL

## 2024-05-20 ENCOUNTER — PATIENT MESSAGE (OUTPATIENT)
Dept: ADMINISTRATIVE | Facility: HOSPITAL | Age: 64
End: 2024-05-20
Payer: COMMERCIAL

## 2024-05-20 ENCOUNTER — TELEPHONE (OUTPATIENT)
Dept: SLEEP MEDICINE | Facility: OTHER | Age: 64
End: 2024-05-20
Payer: COMMERCIAL

## 2024-05-21 ENCOUNTER — PATIENT OUTREACH (OUTPATIENT)
Dept: ADMINISTRATIVE | Facility: HOSPITAL | Age: 64
End: 2024-05-21
Payer: COMMERCIAL

## 2024-05-21 DIAGNOSIS — E11.9 TYPE 2 DIABETES MELLITUS WITHOUT COMPLICATION, WITHOUT LONG-TERM CURRENT USE OF INSULIN: Primary | ICD-10-CM

## 2024-05-21 NOTE — PROGRESS NOTES
Health Maintenance Due   Topic Date Due    Pneumococcal Vaccines (Age 0-64) (2 of 2 - PCV) 02/19/2022    RSV Vaccine (Age 60+ and Pregnant patients) (1 - 1-dose 60+ series) Never done    COVID-19 Vaccine (7 - 2023-24 season) 09/01/2023    Lipid Panel  05/12/2024   Chart review done. HM updated. Immunizations reviewed & updated. Care Everywhere updated.  LAB ORDER PLACED AND SCHEDULED

## 2024-05-23 ENCOUNTER — PATIENT MESSAGE (OUTPATIENT)
Dept: FAMILY MEDICINE | Facility: CLINIC | Age: 64
End: 2024-05-23
Payer: COMMERCIAL

## 2024-05-24 DIAGNOSIS — E11.9 TYPE 2 DIABETES MELLITUS WITHOUT COMPLICATION, WITHOUT LONG-TERM CURRENT USE OF INSULIN: ICD-10-CM

## 2024-05-24 DIAGNOSIS — E78.5 HYPERLIPIDEMIA ASSOCIATED WITH TYPE 2 DIABETES MELLITUS: Primary | ICD-10-CM

## 2024-05-24 DIAGNOSIS — E11.69 HYPERLIPIDEMIA ASSOCIATED WITH TYPE 2 DIABETES MELLITUS: Primary | ICD-10-CM

## 2024-05-30 NOTE — PROGRESS NOTES
Pain Medicine Established Clinic Visit       Chief Complaint:   Chief Complaint   Patient presents with    Follow-up     B/l shoulder pain            History of Present Illness: Yulissa Hatfield is a 63 y.o. female referred by Dr. Edwin Tena for cervical radiculopathy.      Onset: 2018, no specific injury, insidious onset  Location: Bilateral anterior shoulders, worse on the left  Radiation: into neck and down both arms  Timing: constant  Quality: Aching and Deep  Exacerbating Factors: lifting  Alleviating Factors: medications and rest  Associated Symptoms: She feels weakness in both arms, numbness in both hands in C6/7 distribution. She denies night fever/night sweats, urinary incontinence, bowel incontinence and significant weight loss    Severity: Currently: 7/10   Typical Range: 4-10/10     Exacerbation: 10/10     Interval Update-06/03/2024 Patient return to clinic for follow-up on bilateral shoulder pain.  She reports having seen orthopedics and had been referred to physical therapy however she did not attend.  She is seeking a repeat left shoulder injection which was provided to her by Dr. Tellez  previously.  She denies any recent incident or trauma denies any profound weakness she reports that her primary pain is at night which prohibits her from sleeping.  She would like to also discuss medication alternatives.    Interval History-05/05/2023- 62-year-old female presents today with complaints of bilateral shoulder pain, she is established with our clinic and was previously provided a left glenohumeral joint injection that provided 60% relief for a few weeks and then her pain returned.  She reports not being able to sleep at night due to the increased pain, she is scheduled to see Dr. Rosas/orthopedics in the next few weeks for further evaluation and possible bilateral shoulder MRI.  Today she is requesting something help her sleep as well as something to control her bilateral shoulder  pain.    Interval Updates-(12/06/2022):  Yulissa Hatfield returns today for follow up.   She is status post a Left  glenohumeral joint injection Injection provided 60% relief.  She is currently in physical therapy which she states is helping, Currently, the shoulder pain is stable.      Current Pain Scales:  Current: 3/10              Typical Range: 2-3/10          Previous Interventions:  - 11/09/2022 Left  glenohumeral joint injection under fluoroscopic guidance-60%  - 8/16/22: Left subacromial bursa injection     Previous Therapies:  PT/OT: yes currently  Relevant Surgery: no   Previous Medications:   - NSAIDS: Ibuprofen 200 mg  - Muscle Relaxants:  Tizanidine > dry mouth, cough  - TCAs:   - SNRIs: Cymbalta  - Topicals:   - Anticonvulsants:    - Opioids: tramadol    Current Pain Medications:  Ibuprofen  Cymbalta 40 mg   Baclofen 5-10 mg qHS  Tylenol     Blood Thinners: None    Full Medication List:    Current Outpatient Medications:     acetaminophen (TYLENOL) 500 MG tablet, Take 1,000 mg by mouth every 6 (six) hours as needed for Pain., Disp: , Rfl:     clobetasoL (TEMOVATE) 0.05 % cream, APPLY 1 APPLICATION TOPICALLY TO AFFECTED AREA TWICE A DAY, Disp: , Rfl:     desonide (DESOWEN) 0.05 % lotion, , Disp: , Rfl:     ezetimibe (ZETIA) 10 mg tablet, TAKE 1 TABLET BY MOUTH EVERY DAY, Disp: 90 tablet, Rfl: 0    gabapentin (NEURONTIN) 300 MG capsule, Take 1 capsule (300 mg total) by mouth 3 (three) times daily as needed., Disp: 90 capsule, Rfl: 1    hydrocortisone 2.5 % cream, , Disp: , Rfl:     levothyroxine (SYNTHROID) 112 MCG tablet, Take 1 tablet (112 mcg total) by mouth before breakfast., Disp: 90 tablet, Rfl: 1    pravastatin (PRAVACHOL) 40 MG tablet, TAKE 1 TABLET BY MOUTH EVERY DAY, Disp: 90 tablet, Rfl: 3    SOOLANTRA 1 % Crea, , Disp: , Rfl:     telmisartan (MICARDIS) 80 MG Tab, TAKE 1 TABLET BY MOUTH EVERY DAY, Disp: 90 tablet, Rfl: 3    tirzepatide 10 mg/0.5 mL PnIj, Inject 10 mg into the skin every 7  days., Disp: 12 Pen, Rfl: 0     Review of Systems:  Review of Systems   Musculoskeletal:  Positive for joint pain.       Allergies:  Patient has no known allergies.     Medical History:   has a past medical history of Generalized anxiety disorder with panic attacks, HTN (hypertension), Hyperlipidemia, Hypothyroid, DANIELLE on CPAP, and Type 2 diabetes mellitus.    Surgical History:   has a past surgical history that includes Carpal tunnel release; Foot surgery; Knee surgery;  section, classic; Thyroid surgery; Endometrial ablation; Colonoscopy (N/A, 11/10/2021); Esophagogastroduodenoscopy (N/A, 11/10/2021); and Injection of joint (Left, 2022).    Family History:  family history includes Heart disease in her brother, father, and mother; No Known Problems in her daughter, daughter, daughter, sister, and son.    Social History:   reports that she has never smoked. She has never used smokeless tobacco. She reports that she does not drink alcohol and does not use drugs.    Physical Exam:  LMP 2006   GEN: No acute distress. Calm, comfortable  HENT: Normocephalic, atraumatic, moist mucous membranes  EYE: Anicteric sclera, non-injected.   CV: Non-diaphoretic.   RESP: Breathing comfortably. Chest expansion symmetric.  PSYCH: Pleasant mood and appropriate affect. Recent and remote memory intact.   Shoulder Exam:      Inspection: No erythema, bruising, surgical incisions      Palpation: TTP of glenohumeral joint and AC joint of bilateral shoulders.       ROM:  Limited in abduction, internal rotation bilaterally, worse on the left than the right   (+) Painful arc sign with pain between  degrees abduction      Provocative Maneuvers for Biceps Tendon:   (-) Speed's test bilaterally      Provocative Maneuvers for Impingement:  (+) Hawkin's bilaterally  (+) Neer's bilaterally      Provocative Maneuvers for RTC:       (+) Empty Can bilaterally  Neuro:  (-) Godwin bilaterally         Imagin23 MRI  Shoulder RT 06/23/23 MRI Shoulder LT  -MRI Cervical Spine 06/20/2022:  MRI examination of the cervical spine dated June 20, 2022.  There is straightening of the cervical spine with degenerative change greatest at C4-C5 and C5-C6.  No evidence of congenital spinal stenosis.  No abnormal signal change involving the visualized portion of the spinal cord.  No evidence of an acute cervical spine fracture.  At the C2-C3 and C3-C4 levels there is no evidence of a focal disc abnormality.  No significant spinal canal or foraminal encroachment.  At the C4-C5 level there is a minimal posterior disc-osteophyte with mild effacement along the anterior margin of the subarachnoid space and spinal cord.  Mild spinal canal encroachment.  No foraminal encroachment.  At the C5-C6 level there is a broad-based and right paracentral disc-osteophyte.  There is effacement along the anterior margin of the subarachnoid space with right greater than left foraminal encroachment.  At the C6-C7 and C7-T1 levels there is no evidence of a significant focal disc abnormality.  No significant spinal canal or foraminal encroachment.    - X-ray shoulder complete b/l 2/13/20:   Right shoulder: The alignment is normal.  The glenohumeral joint appears normal.  Minimal osteophyte at the inferior humeral head.  There is mild osteophyte at the acromioclavicular joint.  No fracture, no osseous lesions.  The right upper thoracic region appear normal.  Left shoulder: The alignment is normal.  There is mild glenohumeral joint space narrowing.  Subchondral cystic changes at the superior glenoid.  Prominent inferior osteophyte at the humeral head.  Osteophyte noted at the acromioclavicular joint.  No fracture, no osseous lesions.  The left upper thoracic region appear normal    Labs:  BMP  Lab Results   Component Value Date     01/29/2024    K 4.3 01/29/2024     01/29/2024    CO2 25 01/29/2024    BUN 15 01/29/2024    CREATININE 0.60 01/29/2024     CALCIUM 9.0 01/29/2024    ANIONGAP 12 01/29/2024    ESTGFRAFRICA >60 03/30/2022    EGFRNONAA >60 03/30/2022     Lab Results   Component Value Date    ALT 18 01/29/2024    AST 24 01/29/2024    ALKPHOS 70 01/29/2024    BILITOT 0.5 01/29/2024     Lab Results   Component Value Date     01/29/2024       Assessment:  Yulissa Hatfield is a 63 y.o. female with the following diagnoses based on history, exam, and imaging:    Problem List Items Addressed This Visit    None          This is a pleasant 63 y.o. lady presenting with:     - Chronic bilateral shoulder pain: Right worse than left. Imaging with OA in AC and GH joints. Impingement signs on exam b/l.   - Chronic neck pain and bilateral radicular arm pains. MRI with bilateral foraminal encroachment at C5-6.    - Comorbidities: BMI > 50, anxiety, hypertension, hypothyroidism, DM2     12/[id number]/[id number]-cpkt-old female with a history of chronic bilateral shoulder pain right greater than left she is status post a left glenohumeral corticosteroid injection under fluoroscopy with Dr. Tellez about her 60% relief.  She is currently in physical therapy which she does state is helping.  She discussed no benefit with Cymbalta so her and I discussed weaning off this medication, also recommended continuing a home exercise plan 3-4 days a week to help with strengthening of her shoulders bilaterally, would also recommend the baclofen only as needed to help with sleep her and I discussed this in detail.  She acknowledged that she is not a big medicine take so we had a long discussion regarding her medications and how she should take them and what makes her feel most comfortable.  See plan agreed upon below.    05/05/2023 that 62-year-old pleasant female with history of chronic bilateral shoulder pain right greater than left.  Based on history and exam pain is related to osteoarthritis in the AC and glenohumeral joints.  She is established with our office and has been previously  provided a left glenohumeral joint injection with steroids per Dr. Tellez which helped but was not sustainable.  She is scheduled to see orthopedics in the next few weeks however she is having difficulty sleeping her and I discussed I will prescribe Flexeril 10 mg q.h.s. to help with sleep and reduce her symptoms.  Also recommended Tylenol a 1000 mg t.i.d..    06/03/2024-Yulissa Hatfield is a 63 y.o. female who  has a past medical history of Generalized anxiety disorder with panic attacks, HTN (hypertension), Hyperlipidemia, Hypothyroid, DANIELLE on CPAP, and Type 2 diabetes mellitus.  By history and examination this patient has chronic bilateral shoulder pain  The underlying cause is severe glenohumeral osteoarthritis with joint space narrowing diffuse cartilage loss at the level of the humeral head and glenoid with subchondral cystic change/geode formation.Osseous remodeling of the glenoi Pathology is confirmed by imaging.  We discussed the underlying diagnoses and multiple treatment options including non-opioid medications, interventional procedures, physical therapy, home exercise, core muscle enhancement, activity modification, and weight loss.  The risks and benefits of each treatment option were discussed and all questions were answered.        Treatment Plan:   - PT/OT/HEP:  New consult to physical therapy today. Continue HEP for shoulder pain as well. Discussed benefits of exercise for pain.   - Procedures:  Schedule patient for a left glenohumeral joint injection under fluoroscopy  - Medications:    - Cont compound topical medication with Diclofenac 1.5%, Lidocaine 2.5%, Prilocaine 2.5%, and Gabapentin 4%.  Apply 1-2 grams to painful area up to 3-4 times daily   - restart Flexeril 10 mg q.h.s.             - start Tylenol extra-strength a 1000 mg t.i.d. p.r.n. instructed patient not to exceed 3000 mg in 24 hours.  - Imaging: Reviewed.  - Labs: Reviewed.   -continue to follow up with orthopedics as the patient  may need shoulder surgery in the future.    Follow Up:  2-3 weeks in clinic or virtually.    Bryson Bates NP-C  Interventional Pain Management    Disclaimer: This note was partly generated using dictation software which may occasionally result in transcription errors.

## 2024-05-30 NOTE — H&P (VIEW-ONLY)
Pain Medicine Established Clinic Visit       Chief Complaint:   Chief Complaint   Patient presents with    Follow-up     B/l shoulder pain            History of Present Illness: Yulissa Hatfield is a 63 y.o. female referred by Dr. Edwin Tena for cervical radiculopathy.      Onset: 2018, no specific injury, insidious onset  Location: Bilateral anterior shoulders, worse on the left  Radiation: into neck and down both arms  Timing: constant  Quality: Aching and Deep  Exacerbating Factors: lifting  Alleviating Factors: medications and rest  Associated Symptoms: She feels weakness in both arms, numbness in both hands in C6/7 distribution. She denies night fever/night sweats, urinary incontinence, bowel incontinence and significant weight loss    Severity: Currently: 7/10   Typical Range: 4-10/10     Exacerbation: 10/10     Interval Update-06/03/2024 Patient return to clinic for follow-up on bilateral shoulder pain.  She reports having seen orthopedics and had been referred to physical therapy however she did not attend.  She is seeking a repeat left shoulder injection which was provided to her by Dr. Tellez  previously.  She denies any recent incident or trauma denies any profound weakness she reports that her primary pain is at night which prohibits her from sleeping.  She would like to also discuss medication alternatives.    Interval History-05/05/2023- 62-year-old female presents today with complaints of bilateral shoulder pain, she is established with our clinic and was previously provided a left glenohumeral joint injection that provided 60% relief for a few weeks and then her pain returned.  She reports not being able to sleep at night due to the increased pain, she is scheduled to see Dr. Rosas/orthopedics in the next few weeks for further evaluation and possible bilateral shoulder MRI.  Today she is requesting something help her sleep as well as something to control her bilateral shoulder  pain.    Interval Updates-(12/06/2022):  Yulissa Hatfield returns today for follow up.   She is status post a Left  glenohumeral joint injection Injection provided 60% relief.  She is currently in physical therapy which she states is helping, Currently, the shoulder pain is stable.      Current Pain Scales:  Current: 3/10              Typical Range: 2-3/10          Previous Interventions:  - 11/09/2022 Left  glenohumeral joint injection under fluoroscopic guidance-60%  - 8/16/22: Left subacromial bursa injection     Previous Therapies:  PT/OT: yes currently  Relevant Surgery: no   Previous Medications:   - NSAIDS: Ibuprofen 200 mg  - Muscle Relaxants:  Tizanidine > dry mouth, cough  - TCAs:   - SNRIs: Cymbalta  - Topicals:   - Anticonvulsants:    - Opioids: tramadol    Current Pain Medications:  Ibuprofen  Cymbalta 40 mg   Baclofen 5-10 mg qHS  Tylenol     Blood Thinners: None    Full Medication List:    Current Outpatient Medications:     acetaminophen (TYLENOL) 500 MG tablet, Take 1,000 mg by mouth every 6 (six) hours as needed for Pain., Disp: , Rfl:     clobetasoL (TEMOVATE) 0.05 % cream, APPLY 1 APPLICATION TOPICALLY TO AFFECTED AREA TWICE A DAY, Disp: , Rfl:     desonide (DESOWEN) 0.05 % lotion, , Disp: , Rfl:     ezetimibe (ZETIA) 10 mg tablet, TAKE 1 TABLET BY MOUTH EVERY DAY, Disp: 90 tablet, Rfl: 0    gabapentin (NEURONTIN) 300 MG capsule, Take 1 capsule (300 mg total) by mouth 3 (three) times daily as needed., Disp: 90 capsule, Rfl: 1    hydrocortisone 2.5 % cream, , Disp: , Rfl:     levothyroxine (SYNTHROID) 112 MCG tablet, Take 1 tablet (112 mcg total) by mouth before breakfast., Disp: 90 tablet, Rfl: 1    pravastatin (PRAVACHOL) 40 MG tablet, TAKE 1 TABLET BY MOUTH EVERY DAY, Disp: 90 tablet, Rfl: 3    SOOLANTRA 1 % Crea, , Disp: , Rfl:     telmisartan (MICARDIS) 80 MG Tab, TAKE 1 TABLET BY MOUTH EVERY DAY, Disp: 90 tablet, Rfl: 3    tirzepatide 10 mg/0.5 mL PnIj, Inject 10 mg into the skin every 7  days., Disp: 12 Pen, Rfl: 0     Review of Systems:  Review of Systems   Musculoskeletal:  Positive for joint pain.       Allergies:  Patient has no known allergies.     Medical History:   has a past medical history of Generalized anxiety disorder with panic attacks, HTN (hypertension), Hyperlipidemia, Hypothyroid, DANIELLE on CPAP, and Type 2 diabetes mellitus.    Surgical History:   has a past surgical history that includes Carpal tunnel release; Foot surgery; Knee surgery;  section, classic; Thyroid surgery; Endometrial ablation; Colonoscopy (N/A, 11/10/2021); Esophagogastroduodenoscopy (N/A, 11/10/2021); and Injection of joint (Left, 2022).    Family History:  family history includes Heart disease in her brother, father, and mother; No Known Problems in her daughter, daughter, daughter, sister, and son.    Social History:   reports that she has never smoked. She has never used smokeless tobacco. She reports that she does not drink alcohol and does not use drugs.    Physical Exam:  LMP 2006   GEN: No acute distress. Calm, comfortable  HENT: Normocephalic, atraumatic, moist mucous membranes  EYE: Anicteric sclera, non-injected.   CV: Non-diaphoretic.   RESP: Breathing comfortably. Chest expansion symmetric.  PSYCH: Pleasant mood and appropriate affect. Recent and remote memory intact.   Shoulder Exam:      Inspection: No erythema, bruising, surgical incisions      Palpation: TTP of glenohumeral joint and AC joint of bilateral shoulders.       ROM:  Limited in abduction, internal rotation bilaterally, worse on the left than the right   (+) Painful arc sign with pain between  degrees abduction      Provocative Maneuvers for Biceps Tendon:   (-) Speed's test bilaterally      Provocative Maneuvers for Impingement:  (+) Hawkin's bilaterally  (+) Neer's bilaterally      Provocative Maneuvers for RTC:       (+) Empty Can bilaterally  Neuro:  (-) Godwin bilaterally         Imagin23 MRI  Shoulder RT 06/23/23 MRI Shoulder LT  -MRI Cervical Spine 06/20/2022:  MRI examination of the cervical spine dated June 20, 2022.  There is straightening of the cervical spine with degenerative change greatest at C4-C5 and C5-C6.  No evidence of congenital spinal stenosis.  No abnormal signal change involving the visualized portion of the spinal cord.  No evidence of an acute cervical spine fracture.  At the C2-C3 and C3-C4 levels there is no evidence of a focal disc abnormality.  No significant spinal canal or foraminal encroachment.  At the C4-C5 level there is a minimal posterior disc-osteophyte with mild effacement along the anterior margin of the subarachnoid space and spinal cord.  Mild spinal canal encroachment.  No foraminal encroachment.  At the C5-C6 level there is a broad-based and right paracentral disc-osteophyte.  There is effacement along the anterior margin of the subarachnoid space with right greater than left foraminal encroachment.  At the C6-C7 and C7-T1 levels there is no evidence of a significant focal disc abnormality.  No significant spinal canal or foraminal encroachment.    - X-ray shoulder complete b/l 2/13/20:   Right shoulder: The alignment is normal.  The glenohumeral joint appears normal.  Minimal osteophyte at the inferior humeral head.  There is mild osteophyte at the acromioclavicular joint.  No fracture, no osseous lesions.  The right upper thoracic region appear normal.  Left shoulder: The alignment is normal.  There is mild glenohumeral joint space narrowing.  Subchondral cystic changes at the superior glenoid.  Prominent inferior osteophyte at the humeral head.  Osteophyte noted at the acromioclavicular joint.  No fracture, no osseous lesions.  The left upper thoracic region appear normal    Labs:  BMP  Lab Results   Component Value Date     01/29/2024    K 4.3 01/29/2024     01/29/2024    CO2 25 01/29/2024    BUN 15 01/29/2024    CREATININE 0.60 01/29/2024     CALCIUM 9.0 01/29/2024    ANIONGAP 12 01/29/2024    ESTGFRAFRICA >60 03/30/2022    EGFRNONAA >60 03/30/2022     Lab Results   Component Value Date    ALT 18 01/29/2024    AST 24 01/29/2024    ALKPHOS 70 01/29/2024    BILITOT 0.5 01/29/2024     Lab Results   Component Value Date     01/29/2024       Assessment:  Yulissa Hatfield is a 63 y.o. female with the following diagnoses based on history, exam, and imaging:    Problem List Items Addressed This Visit    None          This is a pleasant 63 y.o. lady presenting with:     - Chronic bilateral shoulder pain: Right worse than left. Imaging with OA in AC and GH joints. Impingement signs on exam b/l.   - Chronic neck pain and bilateral radicular arm pains. MRI with bilateral foraminal encroachment at C5-6.    - Comorbidities: BMI > 50, anxiety, hypertension, hypothyroidism, DM2     12/06/20225303-55-wkag-old female with a history of chronic bilateral shoulder pain right greater than left she is status post a left glenohumeral corticosteroid injection under fluoroscopy with Dr. Tellez about her 60% relief.  She is currently in physical therapy which she does state is helping.  She discussed no benefit with Cymbalta so her and I discussed weaning off this medication, also recommended continuing a home exercise plan 3-4 days a week to help with strengthening of her shoulders bilaterally, would also recommend the baclofen only as needed to help with sleep her and I discussed this in detail.  She acknowledged that she is not a big medicine take so we had a long discussion regarding her medications and how she should take them and what makes her feel most comfortable.  See plan agreed upon below.    05/05/2023 that 62-year-old pleasant female with history of chronic bilateral shoulder pain right greater than left.  Based on history and exam pain is related to osteoarthritis in the AC and glenohumeral joints.  She is established with our office and has been previously  provided a left glenohumeral joint injection with steroids per Dr. Tellez which helped but was not sustainable.  She is scheduled to see orthopedics in the next few weeks however she is having difficulty sleeping her and I discussed I will prescribe Flexeril 10 mg q.h.s. to help with sleep and reduce her symptoms.  Also recommended Tylenol a 1000 mg t.i.d..    06/03/2024-Yulissa Hatfield is a 63 y.o. female who  has a past medical history of Generalized anxiety disorder with panic attacks, HTN (hypertension), Hyperlipidemia, Hypothyroid, DANIELLE on CPAP, and Type 2 diabetes mellitus.  By history and examination this patient has chronic bilateral shoulder pain  The underlying cause is severe glenohumeral osteoarthritis with joint space narrowing diffuse cartilage loss at the level of the humeral head and glenoid with subchondral cystic change/geode formation.Osseous remodeling of the glenoi Pathology is confirmed by imaging.  We discussed the underlying diagnoses and multiple treatment options including non-opioid medications, interventional procedures, physical therapy, home exercise, core muscle enhancement, activity modification, and weight loss.  The risks and benefits of each treatment option were discussed and all questions were answered.        Treatment Plan:   - PT/OT/HEP:  New consult to physical therapy today. Continue HEP for shoulder pain as well. Discussed benefits of exercise for pain.   - Procedures:  Schedule patient for a left glenohumeral joint injection under fluoroscopy  - Medications:    - Cont compound topical medication with Diclofenac 1.5%, Lidocaine 2.5%, Prilocaine 2.5%, and Gabapentin 4%.  Apply 1-2 grams to painful area up to 3-4 times daily   - restart Flexeril 10 mg q.h.s.             - start Tylenol extra-strength a 1000 mg t.i.d. p.r.n. instructed patient not to exceed 3000 mg in 24 hours.  - Imaging: Reviewed.  - Labs: Reviewed.   -continue to follow up with orthopedics as the patient  may need shoulder surgery in the future.    Follow Up:  2-3 weeks in clinic or virtually.    Bryson Bates NP-C  Interventional Pain Management    Disclaimer: This note was partly generated using dictation software which may occasionally result in transcription errors.

## 2024-06-03 ENCOUNTER — OFFICE VISIT (OUTPATIENT)
Dept: PAIN MEDICINE | Facility: CLINIC | Age: 64
End: 2024-06-03
Payer: COMMERCIAL

## 2024-06-03 ENCOUNTER — TELEPHONE (OUTPATIENT)
Dept: PAIN MEDICINE | Facility: CLINIC | Age: 64
End: 2024-06-03
Payer: COMMERCIAL

## 2024-06-03 VITALS
HEART RATE: 72 BPM | SYSTOLIC BLOOD PRESSURE: 136 MMHG | DIASTOLIC BLOOD PRESSURE: 80 MMHG | WEIGHT: 287.38 LBS | BODY MASS INDEX: 49.06 KG/M2 | HEIGHT: 64 IN

## 2024-06-03 DIAGNOSIS — M25.512 CHRONIC PAIN OF BOTH SHOULDERS: Primary | ICD-10-CM

## 2024-06-03 DIAGNOSIS — M25.512 CHRONIC PAIN OF BOTH SHOULDERS: ICD-10-CM

## 2024-06-03 DIAGNOSIS — G89.29 CHRONIC PAIN OF BOTH SHOULDERS: ICD-10-CM

## 2024-06-03 DIAGNOSIS — M19.012 OSTEOARTHRITIS OF BILATERAL GLENOHUMERAL JOINTS: ICD-10-CM

## 2024-06-03 DIAGNOSIS — M25.511 CHRONIC PAIN OF BOTH SHOULDERS: Primary | ICD-10-CM

## 2024-06-03 DIAGNOSIS — G89.29 CHRONIC PAIN OF BOTH SHOULDERS: Primary | ICD-10-CM

## 2024-06-03 DIAGNOSIS — M19.011 OSTEOARTHRITIS OF BILATERAL GLENOHUMERAL JOINTS: Primary | ICD-10-CM

## 2024-06-03 DIAGNOSIS — M25.612 DECREASED RANGE OF MOTION OF LEFT SHOULDER: ICD-10-CM

## 2024-06-03 DIAGNOSIS — M25.511 CHRONIC PAIN OF BOTH SHOULDERS: ICD-10-CM

## 2024-06-03 DIAGNOSIS — M19.012 OSTEOARTHRITIS OF BILATERAL GLENOHUMERAL JOINTS: Primary | ICD-10-CM

## 2024-06-03 DIAGNOSIS — M19.011 OSTEOARTHRITIS OF BILATERAL GLENOHUMERAL JOINTS: ICD-10-CM

## 2024-06-03 PROCEDURE — 3079F DIAST BP 80-89 MM HG: CPT | Mod: CPTII,S$GLB,, | Performed by: NURSE PRACTITIONER

## 2024-06-03 PROCEDURE — 3008F BODY MASS INDEX DOCD: CPT | Mod: CPTII,S$GLB,, | Performed by: NURSE PRACTITIONER

## 2024-06-03 PROCEDURE — 3075F SYST BP GE 130 - 139MM HG: CPT | Mod: CPTII,S$GLB,, | Performed by: NURSE PRACTITIONER

## 2024-06-03 PROCEDURE — 99214 OFFICE O/P EST MOD 30 MIN: CPT | Mod: S$GLB,,, | Performed by: NURSE PRACTITIONER

## 2024-06-03 PROCEDURE — 99999 PR PBB SHADOW E&M-EST. PATIENT-LVL IV: CPT | Mod: PBBFAC,,, | Performed by: NURSE PRACTITIONER

## 2024-06-03 PROCEDURE — 1160F RVW MEDS BY RX/DR IN RCRD: CPT | Mod: CPTII,S$GLB,, | Performed by: NURSE PRACTITIONER

## 2024-06-03 PROCEDURE — 3044F HG A1C LEVEL LT 7.0%: CPT | Mod: CPTII,S$GLB,, | Performed by: NURSE PRACTITIONER

## 2024-06-03 PROCEDURE — 1159F MED LIST DOCD IN RCRD: CPT | Mod: CPTII,S$GLB,, | Performed by: NURSE PRACTITIONER

## 2024-06-03 PROCEDURE — 4010F ACE/ARB THERAPY RXD/TAKEN: CPT | Mod: CPTII,S$GLB,, | Performed by: NURSE PRACTITIONER

## 2024-06-03 RX ORDER — CYCLOBENZAPRINE HCL 10 MG
10 TABLET ORAL NIGHTLY
Qty: 30 TABLET | Refills: 0 | Status: SHIPPED | OUTPATIENT
Start: 2024-06-03 | End: 2024-07-03

## 2024-06-03 NOTE — TELEPHONE ENCOUNTER
----- Message from KENROY Camacho sent at 6/3/2024 12:37 PM CDT -----  Regarding: Order for ANDREW HURD    Patient Name: ANDREW HURD([id number])  Sex: Female  : 1960      PCP: LYDIA ANTONY    Center: [facility] CENTRAL BILLING OFFICE     Level of Service:73206     ID OFFICE/OUTPT VISIT, EST, LEVL IV, 30-39 MIN    Types of orders made on 2024: Medications, Outpatient Referral, Procedure                                         Request    Order Date:6/3/2024  Ordering User:LEXIS BLUE [[id number]]  Encounter Provider:Lexis Blue FNP [[id number]]  Authorizing Provider: Lexis Blue FNP [[id number]]  Supervising Provider:ORIN CARPENTER [[id number]]  Type of Supervision:Collaborating Physician  Department:[facility] PAIN MANAGEMENT[[id number]]    Common Order Information  Procedure -> Joint/Bursa Injection (Specify joint and laterality) Cmt: Left                glenohumeral joint injection under fluoroscopic guidance    Pre-op Diagnosis -> Primary osteoarthritis, left shoulder     Order Specific Information  Order: Procedure Request Order for Pain Management [Custom: [id number]]  Order #:          [id number]: 1 FUTURE    Priority: Routine  Class: Clinic Performed    Future Order Information      Expires on:2025            Expected by:0  2024                   Associated Diagnoses      M25.511, G89.29, M25.512 Chronic pain of both shoulders      M25.612 Decreased range of motion of left shoulder      M19.011, M19.012 Osteoarthritis of bilateral glenohumeral joints      Physician -> Adrian         Is patient on anti-coagulants? -> No         Facility Name: -> [facility]         Follow-up: -> 2 weeks           Priority: Routine  Class: Clinic Performed    Futu  re Order Information      Expires on:2025            Expected by:2024                   Associated Diagnoses      M25.511, G89.29, M25.512 Chronic pain of both shoulders      M25.612 Decreased range of motion of left shoulder       M19.011, M19.012 Osteoarthritis of bilateral glenohumeral joints      Procedure -> Joint/Bursa Injection (Specify joint and laterality) Cmt: Left                  glenohumeral joint injection u  nder fluoroscopic guidance        Physician -> Gelter         Is patient on anti-coagulants? -> No         Pre-op Diagnosis -> Primary osteoarthritis, left shoulder         Facility Name: -> [facility]         Follow-up: -> 2 weeks

## 2024-06-04 ENCOUNTER — TELEPHONE (OUTPATIENT)
Dept: RESEARCH | Facility: HOSPITAL | Age: 64
End: 2024-06-04
Payer: COMMERCIAL

## 2024-06-04 NOTE — TELEPHONE ENCOUNTER
----- Message from KENROY Camacho sent at 6/3/2024 12:37 PM CDT -----  Regarding: Order for ANDREW HURD    Patient Name: ANDREW HURD([id number])  Sex: Female  : 1960      PCP: LYDIA ANTONY    Center: [facility] CENTRAL BILLING OFFICE     Level of Service:27891     [place] OFFICE/OUTPT VISIT, EST, LEVL IV, 30-39 MIN    Types of orders made on 2024: Medications, Outpatient Referral, Procedure                                         Request    Order Date:6/3/2024  Ordering User:LEXIS BLUE [[id number]]  Encounter Provider:Lexis Blue FNP [[id number]]  Authorizing Provider: Lexis Blue FNP [[id number]]  Supervising Provider:ORIN CARPENTER [[id number]]  Type of Supervision:Collaborating Physician  Department:[facility] PAIN MANAGEMENT[[id number]]    Common Order Information  Procedure -> Joint/Bursa Injection (Specify joint and laterality) Cmt: Left                glenohumeral joint injection under fluoroscopic guidance    Pre-op Diagnosis -> Primary osteoarthritis, left shoulder     Order Specific Information  Order: Procedure Request Order for Pain Management [Custom: [id number]]  Order #:          [id number]: 1 FUTURE    Priority: Routine  Class: Clinic Performed    Future Order Information      Expires on:2025            Expected by:0  2024                   Associated Diagnoses      M25.511, G89.29, M25.512 Chronic pain of both shoulders      M25.612 Decreased range of motion of left shoulder      M19.011, M19.012 Osteoarthritis of bilateral glenohumeral joints      Physician -> Adrian         Is patient on anti-coagulants? -> No         Facility Name: -> [facility]         Follow-up: -> 2 weeks           Priority: Routine  Class: Clinic Performed    Futu  re Order Information      Expires on:2025            Expected by:2024                   Associated Diagnoses      M25.511, G89.29, M25.512 Chronic pain of both shoulders      M25.612 Decreased range of motion of left shoulder       M19.011, M19.012 Osteoarthritis of bilateral glenohumeral joints      Procedure -> Joint/Bursa Injection (Specify joint and laterality) Cmt: Left                  glenohumeral joint injection u  nder fluoroscopic guidance        Physician -> Gelter         Is patient on anti-coagulants? -> No         Pre-op Diagnosis -> Primary osteoarthritis, left shoulder         Facility Name: -> [facility]         Follow-up: -> 2 weeks

## 2024-06-04 NOTE — TELEPHONE ENCOUNTER
Luisa Study  Study ID#[id number]    Called and reminded patient to complete Month 15 questionnaires which are due on 6/7/24. Patient stated her understanding.

## 2024-06-05 ENCOUNTER — PATIENT MESSAGE (OUTPATIENT)
Dept: PAIN MEDICINE | Facility: CLINIC | Age: 64
End: 2024-06-05
Payer: COMMERCIAL

## 2024-06-17 ENCOUNTER — TELEPHONE (OUTPATIENT)
Dept: PAIN MEDICINE | Facility: CLINIC | Age: 64
End: 2024-06-17
Payer: COMMERCIAL

## 2024-06-17 DIAGNOSIS — E11.9 TYPE 2 DIABETES MELLITUS WITHOUT COMPLICATION, WITHOUT LONG-TERM CURRENT USE OF INSULIN: ICD-10-CM

## 2024-06-19 PROBLEM — M25.612 IMPAIRED RANGE OF MOTION OF BOTH SHOULDERS: Status: ACTIVE | Noted: 2024-06-19

## 2024-06-19 PROBLEM — M25.611 IMPAIRED RANGE OF MOTION OF BOTH SHOULDERS: Status: ACTIVE | Noted: 2024-06-19

## 2024-06-20 NOTE — PRE-PROCEDURE INSTRUCTIONS
Patient reviewed on 6/20/2024.  Okay to proceed at [facility]. The following pre-procedure instructions and arrival time have been reviewed with patient via phone and sent to patient portal for review.  Patient verbalized an understanding.  Pt to be accompanied by Sameer day of procedure as responsible .    Dear Yulissa ,     Please read over the following pre-procedure instructions in it's entirety as there is helpful information here to get you well prepared for your upcoming procedure.              You are scheduled for a procedure with Dr. Campbell on 6/24/2024.    Your scheduled arrival time is 12:00 noon.  This arrival time is roughly 1 hour before your anticipated procedure time to allow sufficient time for pre-op..    Please wear comfortable clothes. You will be placed in a gown for your procedure.  Please do not wear a dress.  This procedure will take place at the [facility] at the corner of [facility] and [facility].  It is in the [facility] [place] next to [facility].  The address is:     [address] [place] [address].  [place] [address]     After entering the building, you will proceed to the second floor where you can check in with registration. You should take any medications that you routinely take for blood pressure, heart medications, thyroid, cholesterol, etc.      The fasting restrictions are dependent on whether or not you are receiving sedation.  Sedation is not available for all procedures.      Your fasting instructions are as follow:  Oral Sedation. You do not need to fast before this procedure.  You can eat and drink like normal.  You CANNOT drive yourself and must have a .     If you are on blood thinners, you need to follow the anticoagulation instructions that had been discussed previously.  You should only stop the blood thinners if it was approved by your primary care physician or your cardiologist.  In the event that you are not able to stop  your blood thinners, a blood thinner was not listed on your medication list, or we were not able to get clearance from your cardiologist, then the procedure may have to be postponed/canceled.      IF you were told to stop your blood thinners, this is how long you should generally hold some of the more common ones.  Remember that stopping blood thinners is only necessary for certain procedures. If you are unsure of your instructions, please call us.   Aspirin - 5 days  Plavix/Clopidogrel - 7 days  Warfarin / Coumadin - 5 days  Eliquis - 3 days  Pradaxa/Dabigatran - 4 days  Xarelto/Rivaroxaban - 3 days     If you are a diabetic, do not take your medication if you will be fasting, but bring it with you. Please plan on being here for roughly 3 hours.     Please call us if you have been sick (running fever, having any flu-like symptoms) or have been taking ANTIBIOTICS in the past 2 weeks or had any outpatient procedures other than with us (colonoscopy, endoscopy, OBGYN, dental, etc.).     If you have been previously COVID positive, you will need to hold off on your procedure until you are symptom free for 10 days. If you did not have any symptoms, you can have your procedure 10 days from your positive test result.       On the morning of your procedure:  *HOLD ALL VITAMINS, MINERALS, HERBS (INCLUDING HERBAL TEAS) AND SUPPLEMENTS  *SHOWER WITH ANTIBACTERIAL SOAP (EX. DIAL) NIGHT BEFORE AND MORNING OF PROCEDURE  *DO NOT APPLY ANY LOTIONS, OILS, POWDERS, PERFUME/COLOGNE, OINTMENTS, GELS, CREAMS, MAKEUP OR DEODORANT TO YOUR SKIN MORNING OF PROCEDURE  *LEAVE JEWELRY AND ANY VALUABLES AT HOME  *WEAR LOOSE COMFORTABLE CLOTHING (PREFERABLY A BUTTON UP SHIRT)     Please reply to this message as receipt of delivery.     Thank you,  [facility]  [place], LPN [facility]  Pre-Admit

## 2024-06-24 ENCOUNTER — HOSPITAL ENCOUNTER (OUTPATIENT)
Facility: HOSPITAL | Age: 64
Discharge: HOME | End: 2024-06-24
Attending: STUDENT IN AN ORGANIZED HEALTH CARE EDUCATION/TRAINING PROGRAM | Admitting: STUDENT IN AN ORGANIZED HEALTH CARE EDUCATION/TRAINING PROGRAM
Payer: COMMERCIAL

## 2024-06-24 VITALS
HEART RATE: 84 BPM | DIASTOLIC BLOOD PRESSURE: 89 MMHG | OXYGEN SATURATION: 98 % | TEMPERATURE: 98 F | SYSTOLIC BLOOD PRESSURE: 140 MMHG | RESPIRATION RATE: 16 BRPM

## 2024-06-24 DIAGNOSIS — M25.511 CHRONIC PAIN OF BOTH SHOULDERS: Primary | ICD-10-CM

## 2024-06-24 DIAGNOSIS — M25.512 CHRONIC PAIN OF BOTH SHOULDERS: Primary | ICD-10-CM

## 2024-06-24 DIAGNOSIS — G89.29 CHRONIC PAIN OF BOTH SHOULDERS: Primary | ICD-10-CM

## 2024-06-24 DIAGNOSIS — G89.29 CHRONIC PAIN: ICD-10-CM

## 2024-06-24 PROCEDURE — 25000003 PHARM REV CODE 250: Performed by: STUDENT IN AN ORGANIZED HEALTH CARE EDUCATION/TRAINING PROGRAM

## 2024-06-24 PROCEDURE — 20610 DRAIN/INJ JOINT/BURSA W/O US: CPT | Mod: LT | Performed by: STUDENT IN AN ORGANIZED HEALTH CARE EDUCATION/TRAINING PROGRAM

## 2024-06-24 PROCEDURE — 20610 DRAIN/INJ JOINT/BURSA W/O US: CPT | Mod: LT,,, | Performed by: STUDENT IN AN ORGANIZED HEALTH CARE EDUCATION/TRAINING PROGRAM

## 2024-06-24 PROCEDURE — 63600175 PHARM REV CODE 636 W HCPCS: Performed by: STUDENT IN AN ORGANIZED HEALTH CARE EDUCATION/TRAINING PROGRAM

## 2024-06-24 PROCEDURE — 25500020 PHARM REV CODE 255: Performed by: STUDENT IN AN ORGANIZED HEALTH CARE EDUCATION/TRAINING PROGRAM

## 2024-06-24 RX ORDER — BUPIVACAINE HYDROCHLORIDE 2.5 MG/ML
INJECTION, SOLUTION EPIDURAL; INFILTRATION; INTRACAUDAL
Status: DISCONTINUED | OUTPATIENT
Start: 2024-06-24 | End: 2024-06-24 | Stop reason: HOSPADM

## 2024-06-24 RX ORDER — LIDOCAINE HYDROCHLORIDE 20 MG/ML
INJECTION, SOLUTION EPIDURAL; INFILTRATION; INTRACAUDAL; PERINEURAL
Status: DISCONTINUED | OUTPATIENT
Start: 2024-06-24 | End: 2024-06-24 | Stop reason: HOSPADM

## 2024-06-24 RX ORDER — METHYLPREDNISOLONE ACETATE 40 MG/ML
INJECTION, SUSPENSION INTRA-ARTICULAR; INTRALESIONAL; INTRAMUSCULAR; SOFT TISSUE
Status: DISCONTINUED | OUTPATIENT
Start: 2024-06-24 | End: 2024-06-24 | Stop reason: HOSPADM

## 2024-06-24 RX ORDER — ALPRAZOLAM 0.5 MG/1
0.5 TABLET, ORALLY DISINTEGRATING ORAL
Status: DISCONTINUED | OUTPATIENT
Start: 2024-06-24 | End: 2024-06-24 | Stop reason: HOSPADM

## 2024-06-24 RX ADMIN — ALPRAZOLAM 0.5 MG: 0.5 TABLET, ORALLY DISINTEGRATING ORAL at 12:06

## 2024-06-24 NOTE — PLAN OF CARE
Pt in preop bay , VSS, meds given. Pt denies any open wounds on body or the use of any immunizations or antibiotics in the past 2 weeks. Pt ready to roll.

## 2024-06-24 NOTE — DISCHARGE SUMMARY
Discharge Note  Short Stay      SUMMARY     Admit Date: 6/24/2024    Attending Physician: Kristina Campbell      Discharge Physician: Kristina Campbell      Discharge Date: 6/24/2024 1:40 PM    Procedure(s) (LRB):  Left glenohumeral joint injection under fluoroscopic guidance (Left)    Final Diagnosis: Chronic pain of both shoulders [M25.511, G89.29, M25.512]  Decreased range of motion of left shoulder [M25.612]  Osteoarthritis of bilateral glenohumeral joints [M19.011, M19.012]    Disposition: Home or self care    Patient Instructions:   Current Discharge Medication List        CONTINUE these medications which have NOT CHANGED    Details   acetaminophen (TYLENOL) 500 MG tablet Take 1,000 mg by mouth every 6 (six) hours as needed for Pain.      cyclobenzaprine (FLEXERIL) 10 MG tablet Take 1 tablet (10 mg total) by mouth every evening.  Qty: 30 tablet, Refills: 0    Associated Diagnoses: Chronic pain of both shoulders; Decreased range of motion of left shoulder; Osteoarthritis of bilateral glenohumeral joints      ezetimibe (ZETIA) 10 mg tablet TAKE 1 TABLET BY MOUTH EVERY DAY  Qty: 90 tablet, Refills: 0    Associated Diagnoses: Mixed hyperlipidemia      levothyroxine (SYNTHROID) 112 MCG tablet Take 1 tablet (112 mcg total) by mouth before breakfast.  Qty: 90 tablet, Refills: 1    Associated Diagnoses: Hypothyroidism, unspecified type      pravastatin (PRAVACHOL) 40 MG tablet TAKE 1 TABLET BY MOUTH EVERY DAY  Qty: 90 tablet, Refills: 3    Associated Diagnoses: Mixed hyperlipidemia      telmisartan (MICARDIS) 80 MG Tab TAKE 1 TABLET BY MOUTH EVERY DAY  Qty: 90 tablet, Refills: 3    Associated Diagnoses: Essential hypertension      clobetasoL (TEMOVATE) 0.05 % cream APPLY 1 APPLICATION TOPICALLY TO AFFECTED AREA TWICE A DAY      desonide (DESOWEN) 0.05 % lotion       gabapentin (NEURONTIN) 300 MG capsule Take 1 capsule (300 mg total) by mouth 3 (three) times daily as needed.  Qty: 90 capsule, Refills: 1    Associated  Diagnoses: Insomnia, unspecified type; Pain      hydrocortisone 2.5 % cream       SOOLANTRA 1 % Crea       tirzepatide 7.5 mg/0.5 mL PnIj Inject 7.5 mg into the skin every 7 days.  Qty: 4 Pen, Refills: 0    Associated Diagnoses: Type 2 diabetes mellitus without complication, without long-term current use of insulin                 Discharge Diagnosis: Chronic pain of both shoulders [M25.511, G89.29, M25.512]  Decreased range of motion of left shoulder [M25.612]  Osteoarthritis of bilateral glenohumeral joints [M19.011, M19.012]  Condition on Discharge: Stable with no complications to procedure   Diet on Discharge: Same as before.  Activity: as per instruction sheet.  Discharge to: Home with a responsible adult.  Follow up: 2-4 weeks       Please call my office or pager at [phone] if experienced any weakness or loss of sensation, fever > 101.5, pain uncontrolled with oral medications, persistent nausea/vomiting/or diarrhea, redness or drainage from the incisions, or any other worrisome concerns. If physician on call was not reached or could not communicate with our office for any reason please go to the nearest emergency department

## 2024-06-24 NOTE — OP NOTE
Glenohumeral Joint Injection under Fluoroscopic Guidance    The procedure, risks, benefits, and options were discussed with the patient. There are no contraindications to the procedure. The patent expressed understanding and agreed to the procedure. Informed written consent was obtained prior to the start of the procedure and can be found in the patient's chart.    PATIENT NAME: Yulissa Hatfield   MRN: [id number]     DATE OF PROCEDURE: 06/24/2024    PROCEDURE: Left Glenohumeral Joint Injection under Fluoroscopic Guidance    PRE-OP DIAGNOSIS: Chronic pain of both shoulders [M25.511, G89.29, M25.512]  Decreased range of motion of left shoulder [M25.612]  Osteoarthritis of bilateral glenohumeral joints [M19.011, M19.012]    POST-OP DIAGNOSIS: Same    PHYSICIAN: Kristina Campbell DO    ASSISTANTS: None     MEDICATIONS INJECTED: Preservative-free Kenalog 40mg with 3cc of Bupivacine 0.25%     LOCAL ANESTHETIC INJECTED: Xylocaine 2%     SEDATION: None    ESTIMATED BLOOD LOSS: None    COMPLICATIONS: None    TECHNIQUE: Time-out was performed to identify the patient and procedure to be performed. With the patient laying in a supine position, the surgical area was prepped and draped in the usual sterile fashion using ChloraPrep and a fenestrated drape. The glenohumeral joint of the shoulder was identified under fluoroscopy guidance. Skin anesthesia was achieved by injecting Lidocaine 2% over the injection site. A 25 gauge,  3.5 inch spinal quinke needle was slowly advanced through under fluoroscopic guidance into the glenohumeral joint until os was encountered and then retracted approximately 2 mm. Once the needle tip was in the area of the joint, and there was no blood,  Contrast dye  Omnipaque (300mg/mL) was injected to confirm placement and there was no vascular runoff. 4 mL of the medication mixture listed above was injected slowly in to the joint. Displacement of the radio opaque contrast after injection of the medication  confirmed intra-articular contrast spread in the joint. The needles were removed and bleeding was nil. A sterile dressing was applied. No specimens collected. The patient tolerated the procedure well.     The patient was monitored after the procedure in the recovery area. They were given post-procedure and discharge instructions to follow at home. The patient was discharged in a stable condition.    Kristina Campbell DO

## 2024-06-24 NOTE — PLAN OF CARE
Patient discharge instructions reviewed, patient verbalized understanding.  Escorted to family via wheelchair.  No concerns voiced.

## 2024-06-24 NOTE — DISCHARGE INSTRUCTIONS
Home Care Instructions Pain Management:    1.  DIET:    You may resume your normal diet today.    2.  BATHING:    You may shower with luke warm water.    3.  DRESSING:    You may remove your bandage today.    4.  ACTIVITY LEVEL:      You may resume your normal activities 24 hours after your procedure.    5.  MEDICATIONS:    You may resume your normal medications today.    6.  SPECIAL INSTRUCTIONS:    No heat to the injection site for 24 hours including bath or shower, heating pad, moist heat or hot tubs.    Use an ice pack to the injection site for any pain or discomfort.  Apply ice packs for 20 minute intervals as needed.    If you have received any sedatives by mouth today, you can not drive for 12 hours.    If you have received sedation through an IV, you can not drive for 24 hours.    PLEASE CALL YOUR DOCTOR FOR THE FOLLOWIN.  Redness or swelling around the injection site.  2.  Fever of 101 degrees.  3.  Drainage (pus) from the injection site.  4.  For any continuous bleeding (some dried blood over the incision is normal.)    FOR EMERGENCIES:    If any unusual problems or difficulties occur during clinic hours, call [phone] or dial [phone].    Follow up with with your physician in 2-3 weeks.

## 2024-07-03 ENCOUNTER — TELEPHONE (OUTPATIENT)
Dept: BARIATRICS | Facility: CLINIC | Age: 64
End: 2024-07-03
Payer: COMMERCIAL

## 2024-07-03 NOTE — TELEPHONE ENCOUNTER
----- Message from Cayla Carney sent at 6/7/2024  2:34 PM CDT -----  Regarding: patient call back  Type: Patient Call Back    Who called: Self     What is the request in detail: asked for a call back to check the status of her initial consult for weight loss     Can the clinic reply by [facility]? No     Would the patient rather a call back or a response via [facility]? Call     Best call back number: .[phone]

## 2024-07-08 ENCOUNTER — OFFICE VISIT (OUTPATIENT)
Dept: SLEEP MEDICINE | Facility: CLINIC | Age: 64
End: 2024-07-08
Attending: PSYCHIATRY & NEUROLOGY
Payer: COMMERCIAL

## 2024-07-08 VITALS
HEART RATE: 73 BPM | DIASTOLIC BLOOD PRESSURE: 71 MMHG | WEIGHT: 293 LBS | HEIGHT: 63 IN | SYSTOLIC BLOOD PRESSURE: 139 MMHG | BODY MASS INDEX: 51.91 KG/M2

## 2024-07-08 DIAGNOSIS — M62.838 NIGHT MUSCLE SPASMS: ICD-10-CM

## 2024-07-08 DIAGNOSIS — G47.33 OSA (OBSTRUCTIVE SLEEP APNEA): Primary | ICD-10-CM

## 2024-07-08 PROCEDURE — 3008F BODY MASS INDEX DOCD: CPT | Mod: CPTII,S$GLB,, | Performed by: PSYCHIATRY & NEUROLOGY

## 2024-07-08 PROCEDURE — 1159F MED LIST DOCD IN RCRD: CPT | Mod: CPTII,S$GLB,, | Performed by: PSYCHIATRY & NEUROLOGY

## 2024-07-08 PROCEDURE — 99214 OFFICE O/P EST MOD 30 MIN: CPT | Mod: S$GLB,,, | Performed by: PSYCHIATRY & NEUROLOGY

## 2024-07-08 PROCEDURE — 3078F DIAST BP <80 MM HG: CPT | Mod: CPTII,S$GLB,, | Performed by: PSYCHIATRY & NEUROLOGY

## 2024-07-08 PROCEDURE — 99999 PR PBB SHADOW E&M-EST. PATIENT-LVL III: CPT | Mod: PBBFAC,,, | Performed by: PSYCHIATRY & NEUROLOGY

## 2024-07-08 PROCEDURE — 4010F ACE/ARB THERAPY RXD/TAKEN: CPT | Mod: CPTII,S$GLB,, | Performed by: PSYCHIATRY & NEUROLOGY

## 2024-07-08 PROCEDURE — 3075F SYST BP GE 130 - 139MM HG: CPT | Mod: CPTII,S$GLB,, | Performed by: PSYCHIATRY & NEUROLOGY

## 2024-07-08 PROCEDURE — 3044F HG A1C LEVEL LT 7.0%: CPT | Mod: CPTII,S$GLB,, | Performed by: PSYCHIATRY & NEUROLOGY

## 2024-07-08 RX ORDER — TIZANIDINE 2 MG/1
TABLET ORAL
Qty: 30 TABLET | Refills: 1 | Status: SHIPPED | OUTPATIENT
Start: 2024-07-08

## 2024-07-08 NOTE — PROGRESS NOTES
2024     1:37 PM 2024    11:23 AM 3/30/2020     2:16 PM 10/16/2019    10:45 AM 2019    10:49 AM   EPWORTH SLEEPINESS SCALE TOTAL SCORE    Total score 0 2 1 1 0       Yulissa Hatfield is a 63 y.o. female seen today for CPAP  follow up. Last seen on 24 by Bridget Spencer.   Yulissa Hatfield got a new Resmed 11 since her last visit with me.    Taking 2 Magnesium. Currently pain wakes her up - goes back to sleep in 15 minutes.   Prior to that, magesium helped her stay aslleep.     The patient reports improved sleep continuity and daytime sleepiness on PAP. ESS today is .  Denies break through snoring.  No  dry mouth.  No aerophagia or air hunger. Denies occasional mask leaks and discomfort. Using a under the nose  mask  Shoulder pain affects her sleep.      Yulissa Hatfield 2024 - 2024 Patient ID: [id number] : 1960 Age: 63 years Gender: Female [facility] [address] [place], [address] Compliance Report Compliance Payor Standard Usage 2024 - 2024 Usage days 30/30 days (100%) >= 4 hours 30 days (100%) < 4 hours 0 days (0%) Usage hours 261 hours 12 minutes Average usage (total days) 8 hours 42 minutes Average usage (days used) 8 hours 42 minutes Median usage (days used) 8 hours 49 minutes Total used hours (value since last reset - 2024) 1,296 hours AirSense 11 AutoSet Serial number [id number] Mode AutoSet Min Pressure 10 cmH2O Max Pressure 16 cmH2O EPR Fulltime EPR level 3 Response Standard Therapy Pressure - cmH2O Median: 10.8 95th percentile: 13.0 Maximum: 14.2 Leaks - L/min Median: 0.0 95th percentile: 4.1 Maximum: 72.4 Events per hour AI: 1.0 HI: 0.1 AHI: 1.1 Apnea Index Central: 0.0 Obstructive: 1.0 Unknown: 0.1 RERA Index 0.0 Cheyne-Wood respiration (average duration per night) 0 minutes (0%)      INTERVAL HISTORY:    2021:    Yulissa Hatfield is a 63 y.o. female seen today for CPAP and insomnia follow up. Last seen  "on 10/16/2019    The patient reports improved sleep continuity and daytime sleepiness on PAP. ESS today is 1/24.  Denies break through snoring.  No dry mouth.   No aerophagia or air hunger. No significant mask leaks and discomfort.    [facility] program has contacted MS. 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   She is using [facility] and she is learning meditation from work. Her ability to fall and stay asleep has improved.    APAP 10-18 cm H2O ; 90% was  with Dreamwear mask.    Therapy Event Summary (Last 14 Days)     Compliance Summary   Days with Device Usage: 14 days   Percentage of Days >=4 Hours: 100.0%   Average Usage (Days Used): 8 hrs. 57 mins. 56 secs.   Average Usage (All Days): 8 hrs. 57 mins. 56 secs.   Apnea Indices Average AHI: 1.0   Average OA Index: 0.2   Average CA Index: 0.0   Large Leak Average Time in Large Leak: 4 mins. 56 secs.   Average % of Night in Large Leak: 0.9%   Periodic Breathing Average % of Night in PB: 0.3%     Bedtime:10 PM  Sleep latency: not long - 30 -45 min at max; =  Nocturnal awakenings:2-3 to urinate  Returns to sleep in 20-30 min  Wake up time: 8 AM     Medications pertinent to sleep disorders: *Magnesium PRN.   Previously tried medications:FAiled  Gabapenitin fgor sleep/pain    DME: [facility]  SLEEP STUDIES:   PSG 9/17/15: Significant Obstructive sleep apnea (DANIELLE) with AHI (apnea hypopnea Index) of 52 and SaO2 of 81 (weight  336 lbs).  PHYSICAL EXAM:  /71 (BP Location: Left arm)   Pulse 73   Ht 5' 3" (1.6 m)   Wt 134.7 kg (297 lb)   LMP 03/25/2006   BMI 52.61 kg/m²   GENERAL: Overweight body habitus, well groomed.    ASSESSMENT:    1. Obstructive sleep apnea, severe by AHI with prior symptoms of snoring, excessive daytime sleepiness, and fatigue, now resolved with CPAP use. The patient is adherent on CPAP and experiencing symptomatic benefit. Medical co-mobidities: insomnia. Benefiting from CPAP use in terms of sleep continuity and daytime sleepiness. " Compliant.      2. Insomnia NEC. Multi-factorial -  excess time in bed, poor sleep hygiene (TV loud); recent traumatic events. Not currently on a medication. Melatonin helps her to fall but not to stay asleep.      PLAN:      Continue APAP 10-18 cm H2O    Regular replacement of CPAP mask, tubing and filter was recommended.  CPAP compliance was reinforced.     exercise 1 step at a time    Will refer to [facility]    Add small dose of Tizanidine on off days in helps re relieve muscle tension surrounding her shouilder pain + it may help her sleep.     Continue Magnesium 200 Bisglycinate  PRN              -Education: During our discussion today, we talked about the etiology of obstructive sleep apnea as well as the potential ramifications of untreated sleep apnea, which could include daytime sleepiness, hypertension, heart disease and/or stroke.  We discussed potential treatment options, which could include weight loss, body positioning, continuous positive airway pressure (CPAP), or referral for surgical consideration. The patient preferred CPAP option.    She should avoid ETOH and sedatives at night, as it tends to aggravate DANIELLE. Regular replacement of CPAP mask, tubing and filter was recommended.    Precautions: The patient was advised to abstain from driving should he feel sleepy or drowsy.

## 2024-07-08 NOTE — Clinical Note
Good evening,  Would you consider Yulissa LAURA Hatfield for [facility] or another program - long standing insomnia (currently worse with arthritic pain). She is motivated in medication - free insomnia management.   Thank you!

## 2024-07-09 ENCOUNTER — OFFICE VISIT (OUTPATIENT)
Dept: PAIN MEDICINE | Facility: CLINIC | Age: 64
End: 2024-07-09
Payer: COMMERCIAL

## 2024-07-09 VITALS
BODY MASS INDEX: 51.91 KG/M2 | HEART RATE: 73 BPM | HEIGHT: 63 IN | DIASTOLIC BLOOD PRESSURE: 85 MMHG | SYSTOLIC BLOOD PRESSURE: 126 MMHG | WEIGHT: 293 LBS

## 2024-07-09 DIAGNOSIS — M25.512 CHRONIC PAIN OF BOTH SHOULDERS: ICD-10-CM

## 2024-07-09 DIAGNOSIS — M19.011 OSTEOARTHRITIS OF BILATERAL GLENOHUMERAL JOINTS: Primary | ICD-10-CM

## 2024-07-09 DIAGNOSIS — M25.511 CHRONIC PAIN OF BOTH SHOULDERS: ICD-10-CM

## 2024-07-09 DIAGNOSIS — M19.012 OSTEOARTHRITIS OF BILATERAL GLENOHUMERAL JOINTS: Primary | ICD-10-CM

## 2024-07-09 DIAGNOSIS — G89.29 CHRONIC PAIN OF BOTH SHOULDERS: ICD-10-CM

## 2024-07-09 PROCEDURE — 1159F MED LIST DOCD IN RCRD: CPT | Mod: CPTII,S$GLB,, | Performed by: NURSE PRACTITIONER

## 2024-07-09 PROCEDURE — 3079F DIAST BP 80-89 MM HG: CPT | Mod: CPTII,S$GLB,, | Performed by: NURSE PRACTITIONER

## 2024-07-09 PROCEDURE — 99999 PR PBB SHADOW E&M-EST. PATIENT-LVL IV: CPT | Mod: PBBFAC,,, | Performed by: NURSE PRACTITIONER

## 2024-07-09 PROCEDURE — 4010F ACE/ARB THERAPY RXD/TAKEN: CPT | Mod: CPTII,S$GLB,, | Performed by: NURSE PRACTITIONER

## 2024-07-09 PROCEDURE — 3074F SYST BP LT 130 MM HG: CPT | Mod: CPTII,S$GLB,, | Performed by: NURSE PRACTITIONER

## 2024-07-09 PROCEDURE — 3008F BODY MASS INDEX DOCD: CPT | Mod: CPTII,S$GLB,, | Performed by: NURSE PRACTITIONER

## 2024-07-09 PROCEDURE — 3044F HG A1C LEVEL LT 7.0%: CPT | Mod: CPTII,S$GLB,, | Performed by: NURSE PRACTITIONER

## 2024-07-09 PROCEDURE — 99214 OFFICE O/P EST MOD 30 MIN: CPT | Mod: S$GLB,,, | Performed by: NURSE PRACTITIONER

## 2024-07-09 NOTE — PROGRESS NOTES
Pain Medicine Established Clinic Visit       Chief Complaint:   Chief Complaint   Patient presents with    Follow-up     SP    Shoulder Pain     Left >Right     Knee Pain     Bilateral            History of Present Illness: Yulissa Hatfield is a 63 y.o. female referred by Dr. Edwin Tena for cervical radiculopathy.      Onset: 2018, no specific injury, insidious onset  Location: Bilateral anterior shoulders, worse on the left  Radiation: into neck and down both arms  Timing: constant  Quality: Aching and Deep  Exacerbating Factors: lifting  Alleviating Factors: medications and rest  Associated Symptoms: She feels weakness in both arms, numbness in both hands in C6/7 distribution. She denies night fever/night sweats, urinary incontinence, bowel incontinence and significant weight loss    Severity: Currently: 7/10   Typical Range: 4-10/10     Exacerbation: 10/10     Interval Update 07/09/2024: Patient return to clinic SP left glenohumeral joint injection procedure done on 06/24/24 with 75% relief and improved mobility.  She does still have right  shoulder pain that she would like to address at a later time.     Interval Update-06/03/2024 Patient return to clinic for follow-up on bilateral shoulder pain.  She reports having seen orthopedics and had been referred to physical therapy however she did not attend.  She is seeking a repeat left shoulder injection which was provided to her by Dr. Tellez  previously.  She denies any recent incident or trauma denies any profound weakness she reports that her primary pain is at night which prohibits her from sleeping.  She would like to also discuss medication alternatives.    Interval History-05/05/2023- 62-year-old female presents today with complaints of bilateral shoulder pain, she is established with our clinic and was previously provided a left glenohumeral joint injection that provided 60% relief for a few weeks and then her pain returned.  She reports not being  able to sleep at night due to the increased pain, she is scheduled to see Dr. Rosas/orthopedics in the next few weeks for further evaluation and possible bilateral shoulder MRI.  Today she is requesting something help her sleep as well as something to control her bilateral shoulder pain.    Interval Updates-(12/06/2022):  Yulissa Hatfield returns today for follow up.   She is status post a Left  glenohumeral joint injection Injection provided 60% relief.  She is currently in physical therapy which she states is helping, Currently, the shoulder pain is stable.      Current Pain Scales:  Current: 3/10              Typical Range: 2-3/10          Previous Interventions:  - 06/24/2024 Left Glenohumeral Joint Injection under Fluoroscopic Guidance-75%  - 11/09/2022 Left  glenohumeral joint injection under fluoroscopic guidance-60%  - 8/16/22: Left subacromial bursa injection     Previous Therapies:  PT/OT: yes currently  Relevant Surgery: no   Previous Medications:   - NSAIDS: Ibuprofen 200 mg  - Muscle Relaxants:  Tizanidine > dry mouth, cough  - TCAs:   - SNRIs: Cymbalta  - Topicals:   - Anticonvulsants:    - Opioids: tramadol    Current Pain Medications:  Ibuprofen  Cymbalta 40 mg   Baclofen 5-10 mg qHS  Tylenol     Blood Thinners: None    Full Medication List:    Current Outpatient Medications:     acetaminophen (TYLENOL) 500 MG tablet, Take 1,000 mg by mouth every 6 (six) hours as needed for Pain., Disp: , Rfl:     clobetasoL (TEMOVATE) 0.05 % cream, APPLY 1 APPLICATION TOPICALLY TO AFFECTED AREA TWICE A DAY, Disp: , Rfl:     desonide (DESOWEN) 0.05 % lotion, , Disp: , Rfl:     ezetimibe (ZETIA) 10 mg tablet, TAKE 1 TABLET BY MOUTH EVERY DAY, Disp: 90 tablet, Rfl: 0    gabapentin (NEURONTIN) 300 MG capsule, Take 1 capsule (300 mg total) by mouth 3 (three) times daily as needed., Disp: 90 capsule, Rfl: 1    hydrocortisone 2.5 % cream, , Disp: , Rfl:     levothyroxine (SYNTHROID) 112 MCG tablet, Take 1 tablet (112  mcg total) by mouth before breakfast., Disp: 90 tablet, Rfl: 1    pravastatin (PRAVACHOL) 40 MG tablet, TAKE 1 TABLET BY MOUTH EVERY DAY, Disp: 90 tablet, Rfl: 3    SOOLANTRA 1 % Crea, , Disp: , Rfl:     telmisartan (MICARDIS) 80 MG Tab, TAKE 1 TABLET BY MOUTH EVERY DAY, Disp: 90 tablet, Rfl: 3    tirzepatide 7.5 mg/0.5 mL PnIj, Inject 7.5 mg into the skin every 7 days., Disp: 4 Pen, Rfl: 0    tiZANidine (ZANAFLEX) 2 MG tablet, 1 pill PO as needed daily for muscle spasms, Disp: 30 tablet, Rfl: 1     Review of Systems:  Review of Systems   Musculoskeletal:  Positive for joint pain.       Allergies:  Patient has no known allergies.     Medical History:   has a past medical history of Generalized anxiety disorder with panic attacks, HTN (hypertension), Hyperlipidemia, Hypothyroid, DANIELLE on CPAP, and Type 2 diabetes mellitus.    Surgical History:   has a past surgical history that includes Carpal tunnel release; Foot surgery; Knee surgery;  section, classic; Thyroid surgery; Endometrial ablation; Colonoscopy (N/A, 11/10/2021); Esophagogastroduodenoscopy (N/A, 11/10/2021); Injection of joint (Left, 2022); and Injection of joint (Left, 2024).    Family History:  family history includes Heart disease in her brother, father, and mother; No Known Problems in her daughter, daughter, daughter, sister, and son.    Social History:   reports that she has never smoked. She has never used smokeless tobacco. She reports that she does not drink alcohol and does not use drugs.    Physical Exam:  LMP 2006   GEN: No acute distress. Calm, comfortable  HENT: Normocephalic, atraumatic, moist mucous membranes  EYE: Anicteric sclera, non-injected.   CV: Non-diaphoretic.   RESP: Breathing comfortably. Chest expansion symmetric.  PSYCH: Pleasant mood and appropriate affect. Recent and remote memory intact.   Shoulder Exam:      Inspection: No erythema, bruising, surgical incisions      Palpation: TTP of glenohumeral joint  and AC joint of bilateral shoulders.       ROM:  Limited in abduction, internal rotation bilaterally, worse on the left than the right   (+) Painful arc sign with pain between  degrees abduction      Provocative Maneuvers for Biceps Tendon:   (-) Speed's test bilaterally      Provocative Maneuvers for Impingement:  (+) Hawkin's bilaterally  (+) Neer's bilaterally      Provocative Maneuvers for RTC:       (+) Empty Can bilaterally  Neuro:  (-) Godwin bilaterally         Imagin23 MRI Shoulder RT 23 MRI Shoulder LT  -MRI Cervical Spine 2022:  MRI examination of the cervical spine dated 2022.  There is straightening of the cervical spine with degenerative change greatest at C4-C5 and C5-C6.  No evidence of congenital spinal stenosis.  No abnormal signal change involving the visualized portion of the spinal cord.  No evidence of an acute cervical spine fracture.  At the C2-C3 and C3-C4 levels there is no evidence of a focal disc abnormality.  No significant spinal canal or foraminal encroachment.  At the C4-C5 level there is a minimal posterior disc-osteophyte with mild effacement along the anterior margin of the subarachnoid space and spinal cord.  Mild spinal canal encroachment.  No foraminal encroachment.  At the C5-C6 level there is a broad-based and right paracentral disc-osteophyte.  There is effacement along the anterior margin of the subarachnoid space with right greater than left foraminal encroachment.  At the C6-C7 and C7-T1 levels there is no evidence of a significant focal disc abnormality.  No significant spinal canal or foraminal encroachment.    - X-ray shoulder complete b/l 20:   Right shoulder: The alignment is normal.  The glenohumeral joint appears normal.  Minimal osteophyte at the inferior humeral head.  There is mild osteophyte at the acromioclavicular joint.  No fracture, no osseous lesions.  The right upper thoracic region appear normal.  Left shoulder:  The alignment is normal.  There is mild glenohumeral joint space narrowing.  Subchondral cystic changes at the superior glenoid.  Prominent inferior osteophyte at the humeral head.  Osteophyte noted at the acromioclavicular joint.  No fracture, no osseous lesions.  The left upper thoracic region appear normal    Labs:  BMP  Lab Results   Component Value Date     01/29/2024    K 4.3 01/29/2024     01/29/2024    CO2 25 01/29/2024    BUN 15 01/29/2024    CREATININE 0.60 01/29/2024    CALCIUM 9.0 01/29/2024    ANIONGAP 12 01/29/2024    ESTGFRAFRICA >60 03/30/2022    EGFRNONAA >60 03/30/2022     Lab Results   Component Value Date    ALT 18 01/29/2024    AST 24 01/29/2024    ALKPHOS 70 01/29/2024    BILITOT 0.5 01/29/2024     Lab Results   Component Value Date     01/29/2024       Assessment:  Yluissa Hatfield is a 63 y.o. female with the following diagnoses based on history, exam, and imaging:    Problem List Items Addressed This Visit    None          This is a pleasant 63 y.o. lady presenting with:     - Chronic bilateral shoulder pain: Right worse than left. Imaging with OA in AC and GH joints. Impingement signs on exam b/l.   - Chronic neck pain and bilateral radicular arm pains. MRI with bilateral foraminal encroachment at C5-6.    - Comorbidities: BMI > 50, anxiety, hypertension, hypothyroidism, DM2     12/06/20220901-12-wbva-old female with a history of chronic bilateral shoulder pain right greater than left she is status post a left glenohumeral corticosteroid injection under fluoroscopy with Dr. Tellez about her 60% relief.  She is currently in physical therapy which she does state is helping.  She discussed no benefit with Cymbalta so her and I discussed weaning off this medication, also recommended continuing a home exercise plan 3-4 days a week to help with strengthening of her shoulders bilaterally, would also recommend the baclofen only as needed to help with sleep her and I discussed this  in detail.  She acknowledged that she is not a big medicine take so we had a long discussion regarding her medications and how she should take them and what makes her feel most comfortable.  See plan agreed upon below.    05/05/2023 that 62-year-old pleasant female with history of chronic bilateral shoulder pain right greater than left.  Based on history and exam pain is related to osteoarthritis in the AC and glenohumeral joints.  She is established with our office and has been previously provided a left glenohumeral joint injection with steroids per Dr. Tellez which helped but was not sustainable.  She is scheduled to see orthopedics in the next few weeks however she is having difficulty sleeping her and I discussed I will prescribe Flexeril 10 mg q.h.s. to help with sleep and reduce her symptoms.  Also recommended Tylenol a 1000 mg t.i.d..    06/03/2024-Yulissa Hatfield is a 63 y.o. female who  has a past medical history of Generalized anxiety disorder with panic attacks, HTN (hypertension), Hyperlipidemia, Hypothyroid, DANIELLE on CPAP, and Type 2 diabetes mellitus.  By history and examination this patient has chronic bilateral shoulder pain  The underlying cause is severe glenohumeral osteoarthritis with joint space narrowing diffuse cartilage loss at the level of the humeral head and glenoid with subchondral cystic change/geode formation.Osseous remodeling of the glenoi Pathology is confirmed by imaging.  We discussed the underlying diagnoses and multiple treatment options including non-opioid medications, interventional procedures, physical therapy, home exercise, core muscle enhancement, activity modification, and weight loss.  The risks and benefits of each treatment option were discussed and all questions were answered.        7/9/20242963-89-icho-old female with a history of chronic bilateral shoulder pain.  She was recently provided a left glenohumeral joint injection under fluoroscopy that provided 75%  relief and improvement in her functionality. Base on  History and  exam she still continues to have right shoulder pain,  in the future we can consider a right glenohumeral injection under x-ray to help with symptoms.  For now I will consult her to Orthopedics/all fields to review images and discuss a plan of care for her left shoulder in the near future.  Patient verbalized understanding and agreed.    Treatment Plan:   - PT/OT/HEP:  Complete physical therapy for bilateral shoulder pain secondary to OA.  Continue HEP for shoulder pain as well. Discussed benefits of exercise for pain.   - Procedures:  none at this time.   - Medications:    - Cont compound topical medication with Diclofenac 1.5%, Lidocaine 2.5%, Prilocaine 2.5%, and Gabapentin 4%.  Apply 1-2 grams to painful area up to 3-4 times daily   - restart Flexeril 10 mg q.h.s.             - Continue  Tylenol extra-strength a 1000 mg t.i.d. p.r.n. instructed patient not to exceed 3000 mg in 24 hours.  - Imaging: Reviewed.  - Labs: Reviewed.   -Referral to Ortho surgery/Dr. Rosado     Follow Up:  3 months or sooner if needed    MARICRUZ Faith  Interventional Pain Management    Disclaimer: This note was partly generated using dictation software which may occasionally result in transcription errors.

## 2024-07-11 ENCOUNTER — PATIENT MESSAGE (OUTPATIENT)
Dept: PSYCHIATRY | Facility: CLINIC | Age: 64
End: 2024-07-11
Payer: COMMERCIAL

## 2024-07-16 DIAGNOSIS — E11.9 TYPE 2 DIABETES MELLITUS WITHOUT COMPLICATION, WITHOUT LONG-TERM CURRENT USE OF INSULIN: ICD-10-CM

## 2024-07-16 RX ORDER — TIRZEPATIDE 7.5 MG/.5ML
INJECTION, SOLUTION SUBCUTANEOUS
Qty: 12 PEN | Refills: 0 | Status: SHIPPED | OUTPATIENT
Start: 2024-07-16 | End: 2024-07-18

## 2024-07-16 NOTE — TELEPHONE ENCOUNTER
Refill Routing Note   Medication(s) are not appropriate for processing by [facility] for the following reason(s):        New or recently adjusted medication    [facility] action(s):  Defer     Requires labs : Yes             Appointments  past 12m or future 3m with PCP    Date Provider   Last Visit   2/23/2024 Emilia Velasquez MD   Next Visit   Visit date not found Emilia Velasquez MD   ED visits in past 90 days: 0        Note composed:3:06 AM 07/16/2024

## 2024-07-16 NOTE — TELEPHONE ENCOUNTER
Care Due:                  Date            Visit Type   Department     Provider  --------------------------------------------------------------------------------                                ESTABLISHED   [facility]                              PATIENT -    MEDICINE/INTERN  Last Visit: 12-      [facility]         Emilia Velasquez  Next Visit: None Scheduled  None         None Found                                                            Last  Test          Frequency    Reason                     Performed    Due Date  --------------------------------------------------------------------------------    HBA1C.......  6 months...  tirzepatide..............  01- 07-    [facility] Embedded Care Due Messages. Reference number: [id number].   7/16/2024 12:16:50 AM CDT

## 2024-07-17 ENCOUNTER — PATIENT MESSAGE (OUTPATIENT)
Dept: FAMILY MEDICINE | Facility: CLINIC | Age: 64
End: 2024-07-17
Payer: COMMERCIAL

## 2024-07-18 ENCOUNTER — TELEPHONE (OUTPATIENT)
Dept: FAMILY MEDICINE | Facility: CLINIC | Age: 64
End: 2024-07-18
Payer: COMMERCIAL

## 2024-07-18 DIAGNOSIS — E11.9 TYPE 2 DIABETES MELLITUS WITHOUT COMPLICATION, WITHOUT LONG-TERM CURRENT USE OF INSULIN: ICD-10-CM

## 2024-07-18 NOTE — TELEPHONE ENCOUNTER
----- Message from Roverto Chiang sent at 7/18/2024  9:01 AM CDT -----  Regarding: Self .[phone]   Type: RX Refill Request    Who Called: Self    Have you contacted your pharmacy: yes    Refill 90 day     RX Name and Strength:tirzepatide (MOUNJARO) 7.5 mg/0.5 mL Ivette    Preferred Pharmacy with phone number: .  [facility]/pharmacy #[id number] - [place] - [address] [facility]  [address] [facility]  [place] [address]  Phone: [phone] Fax: [phone]          Local or Mail Order: local     Would the patient rather a call back or a response via [facility]? Call back     Best Call Back Number:.[phone]      Additional Information:  Pharmacy states when they run out they dont know when they will have it again     Thank you.

## 2024-07-19 DIAGNOSIS — G89.29 CHRONIC PAIN OF BOTH SHOULDERS: Primary | ICD-10-CM

## 2024-07-19 DIAGNOSIS — M25.511 CHRONIC PAIN OF BOTH SHOULDERS: Primary | ICD-10-CM

## 2024-07-19 DIAGNOSIS — M25.512 CHRONIC PAIN OF BOTH SHOULDERS: Primary | ICD-10-CM

## 2024-07-23 DIAGNOSIS — I10 ESSENTIAL HYPERTENSION: ICD-10-CM

## 2024-07-23 RX ORDER — TELMISARTAN 80 MG/1
TABLET ORAL
Qty: 90 TABLET | Refills: 1 | Status: SHIPPED | OUTPATIENT
Start: 2024-07-23

## 2024-07-23 NOTE — TELEPHONE ENCOUNTER
Refill Decision Note   Yulissa Hatfield  is requesting a refill authorization.  Brief Assessment and Rationale for Refill:  Approve     Medication Therapy Plan:         Comments:     Note composed:12:38 PM 07/23/2024

## 2024-07-24 ENCOUNTER — TELEPHONE (OUTPATIENT)
Dept: RESEARCH | Facility: HOSPITAL | Age: 64
End: 2024-07-24
Payer: COMMERCIAL

## 2024-07-24 ENCOUNTER — RESEARCH ENCOUNTER (OUTPATIENT)
Dept: RESEARCH | Facility: HOSPITAL | Age: 64
End: 2024-07-24
Payer: COMMERCIAL

## 2024-07-24 DIAGNOSIS — E03.9 HYPOTHYROIDISM, UNSPECIFIED TYPE: Chronic | ICD-10-CM

## 2024-07-24 RX ORDER — LEVOTHYROXINE SODIUM 112 UG/1
112 TABLET ORAL
Qty: 90 TABLET | Refills: 1 | Status: SHIPPED | OUTPATIENT
Start: 2024-07-24

## 2024-07-24 NOTE — TELEPHONE ENCOUNTER
No care due was identified.  [facility] Embedded Care Due Messages. Reference number: [id number].   7/24/2024 12:21:19 AM CDT

## 2024-07-24 NOTE — TELEPHONE ENCOUNTER
Refill Decision Note   Yulissa Hatfield  is requesting a refill authorization.  Brief Assessment and Rationale for Refill:  Approve     Medication Therapy Plan:         Comments:     Note composed:4:18 AM 07/24/2024

## 2024-07-24 NOTE — TELEPHONE ENCOUNTER
Luisa Study  Study ID#[id number]    End of study protocol completed. Patient has completed study and last payment was submitted.

## 2024-07-24 NOTE — PROGRESS NOTES
"Protocol: [employer] (Luisa)  Protocol#:Luisa  IRB# 2021.156  Version Date: 10-Jeyson-2023  PI: Ranulfo Omer MD  Patient Initials: KARISHMA (Study ID# [id number])      Study Completed - End of Registry      Patient has completed all scheduled assessments for this study. Patient was called today, 7/24/2024, to confirm that she has completed the study requirements. Left voicemail. Her last reimbursement payments would be received sometime this week.      End of Enrollment form in [employer] has been marked as "Study completed".      Follow-Up form in [employer] has been marked as "Off study".  "

## 2024-07-25 ENCOUNTER — OFFICE VISIT (OUTPATIENT)
Dept: ORTHOPEDICS | Facility: CLINIC | Age: 64
End: 2024-07-25
Payer: COMMERCIAL

## 2024-07-25 ENCOUNTER — HOSPITAL ENCOUNTER (OUTPATIENT)
Dept: RADIOLOGY | Facility: HOSPITAL | Age: 64
Discharge: HOME | End: 2024-07-25
Attending: ORTHOPAEDIC SURGERY
Payer: COMMERCIAL

## 2024-07-25 VITALS — BODY MASS INDEX: 51.91 KG/M2 | WEIGHT: 293 LBS | HEIGHT: 63 IN

## 2024-07-25 DIAGNOSIS — M25.512 CHRONIC PAIN OF BOTH SHOULDERS: ICD-10-CM

## 2024-07-25 DIAGNOSIS — M25.511 CHRONIC PAIN OF BOTH SHOULDERS: ICD-10-CM

## 2024-07-25 DIAGNOSIS — M19.011 OSTEOARTHRITIS OF BILATERAL GLENOHUMERAL JOINTS: ICD-10-CM

## 2024-07-25 DIAGNOSIS — G89.29 CHRONIC PAIN OF BOTH SHOULDERS: ICD-10-CM

## 2024-07-25 DIAGNOSIS — M19.012 OSTEOARTHRITIS OF BILATERAL GLENOHUMERAL JOINTS: ICD-10-CM

## 2024-07-25 PROCEDURE — 73030 X-RAY EXAM OF SHOULDER: CPT | Mod: TC,50,PN

## 2024-07-25 PROCEDURE — 73030 X-RAY EXAM OF SHOULDER: CPT | Mod: 26,50,, | Performed by: RADIOLOGY

## 2024-07-25 PROCEDURE — 99999 PR PBB SHADOW E&M-EST. PATIENT-LVL III: CPT | Mod: PBBFAC,,, | Performed by: ORTHOPAEDIC SURGERY

## 2024-07-25 NOTE — PROGRESS NOTES
[facility], Orthopedics and Sports Medicine  [facility]    New Patient Shoulder Office Visit  07/25/2024     Subjective:      Yulissa Hatfield is a 63 y.o.  female referred by Bryson Bates for evaluation and treatment of a bilateral shoulder problem. This is evaluated as a personal injury.    The patient notes the following symptoms: pain and weakness.  Symptoms are located globally about bilateral shoulders. The left side is worse.  She has miserable pain in left shoulder.  Pain at shoulder with lifting both arms.  Limited range of motion both shoulders, worse left shoulder. Has pain at night difficulty sleeping.    Is currently in physical therapy, improving motion in the shoulder overall.     Currently uses tylenol and advil daily for pain relief with mild improvement.  Previously tried celebrex, gabapentin with minimal improvement in pain.    Has seen pain management, sent to therapy, gave intraarticular CSI left shoulder GH joint 6/24/2024, which improved symptoms periodically; previously had right SA CSI 10/20/2022.    Works as .     Outside reports reviewed: historical medical records, office notes, and radiology reports.    Past Medical History:   Diagnosis Date    Generalized anxiety disorder with panic attacks     HTN (hypertension)     Hyperlipidemia     Hypothyroid     DANIELLE on CPAP     Type 2 diabetes mellitus        Patient Active Problem List   Diagnosis    Primary localized osteoarthrosis, lower leg    Obstructive sleep apnea on CPAP    Varicose veins of both lower extremities with pain    Hypothyroidism    Chronic non-seasonal allergic rhinitis    Class 3 severe obesity due to excess calories with serious comorbidity and body mass index (BMI) of 50.0 to 59.9 in adult    Hyperlipidemia associated with type 2 diabetes mellitus    Hypertension associated with diabetes    Generalized anxiety disorder with panic attacks    DANIELLE on CPAP    Type 2 diabetes mellitus  without complication, without long-term current use of insulin    DDD (degenerative disc disease), lumbosacral    Chronic venous stasis dermatitis of both lower extremities    Anxiety    Cervical radiculopathy    Osteoarthritis of bilateral glenohumeral joints    Primary osteoarthritis of right knee    Chronic pain of right knee    Insufficiency fracture    Bone marrow edema    Shoulder pain, bilateral    Impaired range of motion of both shoulders       Past Surgical History:   Procedure Laterality Date    CARPAL TUNNEL RELEASE       SECTION, CLASSIC      x 3    COLONOSCOPY N/A 11/10/2021    Procedure: COLONOSCOPY;  Surgeon: Kayla Pope MD;  Location: [facility] ENDO;  Service: Endoscopy;  Laterality: N/A;    ENDOMETRIAL ABLATION      ESOPHAGOGASTRODUODENOSCOPY N/A 11/10/2021    Procedure: EGD (ESOPHAGOGASTRODUODENOSCOPY);  Surgeon: Kayla Pope MD;  Location: [facility] ENDO;  Service: Endoscopy;  Laterality: N/A;    FOOT SURGERY      INJECTION OF JOINT Left 2022    Procedure: Injection, Joint, Shoulder, Hip, Or Knee;  Surgeon: Sowmya Tellez MD;  Location: [facility] PAIN MGT;  Service: Pain Management;  Laterality: Left;  Left Glenohumeral Joint Injection    INJECTION OF JOINT Left 2024    Procedure: Left glenohumeral joint injection under fluoroscopic guidance;  Surgeon: Kristina Campbell DO;  Location: [facility] PAIN MANAGEMENT;  Service: Pain Management;  Laterality: Left;  oral-20mins    KNEE SURGERY      THYROID SURGERY          Current Outpatient Medications   Medication Instructions    acetaminophen (TYLENOL) 1,000 mg, Oral, Every 6 hours PRN    clobetasoL (TEMOVATE) 0.05 % cream APPLY 1 APPLICATION TOPICALLY TO AFFECTED AREA TWICE A DAY    desonide (DESOWEN) 0.05 % lotion     ezetimibe (ZETIA) 10 mg tablet TAKE 1 TABLET BY MOUTH EVERY DAY    gabapentin (NEURONTIN) 300 mg, Oral, 3 times daily PRN    hydrocortisone 2.5 % cream     levothyroxine (SYNTHROID) 112 mcg, Oral, Before breakfast     pravastatin (PRAVACHOL) 40 mg, Oral    SOOLANTRA 1 % Crea No dose, route, or frequency recorded.    telmisartan (MICARDIS) 80 MG Tab TAKE 1 TABLET BY MOUTH EVERY DAY    tirzepatide 15 mg, Subcutaneous, Every 7 days    tiZANidine (ZANAFLEX) 2 MG tablet 1 pill PO as needed daily for muscle spasms        Review of patient's allergies indicates:  No Known Allergies    Social History     Socioeconomic History    Marital status:    Tobacco Use    Smoking status: Never    Smokeless tobacco: Never   Substance and Sexual Activity    Alcohol use: No    Drug use: No    Sexual activity: Not Currently     Social Determinants of Health     Financial Resource Strain: Low Risk  (12/21/2023)    Overall Financial Resource Strain (CARDIA)     Difficulty of Paying Living Expenses: Not hard at all   Food Insecurity: No Food Insecurity (12/21/2023)    Hunger Vital Sign     Worried About Running Out of Food in the Last Year: Never true     Ran Out of Food in the Last Year: Never true   Transportation Needs: No Transportation Needs (12/21/2023)    PRAPARE - Transportation     Lack of Transportation (Medical): No     Lack of Transportation (Non-Medical): No   Physical Activity: Insufficiently Active (12/21/2023)    Exercise Vital Sign     Days of Exercise per Week: 3 days     Minutes of Exercise per Session: 40 min   Stress: Stress Concern Present (12/21/2023)    [employer] [employer] - Occupational Stress Questionnaire     Feeling of Stress : To some extent   Housing Stability: Low Risk  (12/21/2023)    Housing Stability Vital Sign     Unable to Pay for Housing in the Last Year: No     Number of Places Lived in the Last Year: 1     Unstable Housing in the Last Year: No       Family History   Problem Relation Name Age of Onset    Heart disease Mother      Heart disease Father      No Known Problems Sister      Heart disease Brother      No Known Problems Daughter      No Known Problems Son      No Known Problems  Daughter      No Known Problems Daughter      Colon cancer Neg Hx      Esophageal cancer Neg Hx           Review of Systems   Constitutional: Negative for chills and fever.   HENT:  Negative for hearing loss.    Eyes:  Negative for blurred vision.   Cardiovascular:  Negative for chest pain.   Respiratory:  Negative for shortness of breath.    Gastrointestinal:  Negative for abdominal pain.   Neurological:  Negative for light-headedness.        Objective:      General    Nursing note and vitals reviewed.  Constitutional: She is oriented to person, place, and time. She appears well-developed and well-nourished. No distress.   HENT:   Head: Normocephalic and atraumatic.   Eyes: Pupils are equal, round, and reactive to light.   Cardiovascular:  Normal rate and regular rhythm.            Pulmonary/Chest: Effort normal and breath sounds normal. No respiratory distress.   Neurological: She is alert and oriented to person, place, and time.   Psychiatric: She has a normal mood and affect. Her behavior is normal.         Right Shoulder Exam     Inspection/Observation   Swelling: absent  Bruising: absent  Atrophy: absent    Tenderness   The patient is tender to palpation of the greater tuberosity and biceps tendon.    Range of Motion   Active abduction:  90   Forward Flexion:  90   External Rotation 0 degrees:  50   Internal rotation 0 degrees:  Lumbar     Tests & Signs   Drop arm: negative  Magdaleno test: negative  Impingement: negative  Rotator Cuff Painful Arc/Range: moderate  Belly Press: negative    Other   Sensation: normal    Left Shoulder Exam     Inspection/Observation   Swelling: absent  Bruising: absent  Atrophy: absent    Tenderness   The patient is tender to palpation of the greater tuberosity and biceps tendon.    Range of Motion   Active abduction:  40   Forward Flexion:  40   External Rotation 0 degrees:  10   Internal rotation 0 degrees:  Sacrum     Tests & Signs   Drop arm: negative  Magdaleno test:  negative  Impingement: negative  Rotator Cuff Painful Arc/Range: severe  Belly Press: negative    Other   Sensation: normal       Muscle Strength   Right Upper Extremity   Shoulder Abduction: 5/5   Shoulder Internal Rotation: 5/5   Shoulder External Rotation: 5/5   Biceps: 5/5   Left Upper Extremity  Shoulder Abduction: 4/5   Shoulder Internal Rotation: 5/5   Shoulder External Rotation: 4/5   Biceps: 5/5     Reflexes     Left Side  Biceps:  2+  Triceps:  2+  Brachioradialis:  2+  Left Godwin's Sign:  Absent    Right Side   Biceps:  2+  Triceps:  2+  Brachioradialis:  2+  Right Godwin's Sign:  absent    Vascular Exam     Right Pulses      Radial:                    2+      Left Pulses      Radial:                    2+        Imaging:  Radiographs of the bilateral shoulder taken 07/25/2024 were personally reviewed from the [facility] EMR.  Multiple views of the shoulder are available today for review, including an AP, scapular Y, axillary view.  The glenohumeral joint demonstrates severe degenerative changes.  The acromioclavicular joint demonstrates severe degenerative changes.  The glenohumeral joint is concentrically reduced.  There is no acute fracture or dislocation.      Procedures        Assessment:       Yulissa Hatfield is a 63 y.o. female seen in the office today. Diagnoses of Osteoarthritis of bilateral glenohumeral joints and Chronic pain of both shoulders were pertinent to this visit.  Non-operative treatment is recommended at this time. The patient was educated as to surgical treatment and nonsurgical treatment.  She is not interested in surgery.  She had CSI L GH joint about 6 weeks ago so can not do another; she had relief from CSI so can repeat in future.  Explained the time table for future steroid injections. The natural history and expected course discussed with patient. Various treatment options were discussed, including their risks and benefits. All of the patient's questions were  answered.     Plan:      Continue physical therapy as currently ordered.   Tylenol 650mg TID, PRN pain.  Follow up in 3 months or as  needed if symptoms worsen.         Stone Rosado IV, MD   of Clinical Orthopedics  Department of Orthopedic Surgery  [facility]  Office: [phone]  Website: [email or web address]

## 2024-07-30 DIAGNOSIS — M62.838 NIGHT MUSCLE SPASMS: ICD-10-CM

## 2024-07-30 RX ORDER — TIZANIDINE 2 MG/1
TABLET ORAL
Qty: 90 TABLET | Refills: 1 | Status: SHIPPED | OUTPATIENT
Start: 2024-07-30

## 2024-08-01 ENCOUNTER — OFFICE VISIT (OUTPATIENT)
Dept: BARIATRICS | Facility: CLINIC | Age: 64
End: 2024-08-01
Payer: COMMERCIAL

## 2024-08-01 VITALS
WEIGHT: 291.88 LBS | SYSTOLIC BLOOD PRESSURE: 128 MMHG | HEART RATE: 77 BPM | DIASTOLIC BLOOD PRESSURE: 80 MMHG | OXYGEN SATURATION: 97 % | BODY MASS INDEX: 49.83 KG/M2 | HEIGHT: 64 IN

## 2024-08-01 DIAGNOSIS — M17.11 PRIMARY LOCALIZED OSTEOARTHROSIS OF RIGHT LOWER LEG: ICD-10-CM

## 2024-08-01 DIAGNOSIS — G47.33 OSA ON CPAP: ICD-10-CM

## 2024-08-01 DIAGNOSIS — E66.01 CLASS 3 SEVERE OBESITY DUE TO EXCESS CALORIES WITH SERIOUS COMORBIDITY AND BODY MASS INDEX (BMI) OF 50.0 TO 59.9 IN ADULT: Primary | ICD-10-CM

## 2024-08-01 DIAGNOSIS — E11.9 TYPE 2 DIABETES MELLITUS WITHOUT COMPLICATION, WITHOUT LONG-TERM CURRENT USE OF INSULIN: ICD-10-CM

## 2024-08-01 DIAGNOSIS — I15.2 HYPERTENSION ASSOCIATED WITH DIABETES: ICD-10-CM

## 2024-08-01 DIAGNOSIS — F41.1 GENERALIZED ANXIETY DISORDER WITH PANIC ATTACKS: ICD-10-CM

## 2024-08-01 DIAGNOSIS — E78.5 HYPERLIPIDEMIA ASSOCIATED WITH TYPE 2 DIABETES MELLITUS: ICD-10-CM

## 2024-08-01 DIAGNOSIS — E11.59 HYPERTENSION ASSOCIATED WITH DIABETES: ICD-10-CM

## 2024-08-01 DIAGNOSIS — M51.37 DDD (DEGENERATIVE DISC DISEASE), LUMBOSACRAL: ICD-10-CM

## 2024-08-01 DIAGNOSIS — E11.69 HYPERLIPIDEMIA ASSOCIATED WITH TYPE 2 DIABETES MELLITUS: ICD-10-CM

## 2024-08-01 DIAGNOSIS — F41.0 GENERALIZED ANXIETY DISORDER WITH PANIC ATTACKS: ICD-10-CM

## 2024-08-01 PROCEDURE — 99999 PR PBB SHADOW E&M-EST. PATIENT-LVL IV: CPT | Mod: PBBFAC,,, | Performed by: INTERNAL MEDICINE

## 2024-08-01 PROCEDURE — 3079F DIAST BP 80-89 MM HG: CPT | Mod: CPTII,S$GLB,, | Performed by: INTERNAL MEDICINE

## 2024-08-01 PROCEDURE — 3074F SYST BP LT 130 MM HG: CPT | Mod: CPTII,S$GLB,, | Performed by: INTERNAL MEDICINE

## 2024-08-01 PROCEDURE — 3044F HG A1C LEVEL LT 7.0%: CPT | Mod: CPTII,S$GLB,, | Performed by: INTERNAL MEDICINE

## 2024-08-01 PROCEDURE — 4010F ACE/ARB THERAPY RXD/TAKEN: CPT | Mod: CPTII,S$GLB,, | Performed by: INTERNAL MEDICINE

## 2024-08-01 PROCEDURE — 1160F RVW MEDS BY RX/DR IN RCRD: CPT | Mod: CPTII,S$GLB,, | Performed by: INTERNAL MEDICINE

## 2024-08-01 PROCEDURE — 99215 OFFICE O/P EST HI 40 MIN: CPT | Mod: S$GLB,,, | Performed by: INTERNAL MEDICINE

## 2024-08-01 PROCEDURE — 3008F BODY MASS INDEX DOCD: CPT | Mod: CPTII,S$GLB,, | Performed by: INTERNAL MEDICINE

## 2024-08-01 PROCEDURE — 1159F MED LIST DOCD IN RCRD: CPT | Mod: CPTII,S$GLB,, | Performed by: INTERNAL MEDICINE

## 2024-08-01 RX ORDER — TOPIRAMATE 25 MG/1
25-50 TABLET ORAL NIGHTLY
Qty: 180 TABLET | Refills: 0 | Status: SHIPPED | OUTPATIENT
Start: 2024-08-01 | End: 2025-08-01

## 2024-08-01 NOTE — PROGRESS NOTES
Subjective     Patient ID: Yulissa Hatfield is a 63 y.o. female.    Chief Complaint: Consult    CC: weight    New pt to me, referred by Self, Aaareferral  No address on file , with Patient Active Problem List:     Primary localized osteoarthrosis, lower leg     Obstructive sleep apnea on CPAP     Varicose veins of both lower extremities with pain     Hypothyroidism     Chronic non-seasonal allergic rhinitis     Class 3 severe obesity due to excess calories with serious comorbidity and body mass index (BMI) of 50.0 to 59.9 in adult     Hyperlipidemia associated with type 2 diabetes mellitus     Hypertension associated with diabetes     Generalized anxiety disorder with panic attacks     DANIELLE on CPAP- uses CPAP.      Type 2 diabetes mellitus without complication, without long-term current use of insulin     DDD (degenerative disc disease), lumbosacral     Chronic venous stasis dermatitis of both lower extremities     Anxiety     Cervical radiculopathy     Osteoarthritis of bilateral glenohumeral joints     Primary osteoarthritis of right knee     Chronic pain of right knee     Insufficiency fracture     Bone marrow edema     Shoulder pain, bilateral     Impaired range of motion of both shoulders     Lab Results       Component                Value               Date                       HGBA1C                   5.4                 01/29/2024                 HGBA1C                   5.9 (H)             07/19/2023                 HGBA1C                   6.3 (H)             01/24/2023            Lab Results       Component                Value               Date                       LDLCALC                  126.8               05/23/2024                 CREATININE               0.60                01/29/2024                Current attempts at weight loss: Has lost 42 lbs since starting Mounjaro. Has been exercising 20-30 min a day on [employer] Gym 3 times a week on bike and some weights.     Previous diet attempts:MARI  "in past- did help.     History of medication for loss: on 15 mg mounjaro.   checked today.     Heaviest weight:345#    Lightest weight: 260#    Goal weight: 250# to start.       Last eye exam:        2024. No glaucoma. No retinopathy         Provider:    Typical eating patterns: Works as [employer]. Lives with . Pt does cooking.   Breakfast: skips. Weekends: pancakes, waffles, egg.     lunch: may skip. Melber, wrap, soup. Weekends: salad. Fruit.     dinner: zuchinni, bruno, salad, chicken, fish, lamb chops, shrimp and rice or pasta. If out- chicken, fried seafood.     snacks: more at night. Italian ice. Desserts.     Beverages: Coffee- flavored creamer. Sweet tea, water, ginger ale. ETOH- denies.     Willingness to change: 10/10    Cardiac studies:   · The left ventricle is normal in size with concentric remodeling and normal systolic function.  · The estimated ejection fraction is 65%.  · Grade I left ventricular diastolic dysfunction.  · Mild right ventricular enlargement with normal right ventricular systolic function.  · Mild left atrial enlargement.  · Mild right atrial enlargement.  · Normal central venous pressure (3 mmHg).  · The estimated PA systolic pressure is 8 mmHg.           Normal sinus rhythm   Normal ECG   When compared with ECG of 21-JAN-2020 09:44,   No significant change was found   Confirmed by Elroy FOSTER MD, Sebastian SOTOMAYOR ([id number]) on 1/23/2023       BMR: [id number]    PBF:  55.9%      Review of Systems       Objective   /80 (BP Location: Left arm, Patient Position: Sitting, BP Method: Large (Manual))   Pulse 77   Ht 5' 4" (1.626 m)   Wt 132.4 kg (291 lb 14.4 oz)   LMP 03/25/2006   SpO2 97%   BMI 50.10 kg/m²     Physical Exam  Vitals reviewed.   Constitutional:       General: She is not in acute distress.     Appearance: She is well-developed.      Comments: With severe obesity     HENT:      Head: Normocephalic and atraumatic.   Eyes:      General: No scleral icterus.     Pupils: " Pupils are equal, round, and reactive to light.   Neck:      Thyroid: No thyromegaly.   Cardiovascular:      Rate and Rhythm: Normal rate.      Heart sounds: Normal heart sounds. No murmur heard.     No friction rub. No gallop.   Pulmonary:      Effort: Pulmonary effort is normal. No respiratory distress.      Breath sounds: Normal breath sounds. No wheezing.   Abdominal:      General: Bowel sounds are normal. There is no distension.      Palpations: Abdomen is soft.      Tenderness: There is no abdominal tenderness.   Musculoskeletal:         General: Normal range of motion.      Cervical back: Normal range of motion and neck supple.   Skin:     General: Skin is warm and dry.      Findings: No erythema.   Neurological:      Mental Status: She is alert and oriented to person, place, and time.      Cranial Nerves: No cranial nerve deficit.   Psychiatric:         Behavior: Behavior normal.         Judgment: Judgment normal.            Assessment and Plan     1. Class 3 severe obesity due to excess calories with serious comorbidity and body mass index (BMI) of 50.0 to 59.9 in adult    2. Type 2 diabetes mellitus without complication, without long-term current use of insulin    3. DDD (degenerative disc disease), lumbosacral    4. Primary localized osteoarthrosis of right lower leg    5. DANIELLE on CPAP    6. Hypertension associated with diabetes    7. Hyperlipidemia associated with type 2 diabetes mellitus    8. Generalized anxiety disorder with panic attacks    Other orders  -     topiramate (TOPAMAX) 25 MG tablet; Take 1-2 tablets (25-50 mg total) by mouth every evening.  Dispense: 180 tablet; Refill: 0        1. Class 3 severe obesity due to excess calories with serious comorbidity and body mass index (BMI) of 50.0 to 59.9 in adult  Medical and surgical options discussed today. Seminar info given.        Patient was informed that topiramate is used for migraine prevention and seizures. Weight loss is a common side  effect that is well documented. S/he understands this. S/he was informed of the potential side effects such as serious and possibly fatal rash in which case the medication should be discontinued immediately. Paresthesias, forgetfulness, fatigue, kidney stones, GI symptoms, and changes in lab values such as electrolytes, blood counts and kidney function.      Start topiramate  25 mg  in the evening for 1 week, then can increase  to 2 pills evening if needed.       No soda, sweet tea, juices or lemonade. All drinks should be 5 calories or less.     [id number] yordan pb meal planner, meal ideas and exercise handouts given    2. Type 2 diabetes mellitus without complication, without long-term current use of insulin  Continue with Mounjaro 15 mg.     3. DDD (degenerative disc disease), lumbosacral  Increase low impact activity as tolerated.  Avoid high impact activity, very heavy lifting or other exercise regimens that may cause discomfort.       Optimize BMI     4. Primary localized osteoarthrosis of right lower leg  See #4.     5. DANIELLE on CPAP  Discussed importance of compliance with treatment, and that DANIELLE does require getting closer to normal BMI range to see improvement that some other co-morbidities. Continue with, or consider, CPAP if indicated.     6. Hypertension associated with diabetes  The current medical regimen is effective;  continue present plan and medications. Expect improvement with weight loss.     7. Hyperlipidemia associated with type 2 diabetes mellitus  Expect improvement with weight loss. Recheck after 10% TBW lost.      8. Generalized anxiety disorder with panic attacks  Avoid stimulant anorectics             No follow-ups on file.

## 2024-08-01 NOTE — PATIENT INSTRUCTIONS
If you are interested in bariatric surgery we will need you to  attend an  online seminar. If you choose to view the online seminar please go to: [email or web address] The seminar is best viewed on a desktop or laptop computer. Upon completion of the seminar on the last slide you will see the code word comprised of letters and numbers in red and you should jot them down as youll need them to enter into the required form. On that same page youll see the link which will take you to the form. Please enter all the information required, including the code word. You will receive an email confirmation and so will we. Upon receiving this letter you will be called so that your appointments can be arranged. There is also two links for you to print out two packets of information. One is the patient information packet and the other is the nutrition worksheet. Please complete them and bring to your next appointment in our department.       Patient was informed that topiramate is used for migraine prevention and seizures. Weight loss is a common side effect that is well documented. S/he understands this. S/he was informed of the potential side effects such as serious and possibly fatal rash in which case the medication should be discontinued immediately. Paresthesias, forgetfulness, fatigue, kidney stones, GI symptoms, and changes in lab values such as electrolytes, blood counts and kidney function.      Start topiramate  25 mg  in the evening for 1 week, then can increase  to 2 pills evening if needed.       No soda, sweet tea, juices or lemonade. All drinks should be 5 calories or less.         1200 calorie Meal Plan  STARCHES 80 CALORIES PER SERVING 15g CARB, 3g PROTEIN, 1g FAT  Servings per day   bread   tortilla   crackers   cooked cereals   dry cereals   pasta   rice   corn   popcorn   potato (small)   potato, mashed   sweet potato   squash, winter   cooked beans, peas, lentils (add 1 meat exchange)   1 slice   1  "(6")   4-6 (3/4 oz)   1/2 cup   3/4 cup   1/2 cup   1/3 cup  1/2 cup   1 small light bag  1 (3 oz)   1/2 cup   1/3 cup   1 cup   1/2 cup Most starches are a good source of B vitamins   Choose whole grain foods such as 100% whole wheat bread and flour, brown rice, tortillas, etc. for nutrients and fiber.   Combine beans (starch & meat) with grains (starch) for their complimentary proteins and fiber   Combine grains (starch) with milk (milk) or cheese (meat) to compliment proteins     3   FRUIT 60 CALORIES PER SERVING 15g CARB    fresh fruit   banana  melon (cubes)   berries  canned fruit   dried fruit    1 small   1/2  ½ cup   ¾ cup  ½ cup   ¼ cup    Choose whole fruits for fiber   No fruit juices   2   DAIRY  CALORIES PER SERVING 12g CARB, 8g PROTEIN, 0-8g FAT    milk   yogurt  Protein soy or almond milk 1 cup   1 cup   1 cup Use unsweetened almond or soy milk with added protein  Avoid chocolate or flavored milk  Avoid yogurt with more than 8 gms of sugar. Gms of protein should be higher than grams of sugar               2   MEAT AND SUBSTITUES  CALORIES PER SERVING 7g Protein, 0-13g FAT    Meat,Seafood and fish   cheese   cottage cheese   egg   peanut butter   tofu or tempeh  cooked beans, peas, lentils (add 1 starch)  Quinoa (add 1 starch)   Nuts and seeds (½ serving protein + 1 fat)  Nutritional yeast  Morning Star grillers 1 oz       1 oz  1/4 cup     1   1.5 Tbsp   4 oz (1/2 cup)   1/2 cup              ¼ cup            2 tablespoons    1 bruno or ½ cup      Choose fish, seafood and lower fat cheeses  Limit frying or adding fat.      9   FATS 45 CALORIES PER SERVING     oil   mayonnaise   cream cheese   salad dressing   peanuts   avocado   butter or margarine    1 tsp   1 tsp   1 Tbsp   1 Tbsp   10   1/8   1 tsp Eat less fat.   Eat less saturated fat such as animal fat found in fatter meat, cheese, and butter. Also eat less hydrogenated fat.      2-3   VEGETABLES 25 CALORIES PER SERVING 5 g CARB, 2g " PROTEIN    raw vegetables   cooked vegetables   tomato or vegetable juice 1 cup   1/2 cup     1/2 cup Choose dark green leafy and deep yellow vegetables such as spinach, zuchinni, squash, mushrooms, cauliflower ,broccoli, carrots, and peppers.   Unlimited       1200 CALORIE MEAL PLAN  Eat 3 small meals per day with 1-2 small snacks to keep you full throughout the day  Aim for at least 15 gms of protein at breakfast, at least 25 grams at lunch and at least 25 grams of protein at dinner.  Snacks should contain at least 5 grams of protein  Limit starches to 1 serving per meal or snack.  Keep your carbs whole grain or whole wheat  Limit your intake of refined sugar including sugary beverages ie sweet tea, lemonade, fruit punch             Fruit Protein Dairy Starch Fats Calories   Breakfast 1 2 1 1  340   Lunch  3  1 1 290   Dinner 1 4  1 1 405   Snack   1 1  170   Total 2 9 2 4 2 1205     Sample breakfasts:  2 scrambled eggs (use spray), 1 cup Protein Silk soy milk, 1 slice whole wheat toast, 1 small orange  Omelet made with 1 egg, 1-ounce low fat cheese and non-starchy veggies, 1 low fat plain yogurt with ½ banana  Plain yogurt with ¾ cup unsweetened cold cereal and ½ cup fresh fruit salad, 2 hardboiled eggs  Sample lunches:  ½ whole wheat bagel topped with 3 ounces of low fat cheese melted in oven with a wild green salad with 1 TBSP dressing  ¾ cup cooked beans with 6 whole grain crackers, 10 roasted peanuts  ½ medium baked potato with 3 ounces of low fat cheddar cheese and 1 TBSP  sour cream  1 small wheat tortilla brushed with 1 tsp olive oil then brushed with pizza sauce and 4 ounces low fat cheese, sliced mushrooms and green peppers broiled until cheese is melted.  ½ cup chopped melon    Sample Dinners:  4 ounces of tuna pan seared in 1 tsp olive oil, ½ baked small sweet potato and 2 small plums  ½ cup chick peas, 1 cup cauliflower sauteed with 1 tbsp chopped onion, 1 tsp oil, and 2 tsp clemons powder. Add low  sodium vegetable stock to desired consistency. Serve with ½ cup brown rice  Red beans seasoned with onions and bell peppers served over cauliflower rice  1 cup cooked green or brown lentils served with ½ cup cooked quinoa and chopped tomatoes and cucumbers and 1 tbsp feta cheese  Sample Snacks:  1 cup of Protein Silk Soy milk with 3 squares edgardo crackers  3/4 ounce Triscuits with 1 ounce cubed cheese  Chobani Triple Zero yogurt with ¾ cup unsweetened cereal  ½ cup edamame (shelled)      Meal Ideas for Regular Bariatric Diet  *Recipes and products available at [email or web address]      Breakfast: (15-20g protein)    - Egg white omelet: 2 egg whites or ½ cup Egg Beaters. (Optional proteins: cheese, shrimp, black beans, chicken, sliced turkey) (Optional veggies: tomatoes, salsa, spinach, mushrooms, onions, green peppers, or small slice avocado)     - Egg and sausage: 1 egg or ¼ cup Egg Beaters (any variety), with 1 bruno or 2 links of Turkey sausage or Veggie breakfast sausage (Heyo or [place]con)    - Crust-less breakfast quiche: To make a glass pie dish, mix 4oz part skim Ricotta, 1 cup skim milk, and 2 eggs as your base. Add protein: shredded cheese, sliced lean ham or turkey, turkey buitrago/sausage. Add veggies: tomato, onion, green onion, mushroom, green pepper, spinach, etc.    - Yogurt parfait: Mix 1 - 6oz container Dannon Light N Fit vanilla yogurt, with ¼ cup Kashi Go Lean cereal    - Cottage cheese and fruit: ½ cup part-skim cottage cheese or ricotta cheese topped with fresh fruit or sugar free preserves     - Kathy Archibald's Vanilla Egg custard* (add 2 Tbsp instant coffee granules to make Cappuccino Custard*)    - Hi-Protein café latte (skim milk, decaf coffee, 1 scoop protein powder). Optional to add Sugar free syrup or extract flavoring.    Lunch: (20-30g protein)    - ½ cup Black bean soup (Homemade or Progresso), with ¼ cup shredded low-fat cheese. Top with chopped tomato or fresh salsa.     -  Lean deli turkey breast and low-fat sliced cheese, mustard or light hough to moisten, rolled up together, or wrapped in a Javier lettuce leaf    - Chicken salad made from dinner leftovers, moisten with low-fat salad dressing or light hough. Also try leftover salmon, shrimp, tuna or boiled eggs. Serve ½ cup over dark green salad    - Fat-free canned refried beans, topped with ¼ cup shredded low-fat cheese. Top with chopped tomato or fresh salsa.     - Greek salad: Top mixed greens with 1-2oz grilled chicken, tomatoes, red onions, 2-3 kalamata olives, and sprinkle lightly with feta cheese. Spritz with Balsamic vinegar to taste.     - Crust-less lunch quiche: To make a glass pie dish, mix 4oz part skim Ricotta, 1 cup skim milk, and 2 eggs as your base. Add protein: shredded cheese, sliced lean ham or turkey, shrimp, chicken. Add veggies: tomato, onion, green onion, mushroom, green pepper, spinach, artichoke, broccoli, etc.    - Pizza bake: tomato sauce, low-fat shredded mozzarella and turkey pepperoni or Dominican buitrago. Add any veggies.    - Cucumber crab bites: Spread ¼ cup crab dip (lump crabmeat + light cream cheese and green onions) over sliced cucumber.     - Chicken with light spinach and artichoke dip*: Puree in : 6oz cooked and drained spinach, 2 cloves garlic, 1 can cannelloni beans, ½ cup chopped green onions, 1 can drained artichoke hearts (not marinated in oil), lemon juice and basil. Mix in 2oz chopped up chicken.    Supper: (20-30g protein)    - Serve grilled fish over dark green salad tossed with low-fat dressing, served with grilled asparagus weiner     - Rotisserie chicken salad: served with sliced strawberries, walnuts, fat-free feta cheese crumbles and 1 tbsp Saucedas Own Light Raspberry [place] Vinaigrette    - Shrimp cocktail: Dip cold boiled shrimp in homemade low-sugar cocktail sauce (1/2 cup Lida One Carb ketchup, 2 tbsp horseradish, 1/4 tsp hot sauce, 1 tsp [facility] sauce, 1  tbsp freshly-squeezed lemon juice). Serve with dark green salad, walnuts, and crumbled blue cheese drizzled with olive oil and Balsamic vinegar    - Tuna Melt: Spread tuna salad onto 2 thick slices of tomato. Top with low-fat cheese and broil until cheese is melted. May also be made with chicken salad of shrimp salad. [place] with different types of cheeses.    - Homemade low-fat Chili using extra lean ground beef or ground turkey. Top with shredded cheese and salsa as desired. May add dollop fat-free sour cream if desired    - Dinner Omelet with shrimp or chicken and onion, green peppers and chives.    - No noodle lasagna: Use sliced zucchini or eggplant in place of noodles.  Layer with part skim ricotta cheese and low sugar meat sauce (use very lean ground beef or ground turkey).    - Mexican chicken bake: Bake chunks of chicken breast or thigh with taco seasoning, Pace brand enchilada sauce, green onions and low-fat cheese. Serve with ¼ cup black beans or fat free refried beans topped with chopped tomatoes or salsa.    - Delta frozen meatballs, simmered in Classico Marinara sauce. Different flavors of salsa or spaghetti sauce create different dishes! Sprinkle with parmesan cheese. Serve with grilled or steamed veggies, or a dark green salad.    - Simmer boneless skinless chicken thigh chunks in Classico Marinara sauce or roasted salsa until tender with chopped onion, bell pepper, garlic, mushrooms, spinach, etc.     - Hamburger, without the bun, dressed the way you like. Served with grilled or steamed veggies.    - Eggplant parmesan: Bake slices of eggplant at 350 degrees for 15 minutes. Layer tomato sauce, sliced eggplant and low-fat mozzarella cheese in a baking dish and cover with foil. Bake 30-40 more minutes or until bubbly. Uncover and bake at 400 degrees for about 15 more minutes, or until top is slightly crisp.    - Fish tacos: grilled/baked white fish, wrapped in Javier lettuce leaf, topped with  salsa, shredded low-fat cheese, and light coleslaw.    Snacks: (100-200 calories; >5g protein)    - 1 low-fat cheese stick with 8 cherry tomatoes or 1 serving fresh fruit  - 4 thin slices fat-free turkey breast and 1 slice low-fat cheese  - 4 thin slices fat-free honey ham with wedge of melon  - 1/4 cup unsalted nuts with ½ cup fruit  - 6-oz container Dannon Light n Fit vanilla yogurt, topped with 1oz unsalted nuts         - apple, celery or baby carrots spread with 2 Tbsp natural peanut butter or almond butter   - apple slices with 1 oz slice low-fat cheese  - celery, cucumber, bell pepper or baby carrots dipped in ¼ cup hummus bean spread or light spinach and artichoke dip (*recipe in lunch section)  - 100 calorie bag microwave light popcorn with 3 tbsp grated parmesan cheese  - Eusebio Links Beef Steak - 14g protein! (similar to beef jerky)  - 2 wedges Laughing Cow - Light Herb & Garlic Cheese with sliced cucumber or green bell pepper  - 1/2 cup low-fat cottage cheese with ¼ cup fruit or ¼ cup salsa  - RTD Protein drinks: Atkins, Low Carb Slim Fast, EAS light, Muscle Milk Light, etc.  - Homemade Protein drinks: GNC Soy95, Isopure, Nectar, UNJURY, Whey Gourmet, etc. Mix 1 scoop powder with 8oz skim/1% milk or light soymilk.  - Protein bars: Atkins, EAS, Pure Protein, Think Thin, Detour, etc. Must have 0-4 grams sugar - Read the label.    Takeout Options: No more than twice/week  Deli - Salads (no pasta or rice), meats, cheeses. Roasted chicken. Lox (salmon)    Mexican - Platters which don't include tortillas, chips, or rice. Go easy on the beans. Example: Fajitas without the tortillas. Ask the  not to bring chips to the table if they are too tempting.    Greek - Meat or fish and vegetable, but no bread or rice. Including hummus, baba ganoush, etc, is OK. Most sit-down Greek restaurants can provide you with cucumber slices for dipping instead of kana bread.    Fast Food (Avoid as much as possible) - Salads (no  croutons and limit salad dressing to 2 tbsp), grilled chicken sandwich without the bun and ask for no hough. Ilenes low fat chili or Taco Bell pintos and cheese.    BBQ - The meats are fine if you ask for sauces on the side, but most of the traditional side dishes are loaded with carbs. Keven slaw, baked beans and BBQ sauce are typically made with sugar.    Chinese - Nothing deep-fried, no rice or noodles. Many Chinese sauces have starch and sugar in them, so you'll have to use your judgement. If you find that these sauces trigger cravings, or cause Dumping, you can ask for the sauce to be made without sugar or just use soy sauce.      Lactose-free & Artificial Sweetener-free Protein Powders    Remember, your protein shake should have less than 4 grams of sugar per serving. For whey powders, choose 100% whey pro isolate, not a blend. Blends add creatine in as a filler.    Natural Zero-calorie sweeteners  Stevia  Thierry Keys (http://www.[employer].Linkua/about-swejaneth/)     Lactose-free Protein Powders  [facility] 100% Egg Protein (Vanilla & Chocolate)  GNC Pro Performance 100% Soy Isolate (Vanilla & Chocolate)  Bluebonnet Protein Powder  Garden of Life ® raw organic protein  Isopure ® low carb protein powder  NutraBio ® 100% whey protein isolate  NutraBio ® organic plant protein - vegan  Now ® pea protein - vegan  Orgain ® Organic protein powder  Syntrax ® Nectar    Lactose-free Ready to drink Protein Shakes  Muscle Milk  [facility] Total Lean Lean Shake 25  Isopure ® zero carb   Protein 2 O  [facility] Nutrition Plan  Core Power      Artificial Sweetener-free Protein Powders  Pure Protein Natural Whey Protein Powder (Vanilla, Chocolate & Strawberry)  Rodolfo Abdul Whey Pro Isolate (sweetened w/ stevia) (Vanilla, Chocolate, Strawberry, Pina Coloda & Tropical Dreamsicle)  Nature's Best Natural Isopure - Unflavored (zero carb, found at [facility])    Lactose & Artificial-Sweetener-Free Protein Powders  Rodolfo Abdul Egg White Protein (sweetened  ailyn/ stevia)  Garden of Life ® raw organic protein  Syntrax ® Nectar      Exercise should be some cardiovascular and some resistance training(see below).      STRENGTHENING  An adequate resistance training program could include the following types of exercise, focusing on the major muscle groups. One can use 5 to 10 pound dumbbells for those exercises that require the use of weight. At home, one can use a household item that provides a comfortable weight, such as a milk carton or beverage container. These exercises can also be performed without weights. Add some type of resistance training 2-3 days a week. These can be body weight exercises, light weight or elastic bands. Om[employer] and Urge are great sources for free work out plans and videos.       Chest (Bench Press): Hold the weights with your hands. Lying on a flat surface, with knees bent and feet flat, slowly bring the weights to the chest area with palms facing upward. Begin to exhale and press the weights up, fully extending the arms, and keeping them above your eyes. Inhale as you lower them to the starting position and repeat the movement.  Back (Bent-Over Row): Start by placing your feet shoulder-width apart.  a weight with each hand just outside the knees. Keeping the back straight and the knees flexed, slightly bend forward at the waist. Let the arms hang naturally while holding the weights. From this starting position, pull the weights to the lower abdomen, keeping the elbows close to the body. Exhale as you pull. Return to the starting position, inhaling as you go.  Arms (Bicep Curls): Hold a weight in each hand, with elbows at your sides, palms facing forward. The back should be straight, the chest flared outward. Begin to bend your right arm up first while exhaling, keeping the elbow totally stationary. Wait until the right arm goes completely down to the fully extended position, and then begin to curl the left arm. Each arm curled completes  "one full repetition.  Shoulders (Lateral Raises): Place the feet a few inches apart with the knees bent slightly. Keep the back erect as you lean forward slightly. With the weights in front of your thighs and palms facing together, begin to slowly raise them up to the side until parallel with the floor. Lower the weights to your thighs, and repeat.  Legs (Stiff Leg Dead Lift): Start by standing straight, holding the weights close to your sides, nearly touching your thighs. Keep the weights close to the body--this protects the back. The back must stay straight. Bend at the waist as far forward as you comfortably can while keeping your legs straight, and begin to feel the pull in your hamstrings as you lower the weights toward the ground. Slowly return to the starting position, keeping the back straight.  Legs (Dumbbell Lunge): Hold a weight in each hand, arms hanging at your sides. Step out with one leg, keeping the back straight. It is important to step out far enough so that the knee does not extend over the toe. This puts too much stress on the knee. Go down far enough so that the opposite knee nearly touches the ground. Keep this stance and repeat the lunging motion for several repetitions.  Abdominals: Do not perform standard sit-ups as these could hurt the lower back. Rather, focus on the following 2 exercises: (1) Obliques--Lie on your back with the knees flexed in the bent sit-up position. From this position, bring both knees down to the ground. With the back remaining flat, begin to flex your body toward your toes ("crunch"). Bring your shoulders up off the ground, but go slowly, controlling your momentum. Repeat this for 10 to 15 times. (2) Seated bench kicks/carlos knives--Sit on the end of a bench or chair with the hands placed behind the buttocks. Begin to kick the legs outward with the knees bent slightly--at the same time, lean back to extend the torso. Come back to the beginning position and repeat this " motion 10 to 15 times.

## 2024-08-30 ENCOUNTER — TELEPHONE (OUTPATIENT)
Dept: BARIATRICS | Facility: CLINIC | Age: 64
End: 2024-08-30
Payer: COMMERCIAL

## 2024-08-30 NOTE — TELEPHONE ENCOUNTER
Returned pt's call in regard to rescheduling to follow up appt with MD Helton. Pt has been rescheduled to requested date and verbalized understanding of new appt time/date. Call was disconnected.

## 2024-08-30 NOTE — TELEPHONE ENCOUNTER
----- Message from Amparo Giron sent at 8/30/2024  3:37 PM CDT -----  Regarding: appt  Contact: [phone]  Patient is calling about possibly rescheduling her appt set for 09/05 at 10am. Please contact patient at [phone]

## 2024-09-06 ENCOUNTER — PATIENT MESSAGE (OUTPATIENT)
Dept: FAMILY MEDICINE | Facility: CLINIC | Age: 64
End: 2024-09-06
Payer: COMMERCIAL

## 2024-09-09 ENCOUNTER — OFFICE VISIT (OUTPATIENT)
Dept: BARIATRICS | Facility: CLINIC | Age: 64
End: 2024-09-09
Payer: COMMERCIAL

## 2024-09-09 VITALS
WEIGHT: 288.38 LBS | OXYGEN SATURATION: 98 % | SYSTOLIC BLOOD PRESSURE: 161 MMHG | HEART RATE: 70 BPM | BODY MASS INDEX: 49.5 KG/M2 | DIASTOLIC BLOOD PRESSURE: 87 MMHG

## 2024-09-09 DIAGNOSIS — I15.2 HYPERTENSION ASSOCIATED WITH DIABETES: ICD-10-CM

## 2024-09-09 DIAGNOSIS — E11.59 HYPERTENSION ASSOCIATED WITH DIABETES: ICD-10-CM

## 2024-09-09 DIAGNOSIS — E66.01 CLASS 3 SEVERE OBESITY WITH SERIOUS COMORBIDITY AND BODY MASS INDEX (BMI) OF 45.0 TO 49.9 IN ADULT, UNSPECIFIED OBESITY TYPE: Primary | ICD-10-CM

## 2024-09-09 PROCEDURE — 3008F BODY MASS INDEX DOCD: CPT | Mod: CPTII,S$GLB,, | Performed by: INTERNAL MEDICINE

## 2024-09-09 PROCEDURE — 3044F HG A1C LEVEL LT 7.0%: CPT | Mod: CPTII,S$GLB,, | Performed by: INTERNAL MEDICINE

## 2024-09-09 PROCEDURE — 3079F DIAST BP 80-89 MM HG: CPT | Mod: CPTII,S$GLB,, | Performed by: INTERNAL MEDICINE

## 2024-09-09 PROCEDURE — 3077F SYST BP >= 140 MM HG: CPT | Mod: CPTII,S$GLB,, | Performed by: INTERNAL MEDICINE

## 2024-09-09 PROCEDURE — 1159F MED LIST DOCD IN RCRD: CPT | Mod: CPTII,S$GLB,, | Performed by: INTERNAL MEDICINE

## 2024-09-09 PROCEDURE — 1160F RVW MEDS BY RX/DR IN RCRD: CPT | Mod: CPTII,S$GLB,, | Performed by: INTERNAL MEDICINE

## 2024-09-09 PROCEDURE — 99213 OFFICE O/P EST LOW 20 MIN: CPT | Mod: S$GLB,,, | Performed by: INTERNAL MEDICINE

## 2024-09-09 PROCEDURE — 4010F ACE/ARB THERAPY RXD/TAKEN: CPT | Mod: CPTII,S$GLB,, | Performed by: INTERNAL MEDICINE

## 2024-09-09 PROCEDURE — 99999 PR PBB SHADOW E&M-EST. PATIENT-LVL IV: CPT | Mod: PBBFAC,,, | Performed by: INTERNAL MEDICINE

## 2024-09-09 RX ORDER — TOPIRAMATE 50 MG/1
50-100 TABLET, FILM COATED ORAL NIGHTLY
Qty: 180 TABLET | Refills: 0 | Status: SHIPPED | OUTPATIENT
Start: 2024-09-09 | End: 2025-09-09

## 2024-09-09 NOTE — PROGRESS NOTES
Subjective     Patient ID: Yulissa Hatfield is a 63 y.o. female.    Chief Complaint: Follow-up    CC: weight  Pt here today for follow-up. Has lost 3.5 lbs (-1.9 lbs muscle. -0.7 lbs fat). Was to sater 1200 yordan pb meal planner, exercise and started topiramate 25 mg BID. Had a little bit of SE at first, but they cleared up.   Has been trying to keep up with planner and her calorie intake. She has had to cut back on fruit. Has added some protein shakes in the morning.   Exercise- 3-4 days a week, including PT.     New BMR: 1603    New PBF: 56.4%        Initial:  BMR: 1631    PBF:  55.9%    History of medication for loss: on 15 mg mounjaro.   checked today.    Lab Results       Component                Value               Date                       HGBA1C                   5.4                 01/29/2024                 HGBA1C                   5.9 (H)             07/19/2023                 HGBA1C                   6.3 (H)             01/24/2023            Lab Results       Component                Value               Date                       LDLCALC                  126.8               05/23/2024                 CREATININE               0.60                01/29/2024                Review of Systems       Objective   BP (!) 161/87   Pulse 70   Wt 130.8 kg (288 lb 6.4 oz)   LMP 03/25/2006   SpO2 98%   BMI 49.50 kg/m²     Physical Exam  Vitals reviewed.   Constitutional:       General: She is not in acute distress.     Appearance: She is well-developed.      Comments: With severe obesity     HENT:      Head: Normocephalic and atraumatic.   Eyes:      General: No scleral icterus.     Pupils: Pupils are equal, round, and reactive to light.   Cardiovascular:      Rate and Rhythm: Normal rate.   Pulmonary:      Effort: Pulmonary effort is normal. No respiratory distress.   Musculoskeletal:         General: Normal range of motion.   Skin:     General: Skin is warm and dry.      Findings: No erythema.    Neurological:      Mental Status: She is alert and oriented to person, place, and time.      Cranial Nerves: No cranial nerve deficit.   Psychiatric:         Behavior: Behavior normal.         Judgment: Judgment normal.            Assessment and Plan     1. Class 3 severe obesity with serious comorbidity and body mass index (BMI) of 45.0 to 49.9 in adult, unspecified obesity type    2. Hypertension associated with diabetes    Other orders  -     topiramate (TOPAMAX) 50 MG tablet; Take 1-2 tablets ( mg total) by mouth every evening.  Dispense: 180 tablet; Refill: 0          1. Class 3 severe obesity with serious comorbidity and body mass index (BMI) of 45.0 to 49.9 in adult, unspecified obesity type      2. Hypertension associated with diabetes            Patient was informed that topiramate is used for migraine prevention and seizures. Weight loss is a common side effect that is well documented. S/he understands this. S/he was informed of the potential side effects such as serious and possibly fatal rash in which case the medication should be discontinued immediately. Paresthesias, forgetfulness, fatigue, kidney stones, GI symptoms, and changes in lab values such as electrolytes, blood counts and kidney function.      Start topiramate   mg  in the evening       No soda, sweet tea, juices or lemonade. All drinks should be 5 calories or less.     1200 yordan pb meal planner, meal ideas and exercise handouts given previously.    Continue with Mounjaro 15 mg.      Hurricane tips given    No follow-ups on file.

## 2024-09-09 NOTE — PATIENT INSTRUCTIONS
Patient was informed that topiramate is used for migraine prevention and seizures. Weight loss is a common side effect that is well documented. S/he understands this. S/he was informed of the potential side effects such as serious and possibly fatal rash in which case the medication should be discontinued immediately. Paresthesias, forgetfulness, fatigue, kidney stones, GI symptoms, and changes in lab values such as electrolytes, blood counts and kidney function.      Start topiramate   mg  in the evening       No soda, sweet tea, juices or lemonade. All drinks should be 5 calories or less.     1200 yordan pb meal planner, meal ideas and exercise handouts given previously.    Continue with Mounjaro 15 mg.       Tips for preparing for hurricane season:    If you are on weight loss medications, please take your medication with you in case of evacuation. Injectable medications should be transported with an ice pack if unopened or room temperature if you have started to use them.   Hurricane supplies do not necessarily need to be junk food or high in carbs or sugar. Shelf stable and healthy choices to include in your supplies could include:  Canned/packets of tuna or chicken  Apples, oranges, banana, pears  Beef or turkey jerky  Sugar free pudding  Pickle, olives, dill relish (mix with the tuna or chicken!)  Low sodium or no salt added canned vegetables  If you get bread, get lite bread (40 calories a slice)  0 sugar sports drinks  Water  String cheese will be okay for a few days without refrigeration or in an ice chest.   Peanut or other nut butter.   Parmesan crisps  Pork skins  Protein shakes (20-30g protein, less than 5 g sugar)  Protein bars (15 or more g protein, less than 5 g sugar)  Don't forget disposable forks, spoons, plates in your supplies  If evacuated, manage stress by taking walks, reading or meditating.   If eating out make better choices when possible.   Salads with a lean protein and limited  dressing, cheese or other toppings  Grilled chicken sandwich or burger without the bun.   Skip the fries!  Order water or unsweetened tea instead of soda  Grocery stores with a deli, salad/food bar can be a good quick and affordable option to replace fast food

## 2024-09-10 ENCOUNTER — PATIENT MESSAGE (OUTPATIENT)
Dept: SLEEP MEDICINE | Facility: CLINIC | Age: 64
End: 2024-09-10
Payer: COMMERCIAL

## 2024-09-30 DIAGNOSIS — G89.29 CHRONIC PAIN OF LEFT KNEE: ICD-10-CM

## 2024-09-30 DIAGNOSIS — M17.11 PRIMARY OSTEOARTHRITIS OF RIGHT KNEE: Chronic | ICD-10-CM

## 2024-09-30 DIAGNOSIS — M25.561 CHRONIC PAIN OF RIGHT KNEE: Primary | ICD-10-CM

## 2024-09-30 DIAGNOSIS — M25.562 CHRONIC PAIN OF LEFT KNEE: ICD-10-CM

## 2024-09-30 DIAGNOSIS — M25.562 LEFT KNEE PAIN, UNSPECIFIED CHRONICITY: Primary | Chronic | ICD-10-CM

## 2024-09-30 DIAGNOSIS — G89.29 CHRONIC PAIN OF RIGHT KNEE: Primary | ICD-10-CM

## 2024-10-07 ENCOUNTER — OFFICE VISIT (OUTPATIENT)
Dept: PODIATRY | Facility: CLINIC | Age: 64
End: 2024-10-07
Payer: COMMERCIAL

## 2024-10-07 ENCOUNTER — PATIENT MESSAGE (OUTPATIENT)
Dept: FAMILY MEDICINE | Facility: CLINIC | Age: 64
End: 2024-10-07
Payer: COMMERCIAL

## 2024-10-07 VITALS
WEIGHT: 288.38 LBS | HEIGHT: 64 IN | SYSTOLIC BLOOD PRESSURE: 90 MMHG | DIASTOLIC BLOOD PRESSURE: 52 MMHG | HEART RATE: 83 BPM | BODY MASS INDEX: 49.23 KG/M2

## 2024-10-07 DIAGNOSIS — E11.9 TYPE 2 DIABETES MELLITUS WITHOUT COMPLICATION, WITHOUT LONG-TERM CURRENT USE OF INSULIN: ICD-10-CM

## 2024-10-07 DIAGNOSIS — I87.2 VENOUS INSUFFICIENCY OF BOTH LOWER EXTREMITIES: ICD-10-CM

## 2024-10-07 DIAGNOSIS — M79.671 CHRONIC PAIN OF RIGHT HEEL: Primary | ICD-10-CM

## 2024-10-07 DIAGNOSIS — B07.0 PLANTAR WART OF RIGHT FOOT: ICD-10-CM

## 2024-10-07 DIAGNOSIS — G89.29 CHRONIC PAIN OF RIGHT HEEL: Primary | ICD-10-CM

## 2024-10-07 DIAGNOSIS — M76.61 INSERTIONAL TENDINOPATHY OF RIGHT ACHILLES TENDON: ICD-10-CM

## 2024-10-07 PROCEDURE — 4010F ACE/ARB THERAPY RXD/TAKEN: CPT | Mod: CPTII,S$GLB,, | Performed by: PODIATRIST

## 2024-10-07 PROCEDURE — 3008F BODY MASS INDEX DOCD: CPT | Mod: CPTII,S$GLB,, | Performed by: PODIATRIST

## 2024-10-07 PROCEDURE — 99999 PR PBB SHADOW E&M-EST. PATIENT-LVL IV: CPT | Mod: PBBFAC,,, | Performed by: PODIATRIST

## 2024-10-07 PROCEDURE — 1160F RVW MEDS BY RX/DR IN RCRD: CPT | Mod: CPTII,S$GLB,, | Performed by: PODIATRIST

## 2024-10-07 PROCEDURE — 3078F DIAST BP <80 MM HG: CPT | Mod: CPTII,S$GLB,, | Performed by: PODIATRIST

## 2024-10-07 PROCEDURE — 3044F HG A1C LEVEL LT 7.0%: CPT | Mod: CPTII,S$GLB,, | Performed by: PODIATRIST

## 2024-10-07 PROCEDURE — 3074F SYST BP LT 130 MM HG: CPT | Mod: CPTII,S$GLB,, | Performed by: PODIATRIST

## 2024-10-07 PROCEDURE — 99213 OFFICE O/P EST LOW 20 MIN: CPT | Mod: S$GLB,,, | Performed by: PODIATRIST

## 2024-10-07 PROCEDURE — 1159F MED LIST DOCD IN RCRD: CPT | Mod: CPTII,S$GLB,, | Performed by: PODIATRIST

## 2024-10-07 RX ORDER — TIRZEPATIDE 15 MG/.5ML
INJECTION, SOLUTION SUBCUTANEOUS
Qty: 6 ML | Refills: 0 | OUTPATIENT
Start: 2024-10-07

## 2024-10-07 NOTE — TELEPHONE ENCOUNTER
Provider Staff:  Please note Refusal of medication.     Action required for this patient.      Requested Prescriptions     Refused Prescriptions Disp Refills    MOUNJARO 15 mg/0.5 mL PnIj [Pharmacy Med Name: MOUNJARO 15 MG/0.5 ML PEN] 6 mL 0     Sig: INJECT 15 MG INTO THE SKIN EVERY 7 DAYS.     Refused By: LYDIA ANTONY     Reason for Refusal: Patient needs an appointment      Thanks!  [facility]   Note composed: 10/07/2024 1:19 PM

## 2024-10-07 NOTE — TELEPHONE ENCOUNTER
Refill Routing Note   Medication(s) are not appropriate for processing by [facility] for the following reason(s):        Required labs outdated    [facility] action(s):  Defer   Requires labs : Yes             Appointments  past 12m or future 3m with PCP    Date Provider   Last Visit   2/23/2024 Emilia Velasquez MD   Next Visit   Visit date not found Emilia Velasquez MD   ED visits in past 90 days: 0        Note composed:7:57 AM 10/07/2024

## 2024-10-07 NOTE — TELEPHONE ENCOUNTER
No care due was identified.  [facility] Embedded Care Due Messages. Reference number: [id number].   10/07/2024 3:28:29 PM CDT

## 2024-10-07 NOTE — PROGRESS NOTES
Subjective:     Patient ID: Yulissa Hatfield is a 63 y.o. female.    Chief Complaint: Plantar Warts    Yulissa is a 63 y.o. female who presents for evaluation and treatment of diabetic feet. Yulissa has a past medical history of Generalized anxiety disorder with panic attacks, HTN (hypertension), Hyperlipidemia, Hypothyroid, DANIELLE on CPAP, and Type 2 diabetes mellitus. Patient relates no major problem with feet. Only complaints today consist of under bottom of the right heel.  History of previous treatment for plantar wart.    03/04/2024:  Returns for annual foot exam.  States that she began to develop pain from a possible plantar wart in November 2023.  No new concerns.    10/07/2024: Complains of posterior right heel chronic pain that has been exacerbated.  Relates the pain will wax and wane.  She is sure what activities caused it to worsen.  She utilizes multiple various over-the-counter medications such as icy hot, Biofreeze and others but can not tolerate topical diclofenac because it makes her nauseous.  History prior retrocalcaneal exostectomy many years ago.    PCP: Emilia Velasquez MD    Date Last Seen by PCP:  02/23/2024    Current shoe gear: Flip-flops    Hemoglobin A1C   Date Value Ref Range Status   01/29/2024 5.4 4.0 - 5.6 % Final     Comment:     ADA Screening Guidelines:  5.7-6.4%  Consistent with prediabetes  >or=6.5%  Consistent with diabetes    High levels of fetal hemoglobin interfere with the HbA1C  assay. Heterozygous hemoglobin variants (HbS, HgC, etc)do  not significantly interfere with this assay.   However, presence of multiple variants may affect accuracy.     07/19/2023 5.9 (H) 4.0 - 5.6 % Final     Comment:     ADA Screening Guidelines:  5.7-6.4%  Consistent with prediabetes  >or=6.5%  Consistent with diabetes    High levels of fetal hemoglobin interfere with the HbA1C  assay. Heterozygous hemoglobin variants (HbS, HgC, etc)do  not significantly interfere with this assay.   However,  "presence of multiple variants may affect accuracy.     2023 6.3 (H) 4.0 - 5.6 % Final     Comment:     ADA Screening Guidelines:  5.7-6.4%  Consistent with prediabetes  >or=6.5%  Consistent with diabetes    High levels of fetal hemoglobin interfere with the HbA1C  assay. Heterozygous hemoglobin variants (HbS, HgC, etc)do  not significantly interfere with this assay.   However, presence of multiple variants may affect accuracy.       Vitals:    10/07/24 1617   BP: (!) 90/52   Pulse: 83   Weight: 130.8 kg (288 lb 5.8 oz)   Height: 5' 4" (1.626 m)   PainSc:   4      Past Medical History:   Diagnosis Date    Generalized anxiety disorder with panic attacks     HTN (hypertension)     Hyperlipidemia     Hypothyroid     DANIELLE on CPAP     Type 2 diabetes mellitus        Past Surgical History:   Procedure Laterality Date    CARPAL TUNNEL RELEASE       SECTION, CLASSIC      x 3    COLONOSCOPY N/A 11/10/2021    Procedure: COLONOSCOPY;  Surgeon: Kayla Pope MD;  Location: [facility] ENDO;  Service: Endoscopy;  Laterality: N/A;    ENDOMETRIAL ABLATION      ESOPHAGOGASTRODUODENOSCOPY N/A 11/10/2021    Procedure: EGD (ESOPHAGOGASTRODUODENOSCOPY);  Surgeon: Kayla Pope MD;  Location: [facility] ENDO;  Service: Endoscopy;  Laterality: N/A;    FOOT SURGERY      INJECTION OF JOINT Left 2022    Procedure: Injection, Joint, Shoulder, Hip, Or Knee;  Surgeon: Sowmya Tellez MD;  Location: [facility] PAIN MGT;  Service: Pain Management;  Laterality: Left;  Left Glenohumeral Joint Injection    INJECTION OF JOINT Left 2024    Procedure: Left glenohumeral joint injection under fluoroscopic guidance;  Surgeon: Kristina Campbell DO;  Location: [facility] PAIN MANAGEMENT;  Service: Pain Management;  Laterality: Left;  oral-20mins    KNEE SURGERY      THYROID SURGERY         Family History   Problem Relation Name Age of Onset    Heart disease Mother      Heart disease Father      No Known Problems Sister      Heart disease " Brother      No Known Problems Daughter      No Known Problems Son      No Known Problems Daughter      No Known Problems Daughter      Colon cancer Neg Hx      Esophageal cancer Neg Hx         Social History     Socioeconomic History    Marital status:    Tobacco Use    Smoking status: Never    Smokeless tobacco: Never   Substance and Sexual Activity    Alcohol use: No    Drug use: No    Sexual activity: Not Currently     Social Drivers of Health     Financial Resource Strain: Low Risk  (12/21/2023)    Overall Financial Resource Strain (CARDIA)     Difficulty of Paying Living Expenses: Not hard at all   Food Insecurity: No Food Insecurity (12/21/2023)    Hunger Vital Sign     Worried About Running Out of Food in the Last Year: Never true     Ran Out of Food in the Last Year: Never true   Transportation Needs: No Transportation Needs (12/21/2023)    PRAPARE - Transportation     Lack of Transportation (Medical): No     Lack of Transportation (Non-Medical): No   Physical Activity: Insufficiently Active (12/21/2023)    Exercise Vital Sign     Days of Exercise per Week: 3 days     Minutes of Exercise per Session: 40 min   Stress: Stress Concern Present (12/21/2023)    [employer] - Occupational Stress Questionnaire     Feeling of Stress : To some extent   Housing Stability: Low Risk  (12/21/2023)    Housing Stability Vital Sign     Unable to Pay for Housing in the Last Year: No     Number of Places Lived in the Last Year: 1     Unstable Housing in the Last Year: No       Current Outpatient Medications   Medication Sig Dispense Refill    acetaminophen (TYLENOL) 500 MG tablet Take 1,000 mg by mouth every 6 (six) hours as needed for Pain.      clobetasoL (TEMOVATE) 0.05 % cream APPLY 1 APPLICATION TOPICALLY TO AFFECTED AREA TWICE A DAY      desonide (DESOWEN) 0.05 % lotion       ezetimibe (ZETIA) 10 mg tablet TAKE 1 TABLET BY MOUTH EVERY DAY 90 tablet 0    hydrocortisone 2.5 % cream        levothyroxine (SYNTHROID) 112 MCG tablet TAKE 1 TABLET BY MOUTH BEFORE BREAKFAST. 90 tablet 1    pravastatin (PRAVACHOL) 40 MG tablet TAKE 1 TABLET BY MOUTH EVERY DAY 90 tablet 3    SOOLANTRA 1 % Crea       telmisartan (MICARDIS) 80 MG Tab TAKE 1 TABLET BY MOUTH EVERY DAY 90 tablet 1    tirzepatide 15 mg/0.5 mL PnIj Inject 15 mg into the skin every 7 days. 12 Pen 0    topiramate (TOPAMAX) 50 MG tablet Take 1-2 tablets ( mg total) by mouth every evening. 180 tablet 0    tiZANidine (ZANAFLEX) 2 MG tablet TAKE 1 TABLET BY MOUTH EVERY DAY AS NEEDED MUSCLE SPASMS. 90 tablet 1     No current facility-administered medications for this visit.       Review of patient's allergies indicates:  No Known Allergies      Review of Systems   Constitutional: Negative for chills, fever and malaise/fatigue.   HENT:  Negative for congestion and hearing loss.    Cardiovascular:  Positive for leg swelling. Negative for chest pain and claudication.   Respiratory:  Negative for cough and shortness of breath.    Skin:  Negative for color change, itching and poor wound healing.   Musculoskeletal:  Negative for back pain, joint pain, muscle cramps and muscle weakness.   Gastrointestinal:  Negative for nausea and vomiting.   Neurological:  Negative for numbness, paresthesias and weakness.   Psychiatric/Behavioral:  Negative for altered mental status.         Objective:     Physical Exam  Constitutional:       General: She is not in acute distress.     Appearance: She is obese. She is not ill-appearing.   Cardiovascular:      Pulses:           Dorsalis pedis pulses are 2+ on the right side and 2+ on the left side.        Posterior tibial pulses are detected w/ Doppler on the right side and detected w/ Doppler on the left side.      Comments: Biphasic PT bilateral with Doppler.  Mild to moderate nonpitting edema to lower extremity bilateral with multiple varicosities and telangiectasias.  Skin temp is warm to foot bilateral.  No rubor on  dependency bilateral foot.  No hair growth bilateral lower extremity.  Musculoskeletal:      Right foot: No deformity, bunion, Charcot foot, foot drop or prominent metatarsal heads.      Left foot: No deformity, bunion, Charcot foot, foot drop or prominent metatarsal heads.      Comments: Mild semi rigid pes planus foot structure bilateral.      Pain on palpation along the posterior right heel overlying palpable bony prominence at the posterior lateral aspect and there is pain slightly anterior to the Achilles along the medial aspect of the heel.  No pain with squeezing of the right heel.  No pain on palpation to the plantar right heel.    Ankle range motion with a proximally 0° of dorsiflexion with the knee extended.   Feet:      Right foot:      Protective Sensation: 10 sites tested.  10 sites sensed.      Skin integrity: Callus present.      Left foot:      Protective Sensation: 10 sites tested.  10 sites sensed.   Skin:     General: Skin is warm.      Capillary Refill: Capillary refill takes less than 2 seconds.      Findings: No ecchymosis or erythema.      Nails: There is no clubbing.      Comments: Small raised hyperkeratotic skin plantar right heel with spongy core noting disappearance of the resting skin tension lines and pain during squeezing of the lesion.    Linear value hypertrophic scar posterior right heel overlying the Achilles tendon insertion.   Neurological:      Mental Status: She is alert and oriented to person, place, and time.      Sensory: Sensation is intact.      Motor: Motor function is intact.           Assessment:      Encounter Diagnoses   Name Primary?    Chronic pain of right heel Yes    Type 2 diabetes mellitus without complication, without long-term current use of insulin     Insertional tendinopathy of right Achilles tendon     Venous insufficiency of both lower extremities     Plantar wart of right foot      Plan:     Yulissa was seen today for plantar warts.    Diagnoses and all  orders for this visit:    Chronic pain of right heel  -     X-Ray Calcaneus 2 View Right; Future    Type 2 diabetes mellitus without complication, without long-term current use of insulin    Insertional tendinopathy of right Achilles tendon    Venous insufficiency of both lower extremities    Plantar wart of right foot      I counseled the patient on her conditions, their implications and medical management.    Shoe inspection. Diabetic Foot Education. Patient reminded of the importance of good nutrition and blood sugar control to help prevent podiatric complications of diabetes. Patient instructed on proper foot hygeine. We discussed wearing proper shoe gear, daily foot inspections, never walking without protective shoe gear, never putting sharp instruments to feet.      With the patient's consent a sterile #15 scalpel was used to trim the lesion plantar right heel. Discussed treatment options in detail. The patient declined treatment with cantharidin for today.    Baseline right calcaneus x-ray ordered.  Previous right foot x-ray from October 2021 was reviewed noting hypertrophic bone formation of the posterior superior aspect of the calcaneus.    We discussed having a topical pain cream compounded with ketoprofen, amitriptyline, gabapentin and lidocaine which could be applied to the affected area posterior right heel up to 4 times daily as needed.    We discussed avoiding flat shoes and wearing a small wedge at all times to help reduce pressure and irritation to the posterior right heel.    RTC p.r.n. as discussed.    Assisted by Rylan Price DPM PGY 2    A portion of this note was generated by voice recognition software and may contain spelling and grammar errors.

## 2024-10-07 NOTE — TELEPHONE ENCOUNTER
Care Due:                  Date            Visit Type   Department     Provider  --------------------------------------------------------------------------------                                ESTABLISHED   [facility]                              PATIENT -    MEDICINE/INTERN  Last Visit: 12-      [facility]         Emilia Velasquez  Next Visit: None Scheduled  None         None Found                                                            Last  Test          Frequency    Reason                     Performed    Due Date  --------------------------------------------------------------------------------    HBA1C.......  6 months...  tirzepatide..............  01- 07-    [facility] Embedded Care Due Messages. Reference number: [id number].   10/07/2024 7:43:21 AM CDT

## 2024-10-10 ENCOUNTER — PATIENT MESSAGE (OUTPATIENT)
Dept: PODIATRY | Facility: CLINIC | Age: 64
End: 2024-10-10
Payer: COMMERCIAL

## 2024-10-15 ENCOUNTER — PATIENT MESSAGE (OUTPATIENT)
Dept: FAMILY MEDICINE | Facility: CLINIC | Age: 64
End: 2024-10-15
Payer: COMMERCIAL

## 2024-10-15 DIAGNOSIS — E78.5 HYPERLIPIDEMIA ASSOCIATED WITH TYPE 2 DIABETES MELLITUS: ICD-10-CM

## 2024-10-15 DIAGNOSIS — E11.59 HYPERTENSION ASSOCIATED WITH DIABETES: ICD-10-CM

## 2024-10-15 DIAGNOSIS — I15.2 HYPERTENSION ASSOCIATED WITH DIABETES: ICD-10-CM

## 2024-10-15 DIAGNOSIS — E11.69 HYPERLIPIDEMIA ASSOCIATED WITH TYPE 2 DIABETES MELLITUS: ICD-10-CM

## 2024-10-15 DIAGNOSIS — E11.9 TYPE 2 DIABETES MELLITUS WITHOUT COMPLICATION, WITHOUT LONG-TERM CURRENT USE OF INSULIN: Primary | ICD-10-CM

## 2024-10-21 DIAGNOSIS — E11.9 TYPE 2 DIABETES MELLITUS WITHOUT COMPLICATION, WITHOUT LONG-TERM CURRENT USE OF INSULIN: Primary | ICD-10-CM

## 2024-10-22 ENCOUNTER — OFFICE VISIT (OUTPATIENT)
Dept: ORTHOPEDICS | Facility: CLINIC | Age: 64
End: 2024-10-22
Payer: COMMERCIAL

## 2024-10-22 VITALS — HEIGHT: 64 IN | WEIGHT: 288.38 LBS | BODY MASS INDEX: 49.23 KG/M2

## 2024-10-22 DIAGNOSIS — G89.29 CHRONIC PAIN OF RIGHT KNEE: ICD-10-CM

## 2024-10-22 DIAGNOSIS — G89.29 CHRONIC PAIN OF LEFT KNEE: Primary | ICD-10-CM

## 2024-10-22 DIAGNOSIS — M25.562 CHRONIC PAIN OF LEFT KNEE: Primary | ICD-10-CM

## 2024-10-22 DIAGNOSIS — M17.12 PRIMARY OSTEOARTHRITIS OF LEFT KNEE: ICD-10-CM

## 2024-10-22 DIAGNOSIS — M17.11 PRIMARY OSTEOARTHRITIS OF RIGHT KNEE: ICD-10-CM

## 2024-10-22 DIAGNOSIS — M25.561 CHRONIC PAIN OF RIGHT KNEE: ICD-10-CM

## 2024-10-22 PROCEDURE — 1159F MED LIST DOCD IN RCRD: CPT | Mod: CPTII,S$GLB,, | Performed by: ORTHOPAEDIC SURGERY

## 2024-10-22 PROCEDURE — 99214 OFFICE O/P EST MOD 30 MIN: CPT | Mod: 25,S$GLB,, | Performed by: ORTHOPAEDIC SURGERY

## 2024-10-22 PROCEDURE — 4010F ACE/ARB THERAPY RXD/TAKEN: CPT | Mod: CPTII,S$GLB,, | Performed by: ORTHOPAEDIC SURGERY

## 2024-10-22 PROCEDURE — 20610 DRAIN/INJ JOINT/BURSA W/O US: CPT | Mod: 50,S$GLB,, | Performed by: ORTHOPAEDIC SURGERY

## 2024-10-22 PROCEDURE — 3008F BODY MASS INDEX DOCD: CPT | Mod: CPTII,S$GLB,, | Performed by: ORTHOPAEDIC SURGERY

## 2024-10-22 PROCEDURE — 99999 PR PBB SHADOW E&M-EST. PATIENT-LVL III: CPT | Mod: PBBFAC,,, | Performed by: ORTHOPAEDIC SURGERY

## 2024-10-22 PROCEDURE — 3044F HG A1C LEVEL LT 7.0%: CPT | Mod: CPTII,S$GLB,, | Performed by: ORTHOPAEDIC SURGERY

## 2024-10-22 RX ORDER — LIDOCAINE HYDROCHLORIDE 10 MG/ML
4 INJECTION, SOLUTION INFILTRATION; PERINEURAL
Status: DISCONTINUED | OUTPATIENT
Start: 2024-10-22 | End: 2024-10-22 | Stop reason: HOSPADM

## 2024-10-22 RX ORDER — KETOROLAC TROMETHAMINE 30 MG/ML
30 INJECTION, SOLUTION INTRAMUSCULAR; INTRAVENOUS
Status: DISCONTINUED | OUTPATIENT
Start: 2024-10-22 | End: 2024-10-22 | Stop reason: HOSPADM

## 2024-10-22 RX ORDER — BUPIVACAINE HYDROCHLORIDE 5 MG/ML
5 INJECTION, SOLUTION PERINEURAL
Status: DISCONTINUED | OUTPATIENT
Start: 2024-10-22 | End: 2024-10-22 | Stop reason: HOSPADM

## 2024-10-22 RX ADMIN — KETOROLAC TROMETHAMINE 30 MG: 30 INJECTION, SOLUTION INTRAMUSCULAR; INTRAVENOUS at 02:10

## 2024-10-22 RX ADMIN — BUPIVACAINE HYDROCHLORIDE 5 ML: 5 INJECTION, SOLUTION PERINEURAL at 02:10

## 2024-10-22 RX ADMIN — LIDOCAINE HYDROCHLORIDE 4 ML: 10 INJECTION, SOLUTION INFILTRATION; PERINEURAL at 02:10

## 2024-10-22 NOTE — PROGRESS NOTES
[facility] [address]          Subjective:     Patient ID: Yulissa Hatfield is a 63 y.o. female.    Chief Complaint: Pain of the Left Knee and Pain of the Right Knee    Knee Pain: bilateral  swelling: Yes  worsens with activity: Yes  relieved by: injections , Iovera    Patient last seen in clinic in 2023.  Patient had Iovera in 2023 and was scheduled to undergo subchondroplasty of right knee in March but was canceled due to patient reporting significant improvement in knee pain with just Iovera.  Patient reports knee pain has been relatively controlled since then but has had slowly increasing pain over the past couple of months and is interested in repeat Iovera for bilateral knees      Past Medical History:   Diagnosis Date    Generalized anxiety disorder with panic attacks     HTN (hypertension)     Hyperlipidemia     Hypothyroid     DANIELLE on CPAP     Type 2 diabetes mellitus         Past Surgical History:   Procedure Laterality Date    CARPAL TUNNEL RELEASE       SECTION, CLASSIC      x 3    COLONOSCOPY N/A 11/10/2021    Procedure: COLONOSCOPY;  Surgeon: Kayla Pope MD;  Location: [facility] ENDO;  Service: Endoscopy;  Laterality: N/A;    ENDOMETRIAL ABLATION      ESOPHAGOGASTRODUODENOSCOPY N/A 11/10/2021    Procedure: EGD (ESOPHAGOGASTRODUODENOSCOPY);  Surgeon: Kayla Pope MD;  Location: [facility] ENDO;  Service: Endoscopy;  Laterality: N/A;    FOOT SURGERY      INJECTION OF JOINT Left 2022    Procedure: Injection, Joint, Shoulder, Hip, Or Knee;  Surgeon: Sowmya Tellez MD;  Location: [facility] PAIN MGT;  Service: Pain Management;  Laterality: Left;  Left Glenohumeral Joint Injection    INJECTION OF JOINT Left 2024    Procedure: Left glenohumeral joint injection under fluoroscopic guidance;  Surgeon: Kristina Campbell DO;  Location: [facility] PAIN MANAGEMENT;  Service: Pain Management;  Laterality: Left;  oral-20mins    KNEE SURGERY      THYROID SURGERY          Current  Outpatient Medications   Medication Instructions    acetaminophen (TYLENOL) 1,000 mg, Every 6 hours PRN    clobetasoL (TEMOVATE) 0.05 % cream APPLY 1 APPLICATION TOPICALLY TO AFFECTED AREA TWICE A DAY    desonide (DESOWEN) 0.05 % lotion     ezetimibe (ZETIA) 10 mg tablet TAKE 1 TABLET BY MOUTH EVERY DAY    hydrocortisone 2.5 % cream     levothyroxine (SYNTHROID) 112 mcg, Oral, Before breakfast    pravastatin (PRAVACHOL) 40 mg, Oral    SOOLANTRA 1 % Crea No dose, route, or frequency recorded.    telmisartan (MICARDIS) 80 MG Tab TAKE 1 TABLET BY MOUTH EVERY DAY    tirzepatide 15 mg, Subcutaneous, Every 7 days    topiramate (TOPAMAX)  mg, Oral, Nightly        Review of patient's allergies indicates:  No Known Allergies    Social History     Socioeconomic History    Marital status:    Tobacco Use    Smoking status: Never    Smokeless tobacco: Never   Substance and Sexual Activity    Alcohol use: No    Drug use: No    Sexual activity: Not Currently     Social Drivers of Health     Financial Resource Strain: Low Risk  (12/21/2023)    Overall Financial Resource Strain (CARDIA)     Difficulty of Paying Living Expenses: Not hard at all   Food Insecurity: No Food Insecurity (12/21/2023)    Hunger Vital Sign     Worried About Running Out of Food in the Last Year: Never true     Ran Out of Food in the Last Year: Never true   Transportation Needs: No Transportation Needs (12/21/2023)    PRAPARE - Transportation     Lack of Transportation (Medical): No     Lack of Transportation (Non-Medical): No   Physical Activity: Insufficiently Active (12/21/2023)    Exercise Vital Sign     Days of Exercise per Week: 3 days     Minutes of Exercise per Session: 40 min   Stress: Stress Concern Present (12/21/2023)    [employer] - Occupational Stress Questionnaire     Feeling of Stress : To some extent   Housing Stability: Low Risk  (12/21/2023)    Housing Stability Vital Sign     Unable to Pay for  Housing in the Last Year: No     Number of Places Lived in the Last Year: 1     Unstable Housing in the Last Year: No       Family History   Problem Relation Name Age of Onset    Heart disease Mother      Heart disease Father      No Known Problems Sister      Heart disease Brother      No Known Problems Daughter      No Known Problems Son      No Known Problems Daughter      No Known Problems Daughter      Colon cancer Neg Hx      Esophageal cancer Neg Hx           Review of systems negative except for noted in HPI    Objective:   Physical Exam:     bilateral knee  Skin atraumatic   + effusion  Tender to palpation over medial joint line, tender to palpation lateral joint line  ROM 5-95 bilaterally  Crepitus with ROM  Stable anterior/posterior   Stable varus/valgus  No groin pain with rotation of hip  Grossly NVI distally    Imaging:   X-Ray knee reviewed, KL 4 changes with joint space narrowing, sclerosis, and osteophytosis.      Assessment:        Yulissa Hatfield is a 63 y.o. female    Encounter Diagnoses   Name Primary?    Chronic pain of left knee Yes    Chronic pain of right knee     Primary osteoarthritis of right knee     Primary osteoarthritis of left knee        Plan :  we injected the bilateral knee w 1cc of ketorolac, marcaine and lidocaine under sterile conditions with the patient's informed consent for severe bone on bone knee oa. KL score 4    We discussed Iovera which patient has previously had Iovera for right knee with significant relief for about a year.  We again discussed how this is a new treatment and is cryotherapy, to provide symptomatic relief along the sensory distribution of the infrapatellar tendon branch of the saphenous nerve, AFCN, and LFCN. The patient elected to move forward with this.  We did discuss the fact that this is a fairly novel procedure and the is very limited scientific data around this; however, it is FDA approved.  In a few patients there can be continued pain along  the treated nerve but the exact risk has not been quantified but seems to be rare. The patient was given patient information and literature to review prior to the procedure as well. Based on this, the patient feels the benefits outweigh the risks.      Follow up for Iovera after insurance approval        Orders Placed This Encounter   Procedures    Large Joint Aspiration/Injection: bilateral knee    Iovera            Bimal Turner MD  [employer] [facility] PGY-3

## 2024-10-22 NOTE — PROCEDURES
Large Joint Aspiration/Injection: bilateral knee    Date/Time: 10/22/2024 2:15 PM    Performed by: Ranulfo Omer MD  Authorized by: Ranulfo Omer MD    Consent Done?:  Yes (Verbal)  Indications:  Arthritis  Site marked: the procedure site was marked    Timeout: prior to procedure the correct patient, procedure, and site was verified    Prep: patient was prepped and draped in usual sterile fashion      Local anesthesia used?: Yes    Local anesthetic:  Topical anesthetic    Details:  Needle Size:  22 G  Ultrasonic Guidance for needle placement?: No    Approach:  Anterolateral  Location:  Knee  Laterality:  Bilateral  Site:  Bilateral knee  Medications (Right):  30 mg ketorolac 30 mg/mL (1 mL); 5 mL BUPivacaine 0.5 % (5 mg/mL); 4 mL LIDOcaine HCL 10 mg/ml (1%) 10 mg/mL (1 %)  Medications (Left):  30 mg ketorolac 30 mg/mL (1 mL); 5 mL BUPivacaine 0.5 % (5 mg/mL); 4 mL LIDOcaine HCL 10 mg/ml (1%) 10 mg/mL (1 %)  Patient tolerance:  Patient tolerated the procedure well with no immediate complications

## 2024-10-29 ENCOUNTER — PATIENT MESSAGE (OUTPATIENT)
Dept: FAMILY MEDICINE | Facility: CLINIC | Age: 64
End: 2024-10-29

## 2024-11-05 ENCOUNTER — PROCEDURE VISIT (OUTPATIENT)
Dept: ORTHOPEDICS | Facility: CLINIC | Age: 64
End: 2024-11-05
Payer: COMMERCIAL

## 2024-11-05 VITALS — BODY MASS INDEX: 47.98 KG/M2 | WEIGHT: 281.06 LBS | HEIGHT: 64 IN

## 2024-11-05 DIAGNOSIS — G89.29 CHRONIC PAIN OF LEFT KNEE: ICD-10-CM

## 2024-11-05 DIAGNOSIS — M25.561 CHRONIC PAIN OF RIGHT KNEE: ICD-10-CM

## 2024-11-05 DIAGNOSIS — G89.29 CHRONIC PAIN OF RIGHT KNEE: ICD-10-CM

## 2024-11-05 DIAGNOSIS — M25.562 CHRONIC PAIN OF LEFT KNEE: ICD-10-CM

## 2024-11-05 PROCEDURE — 99499 UNLISTED E&M SERVICE: CPT | Mod: S$GLB,,, | Performed by: ORTHOPAEDIC SURGERY

## 2024-11-05 PROCEDURE — 64640 INJECTION TREATMENT OF NERVE: CPT | Mod: 50,S$GLB,, | Performed by: ORTHOPAEDIC SURGERY

## 2024-11-05 NOTE — PROCEDURES
Procedure Note Iovera:    DATE OF PROCEDURE:  11/05/2024    PREOPERATIVE DIAGNOSIS: left knee pain.     POSTOPERATIVE DIAGNOSIS: left knee pain.     PROCEDURE: Iovera treatment of anterior femoral cutaneous nerve, Lateral femoral cut nerve, and both branches of infrapatellar saphenous nerve using at least 4 different punctures to treat all 4 nerves. (cpt 64640 x4)    ATTENDING SURGEON: Ranulfo Omer M.D.   ASSISTANT: mohsen bay    COMPLICATIONS: None.     IMPLANTS:  None    ESTIMATED BLOOD LOSS:  < 5cc    SPECIMENS REMOVED:  None    ANESTHESIA: Local lidocaine    INDICATIONS FOR PROCEDURE: This is a 63 y.o. female with longstanding knee pain. They have failed non operative management including injections.I discussed a new treatment therapy called Iovera, which is cryotherapy, to provide symptomatic relief along the sensory distribution of the infrapatellar tendon branch of the saphenous nerve  and AFCN. The patient elected to move forward with this  We did discuss the fact that this is a fairly novel procedure and there is very limited scientific data around this.  However, it FDA approved.  The patient was given patient information and literature to review prior to the procedure as well.  Based on this, the patient agreed to move forward with doing the procedure.      PROCEDURE:    The patient was placed supine on the exam table and the proximal medial aspect of the left tibia and anterior aspect of distal femur was prepped with sterile Betadine and alcohol.  A line was drawn extending approximately 5 cm medial to inferior pole of the patella distally to a point approximately 5 cm medial to the tibial tubercle.  A second line was drawn in a medial to lateral direction the width of the patella approximately 7 cm proximal to the patella. We then infiltrated the skin with lidocaine along both lines using a 25g needle. We then introduced the Iovera device along these lines and this device penetrated the skin, creating  cryotherapy to both branches of the infrapatellar saphenous nerve, a third treatment to the anterior femoral cutaneous nerve, and fourth LFCN. 7 punctures of the skin were made to treat the 2 branches of the ISN and another 7 punctures were made to treat the AFCN and LFCN. There were a total of 4 nerves treated with iovera. The patient tolerated the procedure well with no problems.     Procedure Note Iovera:    DATE OF PROCEDURE:  11/05/2024    PREOPERATIVE DIAGNOSIS: right knee pain.     POSTOPERATIVE DIAGNOSIS: right knee pain.     PROCEDURE: Iovera treatment of anterior femoral cutaneous nerve, Lateral femoral cut nerve, and both branches of infrapatellar saphenous nerve using at least 4 different punctures to treat all 4 nerves. (cpt 64640 x4)    ATTENDING SURGEON: Ranulfo Omer M.D.   ASSISTANT: mohsen bay    COMPLICATIONS: None.     IMPLANTS:  None    ESTIMATED BLOOD LOSS:  < 5cc    SPECIMENS REMOVED:  None    ANESTHESIA: Local lidocaine    INDICATIONS FOR PROCEDURE: This is a 63 y.o. female with longstanding knee pain. They have failed non operative management including injections.I discussed a new treatment therapy called Iovera, which is cryotherapy, to provide symptomatic relief along the sensory distribution of the infrapatellar tendon branch of the saphenous nerve  and AFCN. The patient elected to move forward with this  We did discuss the fact that this is a fairly novel procedure and there is very limited scientific data around this.  However, it FDA approved.  The patient was given patient information and literature to review prior to the procedure as well.  Based on this, the patient agreed to move forward with doing the procedure.      PROCEDURE:    The patient was placed supine on the exam table and the proximal medial aspect of the right tibia and anterior aspect of distal femur was prepped with sterile Betadine and alcohol.  A line was drawn extending approximately 5 cm medial to inferior pole of the  patella distally to a point approximately 5 cm medial to the tibial tubercle.  A second line was drawn in a medial to lateral direction the width of the patella approximately 7 cm proximal to the patella. We then infiltrated the skin with lidocaine along both lines using a 25g needle. We then introduced the Iovera device along these lines and this device penetrated the skin, creating cryotherapy to both branches of the infrapatellar saphenous nerve, a third treatment to the anterior femoral cutaneous nerve, and fourth LFCN. 7 punctures of the skin were made to treat the 2 branches of the ISN and another 7 punctures were made to treat the AFCN and LFCN. There were a total of 4 nerves treated with iovera. The patient tolerated the procedure well with no problems.

## 2024-11-07 ENCOUNTER — PATIENT MESSAGE (OUTPATIENT)
Dept: FAMILY MEDICINE | Facility: CLINIC | Age: 64
End: 2024-11-07
Payer: COMMERCIAL

## 2024-11-07 DIAGNOSIS — E11.9 TYPE 2 DIABETES MELLITUS WITHOUT COMPLICATION, WITHOUT LONG-TERM CURRENT USE OF INSULIN: ICD-10-CM

## 2024-11-07 RX ORDER — TIRZEPATIDE 15 MG/.5ML
INJECTION, SOLUTION SUBCUTANEOUS
Qty: 12 PEN | Refills: 1 | Status: SHIPPED | OUTPATIENT
Start: 2024-11-07

## 2024-11-07 NOTE — TELEPHONE ENCOUNTER
Refill Decision Note   Yulissa Hatfield  is requesting a refill authorization.  Brief Assessment and Rationale for Refill:  Approve     Medication Therapy Plan:         Comments:     Note composed:5:41 AM 11/07/2024

## 2024-11-07 NOTE — TELEPHONE ENCOUNTER
No care due was identified.  [facility] Embedded Care Due Messages. Reference number: [id number].   11/07/2024 12:30:33 AM CST

## 2024-11-14 ENCOUNTER — TELEPHONE (OUTPATIENT)
Dept: ORTHOPEDICS | Facility: CLINIC | Age: 64
End: 2024-11-14
Payer: COMMERCIAL

## 2024-11-14 NOTE — TELEPHONE ENCOUNTER
LVM for patient that  will be off on Nov 27. Rescheduled appointment to Nov 20. Requested call back to confirm or change.

## 2024-12-12 ENCOUNTER — OFFICE VISIT (OUTPATIENT)
Dept: FAMILY MEDICINE | Facility: CLINIC | Age: 64
End: 2024-12-12
Payer: COMMERCIAL

## 2024-12-12 VITALS
SYSTOLIC BLOOD PRESSURE: 118 MMHG | WEIGHT: 273.56 LBS | HEART RATE: 67 BPM | HEIGHT: 64 IN | OXYGEN SATURATION: 98 % | TEMPERATURE: 98 F | BODY MASS INDEX: 46.7 KG/M2 | DIASTOLIC BLOOD PRESSURE: 72 MMHG

## 2024-12-12 DIAGNOSIS — M19.90 ARTHRITIS: ICD-10-CM

## 2024-12-12 DIAGNOSIS — E03.9 HYPOTHYROIDISM, UNSPECIFIED TYPE: Chronic | ICD-10-CM

## 2024-12-12 DIAGNOSIS — I15.2 HYPERTENSION ASSOCIATED WITH DIABETES: ICD-10-CM

## 2024-12-12 DIAGNOSIS — E78.5 HYPERLIPIDEMIA ASSOCIATED WITH TYPE 2 DIABETES MELLITUS: ICD-10-CM

## 2024-12-12 DIAGNOSIS — E11.9 TYPE 2 DIABETES MELLITUS WITHOUT COMPLICATION, WITHOUT LONG-TERM CURRENT USE OF INSULIN: Primary | ICD-10-CM

## 2024-12-12 DIAGNOSIS — E11.69 HYPERLIPIDEMIA ASSOCIATED WITH TYPE 2 DIABETES MELLITUS: ICD-10-CM

## 2024-12-12 DIAGNOSIS — Z71.2 ENCOUNTER TO DISCUSS TEST RESULTS: ICD-10-CM

## 2024-12-12 DIAGNOSIS — E11.59 HYPERTENSION ASSOCIATED WITH DIABETES: ICD-10-CM

## 2024-12-12 PROBLEM — E66.813 CLASS 3 SEVERE OBESITY DUE TO EXCESS CALORIES WITH SERIOUS COMORBIDITY AND BODY MASS INDEX (BMI) OF 50.0 TO 59.9 IN ADULT: Status: RESOLVED | Noted: 2020-01-21 | Resolved: 2024-12-12

## 2024-12-12 PROBLEM — E66.01 CLASS 3 SEVERE OBESITY DUE TO EXCESS CALORIES WITH SERIOUS COMORBIDITY AND BODY MASS INDEX (BMI) OF 50.0 TO 59.9 IN ADULT: Status: RESOLVED | Noted: 2020-01-21 | Resolved: 2024-12-12

## 2024-12-12 PROCEDURE — 99999 PR PBB SHADOW E&M-EST. PATIENT-LVL IV: CPT | Mod: PBBFAC,,, | Performed by: INTERNAL MEDICINE

## 2024-12-12 RX ORDER — MELOXICAM 7.5 MG/1
7.5 TABLET ORAL DAILY PRN
Qty: 30 TABLET | Refills: 0 | Status: SHIPPED | OUTPATIENT
Start: 2024-12-12

## 2024-12-12 RX ORDER — TELMISARTAN 80 MG/1
80 TABLET ORAL DAILY
Qty: 90 TABLET | Refills: 3 | Status: SHIPPED | OUTPATIENT
Start: 2024-12-12

## 2024-12-12 RX ORDER — LEVOTHYROXINE SODIUM 112 UG/1
112 TABLET ORAL
Qty: 90 TABLET | Refills: 2 | Status: SHIPPED | OUTPATIENT
Start: 2024-12-12

## 2024-12-12 NOTE — PROGRESS NOTES
SUBJECTIVE     Chief Complaint   Patient presents with    Diabetes       HPI  Yulissa Hatfield is a 63 y.o. female with multiple medical diagnoses as listed in the medical history and problem list that presents for evaluation for DM2. Pt has been doing  well  since last visit. she is fully compliant with meds and denies any adverse side effects. Pt is  somewhat compliant  with an ADA diet and does not exercise 2/2 arthritis. Pt does check her blood sugar levels at home with fasting readings ranging from   . Pt denies any hypoglycemia since last visit. Pt presents for med refills today and is without any other complaints.     PAST MEDICAL HISTORY:  Past Medical History:   Diagnosis Date    Generalized anxiety disorder with panic attacks     HTN (hypertension)     Hyperlipidemia     Hypothyroid     DANIELLE on CPAP     Type 2 diabetes mellitus        PAST SURGICAL HISTORY:  Past Surgical History:   Procedure Laterality Date    CARPAL TUNNEL RELEASE       SECTION, CLASSIC      x 3    COLONOSCOPY N/A 11/10/2021    Procedure: COLONOSCOPY;  Surgeon: Kayla Pope MD;  Location: [facility];  Service: Endoscopy;  Laterality: N/A;    ENDOMETRIAL ABLATION      ESOPHAGOGASTRODUODENOSCOPY N/A 11/10/2021    Procedure: EGD (ESOPHAGOGASTRODUODENOSCOPY);  Surgeon: Kayla Pope MD;  Location: [facility];  Service: Endoscopy;  Laterality: N/A;    FOOT SURGERY      HYSTERECTOMY      Endometrial Ablation    INJECTION OF JOINT Left 2022    Procedure: Injection, Joint, Shoulder, Hip, Or Knee;  Surgeon: Sowmya Tellez MD;  Location: [facility];  Service: Pain Management;  Laterality: Left;  Left Glenohumeral Joint Injection    INJECTION OF JOINT Left 2024    Procedure: Left glenohumeral joint injection under fluoroscopic guidance;  Surgeon: Kristina Campbell DO;  Location: [facility];  Service: Pain Management;  Laterality: Left;  oral-20mins    KNEE SURGERY      THYROID SURGERY          SOCIAL HISTORY:  Social History     Socioeconomic History    Marital status:    Tobacco Use    Smoking status: Never    Smokeless tobacco: Never   Substance and Sexual Activity    Alcohol use: No    Drug use: No    Sexual activity: Not Currently     Partners: Male     Birth control/protection: Other-see comments     Comment: Ravi Balderas     Social Drivers of Health     Financial Resource Strain: Low Risk  (2023)    Overall Financial Resource Strain (CARDIA)     Difficulty of Paying Living Expenses: Not hard at all   Food Insecurity: No Food Insecurity (2023)    Hunger Vital Sign     Worried About Running Out of Food in the Last Year: Never true     Ran Out of Food in the Last Year: Never true   Transportation Needs: No Transportation Needs (2023)    PRAPARE - Transportation     Lack of Transportation (Medical): No     Lack of Transportation (Non-Medical): No   Physical Activity: Insufficiently Active (2023)    Exercise Vital Sign     Days of Exercise per Week: 3 days     Minutes of Exercise per Session: 40 min   Stress: Stress Concern Present (2023)    [employer] - Occupational Stress Questionnaire     Feeling of Stress : To some extent   Housing Stability: Low Risk  (2023)    Housing Stability Vital Sign     Unable to Pay for Housing in the Last Year: No     Number of Places Lived in the Last Year: 1     Unstable Housing in the Last Year: No       FAMILY HISTORY:  Family History   Problem Relation Name Age of Onset    Heart disease Mother      Kidney disease Mother      Heart disease Father      No Known Problems Sister      Heart disease Brother      No Known Problems Daughter      No Known Problems Son      No Known Problems Daughter      No Known Problems Daughter      Colon cancer Neg Hx      Esophageal cancer Neg Hx         ALLERGIES AND MEDICATIONS: updated and reviewed.  Review of  patient's allergies indicates:  No Known Allergies  Current Outpatient Medications   Medication Sig Dispense Refill    acetaminophen (TYLENOL) 500 MG tablet Take 1,000 mg by mouth every 6 (six) hours as needed for Pain.      clobetasoL (TEMOVATE) 0.05 % cream APPLY 1 APPLICATION TOPICALLY TO AFFECTED AREA TWICE A DAY      desonide (DESOWEN) 0.05 % lotion       hydrocortisone 2.5 % cream       MOUNJARO 15 mg/0.5 mL PnIj INJECT 15 MG INTO THE SKIN EVERY 7 DAYS. 12 Pen 1    pravastatin (PRAVACHOL) 40 MG tablet TAKE 1 TABLET BY MOUTH EVERY DAY 90 tablet 3    SOOLANTRA 1 % Crea       topiramate (TOPAMAX) 50 MG tablet Take 1-2 tablets ( mg total) by mouth every evening. 180 tablet 0    levothyroxine (SYNTHROID) 112 MCG tablet Take 1 tablet (112 mcg total) by mouth before breakfast. 90 tablet 2    meloxicam (MOBIC) 7.5 MG tablet Take 1 tablet (7.5 mg total) by mouth daily as needed for Pain (TAKE WITH MEALS). 30 tablet 0    telmisartan (MICARDIS) 80 MG Tab Take 1 tablet (80 mg total) by mouth once daily. 90 tablet 3     No current facility-administered medications for this visit.       ROS  Review of Systems   Constitutional:  Negative for chills and fever.   HENT:  Negative for hearing loss and sore throat.    Eyes:  Negative for visual disturbance.   Respiratory:  Negative for cough and shortness of breath.    Cardiovascular:  Negative for chest pain, palpitations and leg swelling.   Gastrointestinal:  Negative for abdominal pain, constipation, diarrhea, nausea and vomiting.   Genitourinary:  Negative for dysuria, frequency and urgency.   Musculoskeletal:  Positive for arthralgias. Negative for joint swelling and myalgias.   Skin:  Negative for rash and wound.   Neurological:  Negative for headaches.   Psychiatric/Behavioral:  Negative for agitation and confusion. The patient is not nervous/anxious.          OBJECTIVE     Physical Exam  Vitals:    12/12/24 1511   BP: 118/72   Pulse: 67   Temp: 97.7 °F (36.5 °C)     "Body mass index is 46.96 kg/m².  Weight: 124.1 kg (273 lb 9.5 oz)   Height: 5' 4" (162.6 cm)     Physical Exam  Constitutional:       General: She is not in acute distress.     Appearance: She is well-developed.   HENT:      Head: Normocephalic and atraumatic.      Right Ear: External ear normal.      Left Ear: External ear normal.      Nose: Nose normal.   Eyes:      General: No scleral icterus.        Right eye: No discharge.         Left eye: No discharge.      Conjunctiva/sclera: Conjunctivae normal.   Neck:      Vascular: No JVD.      Trachea: No tracheal deviation.   Cardiovascular:      Rate and Rhythm: Normal rate and regular rhythm.      Heart sounds: No murmur heard.     No friction rub. No gallop.   Pulmonary:      Effort: Pulmonary effort is normal. No respiratory distress.      Breath sounds: Normal breath sounds. No wheezing.   Abdominal:      General: Bowel sounds are normal. There is no distension.      Palpations: Abdomen is soft. There is no mass.      Tenderness: There is no abdominal tenderness. There is no guarding or rebound.   Musculoskeletal:         General: No tenderness or deformity. Normal range of motion.      Cervical back: Normal range of motion and neck supple.   Skin:     General: Skin is warm and dry.      Findings: No erythema or rash.   Neurological:      Mental Status: She is alert and oriented to person, place, and time.      Motor: No abnormal muscle tone.      Coordination: Coordination normal.   Psychiatric:         Behavior: Behavior normal.         Thought Content: Thought content normal.         Judgment: Judgment normal.           Health Maintenance         Date Due Completion Date    Pneumococcal Vaccines (Age 0-64) (2 of 2 - PCV) 02/19/2022 2/19/2021    RSV Vaccine (Age 60+ and Pregnant patients) (1 - Risk 60-74 years 1-dose series) Never done ---    HIV Screening 01/13/2027 (Originally 12/17/1975) ---    Eye Exam 04/23/2025 4/23/2024    Hemoglobin A1c 04/25/2025 " 10/25/2024    Mammogram 05/10/2025 5/10/2024    Lipid Panel 05/23/2025 5/23/2024    Foot Exam 10/07/2025 10/7/2024    Diabetes Urine Screening 10/25/2025 10/25/2024    Low Dose Statin 12/12/2025 12/12/2024    TETANUS VACCINE 10/05/2028 10/5/2018    Colorectal Cancer Screening 11/10/2031 11/10/2021              ASSESSMENT     63 y.o. female with     1. Type 2 diabetes mellitus without complication, without long-term current use of insulin    2. Encounter to discuss test results    3. Hypertension associated with diabetes    4. Hypothyroidism, unspecified type    5. Hyperlipidemia associated with type 2 diabetes mellitus    6. BMI 45.0-49.9, adult    7. Arthritis        PLAN:     1. Type 2 diabetes mellitus without complication, without long-term current use of insulin  - Well controlled  - The current medical regimen is effective;  continue present plan and medications.    2. Encounter to discuss test results  - Discussed recent lab results  - All questions/concerns addressed  - Pt voiced understanding    3. Hypertension associated with diabetes  - BP well controlled; at goal of <140/90  - The current medical regimen is effective;  continue present plan and medications.  - telmisartan (MICARDIS) 80 MG Tab; Take 1 tablet (80 mg total) by mouth once daily.  Dispense: 90 tablet; Refill: 3    4. Hypothyroidism, unspecified type  - Stable; no acute issues  - The current medical regimen is effective;  continue present plan and medications.  - levothyroxine (SYNTHROID) 112 MCG tablet; Take 1 tablet (112 mcg total) by mouth before breakfast.  Dispense: 90 tablet; Refill: 2    5. Hyperlipidemia associated with type 2 diabetes mellitus  - Will discontinue Zetia and have pt maintained on Pravastatin alone    6. BMI 45.0-49.9, adult  - Discussed importance of eating a prudent diet and exercising    7. Arthritis  - Start trial Mobic  - meloxicam (MOBIC) 7.5 MG tablet; Take 1 tablet (7.5 mg total) by mouth daily as needed for Pain  (TAKE WITH MEALS).  Dispense: 30 tablet; Refill: 0          RTC in 6 months     Emilia Velasquez MD  12/12/2024 1:22 PM        No follow-ups on file.

## 2025-01-06 DIAGNOSIS — M19.90 ARTHRITIS: ICD-10-CM

## 2025-01-06 RX ORDER — MELOXICAM 7.5 MG/1
7.5 TABLET ORAL DAILY PRN
Qty: 90 TABLET | Refills: 1 | Status: SHIPPED | OUTPATIENT
Start: 2025-01-06

## 2025-01-06 NOTE — TELEPHONE ENCOUNTER
No care due was identified.  [facility] Embedded Care Due Messages. Reference number: [id number].   1/06/2025 8:19:34 AM CST

## 2025-01-16 ENCOUNTER — PATIENT MESSAGE (OUTPATIENT)
Dept: BARIATRICS | Facility: CLINIC | Age: 65
End: 2025-01-16
Payer: COMMERCIAL

## 2025-04-25 ENCOUNTER — PATIENT MESSAGE (OUTPATIENT)
Dept: CARDIOLOGY | Facility: CLINIC | Age: 65
End: 2025-04-25
Payer: COMMERCIAL

## 2025-04-28 ENCOUNTER — PATIENT MESSAGE (OUTPATIENT)
Dept: FAMILY MEDICINE | Facility: CLINIC | Age: 65
End: 2025-04-28
Payer: COMMERCIAL

## 2025-04-28 ENCOUNTER — OFFICE VISIT (OUTPATIENT)
Dept: CARDIOLOGY | Facility: CLINIC | Age: 65
End: 2025-04-28
Payer: COMMERCIAL

## 2025-04-28 VITALS
DIASTOLIC BLOOD PRESSURE: 78 MMHG | WEIGHT: 278 LBS | OXYGEN SATURATION: 97 % | BODY MASS INDEX: 47.46 KG/M2 | HEIGHT: 64 IN | HEART RATE: 76 BPM | SYSTOLIC BLOOD PRESSURE: 130 MMHG

## 2025-04-28 DIAGNOSIS — E11.9 TYPE 2 DIABETES MELLITUS WITHOUT COMPLICATION, WITHOUT LONG-TERM CURRENT USE OF INSULIN: Primary | ICD-10-CM

## 2025-04-28 DIAGNOSIS — I83.813 VARICOSE VEINS OF BOTH LOWER EXTREMITIES WITH PAIN: ICD-10-CM

## 2025-04-28 DIAGNOSIS — E11.9 TYPE 2 DIABETES MELLITUS WITHOUT COMPLICATION, WITHOUT LONG-TERM CURRENT USE OF INSULIN: ICD-10-CM

## 2025-04-28 DIAGNOSIS — E03.9 ACQUIRED HYPOTHYROIDISM: Chronic | ICD-10-CM

## 2025-04-28 DIAGNOSIS — E66.01 CLASS 3 SEVERE OBESITY DUE TO EXCESS CALORIES WITHOUT SERIOUS COMORBIDITY WITH BODY MASS INDEX (BMI) OF 45.0 TO 49.9 IN ADULT: ICD-10-CM

## 2025-04-28 DIAGNOSIS — E11.69 HYPERLIPIDEMIA ASSOCIATED WITH TYPE 2 DIABETES MELLITUS: ICD-10-CM

## 2025-04-28 DIAGNOSIS — E66.813 CLASS 3 SEVERE OBESITY DUE TO EXCESS CALORIES WITHOUT SERIOUS COMORBIDITY WITH BODY MASS INDEX (BMI) OF 45.0 TO 49.9 IN ADULT: ICD-10-CM

## 2025-04-28 DIAGNOSIS — E78.5 HYPERLIPIDEMIA ASSOCIATED WITH TYPE 2 DIABETES MELLITUS: ICD-10-CM

## 2025-04-28 DIAGNOSIS — E78.2 MIXED HYPERLIPIDEMIA: ICD-10-CM

## 2025-04-28 DIAGNOSIS — R00.2 PALPITATIONS: ICD-10-CM

## 2025-04-28 DIAGNOSIS — G47.33 OSA ON CPAP: ICD-10-CM

## 2025-04-28 DIAGNOSIS — I15.2 HYPERTENSION ASSOCIATED WITH DIABETES: Primary | ICD-10-CM

## 2025-04-28 DIAGNOSIS — E66.01 SEVERE OBESITY: ICD-10-CM

## 2025-04-28 DIAGNOSIS — I87.2 CHRONIC VENOUS STASIS DERMATITIS OF BOTH LOWER EXTREMITIES: ICD-10-CM

## 2025-04-28 DIAGNOSIS — E11.59 HYPERTENSION ASSOCIATED WITH DIABETES: Primary | ICD-10-CM

## 2025-04-28 PROCEDURE — G2211 COMPLEX E/M VISIT ADD ON: HCPCS | Mod: S$GLB,,,

## 2025-04-28 PROCEDURE — 4010F ACE/ARB THERAPY RXD/TAKEN: CPT | Mod: CPTII,S$GLB,,

## 2025-04-28 PROCEDURE — 1159F MED LIST DOCD IN RCRD: CPT | Mod: CPTII,S$GLB,,

## 2025-04-28 PROCEDURE — 3008F BODY MASS INDEX DOCD: CPT | Mod: CPTII,S$GLB,,

## 2025-04-28 PROCEDURE — 99999 PR PBB SHADOW E&M-EST. PATIENT-LVL III: CPT | Mod: PBBFAC,,,

## 2025-04-28 PROCEDURE — 99214 OFFICE O/P EST MOD 30 MIN: CPT | Mod: S$GLB,,,

## 2025-04-28 PROCEDURE — 3075F SYST BP GE 130 - 139MM HG: CPT | Mod: CPTII,S$GLB,,

## 2025-04-28 PROCEDURE — 1160F RVW MEDS BY RX/DR IN RCRD: CPT | Mod: CPTII,S$GLB,,

## 2025-04-28 PROCEDURE — 3078F DIAST BP <80 MM HG: CPT | Mod: CPTII,S$GLB,,

## 2025-04-28 PROCEDURE — 3072F LOW RISK FOR RETINOPATHY: CPT | Mod: CPTII,S$GLB,,

## 2025-04-28 RX ORDER — TOPIRAMATE 50 MG/1
50-100 TABLET, FILM COATED ORAL NIGHTLY
Qty: 180 TABLET | Refills: 0 | Status: SHIPPED | OUTPATIENT
Start: 2025-04-28 | End: 2026-04-28

## 2025-04-28 NOTE — ASSESSMENT & PLAN NOTE
Goal BP < 130/80.  Compliant w/ meds.    -A1c 5.3 10/25/24  -controlled   - continue medical therapy: telmisartan 80 mg    - pt to monitor BP at home and record  - enrolling in dig med prgm  - risk factor and lifestyle modifications

## 2025-04-28 NOTE — ASSESSMENT & PLAN NOTE
Goal LDL < 100, last .8 5/23/24  -uncontrolled   Compliant w/ meds and h/o myalgias w/ atorvastatin.  - continue medical therapy- pravastatin 40 mg    - risk factor and lifestyle modifications

## 2025-04-28 NOTE — PROGRESS NOTES
"Subjective:    Patient ID:  Yulissa Hatfield is a 64 y.o. female who presents for follow-up of No chief complaint on file.      PCP: Emilia Velasquez MD     Referring Provider:     HPI Patient is a 65 yo F w/ PMH of HTN, HLD, DANIELLE on CPAP, morbid obesity and DM2 who presents today to establish care and HTN management. She was previously followed by Cardiology and was last seen by Dr. Abbasi on 2023 for f/u of chronic venous stasis dermatitis. Patient mentioned that she has stasis dermatitis and BLE varicose veins x2 yrs and had a venous doppler which noted L saphenous vein insufficiency. She had a venous doppler which noted reflux in her R great saphenous and anterior accessory saphenous vein noted by an outside facility.    Patient reports episodes of palpitations " fluttering or heart speeding up" at rest lasting about 1 minute for the past month.     She mentions that she has been doing well, but has not been wearing the knee high compression stockings for her venous insufficiency. She denies pain of her BLE. She denies cp, orthopnea, PND, presyncope, LOC, and claudication. She notes compliance w/ meds and denies side effects. She is enrolled in digital medicine, but had misplaced her monitor and last reading was 2/3/2025.  She does not exercise, was previously attending at the [employer] doing water aerobics etc. She also notes increased salt intake.     Past Medical History:   Diagnosis Date    Generalized anxiety disorder with panic attacks     HTN (hypertension)     Hyperlipidemia     Hypothyroid     DANIELLE on CPAP     Type 2 diabetes mellitus      Past Surgical History:   Procedure Laterality Date    CARPAL TUNNEL RELEASE       SECTION, CLASSIC      x 3    COLONOSCOPY N/A 11/10/2021    Procedure: COLONOSCOPY;  Surgeon: Kayla Pope MD;  Location: [facility] ENDO;  Service: Endoscopy;  Laterality: N/A;    ENDOMETRIAL ABLATION      ESOPHAGOGASTRODUODENOSCOPY N/A 11/10/2021    " Procedure: EGD (ESOPHAGOGASTRODUODENOSCOPY);  Surgeon: Kayla Pope MD;  Location: [facility] ENDO;  Service: Endoscopy;  Laterality: N/A;    FOOT SURGERY      HYSTERECTOMY  2002    Endometrial Ablation    INJECTION OF JOINT Left 2022    Procedure: Injection, Joint, Shoulder, Hip, Or Knee;  Surgeon: Sowmya Tellez MD;  Location: [facility] PAIN MGT;  Service: Pain Management;  Laterality: Left;  Left Glenohumeral Joint Injection    INJECTION OF JOINT Left 2024    Procedure: Left glenohumeral joint injection under fluoroscopic guidance;  Surgeon: Kristina Campbell DO;  [facility];  Service: Pain Management;  Laterality: Left;  oral-20mins    KNEE SURGERY      THYROID SURGERY       Social History[1]  Family History   Problem Relation Name Age of Onset    Heart disease Mother      Kidney disease Mother      Heart disease Father      No Known Problems Sister      Heart disease Brother      No Known Problems Daughter      No Known Problems Son      No Known Problems Daughter      No Known Problems Daughter      Colon cancer Neg Hx      Esophageal cancer Neg Hx         Review of patient's allergies indicates:  No Known Allergies    Medication List with Changes/Refills   Current Medications    ACETAMINOPHEN (TYLENOL) 500 MG TABLET    Take 1,000 mg by mouth every 6 (six) hours as needed for Pain.    CLOBETASOL (TEMOVATE) 0.05 % CREAM    APPLY 1 APPLICATION TOPICALLY TO AFFECTED AREA TWICE A DAY    DESONIDE (DESOWEN) 0.05 % LOTION        HYDROCORTISONE 2.5 % CREAM        LEVOTHYROXINE (SYNTHROID) 112 MCG TABLET    Take 1 tablet (112 mcg total) by mouth before breakfast.    MELOXICAM (MOBIC) 7.5 MG TABLET    TAKE 1 TABLET (7.5 MG TOTAL) BY MOUTH DAILY AS NEEDED FOR PAIN (TAKE WITH MEALS).    PRAVASTATIN (PRAVACHOL) 40 MG TABLET    TAKE 1 TABLET BY MOUTH EVERY DAY    SOOLANTRA 1 % CREA        TELMISARTAN (MICARDIS) 80 MG TAB    Take 1 tablet (80 mg total) by  "mouth once daily.    TIRZEPATIDE (MOUNJARO) 15 MG/0.5 ML PNIJ    INJECT 1 PEN UNDER THE SKIN ONCE PER WEEK    TOPIRAMATE (TOPAMAX) 50 MG TABLET    TAKE 1-2 TABLETS ( MG TOTAL) BY MOUTH EVERY EVENING.       Review of Systems   Constitutional: Negative for diaphoresis and fever.   HENT:  Negative for congestion and hearing loss.    Eyes:  Negative for blurred vision and pain.   Cardiovascular:  Positive for leg swelling and palpitations. Negative for chest pain, claudication, dyspnea on exertion, near-syncope and syncope.   Respiratory:  Negative for shortness of breath and sleep disturbances due to breathing.    Hematologic/Lymphatic: Negative for bleeding problem. Does not bruise/bleed easily.   Skin:  Negative for color change and poor wound healing.   Gastrointestinal:  Negative for abdominal pain and nausea.   Genitourinary:  Negative for bladder incontinence and flank pain.   Neurological:  Negative for focal weakness and light-headedness.        Objective:   /78 (BP Location: Left arm, Patient Position: Sitting)   Pulse 76   Ht 5' 4" (1.626 m)   Wt 126.1 kg (278 lb)   LMP 03/25/2006   SpO2 97%   BMI 47.72 kg/m²    Physical Exam  Constitutional:       Appearance: She is well-developed. She is obese. She is not diaphoretic.   HENT:      Head: Normocephalic and atraumatic.   Eyes:      General: No scleral icterus.     Pupils: Pupils are equal, round, and reactive to light.   Neck:      Vascular: No JVD.   Cardiovascular:      Rate and Rhythm: Normal rate and regular rhythm.      Pulses: Intact distal pulses.           Radial pulses are 2+ on the right side and 2+ on the left side.        Dorsalis pedis pulses are 2+ on the right side and 2+ on the left side.        Posterior tibial pulses are 2+ on the right side and 2+ on the left side.      Heart sounds: S1 normal and S2 normal. No murmur heard.     No friction rub. No gallop.   Pulmonary:      Effort: Pulmonary effort is normal. No respiratory " distress.      Breath sounds: Normal breath sounds. No wheezing or rales.   Chest:      Chest wall: No tenderness.   Abdominal:      General: Bowel sounds are normal. There is no distension.      Palpations: Abdomen is soft. There is no mass.      Tenderness: There is no abdominal tenderness. There is no rebound.   Musculoskeletal:         General: No tenderness. Normal range of motion.      Cervical back: Normal range of motion and neck supple.      Right lower leg: Swelling present.      Left lower leg: Swelling present.      Comments: BLE varicose veins    Skin:     General: Skin is warm and dry.      Coloration: Skin is not pale.   Neurological:      Mental Status: She is alert and oriented to person, place, and time.      Coordination: Coordination normal.      Deep Tendon Reflexes: Reflexes normal.   Psychiatric:         Behavior: Behavior normal.         Judgment: Judgment normal.              Venous Doppler: 3/3/2023- reviewed.    Conclusion     There is no evidence of a right lower extremity DVT.  There is no evidence of a left lower extremity DVT.  The left greater saphenous vein has reflux around the calf  This was a technically difficult study     Cardiac Echo: 2/2/2023- reviewed.    Summary  The left ventricle is normal in size with concentric remodeling and normal systolic function.  The estimated ejection fraction is 65%.  Grade I left ventricular diastolic dysfunction.  Mild right ventricular enlargement with normal right ventricular systolic function.  Mild left atrial enlargement.  Mild right atrial enlargement.  Normal central venous pressure (3 mmHg).  The estimated PA systolic pressure is 8 mmHg.     Exercise echo stress test: 1/14/2021- reviewed.    Summary  There were no arrhythmias during stress.  Concentric remodeling and normal systolic function. The estimated ejection fraction is 55%  Normal left ventricular diastolic function.  The stress echo portion of this study is negative for  myocardial ischemia.       Assessment:       1. Hypertension associated with diabetes    2. Hyperlipidemia associated with type 2 diabetes mellitus    3. Palpitations    4. Varicose veins of both lower extremities with pain    5. Class 3 severe obesity due to excess calories without serious comorbidity with body mass index (BMI) of 45.0 to 49.9 in adult    6. Severe obesity    7. DANIELLE on CPAP    8. Type 2 diabetes mellitus without complication, without long-term current use of insulin    9. Acquired hypothyroidism    10. Mixed hyperlipidemia    11. Chronic venous stasis dermatitis of both lower extremities         Plan:         Hypertension associated with diabetes  Goal BP < 130/80.  Compliant w/ meds.    -A1c 5.3 10/25/24  -controlled   - continue medical therapy: telmisartan 80 mg    - pt to monitor BP at home and record  - enrolling in dig med prgm  - risk factor and lifestyle modifications     Hyperlipidemia associated with type 2 diabetes mellitus  Goal LDL < 100, last .8 5/23/24  -uncontrolled   Compliant w/ meds and h/o myalgias w/ atorvastatin.  - continue medical therapy- pravastatin 40 mg    - risk factor and lifestyle modifications          Palpitations  -48 HR Holter to evaluate fr arrhthymias     DANIELLE on CPAP  Compliant w/ CPA nightly.      Class 3 severe obesity due to excess calories without serious comorbidity with body mass index (BMI) of 45.0 to 49.9 in adult  Body mass index is 47.72 kg/m². Morbid obesity complicates all aspects of disease management from diagnostic modalities to treatment. Weight loss encouraged and health benefits explained to patient.  -Currently on Mounjaro   -followed by Bariatric Medicine   -patient reports total 70 pound weight loss        Type 2 diabetes mellitus without complication, without long-term current use of insulin  -Gaol A1c <7%  -A1c 5.3 10/25/24  -enrolled in digital medicine   -continue medical therapy per PCP         Hypothyroidism  -Managed by  PCP  -continue medical therapy- synthroid     Chronic venous stasis dermatitis of both lower extremities  Pt noted to have BLE reflux on outside venous doppler and has comorbidity of MO.    - knee high compression stockings  non compliant   -Reorder as patient has lost weight   - see echo and venous insufficiency study results   - risk factor and lifestyle modifications     Varicose veins of both lower extremities with pain  Pt noted to have BLE reflux on outside venous doppler and has comorbidity of MO.    - knee high compression stockings  non compliant   -Reorder as patient has lost weight   - see echo and venous insufficiency study results   - risk factor and lifestyle modifications       Total duration of face to face visit time 30 minutes.  Total time spent counseling greater than fifty percent of total visit time.  Counseling included discussion regarding imaging findings, diagnosis, possibilities, treatment options, risks and benefits.  The patient had many questions regarding the options and long-term effects      Malachi Chavez, VICTOR M  [facility]                       [1]   Social History  Socioeconomic History    Marital status:    Tobacco Use    Smoking status: Never     Passive exposure: Never    Smokeless tobacco: Never   Substance and Sexual Activity    Alcohol use: No    Drug use: No    Sexual activity: Not Currently     Partners: Male     Birth control/protection: Other-see comments     Comment: Endometriel Ablation     Social Drivers of Health     Financial Resource Strain: Low Risk  (4/27/2025)    Overall Financial Resource Strain (CARDIA)     Difficulty of Paying Living Expenses: Not hard at all   Food Insecurity: No Food Insecurity (4/27/2025)    Hunger Vital Sign     Worried About Running Out of Food in the Last Year: Never true     Ran Out of Food in the Last Year: Never true   Transportation Needs: No Transportation Needs (4/27/2025)    PRAPARE - Transportation     Lack of  Transportation (Medical): No     Lack of Transportation (Non-Medical): No   Physical Activity: Insufficiently Active (4/27/2025)    Exercise Vital Sign     Days of Exercise per Week: 2 days     Minutes of Exercise per Session: 20 min   Stress: No Stress Concern Present (4/27/2025)    [employer] - Occupational Stress Questionnaire     Feeling of Stress : Only a little   Housing Stability: Low Risk  (4/27/2025)    Housing Stability Vital Sign     Unable to Pay for Housing in the Last Year: No     Number of Times Moved in the Last Year: 0     Homeless in the Last Year: No

## 2025-04-28 NOTE — ASSESSMENT & PLAN NOTE
Pt noted to have BLE reflux on outside venous doppler and has comorbidity of MO.    - knee high compression stockings  non compliant   -Reorder as patient has lost weight   - see echo and venous insufficiency study results   - risk factor and lifestyle modifications

## 2025-04-28 NOTE — ASSESSMENT & PLAN NOTE
-Gaol A1c <7%  -A1c 5.3 10/25/24  -enrolled in digital medicine   -continue medical therapy per PCP

## 2025-04-28 NOTE — ASSESSMENT & PLAN NOTE
Body mass index is 47.72 kg/m². Morbid obesity complicates all aspects of disease management from diagnostic modalities to treatment. Weight loss encouraged and health benefits explained to patient.  -Currently on Mounjaro   -followed by Bariatric Medicine   -patient reports total 70 pound weight loss

## 2025-05-06 ENCOUNTER — OFFICE VISIT (OUTPATIENT)
Dept: BARIATRICS | Facility: CLINIC | Age: 65
End: 2025-05-06
Payer: COMMERCIAL

## 2025-05-06 VITALS
WEIGHT: 270.5 LBS | OXYGEN SATURATION: 97 % | BODY MASS INDEX: 46.18 KG/M2 | HEIGHT: 64 IN | DIASTOLIC BLOOD PRESSURE: 72 MMHG | SYSTOLIC BLOOD PRESSURE: 139 MMHG | HEART RATE: 70 BPM

## 2025-05-06 DIAGNOSIS — E66.813 CLASS 3 SEVERE OBESITY WITH SERIOUS COMORBIDITY AND BODY MASS INDEX (BMI) OF 45.0 TO 49.9 IN ADULT, UNSPECIFIED OBESITY TYPE: Primary | ICD-10-CM

## 2025-05-06 DIAGNOSIS — E66.01 CLASS 3 SEVERE OBESITY WITH SERIOUS COMORBIDITY AND BODY MASS INDEX (BMI) OF 45.0 TO 49.9 IN ADULT, UNSPECIFIED OBESITY TYPE: Primary | ICD-10-CM

## 2025-05-06 PROCEDURE — 99999 PR PBB SHADOW E&M-EST. PATIENT-LVL IV: CPT | Mod: PBBFAC,,, | Performed by: INTERNAL MEDICINE

## 2025-05-06 PROCEDURE — 3075F SYST BP GE 130 - 139MM HG: CPT | Mod: CPTII,S$GLB,, | Performed by: INTERNAL MEDICINE

## 2025-05-06 PROCEDURE — 3078F DIAST BP <80 MM HG: CPT | Mod: CPTII,S$GLB,, | Performed by: INTERNAL MEDICINE

## 2025-05-06 PROCEDURE — 3008F BODY MASS INDEX DOCD: CPT | Mod: CPTII,S$GLB,, | Performed by: INTERNAL MEDICINE

## 2025-05-06 PROCEDURE — 99213 OFFICE O/P EST LOW 20 MIN: CPT | Mod: S$GLB,,, | Performed by: INTERNAL MEDICINE

## 2025-05-06 PROCEDURE — 1159F MED LIST DOCD IN RCRD: CPT | Mod: CPTII,S$GLB,, | Performed by: INTERNAL MEDICINE

## 2025-05-06 PROCEDURE — 3072F LOW RISK FOR RETINOPATHY: CPT | Mod: CPTII,S$GLB,, | Performed by: INTERNAL MEDICINE

## 2025-05-06 PROCEDURE — 4010F ACE/ARB THERAPY RXD/TAKEN: CPT | Mod: CPTII,S$GLB,, | Performed by: INTERNAL MEDICINE

## 2025-05-06 RX ORDER — TOPIRAMATE 50 MG/1
50-100 TABLET, FILM COATED ORAL NIGHTLY
Qty: 180 TABLET | Refills: 1 | Status: SHIPPED | OUTPATIENT
Start: 2025-05-06 | End: 2026-05-06

## 2025-05-06 NOTE — PROGRESS NOTES
"  Subjective     Patient ID: Yulissa Hatfield is a 64 y.o. female.    Chief Complaint: Follow-up    CC: weight  Pt here today for follow-up. Has lost 21.4 lbs (-5 lbs muscle. -13.3 lbs fat). Was to sater 1200 yordan pb meal planner, exercise and started topiramate  mg qhs. Had a little bit of SE at first, but they cleared up. She does feel that the PM only helped with SE. She was off of it x 1 month. Got off schedule with getting it filled. She did find she was going back to her old habits at that time. Was snacking more. Has just resumed.   Has been trying to keep up with planner and her calorie intake. She has had to cut back on fruit. Has added some protein shakes in the morning.     Exercise- 30 min of weights and cubi daily.      New BMR: 1552    New PBF: 55.4%        Initial:  BMR: 1631    PBF:  55.9%    History of medication for loss: on 15 mg mounjaro for DM2.   checked today.      Lab Results       Component                Value               Date                       HGBA1C                   5.3                 10/25/2024                 HGBA1C                   5.4                 01/29/2024                 HGBA1C                   5.9 (H)             07/19/2023            Lab Results       Component                Value               Date                       LDLCALC                  126.8               05/23/2024                 CREATININE               0.7                 10/25/2024                 Review of Systems       Objective   /72   Pulse 70   Ht 5' 4" (1.626 m)   Wt 122.7 kg (270 lb 8 oz)   LMP 03/25/2006   SpO2 97%   BMI 46.43 kg/m²     Physical Exam  Vitals reviewed.   Constitutional:       General: She is not in acute distress.     Appearance: She is well-developed.      Comments: With severe obesity     HENT:      Head: Normocephalic and atraumatic.   Eyes:      General: No scleral icterus.     Pupils: Pupils are equal, round, and reactive to light.   Cardiovascular: "      Rate and Rhythm: Normal rate.   Pulmonary:      Effort: Pulmonary effort is normal. No respiratory distress.   Musculoskeletal:         General: Normal range of motion.   Skin:     General: Skin is warm and dry.      Findings: No erythema.   Neurological:      Mental Status: She is alert and oriented to person, place, and time.      Cranial Nerves: No cranial nerve deficit.   Psychiatric:         Behavior: Behavior normal.         Judgment: Judgment normal.            Assessment and Plan     1. Class 3 severe obesity with serious comorbidity and body mass index (BMI) of 45.0 to 49.9 in adult, unspecified obesity type    Other orders  -     topiramate (TOPAMAX) 50 MG tablet; Take 1-2 tablets ( mg total) by mouth every evening.  Dispense: 180 tablet; Refill: 1            1. Class 3 severe obesity with serious comorbidity and body mass index (BMI) of 45.0 to 49.9 in adult, unspecified obesity type        Patient was informed that topiramate is used for migraine prevention and seizures. Weight loss is a common side effect that is well documented. S/he understands this. S/he was informed of the potential side effects such as serious and possibly fatal rash in which case the medication should be discontinued immediately. Paresthesias, forgetfulness, fatigue, kidney stones, GI symptoms, and changes in lab values such as electrolytes, blood counts and kidney function.      topiramate   mg  in the evening       No soda, sweet tea, juices or lemonade. All drinks should be 5 calories or less.     1200 yordan pb meal planner, meal ideas and exercise handouts given previously.    Continue with Mounjaro 15 mg.      Protein list and weight training handout given    No follow-ups on file.

## 2025-05-06 NOTE — PATIENT INSTRUCTIONS
Patient was informed that topiramate is used for migraine prevention and seizures. Weight loss is a common side effect that is well documented. S/he understands this. S/he was informed of the potential side effects such as serious and possibly fatal rash in which case the medication should be discontinued immediately. Paresthesias, forgetfulness, fatigue, kidney stones, GI symptoms, and changes in lab values such as electrolytes, blood counts and kidney function.      topiramate   mg  in the evening       No soda, sweet tea, juices or lemonade. All drinks should be 5 calories or less.     1200 yordan pb meal planner, meal ideas and exercise handouts given previously.    Continue with Mounjaro 15 mg.     Protein Content of Foods     Food Name Portion Calories Protein (gms)   Almonds (unsalted) 1/4 cup 160 6   Pavo milk, unsweetened 1 cup 30  1   Beef, Roast 1 oz 46 8   Beef, Steak, sirloin, trimmed 1 oz 55 9   Catfish, broiled or baked 1 oz 30 5   Cheese, American FF 1 oz 40 6   Cheese, Cottage 1% fat ¼ cup 41 7   Cheese, Parmesan, grated ¼ cup 128 12   Cheese, Mozzarella, part skim 1 oz 78 8   Cheese, part skim Ricotta ¼ cup 90 8   Chicken, white breast w/o skin 1 oz 46 9   Chicken, leg w/o skin 1 oz 54 7   Crab, steamed ¼ cup  40 9   Crawfish tails, boiled ¼ cup 35 8   [place], shelled ¼ cup 50 4   Egg 1 78 6   Ham, lean 5% 1 oz 44 7   Hamburger, lean 1 oz 56 7   Hummus ¼ cup 100 5   Lobster, steamed 1 oz 26 5   Milk, skim or 1%, soy  1 cup 90 8   Pork Tenderloin 1 oz 46 7   Pudding, SF 1 serv 60 2   Red beans ¼ cup 56 4   Refried beans, fat free ¼ cup 65 4   [place], baked 1 oz 52 7   Shrimp, steamed 1 oz 28 6   Soymilk, plain ½ cup 40 3   Tilapia, white fish, cooked 1 oz 36 8   Tofu ¼ cup 47 5   Trout 1 oz 48 7   Tuna, canned in water 1 oz 37 8   Tur[place], white meat 1 oz 35 7   Veal Loin 1 oz 50 7   Yogurt, SF, frozen vanilla 3 oz 72 3.5   Yogurt, Fruit, FF, light 3 oz 40 2.5   Yogurt, Greek 3 oz 70 8      *Abbreviations: SF=sugar free, LF=low fat, FF= fat free, gms=grams  *3oz of cooked meat/protein = size of deck of cards or ladies palm   *1oz cheese = 1inch cube or 1 slice American cheese        Sample Weight Training Plan ( adapted from [facility]):    1.Goblet Squat  How to:    Stand with feet hip-width apart and hold a weight vertically in front of chest, elbows pointing toward the floor.  Push hips back and bend knees to lower into a squat.  Drive through heels to stand back up to starting position. That's 1 rep. Complete three to five reps with a heavy weight.      2.Bent-Over Row  How to:    With a dumbbell in each hand and feet under hips, hinge at hips with knees slightly bent and arms just in front of legs.  Drive one elbow back toward hips, feeling shoulder blades squeeze together, pulling weight toward side body.  Slowly lower weight back down, then repeat with other arm. That's 1 rep. Complete three to five reps with a medium-heavy weight.        3.Lateral Lunge  How to:    Holding a weight at chest or dumbbells at each side, stand up straight with feet hip-width apart.  Take a large step to the right, sit hips back, and lower down until right knee is nearly parallel with the floor. Your left leg should be straight.  Return to start. That's 1 rep. Complete 10 reps on each side.      4.Chest Press  How to:    Lie flat on back, or on a bench, with feet flat on the ground. With a dumbbell in each hand, extend arms directly over shoulders, palms facing toward feet.  Squeeze shoulder blades together and slowly bend elbows, lowering the weights out to the side, parallel with shoulders, until elbows form 90-degree angles.  Slowly drive the dumbbells back up to start, squeezing shoulder blades the entire time. Thats 1 rep. Complete three to five reps with a medium-heavy weight.      5.Split Stance Shoulder Press    How to:    Grab a pair of dumbbells or a resistance band. Stagger stance into a  wide step, one foot forward and one back with hips squared, and hold the weights or band just above shoulders, elbows close to sides.  Leaning forward ever so slightly, bend both knees, and press through front heel while simultaneously lifting the weights or band to the renato, keeping elbows forward and arms in line with your ears.  Lower weights or band back to shoulders. That's 1 rep. Do 10 reps, alternating side for each set.        6.Alternating Reverse Lunge To Bicep Curl  How to:    Grab a pair of dumbbells and hold them at arm's length next to sides, palms facing each other. Stand tall with feet hip-width apart.  Step backward with right leg and lower body until front knee is bent 90 degrees. At the same time as you lunge, curl both dumbbells up to shoulders.  Lower the dumbbells as you return to the starting position. Step back with the other leg and repeat.That's 1 rep. Complete 12 reps with a medium weight.

## 2025-05-09 ENCOUNTER — TELEPHONE (OUTPATIENT)
Dept: CARDIOLOGY | Facility: CLINIC | Age: 65
End: 2025-05-09
Payer: COMMERCIAL

## 2025-05-09 NOTE — TELEPHONE ENCOUNTER
Called to confirm Her appt with Barb for her 48 hr holter.      Thank you,    Sanjuanita Ayers  Medical Assistant   [facility]   [phone]-Phone  [phone]-Fax    ----- Message from Christin Day sent at 5/9/2025  2:34 PM CDT -----  Regarding: RE: 48  Got it.thur it is  ----- Message -----  From: Sanjuanita Ayers MA  Sent: 5/9/2025   1:23 PM CDT  To: Brab Evans  Subject: 48                                               Mrs. Hatfield advised that she can't make the appointment you have her scheduled for. Thursday she's available in the AM.Thank you,Sanjuanita Amayaical Assistant [facility] [phone]

## 2025-05-15 ENCOUNTER — TELEPHONE (OUTPATIENT)
Dept: CARDIOLOGY | Facility: CLINIC | Age: 65
End: 2025-05-15
Payer: COMMERCIAL

## 2025-05-15 NOTE — TELEPHONE ENCOUNTER
She states that she went to the store and they sliding down.  She went online to find some. They told her to order them from the store in the new ones. The lady [facility] lady at [facility] gave her the post it note.     Thank you,    Sanjuanita Ayers  Medical Assistant   [facility]   [phone]-Phone  [phone]-Fax

## 2025-05-15 NOTE — TELEPHONE ENCOUNTER
Mrs. Hatfield stopped by she went to get her compression stockings and they told her she's a canidate for the Velcro wraps total of 4 (circaids) instead of the regular due to swelling.She would like a new prescription.    Thank you,    Sanjuanita Ayers  Medical Assistant   [facility]   [phone]-Phone  [phone]-Fax

## 2025-05-20 ENCOUNTER — PATIENT MESSAGE (OUTPATIENT)
Dept: CARDIOLOGY | Facility: CLINIC | Age: 65
End: 2025-05-20
Payer: COMMERCIAL

## 2025-06-04 ENCOUNTER — RESULTS FOLLOW-UP (OUTPATIENT)
Dept: CARDIOLOGY | Facility: CLINIC | Age: 65
End: 2025-06-04

## 2025-06-04 DIAGNOSIS — R00.2 PALPITATIONS: Primary | ICD-10-CM

## 2025-06-04 RX ORDER — VERAPAMIL HYDROCHLORIDE 80 MG/1
80 TABLET ORAL DAILY
Qty: 90 TABLET | Refills: 3 | Status: SHIPPED | OUTPATIENT
Start: 2025-06-04 | End: 2026-06-04

## 2025-06-05 ENCOUNTER — TELEPHONE (OUTPATIENT)
Dept: CARDIOLOGY | Facility: CLINIC | Age: 65
End: 2025-06-05
Payer: COMMERCIAL

## 2025-06-16 ENCOUNTER — PATIENT MESSAGE (OUTPATIENT)
Dept: CARDIOLOGY | Facility: CLINIC | Age: 65
End: 2025-06-16
Payer: COMMERCIAL

## 2025-06-25 ENCOUNTER — OFFICE VISIT (OUTPATIENT)
Dept: CARDIOLOGY | Facility: CLINIC | Age: 65
End: 2025-06-25
Payer: COMMERCIAL

## 2025-06-25 VITALS
DIASTOLIC BLOOD PRESSURE: 85 MMHG | WEIGHT: 276.44 LBS | OXYGEN SATURATION: 98 % | BODY MASS INDEX: 47.19 KG/M2 | HEART RATE: 85 BPM | HEIGHT: 64 IN | SYSTOLIC BLOOD PRESSURE: 136 MMHG

## 2025-06-25 DIAGNOSIS — I83.813 VARICOSE VEINS OF BOTH LOWER EXTREMITIES WITH PAIN: ICD-10-CM

## 2025-06-25 DIAGNOSIS — E66.813 CLASS 3 SEVERE OBESITY DUE TO EXCESS CALORIES WITHOUT SERIOUS COMORBIDITY WITH BODY MASS INDEX (BMI) OF 45.0 TO 49.9 IN ADULT: ICD-10-CM

## 2025-06-25 DIAGNOSIS — E78.5 HYPERLIPIDEMIA ASSOCIATED WITH TYPE 2 DIABETES MELLITUS: ICD-10-CM

## 2025-06-25 DIAGNOSIS — I87.2 CHRONIC VENOUS STASIS DERMATITIS OF BOTH LOWER EXTREMITIES: ICD-10-CM

## 2025-06-25 DIAGNOSIS — E66.01 CLASS 3 SEVERE OBESITY DUE TO EXCESS CALORIES WITHOUT SERIOUS COMORBIDITY WITH BODY MASS INDEX (BMI) OF 45.0 TO 49.9 IN ADULT: ICD-10-CM

## 2025-06-25 DIAGNOSIS — E11.69 HYPERLIPIDEMIA ASSOCIATED WITH TYPE 2 DIABETES MELLITUS: ICD-10-CM

## 2025-06-25 DIAGNOSIS — I15.2 HYPERTENSION ASSOCIATED WITH DIABETES: Primary | ICD-10-CM

## 2025-06-25 DIAGNOSIS — E03.9 ACQUIRED HYPOTHYROIDISM: Chronic | ICD-10-CM

## 2025-06-25 DIAGNOSIS — G47.33 OSA ON CPAP: ICD-10-CM

## 2025-06-25 DIAGNOSIS — E11.59 HYPERTENSION ASSOCIATED WITH DIABETES: Primary | ICD-10-CM

## 2025-06-25 DIAGNOSIS — E11.9 TYPE 2 DIABETES MELLITUS WITHOUT COMPLICATION, WITHOUT LONG-TERM CURRENT USE OF INSULIN: ICD-10-CM

## 2025-06-25 DIAGNOSIS — R00.2 PALPITATIONS: ICD-10-CM

## 2025-06-25 PROCEDURE — 3072F LOW RISK FOR RETINOPATHY: CPT | Mod: CPTII,S$GLB,,

## 2025-06-25 PROCEDURE — 3075F SYST BP GE 130 - 139MM HG: CPT | Mod: CPTII,S$GLB,,

## 2025-06-25 PROCEDURE — G2211 COMPLEX E/M VISIT ADD ON: HCPCS | Mod: S$GLB,,,

## 2025-06-25 PROCEDURE — 1160F RVW MEDS BY RX/DR IN RCRD: CPT | Mod: CPTII,S$GLB,,

## 2025-06-25 PROCEDURE — 3079F DIAST BP 80-89 MM HG: CPT | Mod: CPTII,S$GLB,,

## 2025-06-25 PROCEDURE — 99999 PR PBB SHADOW E&M-EST. PATIENT-LVL III: CPT | Mod: PBBFAC,,,

## 2025-06-25 PROCEDURE — 4010F ACE/ARB THERAPY RXD/TAKEN: CPT | Mod: CPTII,S$GLB,,

## 2025-06-25 PROCEDURE — 99214 OFFICE O/P EST MOD 30 MIN: CPT | Mod: S$GLB,,,

## 2025-06-25 PROCEDURE — 1159F MED LIST DOCD IN RCRD: CPT | Mod: CPTII,S$GLB,,

## 2025-06-25 PROCEDURE — 3008F BODY MASS INDEX DOCD: CPT | Mod: CPTII,S$GLB,,

## 2025-06-25 RX ORDER — VERAPAMIL HYDROCHLORIDE 40 MG/1
40 TABLET ORAL DAILY
Qty: 90 TABLET | Refills: 3 | Status: SHIPPED | OUTPATIENT
Start: 2025-06-25 | End: 2026-06-25

## 2025-06-25 NOTE — ASSESSMENT & PLAN NOTE
Goal LDL < 100, last .8 5/23/24  -uncontrolled   Compliant w/ meds and h/o myalgias w/ atorvastatin.  - continue medical therapy- pravastatin 40 mg    - risk factor and lifestyle modifications    -repeat Lipid panel

## 2025-06-25 NOTE — ASSESSMENT & PLAN NOTE
-48 HR Holter 5/31/25  Interpretation Summary  Show Result Comparison     The predominant rhythm is sinus.    Minimum heart rate 56 maximal 167.  Average heart rate 83 beats per minute.    Total PVC burden 0.3%.  Total PAC burden 2.8%.    No atrial fibrillation    Longest RR interval 1.6 seconds.    One long run of atrial tachycardia  for 91 beats at 171 beats per minute.    No patient reported symptoms    -continue verapamil 40 mg daily

## 2025-06-25 NOTE — ASSESSMENT & PLAN NOTE
Body mass index is 47.45 kg/m². Morbid obesity complicates all aspects of disease management from diagnostic modalities to treatment. Weight loss encouraged and health benefits explained to patient.  -Currently on Mounjaro   -followed by Bariatric Medicine   -patient reports total 70 pound weight loss

## 2025-06-25 NOTE — PROGRESS NOTES
"Subjective:    Patient ID:  Yulissa Hatfield is a 64 y.o. female who presents for follow-up of No chief complaint on file.      PCP: Emilia Velasquez MD     Referring Provider:     HPI Patient is a 65 yo F w/ PMH of HTN, HLD, DANIELLE on CPAP, morbid obesity and DM2 who presents today for f/u appt, HTN management. Patient was last seen on 4/28/25  to establish care and HTN management. She was previously followed by Cardiology and was last seen by Dr. Abbasi on 5/11/2023 for f/u of chronic venous stasis dermatitis. She reported not wearing the knee high compression stockings for her venous insufficiency. Patient also noted episodes of palpitations. 48 HR Holter completed and revealed episode of atrial tachycardia. Patient  was started on verapamil 40 mg daily.     Patient has not started the verapamil. She reports she has decreased her caffeine intake.   She denies pain of her BLE. She denies cp, orthopnea, PND, presyncope, LOC, and claudication. She notes compliance w/ meds and denies side effects. She is enrolled in digital medicine, last reading 5/15/25 /74 w HR 80. She does not exercise, was previously attending at the [employer] [place] doing water aerobics etc.       4/28/25: Patient presents today to establish care and HTN management. She was previously followed by Cardiology and was last seen by Dr. Abbasi on 5/11/2023 for f/u of chronic venous stasis dermatitis. Patient mentioned that she has stasis dermatitis and BLE varicose veins x2 yrs and had a venous doppler which noted L saphenous vein insufficiency. She had a venous doppler which noted reflux in her R great saphenous and anterior accessory saphenous vein noted by an outside facility.    Patient reports episodes of palpitations " fluttering or heart speeding up" at rest lasting about 1 minute for the past month.     She mentions that she has been doing well, but has not been wearing the knee high compression stockings for her venous " insufficiency. She denies pain of her BLE. She denies cp, orthopnea, PND, presyncope, LOC, and claudication. She notes compliance w/ meds and denies side effects. She is enrolled in digital medicine, but had misplaced her monitor and last reading was 2/3/2025.  She does not exercise, was previously attending at the [employer] [place] doing water aerobics etc. She also notes increased salt intake.     Past Medical History:   Diagnosis Date    Generalized anxiety disorder with panic attacks     HTN (hypertension)     Hyperlipidemia     Hypothyroid     DANIELLE on CPAP     Type 2 diabetes mellitus      Past Surgical History:   Procedure Laterality Date    CARPAL TUNNEL RELEASE       SECTION, CLASSIC      x 3    COLONOSCOPY N/A 11/10/2021    Procedure: COLONOSCOPY;  Surgeon: Kayla Pope MD;  Location: [facility] ENDO;  Service: Endoscopy;  Laterality: N/A;    ENDOMETRIAL ABLATION      ESOPHAGOGASTRODUODENOSCOPY N/A 11/10/2021    Procedure: EGD (ESOPHAGOGASTRODUODENOSCOPY);  Surgeon: Kayla Pope MD;  Location: [facility] ENDO;  Service: Endoscopy;  Laterality: N/A;    FOOT SURGERY      HYSTERECTOMY  2002    Endometrial Ablation    INJECTION OF JOINT Left 2022    Procedure: Injection, Joint, Shoulder, Hip, Or Knee;  Surgeon: Sowmya Tellez MD;  Location: [facility] PAIN [facility];  Service: Pain Management;  Laterality: Left;  Left Glenohumeral Joint Injection    INJECTION OF JOINT Left 2024    Procedure: Left glenohumeral joint injection under fluoroscopic guidance;  Surgeon: Kristina Campbell DO;  Location: [facility] PAIN MANAGEMENT;  Service: Pain Management;  Laterality: Left;  oral-20mins    KNEE SURGERY      THYROID SURGERY       Social History[1]  Family History   Problem Relation Name Age of Onset    Heart disease Mother      Kidney disease Mother      Heart disease Father      No Known Problems Sister      Heart disease Brother      No Known Problems Daughter      No  Known Problems Son      No Known Problems Daughter      No Known Problems Daughter      Colon cancer Neg Hx      Esophageal cancer Neg Hx         Review of patient's allergies indicates:  No Known Allergies    Medication List with Changes/Refills   New Medications    VERAPAMIL (CALAN) 40 MG TAB    Take 1 tablet (40 mg total) by mouth once daily.   Current Medications    ACETAMINOPHEN (TYLENOL) 500 MG TABLET    Take 1,000 mg by mouth every 6 (six) hours as needed for Pain.    CLOBETASOL (TEMOVATE) 0.05 % CREAM    APPLY 1 APPLICATION TOPICALLY TO AFFECTED AREA TWICE A DAY    DESONIDE (DESOWEN) 0.05 % LOTION        HYDROCORTISONE 2.5 % CREAM        LEVOTHYROXINE (SYNTHROID) 112 MCG TABLET    Take 1 tablet (112 mcg total) by mouth before breakfast.    MELOXICAM (MOBIC) 7.5 MG TABLET    TAKE 1 TABLET (7.5 MG TOTAL) BY MOUTH DAILY AS NEEDED FOR PAIN (TAKE WITH MEALS).    PRAVASTATIN (PRAVACHOL) 40 MG TABLET    TAKE 1 TABLET BY MOUTH EVERY DAY    SOOLANTRA 1 % CREA        TELMISARTAN (MICARDIS) 80 MG TAB    Take 1 tablet (80 mg total) by mouth once daily.    TIRZEPATIDE (MOUNJARO) 15 MG/0.5 ML PNIJ    INJECT 1 PEN UNDER THE SKIN ONCE PER WEEK    TOPIRAMATE (TOPAMAX) 50 MG TABLET    Take 1-2 tablets ( mg total) by mouth every evening.   Discontinued Medications    VERAPAMIL (CALAN) 80 MG TABLET    Take 1 tablet (80 mg total) by mouth once daily.       Review of Systems   Constitutional: Negative for diaphoresis and fever.   HENT:  Negative for congestion and hearing loss.    Eyes:  Negative for blurred vision and pain.   Cardiovascular:  Positive for leg swelling and palpitations. Negative for chest pain, claudication, dyspnea on exertion, near-syncope and syncope.   Respiratory:  Negative for shortness of breath and sleep disturbances due to breathing.    Hematologic/Lymphatic: Negative for bleeding problem. Does not bruise/bleed easily.   Skin:  Negative for color change and poor wound healing.   Gastrointestinal:   "Negative for abdominal pain and nausea.   Genitourinary:  Negative for bladder incontinence and flank pain.   Neurological:  Negative for focal weakness and light-headedness.        Objective:   /85 (BP Location: Left arm, Patient Position: Sitting)   Pulse 85   Ht 5' 4" (1.626 m)   Wt 125.4 kg (276 lb 7.3 oz)   LMP 03/25/2006   SpO2 98%   BMI 47.45 kg/m²    Physical Exam  Constitutional:       Appearance: She is well-developed. She is obese. She is not diaphoretic.   HENT:      Head: Normocephalic and atraumatic.   Eyes:      General: No scleral icterus.     Pupils: Pupils are equal, round, and reactive to light.   Neck:      Vascular: No JVD.   Cardiovascular:      Rate and Rhythm: Normal rate and regular rhythm.      Pulses: Intact distal pulses.           Radial pulses are 2+ on the right side and 2+ on the left side.        Dorsalis pedis pulses are 2+ on the right side and 2+ on the left side.        Posterior tibial pulses are 2+ on the right side and 2+ on the left side.      Heart sounds: S1 normal and S2 normal. No murmur heard.     No friction rub. No gallop.   Pulmonary:      Effort: Pulmonary effort is normal. No respiratory distress.      Breath sounds: Normal breath sounds. No wheezing or rales.   Chest:      Chest wall: No tenderness.   Abdominal:      General: Bowel sounds are normal. There is no distension.      Palpations: Abdomen is soft. There is no mass.      Tenderness: There is no abdominal tenderness. There is no rebound.   Musculoskeletal:         General: No tenderness. Normal range of motion.      Cervical back: Normal range of motion and neck supple.      Right lower leg: Swelling present.      Left lower leg: Swelling present.      Comments: BLE varicose veins    Skin:     General: Skin is warm and dry.      Coloration: Skin is not pale.   Neurological:      Mental Status: She is alert and oriented to person, place, and time.      Coordination: Coordination normal.      Deep " Tendon Reflexes: Reflexes normal.   Psychiatric:         Behavior: Behavior normal.         Judgment: Judgment normal.           48 HR Holter 5/15/2025  Interpretation Summary    The predominant rhythm is sinus.    Minimum heart rate 56 maximal 167.  Average heart rate 83 beats per minute.    Total PVC burden 0.3%.  Total PAC burden 2.8%.    No atrial fibrillation    Longest RR interval 1.6 seconds.    One long run of atrial tachycardia  for 91 beats at 171 beats per minute.    No patient reported symptoms      Venous Doppler: 3/3/2023- reviewed.    Conclusion     There is no evidence of a right lower extremity DVT.  There is no evidence of a left lower extremity DVT.  The left greater saphenous vein has reflux around the calf  This was a technically difficult study     Cardiac Echo: 2/2/2023- reviewed.    Summary  The left ventricle is normal in size with concentric remodeling and normal systolic function.  The estimated ejection fraction is 65%.  Grade I left ventricular diastolic dysfunction.  Mild right ventricular enlargement with normal right ventricular systolic function.  Mild left atrial enlargement.  Mild right atrial enlargement.  Normal central venous pressure (3 mmHg).  The estimated PA systolic pressure is 8 mmHg.     Exercise echo stress test: 1/14/2021- reviewed.    Summary  There were no arrhythmias during stress.  Concentric remodeling and normal systolic function. The estimated ejection fraction is 55%  Normal left ventricular diastolic function.  The stress echo portion of this study is negative for myocardial ischemia.       Assessment:       1. Hypertension associated with diabetes    2. Hyperlipidemia associated with type 2 diabetes mellitus    3. Palpitations    4. Varicose veins of both lower extremities with pain    5. Chronic venous stasis dermatitis of both lower extremities    6. Class 3 severe obesity due to excess calories without serious comorbidity with body mass index (BMI) of 45.0  to 49.9 in adult    7. Acquired hypothyroidism    8. Type 2 diabetes mellitus without complication, without long-term current use of insulin    9. DANIELLE on CPAP         Plan:         Hypertension associated with diabetes  Goal BP < 130/80.  Compliant w/ meds.    -A1c 5.3 10/25/24  -controlled   - continue medical therapy: telmisartan 80 mg    - pt to monitor BP at home and record  - enrolling in dig med prgm  - risk factor and lifestyle modifications     Hyperlipidemia associated with type 2 diabetes mellitus  Goal LDL < 100, last .8 5/23/24  -uncontrolled   Compliant w/ meds and h/o myalgias w/ atorvastatin.  - continue medical therapy- pravastatin 40 mg    - risk factor and lifestyle modifications    -repeat Lipid panel     Palpitations  -48 HR Holter 5/31/25  Interpretation Summary  Show Result Comparison     The predominant rhythm is sinus.    Minimum heart rate 56 maximal 167.  Average heart rate 83 beats per minute.    Total PVC burden 0.3%.  Total PAC burden 2.8%.    No atrial fibrillation    Longest RR interval 1.6 seconds.    One long run of atrial tachycardia  for 91 beats at 171 beats per minute.    No patient reported symptoms    -continue verapamil 40 mg daily     Varicose veins of both lower extremities with pain  Pt noted to have BLE reflux on outside venous doppler and has comorbidity of MO.    - knee high compression stockings  non compliant   -Reorder as patient has lost weight compression- Circaid wraps   - see echo and venous insufficiency study results   - risk factor and lifestyle modifications     Chronic venous stasis dermatitis of both lower extremities  Pt noted to have BLE reflux on outside venous doppler and has comorbidity of MO.    - knee high compression stockings  non compliant   -Reorder as patient has lost weight   - see echo and venous insufficiency study results   - risk factor and lifestyle modifications     Class 3 severe obesity due to excess calories without serious  comorbidity with body mass index (BMI) of 45.0 to 49.9 in adult  Body mass index is 47.45 kg/m². Morbid obesity complicates all aspects of disease management from diagnostic modalities to treatment. Weight loss encouraged and health benefits explained to patient.  -Currently on Mounjaro   -followed by Bariatric Medicine   -patient reports total 70 pound weight loss        Hypothyroidism  -Managed by PCP  -continue medical therapy- synthroid     Type 2 diabetes mellitus without complication, without long-term current use of insulin  -Gaol A1c <7%  -A1c 5.3 10/25/24  -enrolled in digital medicine   -continue medical therapy per PCP         DANIELLE on CPAP  Compliant w/ CPA nightly.      Total duration of face to face visit time 30 minutes.  Total time spent counseling greater than fifty percent of total visit time.  Counseling included discussion regarding imaging findings, diagnosis, possibilities, treatment options, risks and benefits.  The patient had many questions regarding the options and long-term effects      Malachi Chavez, VICTOR M  [facility]                   [1]   Social History  Socioeconomic History    Marital status:    Tobacco Use    Smoking status: Never     Passive exposure: Never    Smokeless tobacco: Never   Substance and Sexual Activity    Alcohol use: No    Drug use: No    Sexual activity: Not Currently     Partners: Male     Birth control/protection: Other-see comments     Comment: Endometriel Ablation     Social Drivers of Health     Financial Resource Strain: Low Risk  (4/27/2025)    Overall Financial Resource Strain (CARDIA)     Difficulty of Paying Living Expenses: Not hard at all   Food Insecurity: No Food Insecurity (4/27/2025)    Hunger Vital Sign     Worried About Running Out of Food in the Last Year: Never true     Ran Out of Food in the Last Year: Never true   Transportation Needs: No Transportation Needs (4/27/2025)    PRAPARE - Transportation     Lack of Transportation (Medical):  No     Lack of Transportation (Non-Medical): No   Physical Activity: Insufficiently Active (4/27/2025)    Exercise Vital Sign     Days of Exercise per Week: 2 days     Minutes of Exercise per Session: 20 min   Stress: No Stress Concern Present (4/27/2025)    [employer] [place] [employer] - Occupational Stress Questionnaire     Feeling of Stress : Only a little   Housing Stability: Low Risk  (4/27/2025)    Housing Stability Vital Sign     Unable to Pay for Housing in the Last Year: No     Number of Times Moved in the Last Year: 0     Homeless in the Last Year: No      Sepsis

## 2025-06-25 NOTE — ASSESSMENT & PLAN NOTE
Pt noted to have BLE reflux on outside venous doppler and has comorbidity of MO.    - knee high compression stockings  non compliant   -Reorder as patient has lost weight compression- Circaid wraps   - see echo and venous insufficiency study results   - risk factor and lifestyle modifications

## 2025-07-16 DIAGNOSIS — E11.9 TYPE 2 DIABETES MELLITUS WITHOUT COMPLICATION, WITHOUT LONG-TERM CURRENT USE OF INSULIN: ICD-10-CM

## 2025-07-16 DIAGNOSIS — M19.90 ARTHRITIS: ICD-10-CM

## 2025-07-16 RX ORDER — TIRZEPATIDE 15 MG/.5ML
INJECTION, SOLUTION SUBCUTANEOUS
Qty: 12 PEN | Refills: 0 | Status: SHIPPED | OUTPATIENT
Start: 2025-07-16

## 2025-07-16 RX ORDER — MELOXICAM 7.5 MG/1
7.5 TABLET ORAL DAILY PRN
Qty: 90 TABLET | Refills: 1 | Status: SHIPPED | OUTPATIENT
Start: 2025-07-16

## 2025-07-16 NOTE — TELEPHONE ENCOUNTER
Refill Routing Note   Medication(s) are not appropriate for processing by [facility] for the following reason(s):        Outside of protocol    [facility] action(s):  Route   Requires labs : Yes      Medication Therapy Plan: FOVS      Appointments  past 12m or future 3m with PCP    Date Provider   Last Visit   12/12/2024 Emilia Velasquez MD   Next Visit   8/1/2025 Emilia Velasquez MD   ED visits in past 90 days: 0        Note composed:7:46 AM 07/16/2025

## 2025-07-16 NOTE — TELEPHONE ENCOUNTER
Care Due:                  Date            Visit Type   Department     Provider  --------------------------------------------------------------------------------                                MYCHART                              ANNUAL       [facility]                              CHECKUP/PHY  MEDICINE /  Last Visit: 12-      S            INTERNAL MED   Emilia Velasquez                              EP -         [facility]                              PRIMARY      MEDICINE /  Next Visit: 08-      CARE ([facility])   INTERNAL MED   Emilia Velasquez                                                            Last  Test          Frequency    Reason                     Performed    Due Date  --------------------------------------------------------------------------------    Lipid Panel.  12 months..  pravastatin..............  05- 05-    Health [facility] Embedded Care Due Messages. Reference number: [id number].   7/16/2025 12:12:38 AM CDT

## 2025-07-16 NOTE — TELEPHONE ENCOUNTER
Refill Routing Note   Medication(s) are not appropriate for processing by [facility] for the following reason(s):        Required labs outdated    [facility] action(s):  Defer             Appointments  past 12m or future 3m with PCP    Date Provider   Last Visit   12/12/2024 Emilia Velasquze MD   Next Visit   8/1/2025 Emilia Velasquez MD   ED visits in past 90 days: 0        Note composed:2:34 PM 07/16/2025

## 2025-07-16 NOTE — TELEPHONE ENCOUNTER
No care due was identified.  Health [facility] Embedded Care Due Messages. Reference number: [id number].   7/16/2025 11:58:12 AM CDT

## 2025-07-24 ENCOUNTER — PATIENT MESSAGE (OUTPATIENT)
Dept: FAMILY MEDICINE | Facility: CLINIC | Age: 65
End: 2025-07-24
Payer: COMMERCIAL

## 2025-08-21 ENCOUNTER — OFFICE VISIT (OUTPATIENT)
Dept: BARIATRICS | Facility: CLINIC | Age: 65
End: 2025-08-21
Payer: COMMERCIAL

## 2025-08-21 VITALS
WEIGHT: 272.81 LBS | DIASTOLIC BLOOD PRESSURE: 81 MMHG | OXYGEN SATURATION: 99 % | SYSTOLIC BLOOD PRESSURE: 136 MMHG | BODY MASS INDEX: 46.57 KG/M2 | HEIGHT: 64 IN

## 2025-08-21 DIAGNOSIS — E66.813 CLASS 3 SEVERE OBESITY WITH SERIOUS COMORBIDITY AND BODY MASS INDEX (BMI) OF 45.0 TO 49.9 IN ADULT, UNSPECIFIED OBESITY TYPE: Primary | ICD-10-CM

## 2025-08-21 DIAGNOSIS — E11.9 TYPE 2 DIABETES MELLITUS WITHOUT COMPLICATION, WITHOUT LONG-TERM CURRENT USE OF INSULIN: ICD-10-CM

## 2025-08-21 PROCEDURE — 1160F RVW MEDS BY RX/DR IN RCRD: CPT | Mod: CPTII,S$GLB,, | Performed by: INTERNAL MEDICINE

## 2025-08-21 PROCEDURE — 99999 PR PBB SHADOW E&M-EST. PATIENT-LVL IV: CPT | Mod: PBBFAC,,, | Performed by: INTERNAL MEDICINE

## 2025-08-21 PROCEDURE — 1159F MED LIST DOCD IN RCRD: CPT | Mod: CPTII,S$GLB,, | Performed by: INTERNAL MEDICINE

## 2025-08-21 PROCEDURE — 4010F ACE/ARB THERAPY RXD/TAKEN: CPT | Mod: CPTII,S$GLB,, | Performed by: INTERNAL MEDICINE

## 2025-08-21 PROCEDURE — 3075F SYST BP GE 130 - 139MM HG: CPT | Mod: CPTII,S$GLB,, | Performed by: INTERNAL MEDICINE

## 2025-08-21 PROCEDURE — 3066F NEPHROPATHY DOC TX: CPT | Mod: CPTII,S$GLB,, | Performed by: INTERNAL MEDICINE

## 2025-08-21 PROCEDURE — 3079F DIAST BP 80-89 MM HG: CPT | Mod: CPTII,S$GLB,, | Performed by: INTERNAL MEDICINE

## 2025-08-21 PROCEDURE — 3061F NEG MICROALBUMINURIA REV: CPT | Mod: CPTII,S$GLB,, | Performed by: INTERNAL MEDICINE

## 2025-08-21 PROCEDURE — 3008F BODY MASS INDEX DOCD: CPT | Mod: CPTII,S$GLB,, | Performed by: INTERNAL MEDICINE

## 2025-08-21 PROCEDURE — 3072F LOW RISK FOR RETINOPATHY: CPT | Mod: CPTII,S$GLB,, | Performed by: INTERNAL MEDICINE

## 2025-08-21 PROCEDURE — 99212 OFFICE O/P EST SF 10 MIN: CPT | Mod: S$GLB,,, | Performed by: INTERNAL MEDICINE

## 2025-08-21 PROCEDURE — 3044F HG A1C LEVEL LT 7.0%: CPT | Mod: CPTII,S$GLB,, | Performed by: INTERNAL MEDICINE

## 2025-08-21 RX ORDER — TOPIRAMATE 50 MG/1
50-100 TABLET, FILM COATED ORAL NIGHTLY
Qty: 180 TABLET | Refills: 1 | Status: SHIPPED | OUTPATIENT
Start: 2025-08-21 | End: 2026-08-21

## 2025-08-28 ENCOUNTER — TELEPHONE (OUTPATIENT)
Dept: FAMILY MEDICINE | Facility: CLINIC | Age: 65
End: 2025-08-28

## 2025-08-28 ENCOUNTER — OFFICE VISIT (OUTPATIENT)
Dept: FAMILY MEDICINE | Facility: CLINIC | Age: 65
End: 2025-08-28
Payer: COMMERCIAL

## 2025-08-28 VITALS
HEART RATE: 69 BPM | TEMPERATURE: 98 F | OXYGEN SATURATION: 97 % | WEIGHT: 274.06 LBS | SYSTOLIC BLOOD PRESSURE: 122 MMHG | BODY MASS INDEX: 47.04 KG/M2 | DIASTOLIC BLOOD PRESSURE: 72 MMHG

## 2025-08-28 DIAGNOSIS — Z71.2 ENCOUNTER TO DISCUSS TEST RESULTS: ICD-10-CM

## 2025-08-28 DIAGNOSIS — E78.5 HYPERLIPIDEMIA ASSOCIATED WITH TYPE 2 DIABETES MELLITUS: ICD-10-CM
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
## 2025-08-28 DIAGNOSIS — M25.551 RIGHT HIP PAIN: ICD-10-CM

## 2025-08-28 DIAGNOSIS — M54.2 NECK PAIN ON RIGHT SIDE: ICD-10-CM

## 2025-08-28 DIAGNOSIS — E11.69 HYPERLIPIDEMIA ASSOCIATED WITH TYPE 2 DIABETES MELLITUS: ICD-10-CM

## 2025-08-28 DIAGNOSIS — Z00.00 ANNUAL PHYSICAL EXAM: Primary | ICD-10-CM

## 2025-08-28 DIAGNOSIS — E03.9 HYPOTHYROIDISM, UNSPECIFIED TYPE: Chronic | ICD-10-CM

## 2025-08-28 DIAGNOSIS — M54.50 ACUTE RIGHT-SIDED LOW BACK PAIN WITHOUT SCIATICA: ICD-10-CM

## 2025-08-28 PROCEDURE — 3044F HG A1C LEVEL LT 7.0%: CPT | Mod: CPTII,S$GLB,, | Performed by: INTERNAL MEDICINE

## 2025-08-28 PROCEDURE — 1159F MED LIST DOCD IN RCRD: CPT | Mod: CPTII,S$GLB,, | Performed by: INTERNAL MEDICINE

## 2025-08-28 PROCEDURE — 3072F LOW RISK FOR RETINOPATHY: CPT | Mod: CPTII,S$GLB,, | Performed by: INTERNAL MEDICINE

## 2025-08-28 PROCEDURE — 3066F NEPHROPATHY DOC TX: CPT | Mod: CPTII,S$GLB,, | Performed by: INTERNAL MEDICINE

## 2025-08-28 PROCEDURE — 3074F SYST BP LT 130 MM HG: CPT | Mod: CPTII,S$GLB,, | Performed by: INTERNAL MEDICINE

## 2025-08-28 PROCEDURE — 4010F ACE/ARB THERAPY RXD/TAKEN: CPT | Mod: CPTII,S$GLB,, | Performed by: INTERNAL MEDICINE

## 2025-08-28 PROCEDURE — 99396 PREV VISIT EST AGE 40-64: CPT | Mod: S$GLB,,, | Performed by: INTERNAL MEDICINE

## 2025-08-28 PROCEDURE — 99999 PR PBB SHADOW E&M-EST. PATIENT-LVL III: CPT | Mod: PBBFAC,,, | Performed by: INTERNAL MEDICINE

## 2025-08-28 PROCEDURE — 1160F RVW MEDS BY RX/DR IN RCRD: CPT | Mod: CPTII,S$GLB,, | Performed by: INTERNAL MEDICINE

## 2025-08-28 PROCEDURE — 3008F BODY MASS INDEX DOCD: CPT | Mod: CPTII,S$GLB,, | Performed by: INTERNAL MEDICINE

## 2025-08-28 PROCEDURE — 3061F NEG MICROALBUMINURIA REV: CPT | Mod: CPTII,S$GLB,, | Performed by: INTERNAL MEDICINE

## 2025-08-28 PROCEDURE — 3078F DIAST BP <80 MM HG: CPT | Mod: CPTII,S$GLB,, | Performed by: INTERNAL MEDICINE

## 2025-08-28 RX ORDER — PRAVASTATIN SODIUM 40 MG/1
40 TABLET ORAL DAILY
Qty: 90 TABLET | Refills: 3 | Status: SHIPPED | OUTPATIENT
Start: 2025-08-28

## 2025-08-28 RX ORDER — LEVOTHYROXINE SODIUM 112 UG/1
112 TABLET ORAL
Qty: 90 TABLET | Refills: 3 | Status: SHIPPED | OUTPATIENT
Start: 2025-08-28

## (undated) DEVICE — DRESSING LEUKOPLAST FLEX 1X3IN